# Patient Record
Sex: MALE | Race: BLACK OR AFRICAN AMERICAN | NOT HISPANIC OR LATINO | Employment: UNEMPLOYED | URBAN - METROPOLITAN AREA
[De-identification: names, ages, dates, MRNs, and addresses within clinical notes are randomized per-mention and may not be internally consistent; named-entity substitution may affect disease eponyms.]

---

## 2017-01-07 DIAGNOSIS — Z79.899 ENCOUNTER FOR LONG-TERM (CURRENT) USE OF MEDICATIONS: ICD-10-CM

## 2017-01-07 DIAGNOSIS — Z94.1 HEART TRANSPLANTED: ICD-10-CM

## 2017-01-07 DIAGNOSIS — K21.9 GASTROESOPHAGEAL REFLUX DISEASE, ESOPHAGITIS PRESENCE NOT SPECIFIED: ICD-10-CM

## 2017-01-07 DIAGNOSIS — Z94.1 STATUS POST HEART TRANSPLANT: ICD-10-CM

## 2017-01-07 DIAGNOSIS — T86.20 COMPLICATION OF HEART TRANSPLANT, UNSPECIFIED COMPLICATION: ICD-10-CM

## 2017-01-07 DIAGNOSIS — L28.0 LSC (LICHEN SIMPLEX CHRONICUS): ICD-10-CM

## 2017-01-07 DIAGNOSIS — D84.9 IMMUNOSUPPRESSION: ICD-10-CM

## 2017-01-08 RX ORDER — TRIAMCINOLONE ACETONIDE 1 MG/G
CREAM TOPICAL
Qty: 45 G | Refills: 1 | Status: SHIPPED | OUTPATIENT
Start: 2017-01-08 | End: 2017-03-07 | Stop reason: SDUPTHER

## 2017-01-09 RX ORDER — PRAVASTATIN SODIUM 20 MG/1
TABLET ORAL
Qty: 30 TABLET | Refills: 11 | Status: SHIPPED | OUTPATIENT
Start: 2017-01-09 | End: 2017-12-13 | Stop reason: SDUPTHER

## 2017-01-30 DIAGNOSIS — Z79.52 LONG TERM CURRENT USE OF SYSTEMIC STEROIDS: ICD-10-CM

## 2017-01-30 DIAGNOSIS — T86.20 COMPLICATION OF HEART TRANSPLANT, UNSPECIFIED COMPLICATION: ICD-10-CM

## 2017-01-30 DIAGNOSIS — R06.02 SHORTNESS OF BREATH: ICD-10-CM

## 2017-01-30 DIAGNOSIS — Z94.1 STATUS POST HEART TRANSPLANT: ICD-10-CM

## 2017-01-30 DIAGNOSIS — Z94.1 HEART REPLACED BY TRANSPLANT: Primary | ICD-10-CM

## 2017-01-30 DIAGNOSIS — Z79.899 ENCOUNTER FOR LONG-TERM (CURRENT) USE OF MEDICATIONS: ICD-10-CM

## 2017-01-30 DIAGNOSIS — D84.9 IMMUNOSUPPRESSION: ICD-10-CM

## 2017-01-30 DIAGNOSIS — N28.9 RENAL INSUFFICIENCY: ICD-10-CM

## 2017-01-30 DIAGNOSIS — I15.9 SECONDARY HYPERTENSION: ICD-10-CM

## 2017-01-30 DIAGNOSIS — E78.2 MIXED HYPERLIPIDEMIA: ICD-10-CM

## 2017-02-21 DIAGNOSIS — D84.9 IMMUNOSUPPRESSION: ICD-10-CM

## 2017-02-21 DIAGNOSIS — Z94.1 STATUS POST HEART TRANSPLANT: ICD-10-CM

## 2017-02-21 DIAGNOSIS — Z94.1 HEART TRANSPLANTED: ICD-10-CM

## 2017-02-21 DIAGNOSIS — T86.20 COMPLICATION OF HEART TRANSPLANT, UNSPECIFIED COMPLICATION: ICD-10-CM

## 2017-02-21 DIAGNOSIS — K21.9 GASTROESOPHAGEAL REFLUX DISEASE, ESOPHAGITIS PRESENCE NOT SPECIFIED: ICD-10-CM

## 2017-02-21 DIAGNOSIS — Z79.899 ENCOUNTER FOR LONG-TERM (CURRENT) USE OF MEDICATIONS: ICD-10-CM

## 2017-02-24 RX ORDER — TOPIRAMATE 25 MG/1
TABLET ORAL
Qty: 30 TABLET | Refills: 0 | Status: SHIPPED | OUTPATIENT
Start: 2017-02-24 | End: 2018-01-22

## 2017-03-07 DIAGNOSIS — Z94.1 HEART TRANSPLANTED: ICD-10-CM

## 2017-03-07 DIAGNOSIS — T86.20 COMPLICATION OF HEART TRANSPLANT, UNSPECIFIED COMPLICATION: ICD-10-CM

## 2017-03-07 DIAGNOSIS — D84.9 IMMUNOSUPPRESSION: ICD-10-CM

## 2017-03-07 DIAGNOSIS — K21.9 GASTROESOPHAGEAL REFLUX DISEASE, ESOPHAGITIS PRESENCE NOT SPECIFIED: ICD-10-CM

## 2017-03-07 DIAGNOSIS — Z79.899 ENCOUNTER FOR LONG-TERM (CURRENT) USE OF MEDICATIONS: ICD-10-CM

## 2017-03-07 DIAGNOSIS — L28.0 LSC (LICHEN SIMPLEX CHRONICUS): ICD-10-CM

## 2017-03-07 DIAGNOSIS — Z94.1 STATUS POST HEART TRANSPLANT: ICD-10-CM

## 2017-03-08 ENCOUNTER — PATIENT MESSAGE (OUTPATIENT)
Dept: TRANSPLANT | Facility: CLINIC | Age: 22
End: 2017-03-08

## 2017-03-08 RX ORDER — TRIAMCINOLONE ACETONIDE 1 MG/G
CREAM TOPICAL
Qty: 45 G | Refills: 1 | Status: SHIPPED | OUTPATIENT
Start: 2017-03-08 | End: 2018-09-07

## 2017-03-09 RX ORDER — DICYCLOMINE HYDROCHLORIDE 10 MG/1
CAPSULE ORAL
Qty: 60 CAPSULE | Refills: 1 | Status: SHIPPED | OUTPATIENT
Start: 2017-03-09 | End: 2017-05-08 | Stop reason: SDUPTHER

## 2017-03-09 RX ORDER — MYCOPHENOLIC ACID 360 MG/1
TABLET, DELAYED RELEASE ORAL
Qty: 240 TABLET | Refills: 3 | Status: SHIPPED | OUTPATIENT
Start: 2017-03-09 | End: 2017-12-13 | Stop reason: SDUPTHER

## 2017-04-03 ENCOUNTER — TELEPHONE (OUTPATIENT)
Dept: TRANSPLANT | Facility: CLINIC | Age: 22
End: 2017-04-03

## 2017-04-06 ENCOUNTER — TELEPHONE (OUTPATIENT)
Dept: TRANSPLANT | Facility: CLINIC | Age: 22
End: 2017-04-06

## 2017-04-10 ENCOUNTER — TELEPHONE (OUTPATIENT)
Dept: TRANSPLANT | Facility: CLINIC | Age: 22
End: 2017-04-10

## 2017-04-10 ENCOUNTER — PATIENT MESSAGE (OUTPATIENT)
Dept: TRANSPLANT | Facility: CLINIC | Age: 22
End: 2017-04-10

## 2017-04-10 NOTE — LETTER
[facility] [address] [place] [address]  Phone: [phone]   April 10, 2017    Derrick Montanez  [address] [address]          Dear Derrick Montanez:    We hope this letter finds you well.  This is a reminder to follow-up with your medical care.  I have been trying to get in touch with you to schedule your 6 month labs with appointment. We need to get you in asap, your 6 month follow up was due in March.    Please call to schedule your labs and doctor visit.    If you have any questions, please do not hesitate to call me at [phone].     Sincerely,         Breanna Lucio, RN  Post Heart Transplant Coordinator.    [facility]  [address] [place] [id number] [phone]

## 2017-04-10 NOTE — TELEPHONE ENCOUNTER
Letter sent to patient regarding unable to reach by phone to schedule his 6 month follow up with labs.

## 2017-04-15 DIAGNOSIS — T86.20 COMPLICATION OF HEART TRANSPLANT, UNSPECIFIED COMPLICATION: ICD-10-CM

## 2017-04-15 DIAGNOSIS — Z94.1 HEART TRANSPLANTED: ICD-10-CM

## 2017-04-15 DIAGNOSIS — Z94.1 STATUS POST HEART TRANSPLANT: ICD-10-CM

## 2017-04-15 DIAGNOSIS — K21.9 GASTROESOPHAGEAL REFLUX DISEASE, ESOPHAGITIS PRESENCE NOT SPECIFIED: ICD-10-CM

## 2017-04-15 DIAGNOSIS — D84.9 IMMUNOSUPPRESSION: ICD-10-CM

## 2017-04-15 DIAGNOSIS — Z79.899 ENCOUNTER FOR LONG-TERM (CURRENT) USE OF MEDICATIONS: ICD-10-CM

## 2017-04-15 RX ORDER — LISINOPRIL 5 MG/1
TABLET ORAL
Qty: 30 TABLET | Refills: 5 | Status: SHIPPED | OUTPATIENT
Start: 2017-04-15 | End: 2017-10-14 | Stop reason: SDUPTHER

## 2017-05-08 DIAGNOSIS — T86.20 COMPLICATION OF HEART TRANSPLANT, UNSPECIFIED COMPLICATION: ICD-10-CM

## 2017-05-08 DIAGNOSIS — D84.9 IMMUNOSUPPRESSION: ICD-10-CM

## 2017-05-08 DIAGNOSIS — Z94.1 STATUS POST HEART TRANSPLANT: ICD-10-CM

## 2017-05-08 DIAGNOSIS — Z94.1 HEART REPLACED BY TRANSPLANT: ICD-10-CM

## 2017-05-08 DIAGNOSIS — K21.9 GASTROESOPHAGEAL REFLUX DISEASE, ESOPHAGITIS PRESENCE NOT SPECIFIED: ICD-10-CM

## 2017-05-08 DIAGNOSIS — Z94.1 HEART TRANSPLANTED: ICD-10-CM

## 2017-05-08 DIAGNOSIS — Z79.899 ENCOUNTER FOR LONG-TERM (CURRENT) USE OF MEDICATIONS: ICD-10-CM

## 2017-05-08 RX ORDER — DICYCLOMINE HYDROCHLORIDE 10 MG/1
CAPSULE ORAL
Qty: 60 CAPSULE | Refills: 6 | Status: SHIPPED | OUTPATIENT
Start: 2017-05-08 | End: 2017-11-09 | Stop reason: SDUPTHER

## 2017-05-09 RX ORDER — TACROLIMUS 5 MG/1
5 CAPSULE, GELATIN COATED ORAL EVERY 12 HOURS
Qty: 60 CAPSULE | Refills: 6 | Status: SHIPPED | OUTPATIENT
Start: 2017-05-09 | End: 2018-01-22 | Stop reason: SDUPTHER

## 2017-05-09 RX ORDER — TACROLIMUS 1 MG/1
CAPSULE, GELATIN COATED ORAL
Qty: 120 CAPSULE | Refills: 6 | Status: SHIPPED | OUTPATIENT
Start: 2017-05-09 | End: 2017-05-09 | Stop reason: SDUPTHER

## 2017-05-09 RX ORDER — TACROLIMUS 1 MG/1
1 CAPSULE, GELATIN COATED ORAL EVERY 12 HOURS
Qty: 120 CAPSULE | Refills: 6 | Status: SHIPPED | OUTPATIENT
Start: 2017-05-09 | End: 2018-01-22 | Stop reason: SDUPTHER

## 2017-05-09 RX ORDER — TACROLIMUS 5 MG/1
CAPSULE, GELATIN COATED ORAL
Qty: 60 CAPSULE | Refills: 6 | Status: SHIPPED | OUTPATIENT
Start: 2017-05-09 | End: 2017-05-09 | Stop reason: SDUPTHER

## 2017-05-18 ENCOUNTER — HOSPITAL ENCOUNTER (OUTPATIENT)
Dept: CARDIOLOGY | Facility: CLINIC | Age: 22
Discharge: HOME | End: 2017-05-18
Payer: MEDICAID

## 2017-05-18 ENCOUNTER — OFFICE VISIT (OUTPATIENT)
Dept: TRANSPLANT | Facility: CLINIC | Age: 22
End: 2017-05-18
Payer: MEDICAID

## 2017-05-18 VITALS
HEIGHT: 64 IN | SYSTOLIC BLOOD PRESSURE: 154 MMHG | BODY MASS INDEX: 40.39 KG/M2 | WEIGHT: 236.56 LBS | HEART RATE: 90 BPM | DIASTOLIC BLOOD PRESSURE: 86 MMHG

## 2017-05-18 DIAGNOSIS — Z94.1 HEART REPLACED BY TRANSPLANT: ICD-10-CM

## 2017-05-18 DIAGNOSIS — D84.9 IMMUNOSUPPRESSION: ICD-10-CM

## 2017-05-18 DIAGNOSIS — F41.9 ANXIETY: ICD-10-CM

## 2017-05-18 DIAGNOSIS — Z79.52 LONG TERM (CURRENT) USE OF SYSTEMIC STEROIDS: ICD-10-CM

## 2017-05-18 DIAGNOSIS — K21.9 GASTROESOPHAGEAL REFLUX DISEASE, ESOPHAGITIS PRESENCE NOT SPECIFIED: ICD-10-CM

## 2017-05-18 DIAGNOSIS — Z94.1 HEART REPLACED BY TRANSPLANT: Primary | ICD-10-CM

## 2017-05-18 DIAGNOSIS — E66.01 OBESITY, CLASS III, BMI 40-49.9 (MORBID OBESITY): ICD-10-CM

## 2017-05-18 LAB
DIASTOLIC DYSFUNCTION: NO
ESTIMATED PA SYSTOLIC PRESSURE: 22.09
RETIRED EF AND QEF - SEE NOTES: 55 (ref 55–65)
TRICUSPID VALVE REGURGITATION: NORMAL

## 2017-05-18 PROCEDURE — 93320 DOPPLER ECHO COMPLETE: CPT | Mod: 26,S$PBB,, | Performed by: INTERNAL MEDICINE

## 2017-05-18 PROCEDURE — 99999 PR PBB SHADOW E&M-EST. PATIENT-LVL III: CPT | Mod: PBBFAC,,, | Performed by: PHYSICIAN ASSISTANT

## 2017-05-18 PROCEDURE — 93325 DOPPLER ECHO COLOR FLOW MAPG: CPT | Mod: 26,S$PBB,, | Performed by: INTERNAL MEDICINE

## 2017-05-18 PROCEDURE — 93351 STRESS TTE COMPLETE: CPT | Mod: 26,S$PBB,, | Performed by: INTERNAL MEDICINE

## 2017-05-18 PROCEDURE — 99213 OFFICE O/P EST LOW 20 MIN: CPT | Mod: PBBFAC | Performed by: PHYSICIAN ASSISTANT

## 2017-05-18 PROCEDURE — 99214 OFFICE O/P EST MOD 30 MIN: CPT | Mod: S$PBB,,, | Performed by: PHYSICIAN ASSISTANT

## 2017-05-18 RX ORDER — ESCITALOPRAM OXALATE 10 MG/1
10 TABLET ORAL DAILY
Qty: 30 TABLET | Refills: 2 | Status: SHIPPED | OUTPATIENT
Start: 2017-05-18 | End: 2017-08-29 | Stop reason: SDUPTHER

## 2017-05-18 NOTE — PROGRESS NOTES
"Subjective:   Mr. Montanez is a 21 y.o. year old Black or  male who received a  heart transplant on 3/17/09.      CMV status:   Donor: +  Recipient: +    HPI   22 y/o AAM who developed myocarditis at age 6, ultimately underwent transplant at [facility] on 3/17/2009, who shared care between [facility] and [facility] but now is following with us, here for routine follow-up visit 8-years post-transplant.    Patient remains on prednisone 2.5 mg despite being this far out, which per the [facility] snapshot is "due to clinic noncompliance and infrequent FU in [place]." Mother was on facetime throughout our visit as she couldn't be here today. Patient biggest concern today is getting off of prednisone. He feels that this has stunted his growth and is hopeful to grow 2 more inches. 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 He has never taken anything for this but would like to start something. Also has had intermittent cough since April which is associated with seasonal allergies. Cough improves on the day he takes zyrtec. He has not been exercising, but is planning to join a gym next Thursday. Has had a sleep study 4-5 years ago which he reports was negative for sleep apnea.  He does not have daytime sleepiness, difficulty concentrating, or sleeping. Mom denies that he snores.     He denies chest pain, shortness of breath, nausea, vomiting, diarrhea, fever, chills, abdominal pain or any other symptoms at this time.     Review of Systems   Constitution: Negative for chills, decreased appetite, diaphoresis, fever, weakness, malaise/fatigue, night sweats, weight gain and weight loss.   HENT: Negative for headaches.    Cardiovascular: Negative for chest pain, claudication, leg swelling, near-syncope, orthopnea and palpitations.   Respiratory: Negative for cough, hemoptysis, shortness of breath, sleep disturbances due to breathing, snoring, sputum production and " "wheezing.    Endocrine: Negative for cold intolerance and heat intolerance.   Musculoskeletal: Negative for gout, joint pain, joint swelling, muscle cramps, muscle weakness and myalgias.   Gastrointestinal: Negative for bloating, abdominal pain, melena, nausea and vomiting.   Genitourinary: Positive for flank pain. Negative for dysuria.   Neurological: Positive for tremors (reports secondary to anxiety). Negative for difficulty with concentration, excessive daytime sleepiness and dizziness.   Psychiatric/Behavioral: Negative for depression, memory loss, substance abuse and suicidal ideas. The patient is nervous/anxious. The patient does not have insomnia.        Objective:   Blood pressure (!) 154/86, pulse 90, height 5' 3.5" (1.613 m), weight 107.3 kg (236 lb 8.9 oz).body mass index is 41.25 kg/(m^2).    Physical Exam   Constitutional: He is oriented to person, place, and time. He appears well-developed and well-nourished.   obese   Neck: Normal range of motion. Neck supple. No JVD present.   Cardiovascular: Normal rate, regular rhythm, S1 normal and S2 normal.    No murmur heard.  Pulmonary/Chest: Effort normal and breath sounds normal. No respiratory distress. He has no wheezes. He has no rales.   Abdominal: Soft. Bowel sounds are normal. He exhibits no distension. There is no tenderness. There is no rebound.   Musculoskeletal: Normal range of motion. He exhibits no edema.   Neurological: He is alert and oriented to person, place, and time.   Skin: Skin is warm and dry. No rash noted. No erythema. No pallor.   Psychiatric: His mood appears anxious.       Lab Results   Component Value Date    WBC 11.14 05/18/2017    HGB 14.6 05/18/2017    HCT 44.3 05/18/2017    MCV 85 05/18/2017     05/18/2017    CO2 24 05/18/2017    CREATININE 1.0 05/18/2017    CALCIUM 9.3 05/18/2017    ALBUMIN 3.9 05/18/2017    AST 17 05/18/2017    BNP 51 05/18/2017    ALT 18 05/18/2017    ALLOMAP 27 10/07/2016       Lab Results " "  Component Value Date    INR 1.0 10/07/2016       BNP   Date Value Ref Range Status   05/18/2017 51 0 - 99 pg/mL Final     Comment:     Values of less than 100 pg/ml are consistent with non-CHF populations.   10/07/2016 23 0 - 99 pg/mL Final     Comment:     Values of less than 100 pg/ml are consistent with non-CHF populations.   07/14/2016 24 0 - 99 pg/mL Final     Comment:     Values of less than 100 pg/ml are consistent with non-CHF populations.       No results found for: LDH    Tacrolimus Lvl   Date Value Ref Range Status   05/18/2017 6.1 5.0 - 15.0 ng/mL Final     Comment:     Testing performed by Liquid Chromatography-Tandem  Mass Spectrometry (LC-MS/MS).  This test was developed and its performance characteristics  determined by [facility], Department of Pathology  and Laboratory Medicine in a manner consistent with CLIA  requirements. It has not been cleared or approved by the [place]  Food and Drug Administration.  This test is used for clinical   purposes.  It should not be regarded as investigational or for  research.       No results found for: SIROLIMUS  No results found for: CYCLOSPORINE    No results found for this or any previous visit.  No results found for this or any previous visit.    Labs were reviewed with the patient.    Assessment:     1. Heart replaced by transplant    2. Anxiety    3. Gastroesophageal reflux disease, esophagitis presence not specified    4. Immunosuppression    5. Obesity, Class III, BMI 40-49.9 (morbid obesity)    6. Long term (current) use of systemic steroids        Plan:     -Patient had exercise stress echo today and both patient and wife oncern immediately regarding the findings of "enlarged RV" and LVEF 55-60%. I reassured them that the report findings suggested only mild RV enlargement with normal function and that LVEF was still WNL. I reviewed the echo with Dr. Baron who also helped to reassure the patient and mother of the same.   -Discussed weight loss " and exercise at length are crucial to ensure continued overall good health and graft function. He plans to start exercising next week after joining the gym  -Would plan weaning prednisone from 2.5 to 1 mg as he has been on prednisone for many years to reduce withdrawal symptoms. Will await DSA and allomap  -BP elevated in clinic today but patient reports this is unusual for him, and earlier today it was 120/80  -Start lexapro 10 mg. Has a 12-lead EKG from today which can serve as a baseline (if concern for QT prolongation in the future). Will have transplant coordinator f/u via telephone in 1 week to ensure no side effects  - Per patient's mother's request will send clinic note to his PCP Dr. Chelsea Willard    Return instructions as set forth by post transplant schedule or as needed:    Clinic: Return for labs and/or biopsy weekly the first month, every two weeks during month 2 and then monthly for the first year at the provider or coordinator's discretion. During the second year, return to clinic every 3 months. Post transplant year 3-5 return every 6 months. There will be a comprehensive post transplant evaluation every year that may include LHC/RHC/biopsy, stress test, echo, CXR, and other health screening exams.    In addition to the clinical assessment, I have ordered Allomap testing for this patient to assist in identification of moderate/severe acute cellular rejection (ACR) in a pt with stable Allograft function instead of endomyocardial biopsy.     Patient is reminded to call with any health changes as these can be early signs of transplant complications. Patient is advised to make sure any new medications or changes of old medications are discussed with a pharmacist or physician knowledgeable with transplant to avoid rejection/drug toxicity related to significant drug interactions.    UNOS Patient Status  Functional Status: 100% - Normal, no complaints, no evidence of disease  Physical Capacity: No  Limitations  Working for Income: No  If no, reason not working: Patient Choice - Student Full Time/Part Time    WILMER McdowellC

## 2017-05-18 NOTE — PROGRESS NOTES
Pt walked into clinic feeling well. Pt wanted to know if Allomap is stable, he wanted to discontinue prednisone and recheck allomaps monthly until he goes back to school.

## 2017-05-19 ENCOUNTER — TELEPHONE (OUTPATIENT)
Dept: TRANSPLANT | Facility: CLINIC | Age: 22
End: 2017-05-19

## 2017-05-19 DIAGNOSIS — Z94.1 STATUS POST HEART TRANSPLANT: Primary | ICD-10-CM

## 2017-05-23 ENCOUNTER — TELEPHONE (OUTPATIENT)
Dept: TRANSPLANT | Facility: CLINIC | Age: 22
End: 2017-05-23

## 2017-05-23 ENCOUNTER — CONFERENCE (OUTPATIENT)
Dept: TRANSPLANT | Facility: CLINIC | Age: 22
End: 2017-05-23
Payer: MEDICAID

## 2017-05-23 ENCOUNTER — PATIENT MESSAGE (OUTPATIENT)
Dept: TRANSPLANT | Facility: CLINIC | Age: 22
End: 2017-05-23

## 2017-05-23 RX ORDER — PREDNISONE 2.5 MG/1
2.5 TABLET ORAL
Qty: 30 TABLET | Refills: 11 | Status: SHIPPED | OUTPATIENT
Start: 2017-05-23 | End: 2018-01-22 | Stop reason: SDUPTHER

## 2017-05-23 NOTE — TELEPHONE ENCOUNTER
Discussed at chart review. Pt wishes to decrease prednisone dose. Will decrease Pred to 2.5mg Tu and Thurs and watch for adrenal insufficiency.

## 2017-06-23 ENCOUNTER — TELEPHONE (OUTPATIENT)
Dept: TRANSPLANT | Facility: CLINIC | Age: 22
End: 2017-06-23

## 2017-06-23 NOTE — TELEPHONE ENCOUNTER
Pt called to inform me that he smashed his hand on a kaushik nail. No bleeding and not sure if it punctured his skin enough. I advised him to go to doctor for tetnus shot. If he should develop and s/s of infection at the entry poitn to to get antibiotics asap and let me know.

## 2017-08-01 ENCOUNTER — PATIENT MESSAGE (OUTPATIENT)
Dept: TRANSPLANT | Facility: CLINIC | Age: 22
End: 2017-08-01

## 2017-08-02 ENCOUNTER — TELEPHONE (OUTPATIENT)
Dept: TRANSPLANT | Facility: CLINIC | Age: 22
End: 2017-08-02

## 2017-08-02 DIAGNOSIS — Z94.1 STATUS POST HEART TRANSPLANT: Primary | ICD-10-CM

## 2017-08-02 NOTE — TELEPHONE ENCOUNTER
Pt called regarding GI upset. Vomited once last night, diarrhea today. He has not been taking nexium for a couple of days. Will draw labs to rule out CMV.

## 2017-08-07 DIAGNOSIS — E78.2 MIXED HYPERLIPIDEMIA: ICD-10-CM

## 2017-08-07 DIAGNOSIS — Z12.5 SCREENING FOR PROSTATE CANCER: ICD-10-CM

## 2017-08-07 DIAGNOSIS — Z94.1 STATUS POST HEART TRANSPLANT: ICD-10-CM

## 2017-08-29 DIAGNOSIS — F41.9 ANXIETY: ICD-10-CM

## 2017-08-29 RX ORDER — ESCITALOPRAM OXALATE 10 MG/1
10 TABLET ORAL DAILY
Qty: 30 TABLET | Refills: 2 | Status: SHIPPED | OUTPATIENT
Start: 2017-08-29 | End: 2017-11-09 | Stop reason: SDUPTHER

## 2017-10-14 DIAGNOSIS — D84.9 IMMUNOSUPPRESSION: ICD-10-CM

## 2017-10-14 DIAGNOSIS — Z79.899 ENCOUNTER FOR LONG-TERM (CURRENT) USE OF MEDICATIONS: ICD-10-CM

## 2017-10-14 DIAGNOSIS — K21.9 GASTROESOPHAGEAL REFLUX DISEASE, ESOPHAGITIS PRESENCE NOT SPECIFIED: ICD-10-CM

## 2017-10-14 DIAGNOSIS — Z94.1 HEART TRANSPLANTED: ICD-10-CM

## 2017-10-14 DIAGNOSIS — Z94.1 STATUS POST HEART TRANSPLANT: ICD-10-CM

## 2017-10-14 DIAGNOSIS — T86.20 COMPLICATION OF HEART TRANSPLANT, UNSPECIFIED COMPLICATION: ICD-10-CM

## 2017-10-14 RX ORDER — LISINOPRIL 5 MG/1
TABLET ORAL
Qty: 30 TABLET | Refills: 5 | Status: SHIPPED | OUTPATIENT
Start: 2017-10-14 | End: 2018-01-22

## 2017-10-14 RX ORDER — DILTIAZEM HYDROCHLORIDE 180 MG/1
CAPSULE, COATED, EXTENDED RELEASE ORAL
Qty: 30 CAPSULE | Status: CANCELLED | OUTPATIENT
Start: 2017-10-14

## 2017-10-16 DIAGNOSIS — I10 ESSENTIAL HYPERTENSION: Primary | ICD-10-CM

## 2017-10-16 DIAGNOSIS — Z94.1 STATUS POST HEART TRANSPLANT: ICD-10-CM

## 2017-10-16 RX ORDER — DILTIAZEM HYDROCHLORIDE 180 MG/1
180 CAPSULE, COATED, EXTENDED RELEASE ORAL DAILY
Qty: 30 CAPSULE | Refills: 11 | Status: SHIPPED | OUTPATIENT
Start: 2017-10-16 | End: 2018-10-11 | Stop reason: SDUPTHER

## 2017-11-09 DIAGNOSIS — Z94.1 STATUS POST HEART TRANSPLANT: ICD-10-CM

## 2017-11-09 DIAGNOSIS — Z79.899 ENCOUNTER FOR LONG-TERM (CURRENT) USE OF MEDICATIONS: ICD-10-CM

## 2017-11-09 DIAGNOSIS — F41.9 ANXIETY: ICD-10-CM

## 2017-11-09 DIAGNOSIS — Z94.1 HEART TRANSPLANTED: ICD-10-CM

## 2017-11-09 DIAGNOSIS — T86.20 COMPLICATION OF HEART TRANSPLANT, UNSPECIFIED COMPLICATION: ICD-10-CM

## 2017-11-09 DIAGNOSIS — K21.9 GASTROESOPHAGEAL REFLUX DISEASE, ESOPHAGITIS PRESENCE NOT SPECIFIED: ICD-10-CM

## 2017-11-09 DIAGNOSIS — D84.9 IMMUNOSUPPRESSION: ICD-10-CM

## 2017-11-09 RX ORDER — DICYCLOMINE HYDROCHLORIDE 10 MG/1
10 CAPSULE ORAL 2 TIMES DAILY
Qty: 60 CAPSULE | Refills: 6 | Status: SHIPPED | OUTPATIENT
Start: 2017-11-09 | End: 2018-07-07 | Stop reason: SDUPTHER

## 2017-11-09 RX ORDER — ESCITALOPRAM OXALATE 10 MG/1
10 TABLET ORAL DAILY
Qty: 30 TABLET | Refills: 2 | Status: SHIPPED | OUTPATIENT
Start: 2017-11-09 | End: 2018-01-29 | Stop reason: SDUPTHER

## 2017-12-13 DIAGNOSIS — Z94.1 STATUS POST HEART TRANSPLANT: ICD-10-CM

## 2017-12-13 DIAGNOSIS — T86.20 COMPLICATION OF HEART TRANSPLANT, UNSPECIFIED COMPLICATION: ICD-10-CM

## 2017-12-13 DIAGNOSIS — D84.9 IMMUNOSUPPRESSION: ICD-10-CM

## 2017-12-13 DIAGNOSIS — Z79.899 ENCOUNTER FOR LONG-TERM (CURRENT) USE OF MEDICATIONS: ICD-10-CM

## 2017-12-13 DIAGNOSIS — Z94.1 HEART TRANSPLANTED: ICD-10-CM

## 2017-12-13 DIAGNOSIS — K21.9 GASTROESOPHAGEAL REFLUX DISEASE, ESOPHAGITIS PRESENCE NOT SPECIFIED: ICD-10-CM

## 2017-12-13 RX ORDER — MYCOPHENOLIC ACID 360 MG/1
TABLET, DELAYED RELEASE ORAL
Qty: 240 TABLET | Refills: 3 | Status: SHIPPED | OUTPATIENT
Start: 2017-12-13 | End: 2018-01-22

## 2017-12-13 RX ORDER — PRAVASTATIN SODIUM 20 MG/1
TABLET ORAL
Qty: 30 TABLET | Refills: 5 | Status: SHIPPED | OUTPATIENT
Start: 2017-12-13 | End: 2018-01-22

## 2018-01-03 ENCOUNTER — PATIENT MESSAGE (OUTPATIENT)
Dept: TRANSPLANT | Facility: CLINIC | Age: 23
End: 2018-01-03

## 2018-01-03 ENCOUNTER — TELEPHONE (OUTPATIENT)
Dept: TRANSPLANT | Facility: CLINIC | Age: 23
End: 2018-01-03

## 2018-01-03 NOTE — TELEPHONE ENCOUNTER
----- Message from Laisha Porter sent at 12/28/2017  8:32 AM CST -----  Good Morning!                    Pt canceled his appointment via [facility] portal due to time being inconvenient. Should I reschedule appointment or will you like to handle matter?                                                                          Thank you

## 2018-01-03 NOTE — TELEPHONE ENCOUNTER
Called pt regarding appts no VM set up.     Called pt's mother no answer. Will send [facility] message and letter to pt regarding follow up.

## 2018-01-03 NOTE — LETTER
January 3, 2018    Derrick Montanez  [address] [address]          Dear Derrick Montanez:    We hope this letter finds you well.  This is a reminder to follow-up with your medical care.  You missed your appointment with us on 1/3/2018 and have not called to reschedule.     Please call to reschedule your appointment as soon as you are able. We hope to see you soon.    If you have any questions, please do not hesitate to call me at [phone].     Sincerely,         Breanna Lucio, RN  Post Heart Transplant Coordinator    [facility]  [address] [place] [id number] [phone]

## 2018-01-03 NOTE — TELEPHONE ENCOUNTER
Called pt to see if he was coming to clinic today. Once phone number was disconnected, which was removed from demographics. The other phone number was not set up for voicemail. Will send a message through [employer] to pt.

## 2018-01-22 ENCOUNTER — TELEPHONE (OUTPATIENT)
Dept: TRANSPLANT | Facility: CLINIC | Age: 23
End: 2018-01-22

## 2018-01-22 ENCOUNTER — HOSPITAL ENCOUNTER (OUTPATIENT)
Dept: CARDIOLOGY | Facility: CLINIC | Age: 23
Discharge: HOME | End: 2018-01-22
Attending: INTERNAL MEDICINE
Payer: MEDICAID

## 2018-01-22 ENCOUNTER — OFFICE VISIT (OUTPATIENT)
Dept: TRANSPLANT | Facility: CLINIC | Age: 23
End: 2018-01-22
Payer: MEDICAID

## 2018-01-22 ENCOUNTER — HOSPITAL ENCOUNTER (OUTPATIENT)
Dept: RADIOLOGY | Facility: CLINIC | Age: 23
Discharge: HOME | End: 2018-01-22
Attending: INTERNAL MEDICINE
Payer: MEDICAID

## 2018-01-22 ENCOUNTER — HOSPITAL ENCOUNTER (OUTPATIENT)
Dept: RADIOLOGY | Facility: HOSPITAL | Age: 23
Discharge: HOME | End: 2018-01-22
Attending: INTERNAL MEDICINE
Payer: MEDICAID

## 2018-01-22 VITALS
DIASTOLIC BLOOD PRESSURE: 82 MMHG | HEART RATE: 80 BPM | HEIGHT: 65 IN | BODY MASS INDEX: 44.56 KG/M2 | WEIGHT: 267.44 LBS | SYSTOLIC BLOOD PRESSURE: 135 MMHG

## 2018-01-22 DIAGNOSIS — Z79.899 LONG TERM CURRENT USE OF IMMUNOSUPPRESSIVE DRUG: ICD-10-CM

## 2018-01-22 DIAGNOSIS — Z94.1 HEART REPLACED BY TRANSPLANT: ICD-10-CM

## 2018-01-22 DIAGNOSIS — Z94.1 STATUS POST HEART TRANSPLANT: ICD-10-CM

## 2018-01-22 DIAGNOSIS — Z94.1 STATUS POST HEART TRANSPLANT: Primary | ICD-10-CM

## 2018-01-22 DIAGNOSIS — E66.01 OBESITY, CLASS III, BMI 40-49.9 (MORBID OBESITY): ICD-10-CM

## 2018-01-22 DIAGNOSIS — T86.20 COMPLICATION OF HEART TRANSPLANT, UNSPECIFIED COMPLICATION: ICD-10-CM

## 2018-01-22 DIAGNOSIS — E55.9 VITAMIN D DEFICIENCY: ICD-10-CM

## 2018-01-22 PROBLEM — Z79.60 LONG TERM CURRENT USE OF IMMUNOSUPPRESSIVE DRUG: Status: ACTIVE | Noted: 2018-01-22

## 2018-01-22 LAB
DIASTOLIC DYSFUNCTION: NO
ESTIMATED PA SYSTOLIC PRESSURE: 24.16
RETIRED EF AND QEF - SEE NOTES: 60 (ref 55–65)
TRICUSPID VALVE REGURGITATION: ABNORMAL

## 2018-01-22 PROCEDURE — 99213 OFFICE O/P EST LOW 20 MIN: CPT | Mod: PBBFAC,25,NTX | Performed by: INTERNAL MEDICINE

## 2018-01-22 PROCEDURE — 99999 PR PBB SHADOW E&M-EST. PATIENT-LVL III: CPT | Mod: PBBFAC,,, | Performed by: INTERNAL MEDICINE

## 2018-01-22 PROCEDURE — 93306 TTE W/DOPPLER COMPLETE: CPT | Mod: PBBFAC | Performed by: INTERNAL MEDICINE

## 2018-01-22 PROCEDURE — 86832 HLA CLASS I HIGH DEFIN QUAL: CPT | Mod: PO,TXP

## 2018-01-22 PROCEDURE — 77080 DXA BONE DENSITY AXIAL: CPT | Mod: TC

## 2018-01-22 PROCEDURE — 71046 X-RAY EXAM CHEST 2 VIEWS: CPT | Mod: TC,FY,NTX

## 2018-01-22 PROCEDURE — 77080 DXA BONE DENSITY AXIAL: CPT | Mod: 26,,, | Performed by: INTERNAL MEDICINE

## 2018-01-22 PROCEDURE — 99214 OFFICE O/P EST MOD 30 MIN: CPT | Mod: S$PBB,,, | Performed by: INTERNAL MEDICINE

## 2018-01-22 PROCEDURE — 71046 X-RAY EXAM CHEST 2 VIEWS: CPT | Mod: 26,,, | Performed by: RADIOLOGY

## 2018-01-22 RX ORDER — ERGOCALCIFEROL 1.25 MG/1
50000 CAPSULE ORAL
Qty: 30 CAPSULE | Refills: 1 | Status: SHIPPED | OUTPATIENT
Start: 2018-01-22 | End: 2019-05-23 | Stop reason: SDUPTHER

## 2018-01-22 NOTE — TELEPHONE ENCOUNTER
Emailed pt regarding low vitamin D level. Dr. Lawrence sent a prescription to [facility] for ergocalicferal 50,000 units weekly.

## 2018-01-22 NOTE — PROGRESS NOTES
"Subjective:   Mr. Montanez is a 22 y.o. year old Black or  male who received a  heart transplant on 3/17/09.      CMV status:   Donor: +  Recipient: +    HPI   22 y.o. AAM who developed myocarditis at age 6, ultimately underwent transplant at [facility] on 3/17/2009, who shared care between [facility] and [facility] but now is following with us, here for routine follow-up visit 9-years post-transplant. Patient remains on prednisone 2.5 mg despite being this far out, which per the [facility] snapshot is "due to clinic noncompliance and infrequent FU in [place]." He as last seen 5/2017 when he was wanting to get off pred and c/o anxiety about nursing school. He was started on Lexapro and his prednisone was weaned from 2.5mg daily to 2.5 Tuesday and Thursday. He presents today for routine f/u.     Since last visit, pt reports doing very well symptomatically. He has no cardiopulmonary complaints. He had an urgent care visit ~5 weeks ago for ear infection that has since resolved (had tubes placed). He has continued to gain weight (~20-30lbs in last year). Had long discussion about diet (portion control, avoid sweets/snacks). He has not been exercising, but is planning going to his apartment gym.     He denies chest pain, shortness of breath, nausea, vomiting, diarrhea, fever, chills, abdominal pain or any other symptoms at this time.     Current IS:  Tacrolimus 7/7  Myfortic 720 BID  Pred 2.5mg Tues/Thurs    DSE 5/18/17: Today's is pending  CONCLUSIONS     1 - Post-cardiac transplantation study.     2 - Concentric hypertrophy.     3 - Normal left ventricular systolic function (EF 55-60%).     4 - Right ventricular enlargement with normal systolic function.     5 - Normal left ventricular diastolic function.     6 - Mild tricuspid regurgitation.     No evidence of stress induced myocardial ischemia. Sensitivity is impaired due to failure to reach target heart rate.     Review of Systems   Constitution: Negative for chills, " "decreased appetite, diaphoresis, fever, weakness, malaise/fatigue, night sweats, weight gain and weight loss.   Cardiovascular: Negative for chest pain, claudication, leg swelling, near-syncope, orthopnea and palpitations.   Respiratory: Negative for cough, hemoptysis, shortness of breath, sleep disturbances due to breathing, snoring, sputum production and wheezing.    Endocrine: Negative for cold intolerance and heat intolerance.   Musculoskeletal: Negative for gout, joint pain, joint swelling, muscle cramps, muscle weakness and myalgias.   Gastrointestinal: Negative for bloating, abdominal pain, melena, nausea and vomiting.   Genitourinary: Negative for dysuria and flank pain.   Neurological: Negative for difficulty with concentration, excessive daytime sleepiness, dizziness, headaches and tremors.   Psychiatric/Behavioral: Negative for depression, memory loss, substance abuse and suicidal ideas. The patient does not have insomnia and is not nervous/anxious.        Objective:   Blood pressure 135/82, pulse 80, height 5' 5" (1.651 m), weight 121.3 kg (267 lb 6.7 oz).body mass index is 44.5 kg/m².    Physical Exam   Constitutional: He is oriented to person, place, and time. He appears well-developed and well-nourished.   obese   Neck: Normal range of motion. Neck supple. No JVD present.   Cardiovascular: Normal rate, regular rhythm, S1 normal and S2 normal.    No murmur heard.  Pulmonary/Chest: Effort normal and breath sounds normal. No respiratory distress. He has no wheezes. He has no rales.   Abdominal: Soft. Bowel sounds are normal. He exhibits no distension. There is no tenderness. There is no rebound.   Musculoskeletal: Normal range of motion. He exhibits no edema.   Neurological: He is alert and oriented to person, place, and time.   Skin: Skin is warm and dry. No rash noted. No erythema. No pallor.   Psychiatric: His mood appears anxious.       Lab Results   Component Value Date    WBC 12.79 (H) 01/22/2018 "    HGB 14.7 01/22/2018    HCT 44.8 01/22/2018    MCV 84 01/22/2018     01/22/2018    CO2 24 05/18/2017    CREATININE 1.0 05/18/2017    CALCIUM 9.3 05/18/2017    ALBUMIN 3.9 05/18/2017    AST 17 05/18/2017    BNP 51 05/18/2017    ALT 18 05/18/2017    ALLOMAP 26 05/18/2017       Lab Results   Component Value Date    INR 1.0 10/07/2016       BNP   Date Value Ref Range Status   05/18/2017 51 0 - 99 pg/mL Final     Comment:     Values of less than 100 pg/ml are consistent with non-CHF populations.   10/07/2016 23 0 - 99 pg/mL Final     Comment:     Values of less than 100 pg/ml are consistent with non-CHF populations.   07/14/2016 24 0 - 99 pg/mL Final     Comment:     Values of less than 100 pg/ml are consistent with non-CHF populations.       No results found for: LDH    Tacrolimus Lvl   Date Value Ref Range Status   05/18/2017 6.1 5.0 - 15.0 ng/mL Final     Comment:     Testing performed by Liquid Chromatography-Tandem  Mass Spectrometry (LC-MS/MS).  This test was developed and its performance characteristics  determined by [facility], Department of Pathology  and Laboratory Medicine in a manner consistent with CLIA  requirements. It has not been cleared or approved by the [place]  Food and Drug Administration.  This test is used for clinical   purposes.  It should not be regarded as investigational or for  research.       No results found for: SIROLIMUS  No results found for: CYCLOSPORINE    No results found for this or any previous visit.  No results found for this or any previous visit.    Labs were reviewed with the patient.    Assessment:     1. Status post heart transplant    2. Heart replaced by transplant    3. Complication of heart transplant, unspecified complication    4. Obesity, Class III, BMI 40-49.9 (morbid obesity)    5. Long term current use of immunosuppressive drug        Plan:   S/p OHT  - doing well; labs reviewed and BNP, Scr are normal; LDL at goal  - Discussed weight loss and  exercise at length are crucial to ensure continued overall good health and graft function.   - Will plan on weaning prednisone once HLA and Echo resulted; will discuss at chart review   - BP today at goal  - Cont lexapro 10 mg.   - DEXA today c/w osteopenia of L1 (Z score -2.6); will check Vit D and place on Ca++/Vit D supplementation    Return instructions as set forth by post transplant schedule or as needed:    Clinic: Return for labs and/or biopsy weekly the first month, every two weeks during month 2 and then monthly for the first year at the provider or coordinator's discretion. During the second year, return to clinic every 3 months. Post transplant year 3-5 return every 6 months. There will be a comprehensive post transplant evaluation every year that may include LHC/RHC/biopsy, stress test, echo, CXR, and other health screening exams.    In addition to the clinical assessment, I have ordered HLA/DSA testing for this patient to assist in identification of moderate/severe acute cellular rejection (ACR) in a pt with stable Allograft function instead of endomyocardial biopsy.     Patient is reminded to call with any health changes as these can be early signs of transplant complications. Patient is advised to make sure any new medications or changes of old medications are discussed with a pharmacist or physician knowledgeable with transplant to avoid rejection/drug toxicity related to significant drug interactions.    UNOS Patient Status  Functional Status: 100% - Normal, no complaints, no evidence of disease  Physical Capacity: No Limitations  Working for Income: No  If no, reason not working: Patient Choice - Student Full Time/Part Time    Eduardo Lawrence MD    Addendum:  -Vitamin D level 11: will give Rx for Ergocalciferol 50,000 weekly for 3-6 months and recheck levels    - Also, patient is clear to undergo TM tube removal by ENT; please call with questions

## 2018-01-23 RX ORDER — PREDNISONE 2.5 MG/1
2.5 TABLET ORAL
Qty: 30 TABLET | Refills: 11 | Status: SHIPPED | OUTPATIENT
Start: 2018-01-23 | End: 2019-02-07 | Stop reason: SDUPTHER

## 2018-01-23 RX ORDER — TOPIRAMATE 25 MG/1
25 TABLET ORAL NIGHTLY
Qty: 30 TABLET | Refills: 11 | Status: SHIPPED | OUTPATIENT
Start: 2018-01-23 | End: 2019-02-07 | Stop reason: SDUPTHER

## 2018-01-23 RX ORDER — LISINOPRIL 5 MG/1
5 TABLET ORAL DAILY
Qty: 30 TABLET | Refills: 11 | Status: SHIPPED | OUTPATIENT
Start: 2018-01-23 | End: 2019-03-25 | Stop reason: SDUPTHER

## 2018-01-23 RX ORDER — TACROLIMUS 1 MG/1
1 CAPSULE, GELATIN COATED ORAL EVERY 12 HOURS
Qty: 120 CAPSULE | Refills: 11 | Status: SHIPPED | OUTPATIENT
Start: 2018-01-23 | End: 2019-03-25

## 2018-01-23 RX ORDER — TACROLIMUS 5 MG/1
5 CAPSULE, GELATIN COATED ORAL EVERY 12 HOURS
Qty: 60 CAPSULE | Refills: 11 | Status: SHIPPED | OUTPATIENT
Start: 2018-01-23 | End: 2019-02-06 | Stop reason: SDUPTHER

## 2018-01-23 RX ORDER — MYCOPHENOLIC ACID 360 MG/1
720 TABLET, DELAYED RELEASE ORAL 2 TIMES DAILY
Qty: 120 TABLET | Refills: 6 | Status: SHIPPED | OUTPATIENT
Start: 2018-01-23 | End: 2019-06-29 | Stop reason: SDUPTHER

## 2018-01-23 RX ORDER — PRAVASTATIN SODIUM 20 MG/1
20 TABLET ORAL NIGHTLY
Qty: 30 TABLET | Refills: 11 | Status: SHIPPED | OUTPATIENT
Start: 2018-01-23 | End: 2019-11-22

## 2018-01-23 RX ORDER — HYDROGEN PEROXIDE 3 %
20 SOLUTION, NON-ORAL MISCELLANEOUS
Qty: 30 CAPSULE | Refills: 11 | Status: SHIPPED | OUTPATIENT
Start: 2018-01-23

## 2018-01-24 ENCOUNTER — TELEPHONE (OUTPATIENT)
Dept: TRANSPLANT | Facility: CLINIC | Age: 23
End: 2018-01-24

## 2018-01-24 NOTE — TELEPHONE ENCOUNTER
----- Message from Willa Jeong sent at 1/24/2018  1:37 PM CST -----  Contact: Robyn carcamo/ [facility]  Robyn called to get a clearance and a heart note for this pt because he is having surgery tomorrow.  She can be reached @ [phone], the fax# is [phone].  Thanks!

## 2018-01-24 NOTE — TELEPHONE ENCOUNTER
Pt is having tubes removed from ears at [facility] tomorrow. Need clearance and heart note.

## 2018-01-29 DIAGNOSIS — F41.9 ANXIETY: ICD-10-CM

## 2018-01-29 RX ORDER — ESCITALOPRAM OXALATE 10 MG/1
TABLET ORAL
Qty: 30 TABLET | Refills: 11 | Status: SHIPPED | OUTPATIENT
Start: 2018-01-29 | End: 2019-03-23 | Stop reason: SDUPTHER

## 2018-04-02 ENCOUNTER — TELEPHONE (OUTPATIENT)
Dept: TRANSPLANT | Facility: CLINIC | Age: 23
End: 2018-04-02

## 2018-04-02 NOTE — TELEPHONE ENCOUNTER
Pt called bc he thinks he has pink eye. I advised him to see PCP or urgent care for treatment. He stated his understanding.

## 2018-04-25 DIAGNOSIS — Z94.1 HEART REPLACED BY TRANSPLANT: ICD-10-CM

## 2018-04-25 DIAGNOSIS — Z94.1 HEART TRANSPLANTED: ICD-10-CM

## 2018-04-25 DIAGNOSIS — Z94.1 STATUS POST HEART TRANSPLANT: ICD-10-CM

## 2018-04-25 DIAGNOSIS — D84.9 IMMUNOSUPPRESSION: ICD-10-CM

## 2018-04-25 DIAGNOSIS — T86.20 COMPLICATION OF HEART TRANSPLANT, UNSPECIFIED COMPLICATION: ICD-10-CM

## 2018-04-25 DIAGNOSIS — Z79.899 ENCOUNTER FOR LONG-TERM (CURRENT) USE OF MEDICATIONS: ICD-10-CM

## 2018-04-25 DIAGNOSIS — K21.9 GASTROESOPHAGEAL REFLUX DISEASE, ESOPHAGITIS PRESENCE NOT SPECIFIED: ICD-10-CM

## 2018-04-26 RX ORDER — LISINOPRIL 5 MG/1
TABLET ORAL
Qty: 30 TABLET | Refills: 3 | Status: SHIPPED | OUTPATIENT
Start: 2018-04-26 | End: 2018-08-13 | Stop reason: SDUPTHER

## 2018-04-26 RX ORDER — TACROLIMUS 1 MG/1
CAPSULE, GELATIN COATED ORAL
Qty: 120 CAPSULE | Refills: 11 | Status: SHIPPED | OUTPATIENT
Start: 2018-04-26 | End: 2019-02-07 | Stop reason: SDUPTHER

## 2018-07-07 DIAGNOSIS — D84.9 IMMUNOSUPPRESSION: ICD-10-CM

## 2018-07-07 DIAGNOSIS — K21.9 GASTROESOPHAGEAL REFLUX DISEASE, ESOPHAGITIS PRESENCE NOT SPECIFIED: ICD-10-CM

## 2018-07-07 DIAGNOSIS — Z94.1 HEART TRANSPLANTED: ICD-10-CM

## 2018-07-07 DIAGNOSIS — T86.20 COMPLICATION OF HEART TRANSPLANT, UNSPECIFIED COMPLICATION: ICD-10-CM

## 2018-07-07 DIAGNOSIS — Z94.1 STATUS POST HEART TRANSPLANT: ICD-10-CM

## 2018-07-07 DIAGNOSIS — Z79.899 ENCOUNTER FOR LONG-TERM (CURRENT) USE OF MEDICATIONS: ICD-10-CM

## 2018-07-07 RX ORDER — DICYCLOMINE HYDROCHLORIDE 10 MG/1
CAPSULE ORAL
Qty: 60 CAPSULE | Refills: 3 | Status: SHIPPED | OUTPATIENT
Start: 2018-07-07 | End: 2019-02-05 | Stop reason: SDUPTHER

## 2018-07-10 RX ORDER — PRAVASTATIN SODIUM 20 MG/1
TABLET ORAL
Qty: 30 TABLET | Refills: 5 | Status: SHIPPED | OUTPATIENT
Start: 2018-07-10 | End: 2018-09-07 | Stop reason: SDUPTHER

## 2018-07-17 ENCOUNTER — TELEPHONE (OUTPATIENT)
Dept: TRANSPLANT | Facility: CLINIC | Age: 23
End: 2018-07-17

## 2018-07-31 DIAGNOSIS — K21.9 GASTROESOPHAGEAL REFLUX DISEASE, ESOPHAGITIS PRESENCE NOT SPECIFIED: ICD-10-CM

## 2018-07-31 DIAGNOSIS — Z79.899 ENCOUNTER FOR LONG-TERM (CURRENT) USE OF MEDICATIONS: ICD-10-CM

## 2018-07-31 DIAGNOSIS — Z94.1 STATUS POST HEART TRANSPLANT: ICD-10-CM

## 2018-07-31 DIAGNOSIS — D84.9 IMMUNOSUPPRESSION: ICD-10-CM

## 2018-07-31 DIAGNOSIS — T86.20 COMPLICATION OF HEART TRANSPLANT, UNSPECIFIED COMPLICATION: ICD-10-CM

## 2018-07-31 DIAGNOSIS — Z94.1 HEART TRANSPLANTED: ICD-10-CM

## 2018-07-31 RX ORDER — MYCOPHENOLIC ACID 360 MG/1
TABLET, DELAYED RELEASE ORAL
Qty: 240 TABLET | Refills: 3 | Status: SHIPPED | OUTPATIENT
Start: 2018-07-31 | End: 2018-09-07 | Stop reason: SDUPTHER

## 2018-08-13 DIAGNOSIS — T86.20 COMPLICATION OF HEART TRANSPLANT, UNSPECIFIED COMPLICATION: ICD-10-CM

## 2018-08-13 DIAGNOSIS — K21.9 GASTROESOPHAGEAL REFLUX DISEASE, ESOPHAGITIS PRESENCE NOT SPECIFIED: ICD-10-CM

## 2018-08-13 DIAGNOSIS — Z94.1 HEART TRANSPLANTED: ICD-10-CM

## 2018-08-13 DIAGNOSIS — Z79.899 ENCOUNTER FOR LONG-TERM (CURRENT) USE OF MEDICATIONS: ICD-10-CM

## 2018-08-13 DIAGNOSIS — Z94.1 STATUS POST HEART TRANSPLANT: ICD-10-CM

## 2018-08-13 DIAGNOSIS — D84.9 IMMUNOSUPPRESSION: ICD-10-CM

## 2018-08-13 RX ORDER — LISINOPRIL 5 MG/1
TABLET ORAL
Qty: 30 TABLET | Refills: 3 | Status: SHIPPED | OUTPATIENT
Start: 2018-08-13 | End: 2018-09-07 | Stop reason: SDUPTHER

## 2018-08-29 ENCOUNTER — TELEPHONE (OUTPATIENT)
Dept: TRANSPLANT | Facility: CLINIC | Age: 23
End: 2018-08-29

## 2018-08-29 NOTE — TELEPHONE ENCOUNTER
Pt called asking if he can take adderal for attention span in school. I advised no, he cannot take it. He asked what he can take to help him focus. I messaged pharm D for help and will give him an answer next week.

## 2018-09-07 ENCOUNTER — LAB VISIT (OUTPATIENT)
Dept: LAB | Facility: HOSPITAL | Age: 23
End: 2018-09-07
Attending: INTERNAL MEDICINE
Payer: MEDICAID

## 2018-09-07 ENCOUNTER — HOSPITAL ENCOUNTER (OUTPATIENT)
Dept: CARDIOLOGY | Facility: CLINIC | Age: 23
Discharge: HOME | End: 2018-09-07
Attending: INTERNAL MEDICINE
Payer: MEDICAID

## 2018-09-07 ENCOUNTER — NUTRITION (OUTPATIENT)
Dept: NUTRITION | Facility: CLINIC | Age: 23
End: 2018-09-07
Payer: MEDICAID

## 2018-09-07 ENCOUNTER — OFFICE VISIT (OUTPATIENT)
Dept: TRANSPLANT | Facility: CLINIC | Age: 23
End: 2018-09-07
Payer: MEDICAID

## 2018-09-07 ENCOUNTER — TELEPHONE (OUTPATIENT)
Dept: TRANSPLANT | Facility: CLINIC | Age: 23
End: 2018-09-07

## 2018-09-07 VITALS — HEIGHT: 65 IN | WEIGHT: 278.88 LBS | BODY MASS INDEX: 46.46 KG/M2

## 2018-09-07 VITALS
HEIGHT: 65 IN | WEIGHT: 278.88 LBS | DIASTOLIC BLOOD PRESSURE: 73 MMHG | BODY MASS INDEX: 46.46 KG/M2 | HEART RATE: 75 BPM | SYSTOLIC BLOOD PRESSURE: 132 MMHG

## 2018-09-07 DIAGNOSIS — E78.2 MIXED HYPERLIPIDEMIA: ICD-10-CM

## 2018-09-07 DIAGNOSIS — Z94.1 STATUS POST HEART TRANSPLANT: ICD-10-CM

## 2018-09-07 DIAGNOSIS — E66.01 SEVERE OBESITY (BMI >= 40): Primary | ICD-10-CM

## 2018-09-07 DIAGNOSIS — E66.01 MORBID OBESITY: Primary | ICD-10-CM

## 2018-09-07 DIAGNOSIS — Z94.1 HEART REPLACED BY TRANSPLANT: Primary | ICD-10-CM

## 2018-09-07 DIAGNOSIS — Z94.1 HEART REPLACED BY TRANSPLANT: ICD-10-CM

## 2018-09-07 DIAGNOSIS — F41.9 ANXIETY: ICD-10-CM

## 2018-09-07 DIAGNOSIS — E66.01 OBESITY, CLASS III, BMI 40-49.9 (MORBID OBESITY): ICD-10-CM

## 2018-09-07 DIAGNOSIS — Z79.899 LONG TERM CURRENT USE OF IMMUNOSUPPRESSIVE DRUG: ICD-10-CM

## 2018-09-07 DIAGNOSIS — Z71.3 DIETARY COUNSELING: ICD-10-CM

## 2018-09-07 LAB
25(OH)D3+25(OH)D2 SERPL-MCNC: 16 NG/ML
ALBUMIN SERPL BCP-MCNC: 4.1 G/DL
ALP SERPL-CCNC: 80 U/L
ALT SERPL W/O P-5'-P-CCNC: 30 U/L
ANION GAP SERPL CALC-SCNC: 12 MMOL/L
AST SERPL-CCNC: 22 U/L
BASOPHILS # BLD AUTO: 0.06 K/UL
BASOPHILS NFR BLD: 0.5 %
BILIRUB SERPL-MCNC: 0.3 MG/DL
BNP SERPL-MCNC: 14 PG/ML
BUN SERPL-MCNC: 22 MG/DL
CALCIUM SERPL-MCNC: 9.7 MG/DL
CHLORIDE SERPL-SCNC: 108 MMOL/L
CHOLEST SERPL-MCNC: 84 MG/DL
CHOLEST/HDLC SERPL: 3.5 {RATIO}
CO2 SERPL-SCNC: 15 MMOL/L
CREAT SERPL-MCNC: 1.2 MG/DL
DIASTOLIC DYSFUNCTION: NO
DIFFERENTIAL METHOD: ABNORMAL
EOSINOPHIL # BLD AUTO: 0.2 K/UL
EOSINOPHIL NFR BLD: 1.7 %
ERYTHROCYTE [DISTWIDTH] IN BLOOD BY AUTOMATED COUNT: 12.9 %
EST. GFR  (AFRICAN AMERICAN): >60 ML/MIN/1.73 M^2
EST. GFR  (NON AFRICAN AMERICAN): >60 ML/MIN/1.73 M^2
ESTIMATED AVG GLUCOSE: 140 MG/DL
ESTIMATED PA SYSTOLIC PRESSURE: 27.01
GLUCOSE SERPL-MCNC: 178 MG/DL
HBA1C MFR BLD HPLC: 6.5 %
HCT VFR BLD AUTO: 46.4 %
HDLC SERPL-MCNC: 24 MG/DL
HDLC SERPL: 28.6 %
HGB BLD-MCNC: 14.9 G/DL
IMM GRANULOCYTES # BLD AUTO: 0.03 K/UL
IMM GRANULOCYTES NFR BLD AUTO: 0.2 %
LDLC SERPL CALC-MCNC: 30.8 MG/DL
LYMPHOCYTES # BLD AUTO: 3.1 K/UL
LYMPHOCYTES NFR BLD: 26.1 %
MAGNESIUM SERPL-MCNC: 1.7 MG/DL
MCH RBC QN AUTO: 28.2 PG
MCHC RBC AUTO-ENTMCNC: 32.1 G/DL
MCV RBC AUTO: 88 FL
MITRAL VALVE REGURGITATION: NORMAL
MONOCYTES # BLD AUTO: 1.1 K/UL
MONOCYTES NFR BLD: 8.9 %
NEUTROPHILS # BLD AUTO: 7.5 K/UL
NEUTROPHILS NFR BLD: 62.6 %
NONHDLC SERPL-MCNC: 60 MG/DL
NRBC BLD-RTO: 0 /100 WBC
PLATELET # BLD AUTO: 312 K/UL
PMV BLD AUTO: 9.8 FL
POTASSIUM SERPL-SCNC: 4 MMOL/L
PROT SERPL-MCNC: 7.7 G/DL
RBC # BLD AUTO: 5.28 M/UL
RETIRED EF AND QEF - SEE NOTES: 63 (ref 55–65)
SODIUM SERPL-SCNC: 135 MMOL/L
TACROLIMUS BLD-MCNC: 9.5 NG/ML
TRICUSPID VALVE REGURGITATION: NORMAL
TRIGL SERPL-MCNC: 146 MG/DL
WBC # BLD AUTO: 12.03 K/UL

## 2018-09-07 PROCEDURE — 86833 HLA CLASS II HIGH DEFIN QUAL: CPT | Mod: PO,TXP

## 2018-09-07 PROCEDURE — 99999 PR PBB SHADOW E&M-EST. PATIENT-LVL III: CPT | Mod: PBBFAC,,, | Performed by: INTERNAL MEDICINE

## 2018-09-07 PROCEDURE — 86833 HLA CLASS II HIGH DEFIN QUAL: CPT | Mod: 91,PO,NTX

## 2018-09-07 PROCEDURE — 36415 COLL VENOUS BLD VENIPUNCTURE: CPT

## 2018-09-07 PROCEDURE — 99214 OFFICE O/P EST MOD 30 MIN: CPT | Mod: S$PBB,,, | Performed by: INTERNAL MEDICINE

## 2018-09-07 PROCEDURE — 83880 ASSAY OF NATRIURETIC PEPTIDE: CPT

## 2018-09-07 PROCEDURE — 85025 COMPLETE CBC W/AUTO DIFF WBC: CPT | Mod: NTX

## 2018-09-07 PROCEDURE — 97802 MEDICAL NUTRITION INDIV IN: CPT | Mod: PBBFAC | Performed by: DIETITIAN, REGISTERED

## 2018-09-07 PROCEDURE — 99213 OFFICE O/P EST LOW 20 MIN: CPT | Mod: PBBFAC,27,25 | Performed by: INTERNAL MEDICINE

## 2018-09-07 PROCEDURE — 82306 VITAMIN D 25 HYDROXY: CPT | Mod: NTX

## 2018-09-07 PROCEDURE — 86977 RBC SERUM PRETX INCUBJ/INHIB: CPT | Mod: 91,PO,TXP

## 2018-09-07 PROCEDURE — 93306 TTE W/DOPPLER COMPLETE: CPT | Mod: PBBFAC | Performed by: INTERNAL MEDICINE

## 2018-09-07 PROCEDURE — 83036 HEMOGLOBIN GLYCOSYLATED A1C: CPT | Mod: NTX

## 2018-09-07 PROCEDURE — 99213 OFFICE O/P EST LOW 20 MIN: CPT | Mod: PBBFAC,25

## 2018-09-07 PROCEDURE — 86832 HLA CLASS I HIGH DEFIN QUAL: CPT | Mod: 91,PO,NTX

## 2018-09-07 PROCEDURE — 80061 LIPID PANEL: CPT

## 2018-09-07 PROCEDURE — 86832 HLA CLASS I HIGH DEFIN QUAL: CPT | Mod: PO,TXP

## 2018-09-07 PROCEDURE — 80053 COMPREHEN METABOLIC PANEL: CPT | Mod: NTX

## 2018-09-07 PROCEDURE — 80197 ASSAY OF TACROLIMUS: CPT

## 2018-09-07 PROCEDURE — 83735 ASSAY OF MAGNESIUM: CPT | Mod: NTX

## 2018-09-07 PROCEDURE — 99999 PR PBB SHADOW E&M-EST. PATIENT-LVL III: CPT | Mod: PBBFAC,,,

## 2018-09-07 NOTE — PROGRESS NOTES
"Referring Physician:Loyda Donahue MD     Reason for visit:  Chief Complaint   Patient presents with    Obesity    Heart replaced by transplant          Nutrition Counseling      Initial Visit    :1995     Allergies Reviewed  Meds Reviewed    Anthropometrics  Weight:126.5 kg (278 lb 14.1 oz)  Height:5' 5" (1.651 m)  BMI:Body mass index is 46.41 kg/m².   IBW:   61.8 kg  +/-10%    Meds:  Outpatient Medications Prior to Visit   Medication Sig Dispense Refill    aspirin (ECOTRIN) 81 MG EC tablet Take 81 mg by mouth once daily.      cetirizine 10 mg chewable tablet Take 10 mg by mouth as needed.       dicyclomine (BENTYL) 10 MG capsule TAKE 1 CAPSULE BY MOUTH TWICE DAILY 60 capsule 3    diltiaZEM (CARDIZEM CD) 180 MG 24 hr capsule Take 1 capsule (180 mg total) by mouth once daily. 30 capsule 11    ergocalciferol (ERGOCALCIFEROL) 50,000 unit Cap Take 1 capsule (50,000 Units total) by mouth every 7 days. 30 capsule 1    escitalopram oxalate (LEXAPRO) 10 MG tablet TAKE 1 TABLET BY MOUTH EVERY DAY 30 tablet 11    esomeprazole (NEXIUM) 20 MG capsule Take 1 capsule (20 mg total) by mouth before breakfast. 30 capsule 11    guaifenesin (MUCINEX) 600 mg 12 hr tablet Take 1,200 mg by mouth as needed.       lisinopril (PRINIVIL,ZESTRIL) 5 MG tablet Take 1 tablet (5 mg total) by mouth once daily. 30 tablet 11    MAGNESIUM CHLORIDE (SLOW-MAG ORAL) Take 64 mg by mouth 2 (two) times daily. 1-2 tabs      MYFORTIC 360 mg TbEC Take 2 tablets (720 mg total) by mouth 2 (two) times daily. 120 tablet 6    pravastatin (PRAVACHOL) 20 MG tablet Take 1 tablet (20 mg total) by mouth nightly. 30 tablet 11    predniSONE (DELTASONE) 2.5 MG tablet Take 1 tablet (2.5 mg total) by mouth every Tues, Thurs. 30 tablet 11    PROGRAF 1 mg Cap Take 1 capsule (1 mg total) by mouth every 12 (twelve) hours. Take 2 (1 mg capsules) by mouth every 12 hours, along with 1 (5 mg capsule) by mouth for a total of 7 mg  capsule 11 "    PROGRAF 1 mg Cap TAKE TWO CAPSULES BY MOUTH EVERY TWELVE HOURS 120 capsule 11    PROGRAF 5 mg Cap Take 1 capsule (5 mg total) by mouth every 12 (twelve) hours. Pt's final dose is 7 mg BID 60 capsule 11    topiramate (TOPAMAX) 25 MG tablet Take 1 tablet (25 mg total) by mouth every evening. 30 tablet 11     No facility-administered medications prior to visit.          Vitamins/Supplements/Herbs:    n/a    Labs:   HgbA1c  6.5   Chol 84   TG  146   HDL  24   LDL Chol  30.8   Alb  4.1   Gluc  178 (pt states this was nonfasting)     Nutrition Prescription:   1854 Kcals/day( 30 kcal/kg IBW),    49 g protein( 0.8 g/kg IBW)     Support System:    Pt lives in [facility] with 3 roommates; does his own grocery shopping and meal prep.  Is a pharmacy tech at [employer]    Diet Hx:   Pt uses My Fitness Pal kylie to track daily nutrient intake; states he often consumes <1000 calories/day.  Beverages:  Sugar-free.  Snacks:  Candy/sweets depending upon his mood at work.     Breakfast:   Rafael Silver breakfast bowl or eggs or Protein Shake ([facility] Lean Burn:  Tbsp peanut butter, protein powder, almond milk)  Lunch:   Chicken, brown rice.  Hawaiian Punch sugar-free packet in water.  Dinner:   Last night:  Pizza rolls, water.    Current activity level and/or physical limitations:    Pt states he plans to resume cardio exercise at the gym starting next week - elliptical machine work-out    Motivation to make changes/anticipated barriers and/or expected adherence:    Pt seems interested in losing weight via diet and exercise.    Nutrition-Focus Physical Findings:    Appears well nourished.      Assessment:   Pt very attentive and asked relevant questions about foods recommended & to avoid; reading food labels; sample meal plan and menus; healthy snacks; grocery shopping and cooking tips.  All questions answered, and pt verbalized understanding of information.    Nutrition Diagnosis:   Obesity RT excess energy intake and physical  inactivity AEB Obesity - grade III, BMI > 40    Recommendations: 1800 calorie, low fat, high fiber diet.  Exercise goal:  30 minutes per day, 3-5 days per week.  Handouts provided and reviewed:  Cardiac Nutrition Therapy; 1800 Calorie Sample 5-Day Menus; Weight Loss Tips; Cooking Tips for Weight Management; Get Fit Shopping List; Eat Fit Plan...Anytime/Anywhere;  Servings of Carbohydrates for Meal Planning; Walking Works; My Plate Planner;  Heart Healthy Eating:  Shopping Tips and Label Reading Tips        Strategies Implemented:    Increase physical activity as tolerated    Consultation Time:30 minutes.    Follow Up:  Pt provided with dietitian contact number and advised to call with questions or make future appointment if further intervention needed.

## 2018-09-07 NOTE — LETTER
September 7, 2018      [facility]  [address]  [facility] [address]  Phone: [phone]       Patient: Derrick Montanez   YOB: 1995  Date of Visit: 09/07/2018    To Whom It May Concern:    Davy Montanez  was at [facility] on 09/07/2018. Please excuse him from class on 9/7/2018, he was seen in clinic as part of his bi-yearly visits. He may return to school  9/10/2018 with no restrictions. If you have any questions or concerns, or if I can be of further assistance, please do not hesitate to contact me.    Sincerely,        Breanna Lucio, RN  Post Heart Transplant Coordinator  [facility]  [address] [place] [address]  [phone]

## 2018-09-07 NOTE — PROGRESS NOTES
"Subjective:   Mr. Montanez is a 22 y.o. year old Black or  male who received a  heart transplant on 3/17/09.      CMV status:   Donor: +  Recipient: +    HPI   22 y.o. AAM who developed myocarditis at age 6, ultimately underwent transplant at [facility] on 3/17/2009, who shared care between [facility] and [facility] but now is following with us, here for routine follow-up visit 9-years post-transplant. Patient remains on prednisone 2.5 mg despite being this far out, which per the [facility] snapshot is "due to clinic noncompliance and infrequent FU in [place]." He as last seen 5/2017 when he was wanting to get off pred and c/o anxiety about nursing school. He was started on Lexapro and his prednisone was weaned from 2.5mg daily to 2.5 Tuesday and Thursday. He presents today for routine f/u.     Patient continues to do well, however has been gaining weight unfortunately. Patient denies N/V/F/C, lightheadedness, dizziness, PND, orthopnea, LE edema, abdominal pain, abdominal pressure, chest pain, chest pressure, syncope, pre-syncope. 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 Looking to meet with a nutritionist across the street after this visit to work on weight loss. Did bring up a meeting with bariatric medicine, however patient is not interested.     Current IS:  Tacrolimus 7/7  Myfortic 720 BID  Pred 2.5mg Tues/Thurs    TTE 09/07/18:    1 - Post-cardiac transplantation study.     2 - Normal left ventricular systolic function (EF 60-65%).     3 - Concentric remodeling.     4 - No wall motion abnormalities.     5 - Normal left ventricular diastolic function.     6 - Right ventricle is upper limit of normal in size with normal systolic function.     7 - Trivial mitral regurgitation.     8 - Mild tricuspid regurgitation.     9 - The estimated PA systolic pressure is 27 mmHg.           Review of Systems   Constitution: Negative for chills, decreased appetite, diaphoresis, fever, weakness, malaise/fatigue, night sweats, weight " "gain and weight loss.   Cardiovascular: Negative for chest pain, claudication, leg swelling, near-syncope, orthopnea and palpitations.   Respiratory: Negative for cough, hemoptysis, shortness of breath, sleep disturbances due to breathing, snoring, sputum production and wheezing.    Endocrine: Negative for cold intolerance and heat intolerance.   Musculoskeletal: Negative for gout, joint pain, joint swelling, muscle cramps, muscle weakness and myalgias.   Gastrointestinal: Negative for bloating, abdominal pain, melena, nausea and vomiting.   Genitourinary: Negative for dysuria and flank pain.   Neurological: Negative for difficulty with concentration, excessive daytime sleepiness, dizziness, headaches and tremors.   Psychiatric/Behavioral: Negative for depression, memory loss, substance abuse and suicidal ideas. The patient does not have insomnia and is not nervous/anxious.        Objective:   Blood pressure 132/73, pulse 75, height 5' 5" (1.651 m), weight 126.5 kg (278 lb 14.1 oz).body mass index is 46.41 kg/m².    Physical Exam   Constitutional: He is oriented to person, place, and time. He appears well-developed and well-nourished.   obese   Neck: Normal range of motion. Neck supple. No JVD present.   Cardiovascular: Normal rate, regular rhythm, S1 normal and S2 normal.   No murmur heard.  Pulmonary/Chest: Effort normal and breath sounds normal. No respiratory distress. He has no wheezes. He has no rales.   Abdominal: Soft. Bowel sounds are normal. He exhibits no distension. There is no tenderness. There is no rebound.   Musculoskeletal: Normal range of motion. He exhibits no edema.   Neurological: He is alert and oriented to person, place, and time.   Skin: Skin is warm and dry. No rash noted. No erythema. No pallor.   Psychiatric: His mood appears anxious.   Nursing note and vitals reviewed.      Lab Results   Component Value Date    WBC 12.03 09/07/2018    HGB 14.9 09/07/2018    HCT 46.4 09/07/2018    MCV 88 " 09/07/2018     09/07/2018    CO2 15 (L) 09/07/2018    CREATININE 1.2 09/07/2018    CALCIUM 9.7 09/07/2018    ALBUMIN 4.1 09/07/2018    AST 22 09/07/2018    BNP 14 09/07/2018    ALT 30 09/07/2018    ALLOMAP 26 05/18/2017       Lab Results   Component Value Date    INR 1.0 10/07/2016       BNP   Date Value Ref Range Status   09/07/2018 14 0 - 99 pg/mL Final     Comment:     Values of less than 100 pg/ml are consistent with non-CHF populations.   01/22/2018 11 0 - 99 pg/mL Final     Comment:     Values of less than 100 pg/ml are consistent with non-CHF populations.   05/18/2017 51 0 - 99 pg/mL Final     Comment:     Values of less than 100 pg/ml are consistent with non-CHF populations.       No results found for: LDH    Tacrolimus Lvl   Date Value Ref Range Status   01/22/2018 6.4 5.0 - 15.0 ng/mL Final     Comment:     Testing performed by Liquid Chromatography-Tandem  Mass Spectrometry (LC-MS/MS).  This test was developed and its performance characteristics  determined by [facility], Department of Pathology  and Laboratory Medicine in a manner consistent with CLIA  requirements. It has not been cleared or approved by the [place]  Food and Drug Administration.  This test is used for clinical   purposes.  It should not be regarded as investigational or for  research.       Labs were reviewed with the patient.    Assessment:     1. Heart replaced by transplant    2. Anxiety    3. Obesity, Class III, BMI 40-49.9 (morbid obesity)    4. Long term current use of immunosuppressive drug        Plan:   S/p OHT  Continued to discuss weight loss, will see how nutritionist visit goes.  Follow up on immunosuppression and adjust as indicated. Likely will leave on current prednisone dose for now.     Return instructions as set forth by post transplant schedule or as needed:    Clinic: Return for labs and/or biopsy weekly the first month, every two weeks during month 2 and then monthly for the first year at the provider  or coordinator's discretion. During the second year, return to clinic every 3 months. Post transplant year 3-5 return every 6 months. There will be a comprehensive post transplant evaluation every year that may include LHC/RHC/biopsy, stress test, echo, CXR, and other health screening exams.    In addition to the clinical assessment, I have ordered HLA/DSA testing for this patient to assist in identification of moderate/severe acute cellular rejection (ACR) in a pt with stable Allograft function instead of endomyocardial biopsy.     Patient is reminded to call with any health changes as these can be early signs of transplant complications. Patient is advised to make sure any new medications or changes of old medications are discussed with a pharmacist or physician knowledgeable with transplant to avoid rejection/drug toxicity related to significant drug interactions.    UNOS Patient Status  Functional Status: 100% - Normal, no complaints, no evidence of disease  Physical Capacity: No Limitations  Working for Income: No  If no, reason not working: Patient Choice - Student Full Time/Part Time    Loyda Donahue MD    Addendum:  -Vitamin D level 11: will give Rx for Ergocalciferol 50,000 weekly for 3-6 months and recheck levels    - Also, patient is clear to undergo TM tube removal by ENT; please call with questions

## 2018-09-11 LAB
CLASS I ANTIBODY COMMENTS - LUMINEX: NORMAL
CLASS II ANTIBODY COMMENTS - LUMINEX: NORMAL
DSA1 TESTING DATE: NORMAL
DSA12 TESTING DATE: NORMAL
DSA2 TESTING DATE: NORMAL
SERUM COLLECTION DT - LUMINEX CLASS I: NORMAL
SERUM COLLECTION DT - LUMINEX CLASS II: NORMAL

## 2018-09-13 LAB
C1Q1 TESTING DATE: NORMAL
C1Q1 TESTING DATE: NORMAL
C1Q2 TESTING DATE: NORMAL
CLASS I ANTIBODY COMMENTS - LUMINEX: NORMAL
CLASS II ANTIBODY COMMENTS - LUMINEX: NORMAL
SERUM COLLECTION DT - LUMINEX CLASS I: NORMAL
SERUM COLLECTION DT - LUMINEX CLASS II: NORMAL

## 2018-10-11 DIAGNOSIS — Z94.1 STATUS POST HEART TRANSPLANT: ICD-10-CM

## 2018-10-11 DIAGNOSIS — I10 ESSENTIAL HYPERTENSION: ICD-10-CM

## 2018-10-12 RX ORDER — DILTIAZEM HYDROCHLORIDE 180 MG/1
CAPSULE, COATED, EXTENDED RELEASE ORAL
Qty: 30 CAPSULE | Refills: 11 | Status: SHIPPED | OUTPATIENT
Start: 2018-10-12 | End: 2019-11-16 | Stop reason: SDUPTHER

## 2018-11-28 ENCOUNTER — TELEPHONE (OUTPATIENT)
Dept: TRANSPLANT | Facility: CLINIC | Age: 23
End: 2018-11-28

## 2018-11-28 NOTE — TELEPHONE ENCOUNTER
Pt called wanting an antibiotic for sore throat, yellow mucus. Advised pt to go to PCP. Pt stated he is going to urgent care right now.

## 2018-12-04 DIAGNOSIS — D84.9 IMMUNOSUPPRESSION: ICD-10-CM

## 2018-12-04 DIAGNOSIS — Z79.899 ENCOUNTER FOR LONG-TERM (CURRENT) USE OF MEDICATIONS: ICD-10-CM

## 2018-12-04 DIAGNOSIS — K21.9 GASTROESOPHAGEAL REFLUX DISEASE, ESOPHAGITIS PRESENCE NOT SPECIFIED: ICD-10-CM

## 2018-12-04 DIAGNOSIS — T86.20 COMPLICATION OF HEART TRANSPLANT, UNSPECIFIED COMPLICATION: ICD-10-CM

## 2018-12-04 DIAGNOSIS — Z94.1 STATUS POST HEART TRANSPLANT: ICD-10-CM

## 2018-12-04 DIAGNOSIS — Z94.1 HEART TRANSPLANTED: ICD-10-CM

## 2018-12-05 RX ORDER — LISINOPRIL 5 MG/1
TABLET ORAL
Qty: 30 TABLET | Refills: 3 | Status: SHIPPED | OUTPATIENT
Start: 2018-12-05 | End: 2019-07-01 | Stop reason: SDUPTHER

## 2019-02-05 DIAGNOSIS — Z94.1 HEART TRANSPLANTED: ICD-10-CM

## 2019-02-05 DIAGNOSIS — T86.20 COMPLICATION OF HEART TRANSPLANT, UNSPECIFIED COMPLICATION: ICD-10-CM

## 2019-02-05 DIAGNOSIS — K21.9 GASTROESOPHAGEAL REFLUX DISEASE, ESOPHAGITIS PRESENCE NOT SPECIFIED: ICD-10-CM

## 2019-02-05 DIAGNOSIS — Z79.899 ENCOUNTER FOR LONG-TERM (CURRENT) USE OF MEDICATIONS: ICD-10-CM

## 2019-02-05 DIAGNOSIS — Z94.1 HEART REPLACED BY TRANSPLANT: ICD-10-CM

## 2019-02-05 DIAGNOSIS — D84.9 IMMUNOSUPPRESSION: ICD-10-CM

## 2019-02-05 DIAGNOSIS — Z94.1 STATUS POST HEART TRANSPLANT: ICD-10-CM

## 2019-02-05 RX ORDER — DICYCLOMINE HYDROCHLORIDE 10 MG/1
CAPSULE ORAL
Qty: 60 CAPSULE | Refills: 3 | Status: SHIPPED | OUTPATIENT
Start: 2019-02-05 | End: 2019-06-20 | Stop reason: SDUPTHER

## 2019-02-06 RX ORDER — PRAVASTATIN SODIUM 20 MG/1
TABLET ORAL
Qty: 30 TABLET | Refills: 5 | Status: SHIPPED | OUTPATIENT
Start: 2019-02-06 | End: 2019-11-22

## 2019-02-06 RX ORDER — TACROLIMUS 5 MG/1
5 CAPSULE, GELATIN COATED ORAL EVERY 12 HOURS
Qty: 60 CAPSULE | Refills: 6 | Status: CANCELLED | OUTPATIENT
Start: 2019-02-06

## 2019-02-06 RX ORDER — TACROLIMUS 1 MG/1
7 CAPSULE ORAL EVERY 12 HOURS
Qty: 420 CAPSULE | Refills: 5 | Status: SHIPPED | OUTPATIENT
Start: 2019-02-06 | End: 2019-02-07 | Stop reason: DRUGHIGH

## 2019-02-07 DIAGNOSIS — Z94.1 STATUS POST HEART TRANSPLANT: Primary | ICD-10-CM

## 2019-02-07 DIAGNOSIS — Z94.1 STATUS POST HEART TRANSPLANT: ICD-10-CM

## 2019-02-07 DIAGNOSIS — Z94.1 HEART REPLACED BY TRANSPLANT: ICD-10-CM

## 2019-02-07 RX ORDER — TACROLIMUS 5 MG/1
5 CAPSULE ORAL 2 TIMES DAILY
COMMUNITY
End: 2019-03-25 | Stop reason: SDUPTHER

## 2019-02-07 RX ORDER — TACROLIMUS 5 MG/1
CAPSULE ORAL
Qty: 180 CAPSULE | Refills: 3 | Status: SHIPPED | OUTPATIENT
Start: 2019-02-07 | End: 2019-02-07 | Stop reason: CLARIF

## 2019-02-07 NOTE — TELEPHONE ENCOUNTER
----- Message from Morelia Wilks sent at 2/7/2019  9:30 AM CST -----  Contact: [employer] has questions regarding medication  tacrolimus (PROGRAF) 1 MG Cap. Please call [employer] [facility]. [phone] (Phone)  [phone] (Fax). Thank you.

## 2019-02-08 RX ORDER — PREDNISONE 2.5 MG/1
2.5 TABLET ORAL
Qty: 30 TABLET | Refills: 11 | Status: SHIPPED | OUTPATIENT
Start: 2019-02-12 | End: 2019-03-25

## 2019-02-08 RX ORDER — TOPIRAMATE 25 MG/1
25 TABLET ORAL NIGHTLY
Qty: 30 TABLET | Refills: 11 | Status: SHIPPED | OUTPATIENT
Start: 2019-02-08 | End: 2020-03-16

## 2019-02-08 RX ORDER — TACROLIMUS 5 MG/1
5 CAPSULE ORAL EVERY 12 HOURS
Qty: 60 CAPSULE | Refills: 11 | Status: SHIPPED | OUTPATIENT
Start: 2019-02-08 | End: 2020-02-08

## 2019-03-14 ENCOUNTER — TELEPHONE (OUTPATIENT)
Dept: TRANSPLANT | Facility: CLINIC | Age: 24
End: 2019-03-14

## 2019-03-14 NOTE — TELEPHONE ENCOUNTER
Pt called to update cell number and to report he was diagnosed with strep throat and started on amoxicillin.

## 2019-03-18 ENCOUNTER — TELEPHONE (OUTPATIENT)
Dept: TRANSPLANT | Facility: CLINIC | Age: 24
End: 2019-03-18

## 2019-03-18 NOTE — TELEPHONE ENCOUNTER
Pt called to report still not feeling well, feels bloated and urinating a lot since he started not feeling well. Was seen in [place] and found to have strep throat. Started on amoxil. Will get labs in the morning and go from there. Pt googled his symptoms and is worried about cancer or diabetes. Encouraged pt not to stress and get labs tomorrow.

## 2019-03-19 ENCOUNTER — TELEPHONE (OUTPATIENT)
Dept: TRANSPLANT | Facility: CLINIC | Age: 24
End: 2019-03-19

## 2019-03-19 ENCOUNTER — HISTORICAL (OUTPATIENT)
Dept: ADMINISTRATIVE | Facility: HOSPITAL | Age: 24
End: 2019-03-19

## 2019-03-19 LAB
ALBUMIN SERPL-MCNC: 4 GM/DL (ref 3.4–5)
ALBUMIN/GLOB SERPL: 1.1 RATIO (ref 1.1–2)
ALP SERPL-CCNC: 112 UNIT/L (ref 50–136)
ALT SERPL-CCNC: 51 UNIT/L (ref 12–78)
AST SERPL-CCNC: 34 UNIT/L (ref 15–37)
BILIRUB SERPL-MCNC: 0.6 MG/DL (ref 0.2–1)
BILIRUBIN DIRECT+TOT PNL SERPL-MCNC: 0.2 MG/DL (ref 0–0.5)
BILIRUBIN DIRECT+TOT PNL SERPL-MCNC: 0.4 MG/DL (ref 0–0.8)
BNP BLD-MCNC: 27 PG/ML (ref 0–125)
BUN SERPL-MCNC: 27 MG/DL (ref 7–18)
CALCIUM SERPL-MCNC: 9.9 MG/DL (ref 8.5–10.1)
CHLORIDE SERPL-SCNC: 93 MMOL/L (ref 98–107)
CHOLEST SERPL-MCNC: 111 MG/DL (ref 0–200)
CHOLEST/HDLC SERPL: 4.1 {RATIO} (ref 0–5)
CO2 SERPL-SCNC: 19 MMOL/L (ref 21–32)
CREAT SERPL-MCNC: 1.86 MG/DL (ref 0.7–1.3)
ERYTHROCYTE [DISTWIDTH] IN BLOOD BY AUTOMATED COUNT: 12.5 % (ref 11.5–17)
EST. AVERAGE GLUCOSE BLD GHB EST-MCNC: 295 MG/DL
GLOBULIN SER-MCNC: 3.7 GM/DL (ref 2.4–3.5)
GLUCOSE SERPL-MCNC: 719 MG/DL (ref 74–106)
HBA1C MFR BLD: 11.9 % (ref 4.2–6.3)
HCT VFR BLD AUTO: 43.4 % (ref 42–52)
HDLC SERPL-MCNC: 27 MG/DL (ref 35–60)
HGB BLD-MCNC: 14.4 GM/DL (ref 14–18)
LDLC SERPL CALC-MCNC: -1 MG/DL (ref 0–129)
MAGNESIUM SERPL-MCNC: 2.1 MG/DL (ref 1.8–2.4)
MCH RBC QN AUTO: 27.7 PG (ref 27–31)
MCHC RBC AUTO-ENTMCNC: 33.2 GM/DL (ref 33–36)
MCV RBC AUTO: 83.5 FL (ref 80–94)
PLATELET # BLD AUTO: 318 X10(3)/MCL (ref 130–400)
PMV BLD AUTO: 11.1 FL (ref 9.4–12.4)
POTASSIUM SERPL-SCNC: 4.8 MMOL/L (ref 3.5–5.1)
PROT SERPL-MCNC: 7.7 GM/DL (ref 6.4–8.2)
RBC # BLD AUTO: 5.2 X10(6)/MCL (ref 4.7–6.1)
SODIUM SERPL-SCNC: 131 MMOL/L (ref 136–145)
TRIGL SERPL-MCNC: 427 MG/DL (ref 30–150)
VLDLC SERPL CALC-MCNC: 85 MG/DL
WBC # SPEC AUTO: 15.3 X10(3)/MCL (ref 4.5–11.5)

## 2019-03-19 NOTE — TELEPHONE ENCOUNTER
Received a call from [facility] with his blood glucose this morning 719, they stated that they rechecked twice.  Spoke with pt and explained that his blood sugar is 719 and went over normal glucose levels.  Asked if he has a PCP, pt states that he does not know the name or who they are sending him to.   I instructed him that he would need to go to the Emergency room in [place] and be treated for such an elevated blood sugar and explained that he would need to be seen by a local doctor to help in controlling the blood sugar.     Explained danger of such high blood sugar which is untreated.  Pt stated that he would go and be seen at [facility].

## 2019-03-19 NOTE — TELEPHONE ENCOUNTER
DIOR Valdes from [facility] called to inform me Pt is being admitted to ICU in DKA. Pt is on insulin drip and IV fluids. Asked that tacrolimus level drawn in the morning and informed her pt's level should be between 6-8. If it is outside that range to call me for tacro adjustments. She stated her understanding.

## 2019-03-19 NOTE — TELEPHONE ENCOUNTER
Pt called in a panic attack regarding his glucose reading. He didn't know how his blood sugar had gotten so high. Asked pt what he has been eating. He has been drinking gatorade and eating meat this past week. He stated he has really been eating twice a day. But started to feel bad. He asked to see someone here on Friday. Will schedule labs and provider visit, along with endocrine.

## 2019-03-20 LAB
BNP (B-TYPE NATRIURETIC PEP): 27
EXT ALBUMIN: 4
EXT ALT: 51
EXT AST: 34
EXT BUN: 27
EXT CALCIUM: 9.9
EXT CHLORIDE: 93
EXT CHOLESTEROL (LIPID PANEL): 111
EXT CREATININE: 1.86 MG/DL
EXT GLUCOSE: 719
EXT HDL: 27
EXT HEMATOCRIT: 43.4
EXT HEMOGLOBIN: 14.4
EXT LDL CHOLESTEROL: -1
EXT MAGNESIUM: 2.1
EXT PLATELETS: 318
EXT POTASSIUM: 4.8
EXT PROTEIN TOTAL: 7.7
EXT SODIUM: 131 MMOL/L
EXT TRIGLYCERIDES: 427
EXT WBC: 15.3

## 2019-03-21 ENCOUNTER — TELEPHONE (OUTPATIENT)
Dept: ENDOCRINOLOGY | Facility: CLINIC | Age: 24
End: 2019-03-21

## 2019-03-21 NOTE — TELEPHONE ENCOUNTER
----- Message from Moraima Jackson sent at 3/21/2019  4:51 PM CDT -----  Contact: Any / Mother [phone]  PT had to cancel appt for tomorrow. He is currently admitted in hospital in [place].

## 2019-03-23 DIAGNOSIS — F41.9 ANXIETY: ICD-10-CM

## 2019-03-23 RX ORDER — ESCITALOPRAM OXALATE 10 MG/1
TABLET ORAL
Qty: 30 TABLET | Refills: 11 | Status: SHIPPED | OUTPATIENT
Start: 2019-03-23 | End: 2020-04-20

## 2019-03-25 ENCOUNTER — OFFICE VISIT (OUTPATIENT)
Dept: ENDOCRINOLOGY | Facility: CLINIC | Age: 24
End: 2019-03-25
Payer: MEDICAID

## 2019-03-25 ENCOUNTER — LAB VISIT (OUTPATIENT)
Dept: LAB | Facility: HOSPITAL | Age: 24
End: 2019-03-25
Payer: MEDICAID

## 2019-03-25 ENCOUNTER — TELEPHONE (OUTPATIENT)
Dept: TRANSPLANT | Facility: CLINIC | Age: 24
End: 2019-03-25

## 2019-03-25 VITALS
SYSTOLIC BLOOD PRESSURE: 126 MMHG | BODY MASS INDEX: 45.62 KG/M2 | HEIGHT: 65 IN | DIASTOLIC BLOOD PRESSURE: 77 MMHG | WEIGHT: 273.81 LBS | HEART RATE: 71 BPM

## 2019-03-25 DIAGNOSIS — E66.01 OBESITY, CLASS III, BMI 40-49.9 (MORBID OBESITY): ICD-10-CM

## 2019-03-25 DIAGNOSIS — Z79.4 TYPE 2 DIABETES MELLITUS WITH HYPERGLYCEMIA, WITH LONG-TERM CURRENT USE OF INSULIN: Primary | ICD-10-CM

## 2019-03-25 DIAGNOSIS — E11.65 TYPE 2 DIABETES MELLITUS WITH HYPERGLYCEMIA, WITH LONG-TERM CURRENT USE OF INSULIN: Primary | ICD-10-CM

## 2019-03-25 DIAGNOSIS — Z94.1 HEART REPLACED BY TRANSPLANT: ICD-10-CM

## 2019-03-25 DIAGNOSIS — Z71.89 COUNSELING AND COORDINATION OF CARE: ICD-10-CM

## 2019-03-25 DIAGNOSIS — Z94.1 STATUS POST HEART TRANSPLANT: ICD-10-CM

## 2019-03-25 DIAGNOSIS — Z79.4 TYPE 2 DIABETES MELLITUS WITH HYPERGLYCEMIA, WITH LONG-TERM CURRENT USE OF INSULIN: ICD-10-CM

## 2019-03-25 DIAGNOSIS — E11.65 TYPE 2 DIABETES MELLITUS WITH HYPERGLYCEMIA, WITH LONG-TERM CURRENT USE OF INSULIN: ICD-10-CM

## 2019-03-25 DIAGNOSIS — Z79.899 ENCOUNTER FOR LONG-TERM (CURRENT) USE OF MEDICATIONS: ICD-10-CM

## 2019-03-25 DIAGNOSIS — Z79.899 LONG TERM CURRENT USE OF IMMUNOSUPPRESSIVE DRUG: ICD-10-CM

## 2019-03-25 LAB
ESTIMATED AVG GLUCOSE: 332 MG/DL (ref 68–131)
EXT CREATININE: 1.2 MG/DL
EXT CREATININE: 1.31 MG/DL
EXT CREATININE: 1.41 MG/DL
EXT TACROLIMUS LVL: 11.9
EXT TACROLIMUS LVL: 12.5
EXT TACROLIMUS LVL: 13.7
EXT TACROLIMUS LVL: 13.8
HBA1C MFR BLD HPLC: 13.2 % (ref 4–5.6)

## 2019-03-25 PROCEDURE — 99215 OFFICE O/P EST HI 40 MIN: CPT | Mod: PBBFAC | Performed by: NURSE PRACTITIONER

## 2019-03-25 PROCEDURE — 83036 HEMOGLOBIN GLYCOSYLATED A1C: CPT

## 2019-03-25 PROCEDURE — 36415 COLL VENOUS BLD VENIPUNCTURE: CPT

## 2019-03-25 PROCEDURE — 99999 PR PBB SHADOW E&M-EST. PATIENT-LVL V: CPT | Mod: PBBFAC,,, | Performed by: NURSE PRACTITIONER

## 2019-03-25 PROCEDURE — 99204 OFFICE O/P NEW MOD 45 MIN: CPT | Mod: S$PBB,,, | Performed by: NURSE PRACTITIONER

## 2019-03-25 PROCEDURE — 99204 PR OFFICE/OUTPT VISIT, NEW, LEVL IV, 45-59 MIN: ICD-10-PCS | Mod: S$PBB,,, | Performed by: NURSE PRACTITIONER

## 2019-03-25 PROCEDURE — 99999 PR PBB SHADOW E&M-EST. PATIENT-LVL V: ICD-10-PCS | Mod: PBBFAC,,, | Performed by: NURSE PRACTITIONER

## 2019-03-25 RX ORDER — INSULIN LISPRO 100 [IU]/ML
INJECTION, SOLUTION INTRAVENOUS; SUBCUTANEOUS
Qty: 1 VIAL | Refills: 6 | Status: SHIPPED | OUTPATIENT
Start: 2019-03-25 | End: 2020-01-07 | Stop reason: SDUPTHER

## 2019-03-25 RX ORDER — INSULIN GLARGINE 100 [IU]/ML
INJECTION, SOLUTION SUBCUTANEOUS DAILY
COMMUNITY
End: 2019-04-08 | Stop reason: SDUPTHER

## 2019-03-25 RX ORDER — INSULIN ASPART 100 [IU]/ML
8 INJECTION, SOLUTION INTRAVENOUS; SUBCUTANEOUS
Status: COMPLETED | OUTPATIENT
Start: 2019-03-25 | End: 2019-03-25

## 2019-03-25 RX ORDER — TACROLIMUS 1 MG/1
CAPSULE, GELATIN COATED ORAL
Qty: 90 CAPSULE | Refills: 11 | Status: SHIPPED | OUTPATIENT
Start: 2019-03-25 | End: 2019-04-18

## 2019-03-25 RX ADMIN — INSULIN ASPART 8 UNITS: 100 INJECTION, SOLUTION INTRAVENOUS; SUBCUTANEOUS at 03:03

## 2019-03-25 NOTE — PROGRESS NOTES
CHIEF COMPLAINT: Type 2 Diabetes     HPI: Mr. Derrick Montanez is a 23 y.o. male who was diagnosed with Type 2 DM in 3/2019   Pt has h/o heart transplantation 2009; followed by heart transplant biannually  Recently stopped steriods in last year. Recent adjustments to Prograft.  Lives in [place], [place].   Recent hospitalization, reports severe hyperglycemia 400-600s. Transitioned from insulin drip to Lantus.  Pt is being seen by me for the first time.  Along with MARKUS Tinoco NP.   Lab Results   Component Value Date    HGBA1C 6.5 (H) 09/07/2018       PREVIOUS DIABETES MEDICATIONS TRIED  None    CURRENT DIABETIC MEDS:  Lantus 20 HS    Pt is monitoring blood glucose readings 0-2 times a day.  Needs >100 strips per month related to fluctuations with blood glucose reading, a1c trends, and activity level.  Diabetes Management Status    Statin: Taking  ACE/ARB: Taking    Screening or Prevention Patient's value Goal Complete/Controlled?   HgA1C Testing and Control   Lab Results   Component Value Date    HGBA1C 6.5 (H) 09/07/2018      Annually/Less than 8% Yes   Lipid profile : 09/07/2018 Annually Yes   LDL control Lab Results   Component Value Date    LDLCALC 30.8 (L) 09/07/2018    Annually/Less than 100 mg/dl  Yes   Nephropathy screening No results found for: LABMICR  No results found for: PROTEINUA Annually No   Blood pressure BP Readings from Last 1 Encounters:   09/07/18 132/73    Less than 140/90 Yes   Dilated retinal exam Most Recent Eye Exam Date: Not Found Annually Yes   Foot exam   Most Recent Foot Exam Date: Not Found Annually Yes       REVIEW OF SYSTEMS  General: no weakness, fatigue, or weight changes.   Eyes: no double or blurred vision, eye pain, or redness.   Cardiovascular: no chest pain, palpitations, edema, or murmurs.   Respiratory: no cough or dyspnea.   GI: no heartburn, nausea, or changes in bowel patterns; good appetite.   Skin: no rashes, dryness, itching, or reactions at insulin injection  "sites.  Neuro: no numbness, tingling, tremors, or vertigo.   Endocrine: no polyuria, polydipsia, polyphagia, heat or cold intolerance.     Vital Signs  /77 (BP Location: Right arm, Patient Position: Sitting, BP Method: Medium (Manual))   Pulse 71   Ht 5' 5" (1.651 m)   Wt 124.2 kg (273 lb 13 oz)   BMI 45.56 kg/m²     Hemoglobin A1C   Date Value Ref Range Status   09/07/2018 6.5 (H) 4.0 - 5.6 % Final     Comment:     ADA Screening Guidelines:  5.7-6.4%  Consistent with prediabetes  >or=6.5%  Consistent with diabetes  High levels of fetal hemoglobin interfere with the HbA1C  assay. Heterozygous hemoglobin variants (HbS, HgC, etc)do  not significantly interfere with this assay.   However, presence of multiple variants may affect accuracy.     01/22/2018 6.1 (H) 4.0 - 5.6 % Final     Comment:     According to ADA guidelines, hemoglobin A1c <7.0% represents  optimal control in non-pregnant diabetic patients. Different  metrics may apply to specific patient populations.   Standards of Medical Care in Diabetes-2016.  For the purpose of screening for the presence of diabetes:  <5.7%     Consistent with the absence of diabetes  5.7-6.4%  Consistent with increasing risk for diabetes   (prediabetes)  >or=6.5%  Consistent with diabetes  Currently, no consensus exists for use of hemoglobin A1c  for diagnosis of diabetes for children.  This Hemoglobin A1c assay has significant interference with fetal   hemoglobin   (HbF). The results are invalid for patients with abnormal amounts of   HbF,   including those with known Hereditary Persistence   of Fetal Hemoglobin. Heterozygous hemoglobin variants (HbAS, HbAC,   HbAD, HbAE, HbA2) do not significantly interfere with this assay;   however, presence of multiple variants in a sample may impact the %   interference.     07/14/2016 5.9 4.5 - 6.2 % Final     Comment:     According to ADA guidelines, hemoglobin A1C <7.0% represents  optimal control in non-pregnant diabetic " patients.  Different  metrics may apply to specific populations.   Standards of Medical Care in Diabetes - 2016.  For the purpose of screening for the presence of diabetes:  <5.7%     Consistent with the absence of diabetes  5.7-6.4%  Consistent with increasing risk for diabetes   (prediabetes)  >or=6.5%  Consistent with diabetes  Currently no consensus exists for use of hemoglobin A1C  for diagnosis of diabetes for children.          Chemistry        Component Value Date/Time     (L) 09/07/2018 0923    K 4.0 09/07/2018 0923     09/07/2018 0923    CO2 15 (L) 09/07/2018 0923    BUN 22 (H) 09/07/2018 0923    CREATININE 1.2 09/07/2018 0923     (H) 09/07/2018 0923        Component Value Date/Time    CALCIUM 9.7 09/07/2018 0923    ALKPHOS 80 09/07/2018 0923    AST 22 09/07/2018 0923    ALT 30 09/07/2018 0923    BILITOT 0.3 09/07/2018 0923    ESTGFRAFRICA >60.0 09/07/2018 0923    EGFRNONAA >60.0 09/07/2018 0923           Lab Results   Component Value Date    TSH 1.126 05/18/2017      Lab Results   Component Value Date    CHOL 84 (L) 09/07/2018    CHOL 107 (L) 01/22/2018    CHOL 116 (L) 05/18/2017     Lab Results   Component Value Date    HDL 24 (L) 09/07/2018    HDL 32 (L) 01/22/2018    HDL 45 05/18/2017     Lab Results   Component Value Date    LDLCALC 30.8 (L) 09/07/2018    LDLCALC 35.2 (L) 01/22/2018    LDLCALC 60.6 (L) 05/18/2017     Lab Results   Component Value Date    TRIG 146 09/07/2018    TRIG 199 (H) 01/22/2018    TRIG 52 05/18/2017     Lab Results   Component Value Date    CHOLHDL 28.6 09/07/2018    CHOLHDL 29.9 01/22/2018    CHOLHDL 38.8 05/18/2017         PHYSICAL EXAMINATION  Constitutional: Appears well, no distress  Neck: Supple, trachea midline.   Respiratory: CTA without wheezes, even and unlabored.  Cardiovascular: RRR; no carotid bruits or murmurs; (+) DP pulses; no edema.   Lymph: no lymphadenopathy palpated  Skin: warm and dry; no injection site reactions, no acanthosis nigracans  observed.  Neuro:patient alert and cooperative, normal affect; steady gait.    Diabetes Foot Exam:   Protective Sensation (w/ 10 gram monofilament):  Right: Intact  Left: Intact  Vibratory sensation intact -BLE     Visual Inspection:  Normal -  Bilateral    Pedal Pulses:   Right: Present  Left: Present    Posterior tibialis:   Right:Present  Left: Present        Assessment/Plan  1. Type 2 diabetes mellitus with hyperglycemia, with long-term current use of insulin  Hemoglobin A1c    Hemoglobin A1c    Ambulatory Referral to Diabetes Education    POCT Glucose, Hand-Held Device >350 mg/dl    F/u in 4 mos  Paraeducation being done per Stefanie Buck LPN  Vial/syringe training done  12 units w/ largest meal-dinner  Carb counting book given  Portion plate given     8 units x 1 (novolog)  Scale 180-230+2, etc  Add victoza 0.6 to 1.8 mg daily -titrate in 3 weeks  Increase basaglar to 35 units at night  Reviewed wt based 124 kg     Counseling >40 mins     2. Status post heart transplant  F/u with h[facility] q4-[facility] mos [facility]   3. Obesity, Class III, BMI 40-49.9 (morbid obesity)  Body mass index is 45.56 kg/m². may increase insulin resistance  Add glp1a   No h/o pancreatitis or med thyroid ca    4. Long term current use of immunosuppressive drug  Recent dose adjustments   May increase insulin resistance    5. Encounter for long-term (current) use of medications  See above   6. Counseling and coordination of care  See above         FOLLOW UP  Follow up in about 4 months (around 7/25/2019).

## 2019-03-25 NOTE — TELEPHONE ENCOUNTER
Pt's tacro was 12.5 on discharge of [facility]. His goal is 6-8. Dr. Donahue reviewed results and ordered to decrease tac to 6/7 and repeat in 1 week.

## 2019-03-25 NOTE — PROGRESS NOTES
.Patient education given on vial and syring, self injection insulin. The patient expresses understanding and acceptance of instructions. Patient re-demonstrated instruction    Stefanie Buck 3/25/2019 3:28 PM

## 2019-03-27 RX ORDER — PEN NEEDLE, DIABETIC 30 GX3/16"
NEEDLE, DISPOSABLE MISCELLANEOUS
Qty: 200 EACH | Refills: 3 | Status: SHIPPED | OUTPATIENT
Start: 2019-03-27 | End: 2020-01-07 | Stop reason: SDUPTHER

## 2019-03-27 RX ORDER — CALCIUM CARB/VITAMIN D3/VIT K1 500-100-40
TABLET,CHEWABLE ORAL
Qty: 300 EACH | Refills: 3 | Status: SHIPPED | OUTPATIENT
Start: 2019-03-27 | End: 2019-04-01 | Stop reason: SDUPTHER

## 2019-04-01 NOTE — TELEPHONE ENCOUNTER
"----- Message from Veronika Silva sent at 4/1/2019  1:30 PM CDT -----  Contact: self  Pt is requesting a refill on insulin syringe-needle U-100 (ADVOCATE SYRINGES) 0.3 mL 31 gauge x 5/16" Syrg. Pt is using [facility] [address] [place] [address] AT [facility].    Pt can be reached at [phone].    Thank you   "

## 2019-04-02 RX ORDER — CALCIUM CARB/VITAMIN D3/VIT K1 500-100-40
TABLET,CHEWABLE ORAL
Qty: 300 EACH | Refills: 3 | Status: SHIPPED | OUTPATIENT
Start: 2019-04-02 | End: 2020-01-07 | Stop reason: SDUPTHER

## 2019-04-04 ENCOUNTER — PATIENT MESSAGE (OUTPATIENT)
Dept: ENDOCRINOLOGY | Facility: CLINIC | Age: 24
End: 2019-04-04

## 2019-04-04 ENCOUNTER — TELEPHONE (OUTPATIENT)
Dept: TRANSPLANT | Facility: CLINIC | Age: 24
End: 2019-04-04

## 2019-04-04 DIAGNOSIS — Z94.1 STATUS POST HEART TRANSPLANT: Primary | ICD-10-CM

## 2019-04-04 NOTE — TELEPHONE ENCOUNTER
Called to check on pt to see how his glucemic control is doing. His BG is under 180, which is goal. He has lost `1 lb with dietary changes. I will schedule appts in May for his annual and he can schedule is [facility] in July. He will repeat labs next week for tacro change.

## 2019-04-08 ENCOUNTER — PATIENT MESSAGE (OUTPATIENT)
Dept: ENDOCRINOLOGY | Facility: CLINIC | Age: 24
End: 2019-04-08

## 2019-04-08 RX ORDER — INSULIN GLARGINE 100 [IU]/ML
INJECTION, SOLUTION SUBCUTANEOUS
Qty: 1 BOX | Refills: 6 | Status: SHIPPED | OUTPATIENT
Start: 2019-04-08 | End: 2019-04-09 | Stop reason: SDUPTHER

## 2019-04-09 DIAGNOSIS — E11.65 TYPE 2 DIABETES MELLITUS WITH HYPERGLYCEMIA, WITHOUT LONG-TERM CURRENT USE OF INSULIN: Primary | ICD-10-CM

## 2019-04-09 RX ORDER — INSULIN GLARGINE 100 [IU]/ML
INJECTION, SOLUTION SUBCUTANEOUS
Qty: 1 BOX | Refills: 6 | Status: SHIPPED | OUTPATIENT
Start: 2019-04-09 | End: 2019-05-06 | Stop reason: SDUPTHER

## 2019-04-09 NOTE — TELEPHONE ENCOUNTER
----- Message from Moraima Jackson sent at 4/9/2019 11:04 AM CDT -----  Contact: Self [phone]  .Medication question / problems.  Pls transfer insulin (BASAGLAR KWIKPEN U-100 INSULIN) glargine 100 units/mL (3mL) SubQ pen      ..  [facility] [address] [place] [address] & [address] [address]  Phone: [phone] Fax: [phone]

## 2019-04-12 ENCOUNTER — TELEPHONE (OUTPATIENT)
Dept: TRANSPLANT | Facility: CLINIC | Age: 24
End: 2019-04-12

## 2019-04-12 ENCOUNTER — HISTORICAL (OUTPATIENT)
Dept: ADMINISTRATIVE | Facility: HOSPITAL | Age: 24
End: 2019-04-12

## 2019-04-12 DIAGNOSIS — E11.65 TYPE 2 DIABETES MELLITUS WITH HYPERGLYCEMIA, WITHOUT LONG-TERM CURRENT USE OF INSULIN: Primary | ICD-10-CM

## 2019-04-12 LAB
ALBUMIN SERPL-MCNC: 4.1 GM/DL (ref 3.4–5)
ALBUMIN/GLOB SERPL: 1.2 {RATIO}
ALP SERPL-CCNC: 64 UNIT/L (ref 50–136)
ALT SERPL-CCNC: 76 UNIT/L (ref 12–78)
AST SERPL-CCNC: 38 UNIT/L (ref 15–37)
BILIRUB SERPL-MCNC: 0.3 MG/DL (ref 0.2–1)
BILIRUBIN DIRECT+TOT PNL SERPL-MCNC: 0.1 MG/DL (ref 0–0.8)
BILIRUBIN DIRECT+TOT PNL SERPL-MCNC: 0.2 MG/DL (ref 0–0.2)
BNP BLD-MCNC: 8 PG/ML (ref 0–125)
BUN SERPL-MCNC: 25 MG/DL (ref 7–18)
CALCIUM SERPL-MCNC: 9.5 MG/DL (ref 8.5–10.1)
CHLORIDE SERPL-SCNC: 107 MMOL/L (ref 98–107)
CHOLEST SERPL-MCNC: 90 MG/DL (ref 0–200)
CHOLEST/HDLC SERPL: 2.7 {RATIO} (ref 0–5)
CO2 SERPL-SCNC: 21 MMOL/L (ref 21–32)
CREAT SERPL-MCNC: 1.25 MG/DL (ref 0.7–1.3)
ERYTHROCYTE [DISTWIDTH] IN BLOOD BY AUTOMATED COUNT: 13.3 % (ref 11.5–17)
EST. AVERAGE GLUCOSE BLD GHB EST-MCNC: 255 MG/DL
GLOBULIN SER-MCNC: 3.4 GM/DL (ref 2.4–3.5)
GLUCOSE SERPL-MCNC: 105 MG/DL (ref 74–106)
HBA1C MFR BLD: 10.5 % (ref 4.2–6.3)
HCT VFR BLD AUTO: 44 % (ref 42–52)
HDLC SERPL-MCNC: 33 MG/DL (ref 35–60)
HGB BLD-MCNC: 14.1 GM/DL (ref 14–18)
LDLC SERPL CALC-MCNC: 39 MG/DL (ref 0–129)
MAGNESIUM SERPL-MCNC: 1.6 MG/DL (ref 1.8–2.4)
MCH RBC QN AUTO: 28.5 PG (ref 27–31)
MCHC RBC AUTO-ENTMCNC: 32 GM/DL (ref 33–36)
MCV RBC AUTO: 89.1 FL (ref 80–94)
PLATELET # BLD AUTO: 242 X10(3)/MCL (ref 130–400)
PMV BLD AUTO: 9.9 FL (ref 9.4–12.4)
POTASSIUM SERPL-SCNC: 4.2 MMOL/L (ref 3.5–5.1)
PROT SERPL-MCNC: 7.5 GM/DL (ref 6.4–8.2)
RBC # BLD AUTO: 4.94 X10(6)/MCL (ref 4.7–6.1)
SODIUM SERPL-SCNC: 139 MMOL/L (ref 136–145)
TRIGL SERPL-MCNC: 90 MG/DL (ref 30–150)
VLDLC SERPL CALC-MCNC: 18 MG/DL
WBC # SPEC AUTO: 10.2 X10(3)/MCL (ref 4.5–11.5)

## 2019-04-12 NOTE — TELEPHONE ENCOUNTER
Called pt's mother to inform her of pt's labs. Unable to reach pt. Informed her his labs look good, glucose is 105, A1C is down to 10.5. Lipid panel is very low, however he is in school studying and not active. When school is out will bring him in to meet with dietician again, now that pt has new onset DM2. Pt's mother stated her understanding.

## 2019-04-12 NOTE — TELEPHONE ENCOUNTER
----- Message from Moraima Jackson sent at 4/12/2019  9:30 AM CDT -----  Contact: Self [phone]   .Refill request.  Test strips     ..  [facility] [id number] [place] [address] & [address] [address]  Phone: [phone] Fax: [phone]

## 2019-04-15 ENCOUNTER — TELEPHONE (OUTPATIENT)
Dept: TRANSPLANT | Facility: CLINIC | Age: 24
End: 2019-04-15

## 2019-04-15 LAB
BNP (B-TYPE NATRIURETIC PEP): 8
EXT ALBUMIN: 4.1
EXT ALT: 64
EXT AST: 76
EXT BUN: 25
EXT CALCIUM: 9.5
EXT CHLORIDE: 107
EXT CHOLESTEROL (LIPID PANEL): 90
EXT CREATININE: 1.25 MG/DL
EXT GLUCOSE: 105
EXT HDL: 33
EXT HEMATOCRIT: 44
EXT HEMOGLOBIN A1C: 10.5 %
EXT HEMOGLOBIN: 14.1
EXT LDL CHOLESTEROL: 39
EXT MAGNESIUM: 1.6
EXT PLATELETS: 242
EXT POTASSIUM: 4.2
EXT PROTEIN TOTAL: 7.5
EXT SODIUM: 139 MMOL/L
EXT TRIGLYCERIDES: 90
EXT WBC: 10.2

## 2019-04-18 DIAGNOSIS — Z94.1 HEART REPLACED BY TRANSPLANT: ICD-10-CM

## 2019-04-18 LAB — EXT TACROLIMUS LVL: 10.1

## 2019-04-18 RX ORDER — TACROLIMUS 1 MG/1
CAPSULE, GELATIN COATED ORAL
Qty: 60 CAPSULE | Refills: 11 | Status: SHIPPED | OUTPATIENT
Start: 2019-04-18 | End: 2020-04-20

## 2019-04-18 NOTE — TELEPHONE ENCOUNTER
Consulted with Dr. Banuelos for pt's tacro level os 10.1 after decreasing tac to 6/7. Goal is 6-8. Will decrease tac to 6/6 and repeat next week. Pt stated his understanding.

## 2019-04-25 NOTE — TELEPHONE ENCOUNTER
----- Message from Marielle Butler sent at 4/25/2019 12:32 PM CDT -----  Contact: Self  Pt is needing a refill of his liraglutide 0.6 mg/0.1 mL, 18 mg/3 mL, subq PNIJ (VICTOZA 3-CARMEN) 0.6 mg/0.1 mL (18 mg/3 mL) PnIj called in to the pharmacy on file. Please advise once this has been done.

## 2019-05-06 DIAGNOSIS — E11.65 TYPE 2 DIABETES MELLITUS WITH HYPERGLYCEMIA, WITHOUT LONG-TERM CURRENT USE OF INSULIN: ICD-10-CM

## 2019-05-06 RX ORDER — INSULIN GLARGINE 100 [IU]/ML
INJECTION, SOLUTION SUBCUTANEOUS
Qty: 1 BOX | Refills: 6 | Status: SHIPPED | OUTPATIENT
Start: 2019-05-06 | End: 2019-05-08

## 2019-05-06 NOTE — TELEPHONE ENCOUNTER
----- Message from Moraima Jackson sent at 5/6/2019  1:07 PM CDT -----  Contact: Self [phone]  .Medication question / problems.  PT called to check status of prior authorization for insulin (BASAGLAR KWIKPEN U-100 INSULIN) glargine 100 units/mL (3mL) SubQ pen. He is out of medication.     ..  [facility] [address] [place] [address] & [address] [address]  Phone: [phone] Fax: [phone]

## 2019-05-07 ENCOUNTER — TELEPHONE (OUTPATIENT)
Dept: ENDOCRINOLOGY | Facility: CLINIC | Age: 24
End: 2019-05-07

## 2019-05-07 ENCOUNTER — PATIENT MESSAGE (OUTPATIENT)
Dept: ENDOCRINOLOGY | Facility: CLINIC | Age: 24
End: 2019-05-07

## 2019-05-07 NOTE — TELEPHONE ENCOUNTER
----- Message from Moraima Jackson sent at 5/7/2019 11:00 AM CDT -----  Contact: Self [phone]  .Medication question / problems.  PT called to check status of a prior authorization for insulin (BASAGLAR KWIKPEN U-100 INSULIN) glargine 100 units/mL (3mL) SubQ pen. He is out of insulin. Pt can be reached at [phone].

## 2019-05-07 NOTE — TELEPHONE ENCOUNTER
Called patient insurance to start a PA for his Basaglar.    It will take 24 hours for his insurance to make a decision .    PA number- [phone]

## 2019-05-08 ENCOUNTER — TELEPHONE (OUTPATIENT)
Dept: ENDOCRINOLOGY | Facility: CLINIC | Age: 24
End: 2019-05-08

## 2019-05-08 DIAGNOSIS — E11.65 TYPE 2 DIABETES MELLITUS WITH HYPERGLYCEMIA, WITHOUT LONG-TERM CURRENT USE OF INSULIN: ICD-10-CM

## 2019-05-08 RX ORDER — INSULIN GLARGINE 100 [IU]/ML
INJECTION, SOLUTION SUBCUTANEOUS
Qty: 1 BOX | Refills: 6
Start: 2019-05-08 | End: 2020-01-07 | Stop reason: SDUPTHER

## 2019-05-08 RX ORDER — METFORMIN HYDROCHLORIDE 500 MG/1
500 TABLET ORAL 2 TIMES DAILY WITH MEALS
Qty: 180 TABLET | Refills: 3 | Status: SHIPPED | OUTPATIENT
Start: 2019-05-08 | End: 2020-05-21

## 2019-05-08 NOTE — TELEPHONE ENCOUNTER
Spoke with pt   Sent metformin 500 mg twice a day  Instructed pt to continue admelog 12 units at dinner  victoza 1.8 mg daily  bg 100-175 mg/dl so far  Off basaglar x 2-3 days, pending PA  PA done per staff, on yesterday 24 hour wait.  Instructed pt if started back, decrease to 25 units.  Will message staff now for updates.

## 2019-05-09 ENCOUNTER — TELEPHONE (OUTPATIENT)
Dept: ENDOCRINOLOGY | Facility: CLINIC | Age: 24
End: 2019-05-09

## 2019-05-09 NOTE — TELEPHONE ENCOUNTER
Waiting on the approval letter from the insurance. SPoke with Rep today from patient insurance , they will fax paperwork today.

## 2019-05-09 NOTE — TELEPHONE ENCOUNTER
----- Message from TARYN Hermosillo, STEFANIP sent at 5/8/2019  5:20 PM CDT -----  Called pharmacy, Pa pending  Please call in am for status with insurance-basaglar  Thanks  Also spoke with pt  Mickie

## 2019-05-18 ENCOUNTER — NURSE TRIAGE (OUTPATIENT)
Dept: ADMINISTRATIVE | Facility: CLINIC | Age: 24
End: 2019-05-18

## 2019-05-18 RX ORDER — BLOOD-GLUCOSE CONTROL, NORMAL
1 EACH MISCELLANEOUS 4 TIMES DAILY
Qty: 100 EACH | Refills: 3 | Status: SHIPPED | OUTPATIENT
Start: 2019-05-18 | End: 2020-01-09

## 2019-05-18 NOTE — TELEPHONE ENCOUNTER
Reason for Disposition   Caller requesting a refill, no triage required, and triager able to refill per unit policy    Protocols used: MEDICATION QUESTION CALL-A-    Patient's mom called for refill of the lancets. One touch ultra 30 gauge, #100, 3 refills. Called the rx to [facility]. I TARYN Lino refilled diabetes meds on 5/8/19, and the lancets were not included. Mom states they use the [facility] on [address] and she will pick them up tomorrow.

## 2019-05-23 DIAGNOSIS — Z94.1 STATUS POST HEART TRANSPLANT: ICD-10-CM

## 2019-05-23 DIAGNOSIS — E55.9 VITAMIN D DEFICIENCY: ICD-10-CM

## 2019-05-23 RX ORDER — ERGOCALCIFEROL 1.25 MG/1
50000 CAPSULE ORAL
Qty: 30 CAPSULE | Refills: 11 | Status: SHIPPED | OUTPATIENT
Start: 2019-05-23 | End: 2020-07-31

## 2019-05-27 ENCOUNTER — TELEPHONE (OUTPATIENT)
Dept: TRANSPLANT | Facility: CLINIC | Age: 24
End: 2019-05-27

## 2019-05-27 ENCOUNTER — TELEPHONE (OUTPATIENT)
Dept: ORTHOPEDICS | Facility: CLINIC | Age: 24
End: 2019-05-27

## 2019-05-27 NOTE — TELEPHONE ENCOUNTER
Pt's mother called concerned bc she was just told that ortho is not seeing new medicaid pt's. Pt broke his foot over the weekend and was asked to follow up outpt. Will send a message to ortho clinic.

## 2019-05-27 NOTE — TELEPHONE ENCOUNTER
Ortho Telephone Triage Message  [phone]  Mother states pt injured L ankle on 5/24/19 and was seen at [facility] ED. Mother confirms not Workers Comp.States told hairline fracture and boot placed. Mother requests Ortho appt. Advised that no Ortho appts are, presently, available at Ortho Clinic for new pts under Medicaid Insurance. Advised that PCP may fax referral to [facility]. Mom states that first available appt at [facility] Orth[facility] is on 6/4/19 and that pt is in pain and cannot wait that long. Advised that pt go to [facility] ED, if in pain, and [facility] Orth[facility] On Call will be available, if needed. Mother states that she does not want to go to [facility] ED, but  to [facility] ED, as pt is established at [facility] and asks if Ortho would be called. Advised that Ortho will be called, if indicated.

## 2019-05-27 NOTE — TELEPHONE ENCOUNTER
Ortho Telephone Triage Follow Up Call  [phone]  Spoke with Mrs. Montanez and advised that Dr. Rm/Eddi Podiatry notified and can see pt on 5/29/19 for L ankle fracture, noting HO DM. Mrs. Montanez declines appt and states she wants pt to be seen by Orthopedics. Advised Mrs. Montanez that Dr. Newton/ [facility] has been notified regarding her concerns and that pt may go to  ED. Mrs. Montanez states understanding and will bring pt to ED today or in AM.

## 2019-05-27 NOTE — TELEPHONE ENCOUNTER
----- Message from Erinn Cohen LPN sent at 5/27/2019  4:10 PM CDT -----  Regarding: FW: ortho      ----- Message -----  From: Wanda Msua MA  Sent: 5/27/2019   4:02 PM  To: [facility] Ortho Triage  Subject: FW: ortho                                            ----- Message -----  From: Laisha Porter  Sent: 5/27/2019   3:56 PM  To: [facility] Ortho Clinical Support  Subject: FW: ortho                                        Good morning,                 Please see message below. Pt is a post heart transplant pt and is needing to be seen as soon possible. Thank you  ----- Message -----  From: Breanna Lucio RN  Sent: 5/27/2019   3:49 PM  To: [facility] Heart Transplant Schedulers  Subject: ortho                                            Hey,  Can you pleas get pt in to see ortho this week? He broke his foot and referrred him to ortho.    Thanks,  T

## 2019-05-27 NOTE — TELEPHONE ENCOUNTER
----- Message from Jamey Billings LPN sent at 5/27/2019  2:15 PM CDT -----  Contact: Mother/ [phone]  I just spoke with Mr. Montanez's mother.  They are not going to pursue this as a work related injury.    Johnny     ----- Message -----  From: Erinn Cohen LPN  Sent: 5/27/2019  10:06 AM  To: Jamey Billings LPN    Johnny -    As per our telephone conversation, please contact pt/mother to determine if pt is to be seen in OOH under Workers Comp. If otherwise, please advise.  Thank you,  Erinn  [phone]    ----- Message -----  From: Rekha Frias  Sent: 5/27/2019   9:38 AM  To: [facility] Ortho Triage    Patient mother would like a call back to make a new patient appt for the patient left ankle fracture. Patient mother said the patient did not have a MVA but did hurt is foot outside of work in the parking lot.

## 2019-05-27 NOTE — TELEPHONE ENCOUNTER
Returned pt's call from Saturday. PT informed me that he broke his foot. PA approval faxed to [employer] this morning.

## 2019-06-20 DIAGNOSIS — K21.9 GASTROESOPHAGEAL REFLUX DISEASE, ESOPHAGITIS PRESENCE NOT SPECIFIED: ICD-10-CM

## 2019-06-20 DIAGNOSIS — Z94.1 STATUS POST HEART TRANSPLANT: ICD-10-CM

## 2019-06-20 DIAGNOSIS — Z94.1 HEART TRANSPLANTED: ICD-10-CM

## 2019-06-20 DIAGNOSIS — D84.9 IMMUNOSUPPRESSION: ICD-10-CM

## 2019-06-20 DIAGNOSIS — Z79.899 ENCOUNTER FOR LONG-TERM (CURRENT) USE OF MEDICATIONS: ICD-10-CM

## 2019-06-20 DIAGNOSIS — T86.20 COMPLICATION OF HEART TRANSPLANT, UNSPECIFIED COMPLICATION: ICD-10-CM

## 2019-06-20 RX ORDER — DICYCLOMINE HYDROCHLORIDE 10 MG/1
CAPSULE ORAL
Qty: 60 CAPSULE | Refills: 3 | Status: SHIPPED | OUTPATIENT
Start: 2019-06-20 | End: 2019-11-16 | Stop reason: SDUPTHER

## 2019-06-28 DIAGNOSIS — Z94.1 HEART REPLACED BY TRANSPLANT: ICD-10-CM

## 2019-06-29 RX ORDER — MYCOPHENOLIC ACID 360 MG/1
720 TABLET, DELAYED RELEASE ORAL 2 TIMES DAILY
Qty: 120 TABLET | Refills: 6 | Status: SHIPPED | OUTPATIENT
Start: 2019-06-29 | End: 2019-07-01

## 2019-07-01 DIAGNOSIS — D84.9 IMMUNOSUPPRESSION: ICD-10-CM

## 2019-07-01 DIAGNOSIS — Z94.1 HEART TRANSPLANTED: ICD-10-CM

## 2019-07-01 DIAGNOSIS — K21.9 GASTROESOPHAGEAL REFLUX DISEASE, ESOPHAGITIS PRESENCE NOT SPECIFIED: ICD-10-CM

## 2019-07-01 DIAGNOSIS — Z79.899 ENCOUNTER FOR LONG-TERM (CURRENT) USE OF MEDICATIONS: ICD-10-CM

## 2019-07-01 DIAGNOSIS — Z94.1 HEART REPLACED BY TRANSPLANT: ICD-10-CM

## 2019-07-01 DIAGNOSIS — T86.20 COMPLICATION OF HEART TRANSPLANT, UNSPECIFIED COMPLICATION: ICD-10-CM

## 2019-07-01 DIAGNOSIS — Z94.1 STATUS POST HEART TRANSPLANT: ICD-10-CM

## 2019-07-01 RX ORDER — LISINOPRIL 5 MG/1
5 TABLET ORAL DAILY
Qty: 30 TABLET | Refills: 3 | Status: SHIPPED | OUTPATIENT
Start: 2019-07-01 | End: 2019-10-05 | Stop reason: SDUPTHER

## 2019-07-01 RX ORDER — MYCOPHENOLIC ACID 360 MG/1
720 TABLET, DELAYED RELEASE ORAL 2 TIMES DAILY
Qty: 120 TABLET | Refills: 6 | Status: SHIPPED | OUTPATIENT
Start: 2019-07-01 | End: 2020-04-20 | Stop reason: SDUPTHER

## 2019-07-09 ENCOUNTER — TELEPHONE (OUTPATIENT)
Dept: TRANSPLANT | Facility: CLINIC | Age: 24
End: 2019-07-09

## 2019-08-02 ENCOUNTER — PATIENT MESSAGE (OUTPATIENT)
Dept: ENDOCRINOLOGY | Facility: CLINIC | Age: 24
End: 2019-08-02

## 2019-08-08 ENCOUNTER — HOSPITAL ENCOUNTER (OUTPATIENT)
Dept: RADIOLOGY | Facility: HOSPITAL | Age: 24
Discharge: HOME | End: 2019-08-08
Attending: INTERNAL MEDICINE
Payer: MEDICAID

## 2019-08-08 ENCOUNTER — OFFICE VISIT (OUTPATIENT)
Dept: TRANSPLANT | Facility: CLINIC | Age: 24
End: 2019-08-08
Payer: MEDICAID

## 2019-08-08 ENCOUNTER — DOCUMENTATION ONLY (OUTPATIENT)
Dept: CARDIOLOGY | Facility: CLINIC | Age: 24
End: 2019-08-08

## 2019-08-08 ENCOUNTER — INITIAL CONSULT (OUTPATIENT)
Dept: CARDIOLOGY | Facility: CLINIC | Age: 24
End: 2019-08-08
Payer: MEDICAID

## 2019-08-08 ENCOUNTER — TELEPHONE (OUTPATIENT)
Dept: TRANSPLANT | Facility: CLINIC | Age: 24
End: 2019-08-08

## 2019-08-08 ENCOUNTER — HOSPITAL ENCOUNTER (OUTPATIENT)
Dept: CARDIOLOGY | Facility: CLINIC | Age: 24
Discharge: HOME | End: 2019-08-08
Attending: INTERNAL MEDICINE
Payer: MEDICAID

## 2019-08-08 VITALS
HEIGHT: 65 IN | DIASTOLIC BLOOD PRESSURE: 78 MMHG | DIASTOLIC BLOOD PRESSURE: 88 MMHG | HEART RATE: 79 BPM | BODY MASS INDEX: 45.48 KG/M2 | HEIGHT: 65 IN | SYSTOLIC BLOOD PRESSURE: 138 MMHG | OXYGEN SATURATION: 99 % | BODY MASS INDEX: 44.73 KG/M2 | SYSTOLIC BLOOD PRESSURE: 130 MMHG | WEIGHT: 268.5 LBS | WEIGHT: 273 LBS | HEART RATE: 80 BPM

## 2019-08-08 VITALS
SYSTOLIC BLOOD PRESSURE: 127 MMHG | BODY MASS INDEX: 43.15 KG/M2 | DIASTOLIC BLOOD PRESSURE: 85 MMHG | HEART RATE: 79 BPM | HEIGHT: 66 IN | WEIGHT: 268.5 LBS

## 2019-08-08 DIAGNOSIS — T86.22 HEART TRANSPLANT FAILURE: Primary | ICD-10-CM

## 2019-08-08 DIAGNOSIS — E11.65 TYPE 2 DIABETES MELLITUS WITH HYPERGLYCEMIA, UNSPECIFIED WHETHER LONG TERM INSULIN USE: ICD-10-CM

## 2019-08-08 DIAGNOSIS — Z94.1 HEART REPLACED BY TRANSPLANT: ICD-10-CM

## 2019-08-08 DIAGNOSIS — T86.22 HEART TRANSPLANT FAILURE: ICD-10-CM

## 2019-08-08 DIAGNOSIS — Z79.899 ENCOUNTER FOR LONG-TERM (CURRENT) USE OF MEDICATIONS: ICD-10-CM

## 2019-08-08 DIAGNOSIS — T86.20 COMPLICATION OF HEART TRANSPLANT, UNSPECIFIED COMPLICATION: ICD-10-CM

## 2019-08-08 DIAGNOSIS — Z79.899 LONG TERM CURRENT USE OF IMMUNOSUPPRESSIVE DRUG: ICD-10-CM

## 2019-08-08 DIAGNOSIS — E66.01 OBESITY, CLASS III, BMI 40-49.9 (MORBID OBESITY): Primary | ICD-10-CM

## 2019-08-08 DIAGNOSIS — Z94.1 STATUS POST HEART TRANSPLANT: ICD-10-CM

## 2019-08-08 DIAGNOSIS — D84.9 IMMUNOSUPPRESSION: ICD-10-CM

## 2019-08-08 LAB
ASCENDING AORTA: 2.72 CM
AV INDEX (PROSTH): 0.82
AV MEAN GRADIENT: 3 MMHG
AV PEAK GRADIENT: 4 MMHG
AV VALVE AREA: 3.16 CM2
AV VELOCITY RATIO: 0.8
BSA FOR ECHO PROCEDURE: 2.38 M2
CV ECHO LV RWT: 0.36 CM
DOP CALC AO PEAK VEL: 1.06 M/S
DOP CALC AO VTI: 21.4 CM
DOP CALC LVOT AREA: 3.8 CM2
DOP CALC LVOT DIAMETER: 2.21 CM
DOP CALC LVOT PEAK VEL: 0.85 M/S
DOP CALC LVOT STROKE VOLUME: 67.56 CM3
DOP CALCLVOT PEAK VEL VTI: 17.62 CM
E WAVE DECELERATION TIME: 158.19 MSEC
E/A RATIO: 2.92
E/E' RATIO: 11.37 M/S
ECHO LV POSTERIOR WALL: 0.9 CM (ref 0.6–1.1)
FRACTIONAL SHORTENING: 23 % (ref 28–44)
INTERVENTRICULAR SEPTUM: 0.8 CM (ref 0.6–1.1)
LEFT INTERNAL DIMENSION IN SYSTOLE: 3.91 CM (ref 2.1–4)
LEFT VENTRICLE DIASTOLIC VOLUME INDEX: 54.18 ML/M2
LEFT VENTRICLE DIASTOLIC VOLUME: 122.33 ML
LEFT VENTRICLE MASS INDEX: 67 G/M2
LEFT VENTRICLE SYSTOLIC VOLUME INDEX: 29.4 ML/M2
LEFT VENTRICLE SYSTOLIC VOLUME: 66.4 ML
LEFT VENTRICULAR INTERNAL DIMENSION IN DIASTOLE: 5.07 CM (ref 3.5–6)
LEFT VENTRICULAR MASS: 150.33 G
LV LATERAL E/E' RATIO: 10.8 M/S
LV SEPTAL E/E' RATIO: 12 M/S
MV PEAK A VEL: 0.37 M/S
MV PEAK E VEL: 1.08 M/S
PISA TR MAX VEL: 2.34 M/S
PULM VEIN S/D RATIO: 0.51
PV PEAK D VEL: 0.65 M/S
PV PEAK S VEL: 0.33 M/S
RA PRESSURE: 3 MMHG
RIGHT VENTRICULAR END-DIASTOLIC DIMENSION: 3.97 CM
RV TISSUE DOPPLER FREE WALL SYSTOLIC VELOCITY 1 (APICAL 4 CHAMBER VIEW): 9.41 CM/S
SINUS: 3.21 CM
STJ: 2.88 CM
TDI LATERAL: 0.1 M/S
TDI SEPTAL: 0.09 M/S
TDI: 0.1 M/S
TR MAX PG: 22 MMHG
TRICUSPID ANNULAR PLANE SYSTOLIC EXCURSION: 1.87 CM
TV REST PULMONARY ARTERY PRESSURE: 25 MMHG

## 2019-08-08 PROCEDURE — 99215 OFFICE O/P EST HI 40 MIN: CPT | Mod: S$PBB,,, | Performed by: INTERNAL MEDICINE

## 2019-08-08 PROCEDURE — 93306 TTE W/DOPPLER COMPLETE: CPT | Mod: PBBFAC | Performed by: INTERNAL MEDICINE

## 2019-08-08 PROCEDURE — 99999 PR PBB SHADOW E&M-EST. PATIENT-LVL III: ICD-10-PCS | Mod: PBBFAC,,, | Performed by: INTERNAL MEDICINE

## 2019-08-08 PROCEDURE — 99213 OFFICE O/P EST LOW 20 MIN: CPT | Mod: PBBFAC,25,27 | Performed by: INTERNAL MEDICINE

## 2019-08-08 PROCEDURE — 71046 X-RAY EXAM CHEST 2 VIEWS: CPT | Mod: TC,FY

## 2019-08-08 PROCEDURE — 99999 PR PBB SHADOW E&M-EST. PATIENT-LVL III: CPT | Mod: PBBFAC,,, | Performed by: INTERNAL MEDICINE

## 2019-08-08 PROCEDURE — 93306 TRANSTHORACIC ECHO (TTE) COMPLETE (CUPID ONLY): ICD-10-PCS | Mod: 26,S$PBB,, | Performed by: INTERNAL MEDICINE

## 2019-08-08 PROCEDURE — 99215 PR OFFICE/OUTPT VISIT, EST, LEVL V, 40-54 MIN: ICD-10-PCS | Mod: S$PBB,,, | Performed by: INTERNAL MEDICINE

## 2019-08-08 PROCEDURE — 99214 OFFICE O/P EST MOD 30 MIN: CPT | Mod: S$PBB,,, | Performed by: INTERNAL MEDICINE

## 2019-08-08 PROCEDURE — 99214 PR OFFICE/OUTPT VISIT, EST, LEVL IV, 30-39 MIN: ICD-10-PCS | Mod: S$PBB,,, | Performed by: INTERNAL MEDICINE

## 2019-08-08 PROCEDURE — 71046 XR CHEST PA AND LATERAL: ICD-10-PCS | Mod: 26,,, | Performed by: RADIOLOGY

## 2019-08-08 PROCEDURE — 71046 X-RAY EXAM CHEST 2 VIEWS: CPT | Mod: 26,,, | Performed by: RADIOLOGY

## 2019-08-08 PROCEDURE — 99213 OFFICE O/P EST LOW 20 MIN: CPT | Mod: PBBFAC,25 | Performed by: INTERNAL MEDICINE

## 2019-08-08 RX ORDER — DIPHENHYDRAMINE HCL 25 MG
50 CAPSULE ORAL ONCE
Status: CANCELLED | OUTPATIENT
Start: 2019-08-08 | End: 2019-08-08

## 2019-08-08 RX ORDER — DEXTROSE MONOHYDRATE AND SODIUM CHLORIDE 5; .45 G/100ML; G/100ML
INJECTION, SOLUTION INTRAVENOUS CONTINUOUS
Status: CANCELLED | OUTPATIENT
Start: 2019-08-08

## 2019-08-08 NOTE — TELEPHONE ENCOUNTER
Met with pt in clinic with Dr. Hernandez, pt denies SOB, Pain, fever. Reviewed medications, tacrolimus level was pending.

## 2019-08-08 NOTE — PROGRESS NOTES
"Subjective:   Mr. Montanez is a 23 y.o. year old Black or  male who received a  heart transplant on 3/17/09.      CMV status:   Donor: +  Recipient: +    HPI   23 y.o. AAM who developed myocarditis at age 6, ultimately underwent transplant at [facility] on 3/17/2009, who shared care between [facility] and [facility] but now is following with us, here for routine follow-up visit 9-years post-transplant. Patient remains on prednisone 2.5 mg despite being this far out, which per the [facility] snapshot is "due to clinic noncompliance and infrequent FU in [place]." He as last seen 5/2017 when he was wanting to get off pred and c/o anxiety about nursing school. He was started on Lexapro and his prednisone was weaned from 2.5mg daily to 2.5 Tuesday and Thursday. He presents today for routine f/u.     Patient continues to do well, however has been gaining weight unfortunately. Patient denies N/V/F/C, lightheadedness, dizziness, PND, orthopnea, LE edema, abdominal pain, abdominal pressure, chest pain, chest pressure, syncope, pre-syncope. 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 Looking to meet with a nutritionist across the street after this visit to work on weight loss. Did bring up a meeting with bariatric medicine, however patient is not interested.     Current IS:  Tacrolimus 6/6  Myfortic 720 BID      TTE 09/07/18:    1 - Post-cardiac transplantation study.     2 - Normal left ventricular systolic function (EF 60-65%).     3 - Concentric remodeling.     4 - No wall motion abnormalities.     5 - Normal left ventricular diastolic function.     6 - Right ventricle is upper limit of normal in size with normal systolic function.     7 - Trivial mitral regurgitation.     8 - Mild tricuspid regurgitation.     9 - The estimated PA systolic pressure is 27 mmHg.           Review of Systems   Constitution: Negative for chills, decreased appetite, diaphoresis, fever, malaise/fatigue, night sweats, weight gain and weight loss. " "  Cardiovascular: Negative for chest pain, claudication, leg swelling, near-syncope, orthopnea and palpitations.   Respiratory: Negative for cough, hemoptysis, shortness of breath, sleep disturbances due to breathing, snoring, sputum production and wheezing.    Endocrine: Negative for cold intolerance and heat intolerance.   Musculoskeletal: Negative for gout, joint pain, joint swelling, muscle cramps, muscle weakness and myalgias.   Gastrointestinal: Negative for bloating, abdominal pain, melena, nausea and vomiting.   Genitourinary: Negative for dysuria and flank pain.   Neurological: Negative for difficulty with concentration, excessive daytime sleepiness, dizziness, headaches, tremors and weakness.   Psychiatric/Behavioral: Negative for depression, memory loss, substance abuse and suicidal ideas. The patient does not have insomnia and is not nervous/anxious.        Objective:   Blood pressure 127/85, pulse 79, height 5' 6" (1.676 m), weight 121.8 kg (268 lb 8.3 oz).body mass index is 43.34 kg/m².    Physical Exam   Constitutional: He is oriented to person, place, and time. He appears well-developed and well-nourished.   obese   Neck: Normal range of motion. Neck supple. No JVD present.   Cardiovascular: Normal rate, regular rhythm, S1 normal and S2 normal.   No murmur heard.  Pulmonary/Chest: Effort normal and breath sounds normal. No respiratory distress. He has no wheezes. He has no rales.   Abdominal: Soft. Bowel sounds are normal. He exhibits no distension. There is no tenderness. There is no rebound.   Musculoskeletal: Normal range of motion. He exhibits no edema.   Neurological: He is alert and oriented to person, place, and time.   Skin: Skin is warm and dry. No rash noted. No erythema. No pallor.   Psychiatric: His mood appears anxious.   Nursing note and vitals reviewed.      Lab Results   Component Value Date    WBC 13.08 (H) 08/08/2019    HGB 13.7 (L) 08/08/2019    HCT 44.1 08/08/2019    MCV 90 " 08/08/2019     08/08/2019    CO2 22 (L) 08/08/2019    CREATININE 1.1 08/08/2019    CALCIUM 9.0 08/08/2019    ALBUMIN 3.6 08/08/2019    AST 17 08/08/2019    BNP 34 08/08/2019    ALT 20 08/08/2019    ALLOMAP 26 05/18/2017       Lab Results   Component Value Date    INR 1.0 10/07/2016       BNP   Date Value Ref Range Status   08/08/2019 34 0 - 99 pg/mL Final     Comment:     Values of less than 100 pg/ml are consistent with non-CHF populations.   09/07/2018 14 0 - 99 pg/mL Final     Comment:     Values of less than 100 pg/ml are consistent with non-CHF populations.   01/22/2018 11 0 - 99 pg/mL Final     Comment:     Values of less than 100 pg/ml are consistent with non-CHF populations.       No results found for: LDH    Tacrolimus Lvl   Date Value Ref Range Status   09/07/2018 9.5 5.0 - 15.0 ng/mL Final     Comment:     Testing performed by Liquid Chromatography-Tandem  Mass Spectrometry (LC-MS/MS).  This test was developed and its performance characteristics  determined by [facility], Department of Pathology  and Laboratory Medicine in a manner consistent with CLIA  requirements. It has not been cleared or approved by the [place]  Food and Drug Administration.  This test is used for clinical   purposes.  It should not be regarded as investigational or for  research.       Labs were reviewed with the patient.    Assessment:     1. Obesity, Class III, BMI 40-49.9 (morbid obesity)    2. Heart transplant failure    3. Status post heart transplant    4. Complication of heart transplant, unspecified complication    5. Heart replaced by transplant    6. Encounter for long-term (current) use of medications    7. Immunosuppression    8. Type 2 diabetes mellitus with hyperglycemia, unspecified whether long term insulin use    9. Long term current use of immunosuppressive drug        Plan:   S/p OHT satble  Continued to discuss weight loss    Return instructions as set forth by post transplant schedule or as  needed:    Clinic: Return for labs and/or biopsy weekly the first month, every two weeks during month 2 and then monthly for the first year at the provider or coordinator's discretion. During the second year, return to clinic every 3 months. Post transplant year 3-5 return every 6 months. There will be a comprehensive post transplant evaluation every year that may include LHC/RHC/biopsy, stress test, echo, CXR, and other health screening exams.    In addition to the clinical assessment, I have ordered HLA/DSA testing for this patient to assist in identification of moderate/severe acute cellular rejection (ACR) in a pt with stable Allograft function instead of endomyocardial biopsy.     Patient is reminded to call with any health changes as these can be early signs of transplant complications. Patient is advised to make sure any new medications or changes of old medications are discussed with a pharmacist or physician knowledgeable with transplant to avoid rejection/drug toxicity related to significant drug interactions.    UNOS Patient Status  Functional Status: 100% - Normal, no complaints, no evidence of disease  Physical Capacity: No Limitations  Working for Income: No  If no, reason not working: Patient Choice - Student Full Time/Part Time    Abad Hernandez MD    Addendum:  -Vitamin D level 11: will give Rx for Ergocalciferol 50,000 weekly for 3-6 months and recheck levels    - Also, patient is clear to undergo TM tube removal by ENT; please call with questions

## 2019-08-08 NOTE — PROGRESS NOTES
OUTPATIENT CATHETERIZATION INSTRUCTIONS    You have been scheduled for a procedure in the catheterization lab on Friday, August 16, 2019.     Please report to the Cardiology Waiting Area on the Third floor of the hospital and check in at 11 AM.   You will then be taken to the SSCU (Short Stay Cardiac Unit) and prepared for your procedure. Please be aware that this is not the time of your procedure but the time you are to arrive. The procedures are scheduled on an hourly basis; however, emergency cases take precedence over all other cases.       You may not have anything to eat or drink after midnight the night before your test. You may take your regular morning medications with water. If there are any medications that you should not take you will be instructed to hold them that morning. If you are diabetic and on Metformin (Glucophage) do not take it the day before, the day of, and for 2 days after your procedure.      The procedure will take 1-2 hours to perform. After the procedure, you will return to SSCU on the third floor of the hospital. You will need to lie still (or keep your arm still) for the next 4 to 6 hours to minimize bleeding from the puncture site. Your family may remain in the room with you during this time.       You may be able to be discharged home that same afternoon if there is someone to drive you home and there were no complications. If you have one of the balloon, stent, or device procedures you may spend the night in the hospital. Your doctor will determine, based on your progress, the date and time of your discharge. The results of your procedure will be discussed with you before you are discharged. Any further testing or procedures will be scheduled for you either before you leave or you will be called with these appointments.       If you should have any questions, concerns, or need to change the date of your procedure, please call  ANGELA Blakely @ [phone]    Special  Instructions:  Hold Metformin Thursday, Friday, Saturday and Sunday  Hold Victoza the morning of procedure  Drink plenty of water the day before and after your procedure.     THE ABOVE INSTRUCTIONS WERE GIVEN TO THE PATIENT VERBALLY AND THEY VERBALIZED UNDERSTANDING.  THEY DO NOT REQUIRE ANY SPECIAL NEEDS AND DO NOT HAVE ANY LEARNING BARRIERS.          Directions for Reporting to Cardiology Waiting Area in the Hospital  If you park in the Parking Garage:  Take elevators to the1st floor of the parking garage.  Continue past the gift shop, coffee shop, and piano.  Take a right and go to the gold elevators. (Elevator B)  Take the elevator to the 3rd floor.  Follow the arrow on the sign on the wall that says Cath Lab Registration/EP Lab Registration.  Follow the long hallway all the way around until you come to a big open area.  This is the registration area.  Check in at Reception Desk.    OR    If family is dropping you off:  Have them drop you off at the front of the Hospital under the green overhang.  Enter through the doors and take a right.  Take the E elevators to the 3rd floor Cardiology Waiting Area.  Check in at the Reception Desk in the waiting room.

## 2019-08-08 NOTE — PROGRESS NOTES
PCP - Jude Willard MD  Subjective:   Patient ID:  Derrick Montanez is a 23 y.o. y.o. male who presents for  Post OHTx [facility] 10 year anniversary. 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 Donahue (taken off prednisone, currently on Tacrolimus 6mg BID, Myfortic 720mg BID), additionally Obesity, IDDM type II. Patient with no new complaints today, continues to do well. No HF symptoms, no cardiovascular symptoms, Patient denies N/V/F/C, lightheadedness, dizziness, PND, orthopnea, LE edema, abdominal pain, abdominal pressure, chest pain, chest pressure, syncope, pre-syncope. Currently in finishing up undergrad, plans for PA school. Completed TTE today, EF 65%, CVP 3mmHg. Cr 1.1, WBC 13K.     Last EMbx 6 years ago at [facility], did have R groin hematoma size of [place], patient prefers Right radial access if possible, however with body habitus may be challenging. Additionally, very difficult PIV access, may need ultrasound guided, if unable to achieve then will give PO Valium and start with R CFV access to IV sedation prior to procedure.       TTE 09/07/18:    1 - Post-cardiac transplantation study.     2 - Normal left ventricular systolic function (EF 60-65%).     3 - Concentric remodeling.     4 - No wall motion abnormalities.     5 - Normal left ventricular diastolic function.     6 - Right ventricle is upper limit of normal in size with normal systolic function.     7 - Trivial mitral regurgitation.     8 - Mild tricuspid regurgitation.     9 - The estimated PA systolic pressure is 27 mmHg.       History:     Social History     Tobacco Use    Smoking status: Never Smoker    Smokeless tobacco: Never Used   Substance Use Topics    Alcohol use: Not on file     Comment: occ     Family History   Problem Relation Age of Onset    No Known Problems Mother     No Known Problems Father     Colon cancer Neg Hx     Cirrhosis Neg Hx     Celiac disease Neg  Hx     Crohn's disease Neg Hx     Ulcerative colitis Neg Hx     Rectal cancer Neg Hx     Liver cancer Neg Hx     Irritable bowel syndrome Neg Hx     Esophageal cancer Neg Hx     Stomach cancer Neg Hx     Melanoma Neg Hx        Meds:     Review of patient's allergies indicates:   Allergen Reactions    Pneumovax 23 [pneumococcal 23-yo ps vaccine] Swelling and Other (See Comments)     Swelling at site and Pneumonia like Sx. Pt spent x1 week in hospital       Current Outpatient Medications:     aspirin (ECOTRIN) 81 MG EC tablet, Take 81 mg by mouth once daily., Disp: , Rfl:     dicyclomine (BENTYL) 10 MG capsule, TAKE 1 CAPSULE BY MOUTH TWICE DAILY, Disp: 60 capsule, Rfl: 3    diltiaZEM (CARDIZEM CD) 180 MG 24 hr capsule, TAKE 1 CAPSULE BY MOUTH EVERY DAY, Disp: 30 capsule, Rfl: 11    ergocalciferol (ERGOCALCIFEROL) 50,000 unit Cap, Take 1 capsule (50,000 Units total) by mouth every 7 days., Disp: 30 capsule, Rfl: 11    escitalopram oxalate (LEXAPRO) 10 MG tablet, TAKE 1 TABLET BY MOUTH EVERY DAY, Disp: 30 tablet, Rfl: 11    esomeprazole (NEXIUM) 20 MG capsule, Take 1 capsule (20 mg total) by mouth before breakfast., Disp: 30 capsule, Rfl: 11    insulin (BASAGLAR KWIKPEN U-100 INSULIN) glargine 100 units/mL (3mL) SubQ pen, Inject 25-35 units daily, Disp: 1 Box, Rfl: 6    insulin lispro (ADMELOG U-100 INSULIN LISPRO) 100 unit/mL injection, Inject 12 units at dinner time, 180-230+2, 231-280+4, 281-330+6, 331-380+8, >380 +10., Disp: 1 vial, Rfl: 6    liraglutide 0.6 mg/0.1 mL, 18 mg/3 mL, subq PNIJ (VICTOZA 3-CARMEN) 0.6 mg/0.1 mL (18 mg/3 mL) PnIj, Inject 0.6 mg daily, week 1, 1.2 mg daily week 2, 1.8 mg daily week 3 and on if tolerable., Disp: 9 mL, Rfl: 6    lisinopril (PRINIVIL,ZESTRIL) 5 MG tablet, Take 1 tablet (5 mg total) by mouth once daily., Disp: 30 tablet, Rfl: 3    metFORMIN (GLUCOPHAGE) 500 MG tablet, Take 1 tablet (500 mg total) by mouth 2 (two) times daily with meals., Disp: 180 tablet,  "Rfl: 3    MYFORTIC 360 mg TbEC, Take 2 tablets (720 mg total) by mouth 2 (two) times daily., Disp: 120 tablet, Rfl: 6    pravastatin (PRAVACHOL) 20 MG tablet, Take 1 tablet (20 mg total) by mouth nightly., Disp: 30 tablet, Rfl: 11    PROGRAF 1 mg Cap, Take 1 (1 mg capsules) by mouth in the am and pm along with 1 (5 mg capsule) by mouth for a total of 6 mg twice a day, Disp: 60 capsule, Rfl: 11    tacrolimus (PROGRAF) 5 MG Cap, Take 1 capsule (5 mg total) by mouth every 12 (twelve) hours., Disp: 60 capsule, Rfl: 11    topiramate (TOPAMAX) 25 MG tablet, Take 1 tablet (25 mg total) by mouth every evening., Disp: 30 tablet, Rfl: 11    blood sugar diagnostic Strp, 1 each by Misc.(Non-Drug; Combo Route) route 2 (two) times daily., Disp: 100 strip, Rfl: 11    cetirizine 10 mg chewable tablet, Take 10 mg by mouth as needed. , Disp: , Rfl:     insulin syringe-needle U-100 (ADVOCATE SYRINGES) 0.3 mL 31 gauge x 5/16" Syrg, Uses 3 times a day, 90 day, Disp: 300 each, Rfl: 3    lancets 30 gauge Misc, 1 lancet by Misc.(Non-Drug; Combo Route) route 4 (four) times daily., Disp: 100 each, Rfl: 3    pen needle, diabetic (BD ULTRA-FINE KARIME PEN NEEDLE) 32 gauge x 5/32" Ndle, Uses 2 times a day. 90 day, Disp: 200 each, Rfl: 3    pravastatin (PRAVACHOL) 20 MG tablet, TAKE 1 TABLET BY MOUTH EVERY NIGHTLY, Disp: 30 tablet, Rfl: 5      Review of Systems   Constitutional: Negative for chills, fever, malaise/fatigue and weight loss.   Respiratory: Negative for cough, sputum production and shortness of breath.    Cardiovascular: Negative for chest pain, palpitations, orthopnea, claudication, leg swelling and PND.   Gastrointestinal: Negative for abdominal pain, diarrhea, nausea and vomiting.   Genitourinary: Negative for dysuria and urgency.       Objective:  Vitals:    08/08/19 0919 08/08/19 0922   BP: (!) 132/93 138/88   BP Location: Left arm Right arm   Patient Position: Sitting Sitting   BP Method: Large (Automatic) Large " "(Automatic)   Pulse: 82 79   SpO2: 99%    Weight: 121.8 kg (268 lb 8.3 oz)    Height: 5' 5" (1.651 m)         Physical Exam   Constitutional: He is oriented to person, place, and time. No distress.   HENT:   Head: Normocephalic and atraumatic.   Neck: Normal range of motion. Neck supple. No JVD present.   Cardiovascular: Normal rate, regular rhythm, normal heart sounds and intact distal pulses. Exam reveals no gallop and no friction rub.   No murmur heard.  Pulmonary/Chest: Effort normal and breath sounds normal. No respiratory distress. He has no wheezes. He has no rales.   Abdominal: Soft. Bowel sounds are normal. He exhibits no distension. There is no tenderness.   Musculoskeletal: Normal range of motion.   Neurological: He is alert and oriented to person, place, and time.   Skin: Skin is warm and dry. He is not diaphoretic.       Labs:     Lab Results   Component Value Date     08/08/2019    K 4.2 08/08/2019     08/08/2019    CO2 22 (L) 08/08/2019    BUN 20 08/08/2019    CREATININE 1.1 08/08/2019    ANIONGAP 11 08/08/2019     Lab Results   Component Value Date    HGBA1C 10.5 04/12/2019     Lab Results   Component Value Date    BNP 34 08/08/2019    BNP 14 09/07/2018    BNP 11 01/22/2018       Lab Results   Component Value Date    WBC 13.08 (H) 08/08/2019    HGB 13.7 (L) 08/08/2019    HCT 44.1 08/08/2019     08/08/2019    GRAN 7.8 (H) 08/08/2019    GRAN 59.7 08/08/2019     Lab Results   Component Value Date    CHOL 92 (L) 08/08/2019    HDL 26 (L) 08/08/2019    LDLCALC 27.2 (L) 08/08/2019    TRIG 194 (H) 08/08/2019       Lab Results   Component Value Date     08/08/2019    K 4.2 08/08/2019     08/08/2019    CO2 22 (L) 08/08/2019    BUN 20 08/08/2019    CREATININE 1.1 08/08/2019    ANIONGAP 11 08/08/2019     Lab Results   Component Value Date    HGBA1C 10.5 04/12/2019     Lab Results   Component Value Date    BNP 34 08/08/2019    BNP 14 09/07/2018    BNP 11 01/22/2018    Lab Results "   Component Value Date    WBC 13.08 (H) 08/08/2019    HGB 13.7 (L) 08/08/2019    HCT 44.1 08/08/2019     08/08/2019    GRAN 7.8 (H) 08/08/2019    GRAN 59.7 08/08/2019     Lab Results   Component Value Date    CHOL 92 (L) 08/08/2019    HDL 26 (L) 08/08/2019    LDLCALC 27.2 (L) 08/08/2019    TRIG 194 (H) 08/08/2019                Cardiovascular Imaging:     TTE 09/07/18:    1 - Post-cardiac transplantation study.     2 - Normal left ventricular systolic function (EF 60-65%).     3 - Concentric remodeling.     4 - No wall motion abnormalities.     5 - Normal left ventricular diastolic function.     6 - Right ventricle is upper limit of normal in size with normal systolic function.     7 - Trivial mitral regurgitation.     8 - Mild tricuspid regurgitation.     9 - The estimated PA systolic pressure is 27 mmHg.     2019  · Patient is s/p cardiac transplant.  · Normal left ventricular systolic function. The estimated ejection fraction is 65%  · Indeterminate left ventricular diastolic function.  · No wall motion abnormalities.  · Normal right ventricular systolic function.  · Mild tricuspid regurgitation.  · The estimated PA systolic pressure is 25 mm Hg  · Normal central venous pressure (3 mm Hg).    Assessment & Plan:     Derrick Montanez is a 23 y.o. y.o. male who presents for  Post OHTx [facility] 10 year anniversary. Case discussed with [facility] coordinator Breanna, due to recent discontinuation of prednisone, will also pursue RV EMBx.    - TTE EF 65%, CVP 3mmHg, PASP 25mmHg, no WMA    - Cr 1.1, WBC 13K,    --C (no IVUS needed), RV EMBx  - Anti-platelet Therapy: ASA   - Access: Right radial access, Right CFV   - Hx of R groin hematoma at [facility]   - Difficult PIV access, may need ultrasound guided, if unable to achieve then will give PO Valium and start with R CFV access to IV sedation prior to procedure.   - Catheters: 6 Fr sheath, 9Fr sheath with 7F long curved sheath, Pigtail catheter, Biopsy forceps  - Creatinine/CrCl:  1.1  - Allergies:  Pneuomovax  - Pre-Hydration: D5 1/2NS 125cc/hr   - Pre-Op Med: Benadryl  - All patient's questions were answered.  -The risks, benefits and alternatives of the procedure were explained to the patient.   -The risks of coronary angiography include but are not limited to: bleeding, infection, heart rhythm abnormalities, allergic reactions, kidney injury and potential need for dialysis, stroke and death.   - Should stenting be indicated, the patient has agreed to dual anti-platelet therapy for 1-consecutive year with a drug-eluting stent and a minimum of 1-month with the use of a bare metal stent  - Additionally, pt is aware that non-compliance is likely to result in stent clotting with heart attack, heart failure, and/or death  -The risks of moderate sedation include hypotension, respiratory depression, arrhythmias, bronchospasm, and death.   - Informed consent was obtained and the  patient is agreeable to proceed with the procedure.      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  The patient's diastolic blood pressures elevated in clinic today.  We will reassess post catheterization.    Signed:  Paulino Roland M.D.  Page # [phone]  Cardiovascular Fellow PGY-IV  [facility]     I have personally taken the history and examined this patient and agree with the resident's note as stated above.  All of the patient's questions were answered.

## 2019-08-08 NOTE — LETTER
August 8, 2019      Loyda Donahue MD  [address]  [facility] [address]           [facility]  [address]  [facility] [address]  Phone: [phone]          Patient: Derrick Montanez   MR Number: [id number]   YOB: 1995   Date of Visit: 8/8/2019       Dear Dr. Loyda Donahue:    Thank you for referring Derrick Montanez to me for evaluation. Attached you will find relevant portions of my assessment and plan of care.    If you have questions, please do not hesitate to call me. I look forward to following Derrick Montanez along with you.    Sincerely,    Thien Hayes MD    Enclosure  CC:  No Recipients    If you would like to receive this communication electronically, please contact [email or web address] or [phone] to request more information on [facility] access.    For providers and/or their staff who would like to refer a patient to [facility], please contact us through our one-stop-shop provider referral line, [facility], at [phone].    If you feel you have received this communication in error or would no longer like to receive these types of communications, please e-mail [email or web address]

## 2019-08-16 ENCOUNTER — HOSPITAL ENCOUNTER (OUTPATIENT)
Facility: HOSPITAL | Age: 24
Discharge: HOME | End: 2019-08-16
Attending: INTERNAL MEDICINE | Admitting: INTERNAL MEDICINE
Payer: MEDICAID

## 2019-08-16 VITALS
HEIGHT: 66 IN | WEIGHT: 268 LBS | TEMPERATURE: 98 F | DIASTOLIC BLOOD PRESSURE: 80 MMHG | BODY MASS INDEX: 43.07 KG/M2 | HEART RATE: 83 BPM | RESPIRATION RATE: 18 BRPM | OXYGEN SATURATION: 93 % | SYSTOLIC BLOOD PRESSURE: 136 MMHG

## 2019-08-16 DIAGNOSIS — Z98.890 STATUS POST CORONARY ANGIOGRAM: ICD-10-CM

## 2019-08-16 DIAGNOSIS — T86.22 HEART TRANSPLANT FAILURE: ICD-10-CM

## 2019-08-16 LAB
ABO + RH BLD: NORMAL
ANION GAP SERPL CALC-SCNC: 11 MMOL/L (ref 8–16)
BASOPHILS # BLD AUTO: 0.05 K/UL (ref 0–0.2)
BASOPHILS NFR BLD: 0.4 % (ref 0–1.9)
BLD GP AB SCN CELLS X3 SERPL QL: NORMAL
BUN SERPL-MCNC: 16 MG/DL (ref 6–20)
CALCIUM SERPL-MCNC: 9.6 MG/DL (ref 8.7–10.5)
CHLORIDE SERPL-SCNC: 106 MMOL/L (ref 95–110)
CO2 SERPL-SCNC: 21 MMOL/L (ref 23–29)
CREAT SERPL-MCNC: 1 MG/DL (ref 0.5–1.4)
DIFFERENTIAL METHOD: ABNORMAL
EOSINOPHIL # BLD AUTO: 0.1 K/UL (ref 0–0.5)
EOSINOPHIL NFR BLD: 1.2 % (ref 0–8)
ERYTHROCYTE [DISTWIDTH] IN BLOOD BY AUTOMATED COUNT: 12.8 % (ref 11.5–14.5)
EST. GFR  (AFRICAN AMERICAN): >60 ML/MIN/1.73 M^2
EST. GFR  (NON AFRICAN AMERICAN): >60 ML/MIN/1.73 M^2
GLUCOSE SERPL-MCNC: 127 MG/DL (ref 70–110)
HCT VFR BLD AUTO: 46.3 % (ref 40–54)
HGB BLD-MCNC: 14.3 G/DL (ref 14–18)
IMM GRANULOCYTES # BLD AUTO: 0.03 K/UL (ref 0–0.04)
IMM GRANULOCYTES NFR BLD AUTO: 0.3 % (ref 0–0.5)
LYMPHOCYTES # BLD AUTO: 2.6 K/UL (ref 1–4.8)
LYMPHOCYTES NFR BLD: 22.7 % (ref 18–48)
MCH RBC QN AUTO: 27.4 PG (ref 27–31)
MCHC RBC AUTO-ENTMCNC: 30.9 G/DL (ref 32–36)
MCV RBC AUTO: 89 FL (ref 82–98)
MONOCYTES # BLD AUTO: 0.8 K/UL (ref 0.3–1)
MONOCYTES NFR BLD: 7 % (ref 4–15)
NEUTROPHILS # BLD AUTO: 7.8 K/UL (ref 1.8–7.7)
NEUTROPHILS NFR BLD: 68.4 % (ref 38–73)
NRBC BLD-RTO: 0 /100 WBC
PLATELET # BLD AUTO: 303 K/UL (ref 150–350)
PMV BLD AUTO: 10.2 FL (ref 9.2–12.9)
POCT GLUCOSE: 113 MG/DL (ref 70–110)
POCT GLUCOSE: 133 MG/DL (ref 70–110)
POTASSIUM SERPL-SCNC: 4.5 MMOL/L (ref 3.5–5.1)
RBC # BLD AUTO: 5.22 M/UL (ref 4.6–6.2)
SODIUM SERPL-SCNC: 138 MMOL/L (ref 136–145)
WBC # BLD AUTO: 11.42 K/UL (ref 3.9–12.7)

## 2019-08-16 PROCEDURE — 99153 MOD SED SAME PHYS/QHP EA: CPT | Performed by: INTERNAL MEDICINE

## 2019-08-16 PROCEDURE — 86850 RBC ANTIBODY SCREEN: CPT

## 2019-08-16 PROCEDURE — 85025 COMPLETE CBC W/AUTO DIFF WBC: CPT

## 2019-08-16 PROCEDURE — C1760 CLOSURE DEV, VASC: HCPCS | Performed by: INTERNAL MEDICINE

## 2019-08-16 PROCEDURE — 88307 TISSUE SPECIMEN TO PATHOLOGY: ICD-10-PCS | Mod: 26,,, | Performed by: PATHOLOGY

## 2019-08-16 PROCEDURE — C1887 CATHETER, GUIDING: HCPCS | Performed by: INTERNAL MEDICINE

## 2019-08-16 PROCEDURE — 88342 IMHCHEM/IMCYTCHM 1ST ANTB: CPT | Performed by: PATHOLOGY

## 2019-08-16 PROCEDURE — 93567 NJX CAR CTH SPRVLV AORTGRPHY: CPT

## 2019-08-16 PROCEDURE — 80048 BASIC METABOLIC PNL TOTAL CA: CPT

## 2019-08-16 PROCEDURE — 93010 EKG 12-LEAD: ICD-10-PCS | Mod: ,,, | Performed by: INTERNAL MEDICINE

## 2019-08-16 PROCEDURE — 93505 ENDOMYOCARDIAL BIOPSY: CPT | Mod: 26,,, | Performed by: INTERNAL MEDICINE

## 2019-08-16 PROCEDURE — 25000003 PHARM REV CODE 250: Performed by: STUDENT IN AN ORGANIZED HEALTH CARE EDUCATION/TRAINING PROGRAM

## 2019-08-16 PROCEDURE — 93567 NJX CAR CTH SPRVLV AORTGRPHY: CPT | Mod: ,,, | Performed by: INTERNAL MEDICINE

## 2019-08-16 PROCEDURE — 93005 ELECTROCARDIOGRAM TRACING: CPT | Mod: 59

## 2019-08-16 PROCEDURE — 93460 PR CATH PLACE/CORON ANGIO, IMG SUPER/INTERP,R&L HRT CATH, L HRT VENTRIC: ICD-10-PCS | Mod: 26,,, | Performed by: INTERNAL MEDICINE

## 2019-08-16 PROCEDURE — 99152 MOD SED SAME PHYS/QHP 5/>YRS: CPT | Performed by: INTERNAL MEDICINE

## 2019-08-16 PROCEDURE — 93505 PR BIOPSY OF HEART LINING: ICD-10-PCS | Mod: 26,,, | Performed by: INTERNAL MEDICINE

## 2019-08-16 PROCEDURE — 25000003 PHARM REV CODE 250: Performed by: INTERNAL MEDICINE

## 2019-08-16 PROCEDURE — 93010 ELECTROCARDIOGRAM REPORT: CPT | Mod: ,,, | Performed by: INTERNAL MEDICINE

## 2019-08-16 PROCEDURE — C1769 GUIDE WIRE: HCPCS | Performed by: INTERNAL MEDICINE

## 2019-08-16 PROCEDURE — 82962 GLUCOSE BLOOD TEST: CPT

## 2019-08-16 PROCEDURE — 93460 R&L HRT ART/VENTRICLE ANGIO: CPT

## 2019-08-16 PROCEDURE — C1751 CATH, INF, PER/CENT/MIDLINE: HCPCS | Performed by: INTERNAL MEDICINE

## 2019-08-16 PROCEDURE — 99152 MOD SED SAME PHYS/QHP 5/>YRS: CPT | Mod: ,,, | Performed by: INTERNAL MEDICINE

## 2019-08-16 PROCEDURE — 93505 ENDOMYOCARDIAL BIOPSY: CPT | Performed by: INTERNAL MEDICINE

## 2019-08-16 PROCEDURE — 25500020 PHARM REV CODE 255: Performed by: INTERNAL MEDICINE

## 2019-08-16 PROCEDURE — 99152 PR MOD CONSCIOUS SEDATION, SAME PHYS, 5+ YRS, FIRST 15 MIN: ICD-10-PCS | Mod: ,,, | Performed by: INTERNAL MEDICINE

## 2019-08-16 PROCEDURE — 63600175 PHARM REV CODE 636 W HCPCS: Performed by: INTERNAL MEDICINE

## 2019-08-16 PROCEDURE — 88342 TISSUE SPECIMEN TO PATHOLOGY: ICD-10-PCS | Mod: 26,,, | Performed by: PATHOLOGY

## 2019-08-16 PROCEDURE — C1894 INTRO/SHEATH, NON-LASER: HCPCS | Performed by: INTERNAL MEDICINE

## 2019-08-16 PROCEDURE — 88307 TISSUE EXAM BY PATHOLOGIST: CPT | Mod: 26,,, | Performed by: PATHOLOGY

## 2019-08-16 PROCEDURE — 93460 R&L HRT ART/VENTRICLE ANGIO: CPT | Mod: 26,,, | Performed by: INTERNAL MEDICINE

## 2019-08-16 PROCEDURE — 27201423 OPTIME MED/SURG SUP & DEVICES STERILE SUPPLY: Performed by: INTERNAL MEDICINE

## 2019-08-16 PROCEDURE — 93567 PR INJECT SUPRAVALVULAR AORTOGRAPHY DURING HEART CATH: ICD-10-PCS | Mod: ,,, | Performed by: INTERNAL MEDICINE

## 2019-08-16 RX ORDER — INSULIN ASPART 100 [IU]/ML
0-5 INJECTION, SOLUTION INTRAVENOUS; SUBCUTANEOUS
Status: DISCONTINUED | OUTPATIENT
Start: 2019-08-16 | End: 2019-08-16 | Stop reason: HOSPADM

## 2019-08-16 RX ORDER — VERAPAMIL HYDROCHLORIDE 2.5 MG/ML
INJECTION, SOLUTION INTRAVENOUS
Status: DISCONTINUED | OUTPATIENT
Start: 2019-08-16 | End: 2019-08-16 | Stop reason: HOSPADM

## 2019-08-16 RX ORDER — CEFAZOLIN SODIUM 1 G/3ML
INJECTION, POWDER, FOR SOLUTION INTRAMUSCULAR; INTRAVENOUS
Status: DISCONTINUED | OUTPATIENT
Start: 2019-08-16 | End: 2019-08-16 | Stop reason: HOSPADM

## 2019-08-16 RX ORDER — SODIUM CHLORIDE 9 MG/ML
INJECTION, SOLUTION INTRAVENOUS CONTINUOUS
Status: DISCONTINUED | OUTPATIENT
Start: 2019-08-16 | End: 2019-08-16

## 2019-08-16 RX ORDER — HEPARIN SODIUM 200 [USP'U]/100ML
INJECTION, SOLUTION INTRAVENOUS
Status: DISCONTINUED | OUTPATIENT
Start: 2019-08-16 | End: 2019-08-16 | Stop reason: HOSPADM

## 2019-08-16 RX ORDER — HEPARIN SODIUM 1000 [USP'U]/ML
INJECTION, SOLUTION INTRAVENOUS; SUBCUTANEOUS
Status: DISCONTINUED | OUTPATIENT
Start: 2019-08-16 | End: 2019-08-16 | Stop reason: HOSPADM

## 2019-08-16 RX ORDER — HYDRALAZINE HYDROCHLORIDE 20 MG/ML
INJECTION INTRAMUSCULAR; INTRAVENOUS
Status: DISCONTINUED | OUTPATIENT
Start: 2019-08-16 | End: 2019-08-16 | Stop reason: HOSPADM

## 2019-08-16 RX ORDER — NITROGLYCERIN 5 MG/ML
INJECTION, SOLUTION INTRAVENOUS
Status: DISCONTINUED | OUTPATIENT
Start: 2019-08-16 | End: 2019-08-16 | Stop reason: HOSPADM

## 2019-08-16 RX ORDER — ALPRAZOLAM 0.25 MG/1
0.25 TABLET ORAL ONCE
Status: COMPLETED | OUTPATIENT
Start: 2019-08-16 | End: 2019-08-16

## 2019-08-16 RX ORDER — DIPHENHYDRAMINE HCL 50 MG
50 CAPSULE ORAL ONCE
Status: COMPLETED | OUTPATIENT
Start: 2019-08-16 | End: 2019-08-16

## 2019-08-16 RX ORDER — IBUPROFEN 200 MG
16 TABLET ORAL
Status: DISCONTINUED | OUTPATIENT
Start: 2019-08-16 | End: 2019-08-16 | Stop reason: HOSPADM

## 2019-08-16 RX ORDER — SODIUM CHLORIDE 9 MG/ML
INJECTION, SOLUTION INTRAVENOUS
Status: DISCONTINUED | OUTPATIENT
Start: 2019-08-16 | End: 2019-08-16 | Stop reason: HOSPADM

## 2019-08-16 RX ORDER — FENTANYL CITRATE 50 UG/ML
INJECTION, SOLUTION INTRAMUSCULAR; INTRAVENOUS
Status: DISCONTINUED | OUTPATIENT
Start: 2019-08-16 | End: 2019-08-16 | Stop reason: HOSPADM

## 2019-08-16 RX ORDER — GLUCAGON 1 MG
1 KIT INJECTION
Status: DISCONTINUED | OUTPATIENT
Start: 2019-08-16 | End: 2019-08-16 | Stop reason: HOSPADM

## 2019-08-16 RX ORDER — SODIUM CHLORIDE 9 MG/ML
INJECTION, SOLUTION INTRAVENOUS CONTINUOUS
Status: ACTIVE | OUTPATIENT
Start: 2019-08-16 | End: 2019-08-16

## 2019-08-16 RX ORDER — DEXTROSE MONOHYDRATE AND SODIUM CHLORIDE 5; .45 G/100ML; G/100ML
INJECTION, SOLUTION INTRAVENOUS CONTINUOUS
Status: DISCONTINUED | OUTPATIENT
Start: 2019-08-16 | End: 2019-08-16

## 2019-08-16 RX ORDER — MIDAZOLAM HYDROCHLORIDE 1 MG/ML
INJECTION, SOLUTION INTRAMUSCULAR; INTRAVENOUS
Status: DISCONTINUED | OUTPATIENT
Start: 2019-08-16 | End: 2019-08-16 | Stop reason: HOSPADM

## 2019-08-16 RX ORDER — IBUPROFEN 200 MG
24 TABLET ORAL
Status: DISCONTINUED | OUTPATIENT
Start: 2019-08-16 | End: 2019-08-16 | Stop reason: HOSPADM

## 2019-08-16 RX ADMIN — ALPRAZOLAM 0.25 MG: 0.25 TABLET ORAL at 12:08

## 2019-08-16 RX ADMIN — DIPHENHYDRAMINE HYDROCHLORIDE 50 MG: 50 CAPSULE ORAL at 12:08

## 2019-08-16 NOTE — Clinical Note
130 ml injected throughout the case. 30 mL total wasted during the case. 160 mL total used in the case.

## 2019-08-16 NOTE — PLAN OF CARE
Problem: Adult Inpatient Plan of Care  Goal: Plan of Care Review  Outcome: Ongoing (interventions implemented as appropriate)  Received report from Alyson. Patient s/p R/LHC, AAOx3. VSS, no c/o pain or discomfort at this time, resp even and unlabored. Gauze/tegaderm dressing to R groin, vascband to R wrist is CDI. No active bleeding. No hematoma noted. Post procedure protocol reviewed with patient and patient's family. Understanding verbalized. Family members at bedside. Nurse call bell within reach. Will continue to monitor per post procedure protocol.

## 2019-08-16 NOTE — Clinical Note
Angiography performed of the aortic arch. Angiography performed via power injection 40 mL contrast at 20 mL/s.

## 2019-08-16 NOTE — PLAN OF CARE
Ambulated on unit with grandmother at pt side.  Denies pain or SOB.  Verbalized understanding of procedure. Oriented to room and call bell provided.  Will continue to monitor.

## 2019-08-16 NOTE — INTERVAL H&P NOTE
The patient has been examined and the H&P has been reviewed:    I concur with the findings and no changes have occurred since H&P was written.   Derrick Montanez is a 23 y.o. male who presents for Post OHTx [facility] 10 year anniversary. PMHx of myocarditis at age 6, ultimately underwent transplant at [facility] on 3/17/2009.   Pt's last meal was at midnight, although he has drunk a little bit of water this morning around 11am. He is non-compliant with his aspirin. Consents in media tab.     Anesthesia/Surgery risks, benefits and alternative options discussed and understood by patient/family.    - TTE: EF 65%, CVP 3mmHg, PASP 25mmHg, no WMA   - LHC (no IVUS needed), RV EMBx  - Anti-platelet Therapy: plavix 75mg / ASA 81mg   - Access: : Right radial access, Right CFV                 - Hx of R groin hematoma at [facility]                 - Difficult PIV access, may need ultrasound guided, if unable to achieve then will give PO Valium and start with R CFV access to IV sedation prior to procedure.   - Catheters: 6 Fr sheath, 9Fr sheath with 7F long curved sheath, Pigtail catheter, Biopsy forceps  - Creatinine/CrCl: 1.1 on 8/8/19. Repeat from this AM pending.  - Allergies: Pneumovax  - Pre-Hydration:D5 1/2NS 125cc/hr  - Pre-Op Med: 50mg Benadryl, Alprazolam 0.25mg x1 dose only   - All patient's questions were answered.  -The risks, benefits and alternatives of the procedure were explained to the patient's family and surrogate decision maker.   -The risks of coronary angiography include but are not limited to: bleeding, infection, heart rhythm abnormalities, allergic reactions, kidney injury and potential need for dialysis, stroke and death.   -The risks of moderate sedation include hypotension, respiratory depression, arrhythmias, bronchospasm, and death.   - Informed consent was obtained and the  patient is agreeable to proceed with the procedure.      Active Hospital Problems    Diagnosis  POA    Heart transplant failure [T86.22]  Yes       Resolved Hospital Problems   No resolved problems to display.

## 2019-08-16 NOTE — Clinical Note
The PA catheter is inserted into the main pulmonary artery. Hemodynamics performed. Oxygen saturation obtained at 79%.

## 2019-08-17 NOTE — PROGRESS NOTES
Patient ambulated in hallway with standby assist. R groin remained CDI. No bleeding or hematoma noted. Will monitor.

## 2019-08-17 NOTE — PROGRESS NOTES
Patient discharged per MD orders. Instructions given on medications, wound care, activity, signs of infection, when to call MD, and follow up appointments. Pt verbalized understanding.  Patient AAOx3, VSS, no c/o pain or discomfort at this time. Telemetry and PIV removed. Patient refused wheelchair and ambulated off unit with family. Pt had an even/steady gait.

## 2019-08-17 NOTE — DISCHARGE SUMMARY
DISCHARGE SUMMARY  Interventional Cardiology    Team: Networked reference to record PCT     Patient Name: Derrick Montanez  YOB: 1995    Admit Date: 8/16/2019    Discharge Date: 08/17/2019    Discharge Attending Physician: Thien Hayes MD     Resident on Service: Yanet Frey MD    Chief Complaint: Post OHTx 10 year LHc and EMBx    Princilpal Diagnoses:  Active Hospital Problems    Diagnosis  POA    Heart transplant failure [T86.22]  Yes      Resolved Hospital Problems   No resolved problems to display.     Discharged Condition: Admit problems have stabilized       HOSPITAL COURSE:      Initial Presentation:    Derrick Montanez is a 24 y/o M.who presents for  Post OHTx [facility] 10 year anniversary. 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 Last EMBx was 6 years ago at [facility]. Pt reports it was complicated by a right groin hematoma.   He was seen in Interventional Clinic by Dr. Hayes scheduled for this procedure on 8/16/19.     Pt presented with no new complaints. Last meal at midnight.     Course of Principle Problem for Admission:    He underwent a LHC via R CFA. Right radial access was attempted initially, he had a bovine aortic arch with the origin of the head and neck vessel being in thte distal part of the aortic arch creating a very acute angle between the innominate artery origin and the ascending aorta. Results of the angiogram itself was normal. LVEDP was 25 and 5 biopsy specimens were taken and sent to the lab. Patient was discharged after routine post cath protocol was complete and pt was able to ambulate w/o complaints. No complications.    CONSULTS: n/a    Last CBC/BMP/HgbA1c (if applicable):  Recent Labs   Lab 08/16/19  1151   WBC 11.42   HGB 14.3   HCT 46.3          Recent Labs   Lab 08/16/19  1151      K 4.5      CO2 21*   BUN 16   CREATININE 1.0  "  CALCIUM 9.6     Disposition:  Home       Discharge Medication List:   Derrick Montanez   Home Medication Instructions ANGELICA:[id number]    Printed on:08/17/19 0021   Medication Information                      aspirin (ECOTRIN) 81 MG EC tablet  Take 81 mg by mouth once daily.             blood sugar diagnostic Strp  1 each by Misc.(Non-Drug; Combo Route) route 2 (two) times daily.             cetirizine 10 mg chewable tablet  Take 10 mg by mouth as needed.              dicyclomine (BENTYL) 10 MG capsule  TAKE 1 CAPSULE BY MOUTH TWICE DAILY             diltiaZEM (CARDIZEM CD) 180 MG 24 hr capsule  TAKE 1 CAPSULE BY MOUTH EVERY DAY             ergocalciferol (ERGOCALCIFEROL) 50,000 unit Cap  Take 1 capsule (50,000 Units total) by mouth every 7 days.             escitalopram oxalate (LEXAPRO) 10 MG tablet  TAKE 1 TABLET BY MOUTH EVERY DAY             esomeprazole (NEXIUM) 20 MG capsule  Take 1 capsule (20 mg total) by mouth before breakfast.             insulin (BASAGLAR KWIKPEN U-100 INSULIN) glargine 100 units/mL (3mL) SubQ pen  Inject 25-35 units daily             insulin lispro (ADMELOG U-100 INSULIN LISPRO) 100 unit/mL injection  Inject 12 units at dinner time, 180-230+2, 231-280+4, 281-330+6, 331-380+8, >380 +10.             insulin syringe-needle U-100 (ADVOCATE SYRINGES) 0.3 mL 31 gauge x 5/16" Syrg  Uses 3 times a day, 90 day             lancets 30 gauge Misc  1 lancet by Misc.(Non-Drug; Combo Route) route 4 (four) times daily.             liraglutide 0.6 mg/0.1 mL, 18 mg/3 mL, subq PNIJ (VICTOZA 3-CARMEN) 0.6 mg/0.1 mL (18 mg/3 mL) PnIj  Inject 0.6 mg daily, week 1, 1.2 mg daily week 2, 1.8 mg daily week 3 and on if tolerable.             lisinopril (PRINIVIL,ZESTRIL) 5 MG tablet  Take 1 tablet (5 mg total) by mouth once daily.             metFORMIN (GLUCOPHAGE) 500 MG tablet  Take 1 tablet (500 mg total) by mouth 2 (two) times daily with meals.             MYFORTIC 360 mg TbEC  Take 2 tablets (720 mg total) by " "mouth 2 (two) times daily.             pen needle, diabetic (BD ULTRA-FINE KARIME PEN NEEDLE) 32 gauge x 5/32" Ndle  Uses 2 times a day. 90 day             pravastatin (PRAVACHOL) 20 MG tablet  Take 1 tablet (20 mg total) by mouth nightly.             pravastatin (PRAVACHOL) 20 MG tablet  TAKE 1 TABLET BY MOUTH EVERY NIGHTLY             PROGRAF 1 mg Cap  Take 1 (1 mg capsules) by mouth in the am and pm along with 1 (5 mg capsule) by mouth for a total of 6 mg twice a day             tacrolimus (PROGRAF) 5 MG Cap  Take 1 capsule (5 mg total) by mouth every 12 (twelve) hours.             topiramate (TOPAMAX) 25 MG tablet  Take 1 tablet (25 mg total) by mouth every evening.                 Patient Instructions:  No discharge procedures on file.    At the time of discharge patient was told to take all medications as prescribed, to keep all followup appointments, and to call their primary care physician or return to the emergency room if they have any worsening or concerning symptoms.    Patient seen and plan discussed with Staff.    Signing Physician:    Yanet Frey MD  Cardiology, PGY IV  Pager: [phone]      "

## 2019-08-23 ENCOUNTER — PATIENT MESSAGE (OUTPATIENT)
Dept: TRANSPLANT | Facility: CLINIC | Age: 24
End: 2019-08-23

## 2019-08-26 DIAGNOSIS — Z94.1 STATUS POST HEART TRANSPLANT: ICD-10-CM

## 2019-08-26 DIAGNOSIS — Z94.9 HYPERTENSION ASSOCIATED WITH TRANSPLANTATION: Primary | ICD-10-CM

## 2019-08-26 DIAGNOSIS — I15.8 HYPERTENSION ASSOCIATED WITH TRANSPLANTATION: Primary | ICD-10-CM

## 2019-08-26 RX ORDER — NEBULIZER AND COMPRESSOR
EACH MISCELLANEOUS
Qty: 1 EACH | Refills: 0 | Status: SHIPPED | OUTPATIENT
Start: 2019-08-26

## 2019-08-26 NOTE — TELEPHONE ENCOUNTER
Pt's mother called to inform me that the IC doctor stated pt had hypertension and needed to have his medication adjusted. Asked pt's mother if he had BP cuff at home to check his BP, she stated no. She asked to have a script sent to [facility] to cover the cost of the cuff. Script sent to [facility].

## 2019-10-05 DIAGNOSIS — D84.9 IMMUNOSUPPRESSION: ICD-10-CM

## 2019-10-05 DIAGNOSIS — T86.20 COMPLICATION OF HEART TRANSPLANT, UNSPECIFIED COMPLICATION: ICD-10-CM

## 2019-10-05 DIAGNOSIS — Z94.1 STATUS POST HEART TRANSPLANT: ICD-10-CM

## 2019-10-05 DIAGNOSIS — Z79.899 ENCOUNTER FOR LONG-TERM (CURRENT) USE OF MEDICATIONS: ICD-10-CM

## 2019-10-05 DIAGNOSIS — K21.9 GASTROESOPHAGEAL REFLUX DISEASE, ESOPHAGITIS PRESENCE NOT SPECIFIED: ICD-10-CM

## 2019-10-05 DIAGNOSIS — Z94.1 HEART TRANSPLANTED: ICD-10-CM

## 2019-10-06 RX ORDER — LISINOPRIL 5 MG/1
TABLET ORAL
Qty: 30 TABLET | Refills: 3 | Status: SHIPPED | OUTPATIENT
Start: 2019-10-06 | End: 2020-03-16

## 2019-11-16 DIAGNOSIS — Z79.899 ENCOUNTER FOR LONG-TERM (CURRENT) USE OF MEDICATIONS: ICD-10-CM

## 2019-11-16 DIAGNOSIS — Z94.1 HEART TRANSPLANTED: ICD-10-CM

## 2019-11-16 DIAGNOSIS — D84.9 IMMUNOSUPPRESSION: ICD-10-CM

## 2019-11-16 DIAGNOSIS — I10 ESSENTIAL HYPERTENSION: ICD-10-CM

## 2019-11-16 DIAGNOSIS — Z94.1 STATUS POST HEART TRANSPLANT: ICD-10-CM

## 2019-11-16 DIAGNOSIS — T86.20 COMPLICATION OF HEART TRANSPLANT, UNSPECIFIED COMPLICATION: ICD-10-CM

## 2019-11-16 DIAGNOSIS — K21.9 GASTROESOPHAGEAL REFLUX DISEASE, ESOPHAGITIS PRESENCE NOT SPECIFIED: ICD-10-CM

## 2019-11-16 RX ORDER — DICYCLOMINE HYDROCHLORIDE 10 MG/1
CAPSULE ORAL
Qty: 60 CAPSULE | Refills: 3 | Status: SHIPPED | OUTPATIENT
Start: 2019-11-16 | End: 2020-04-20

## 2019-11-16 RX ORDER — DILTIAZEM HYDROCHLORIDE 180 MG/1
CAPSULE, COATED, EXTENDED RELEASE ORAL
Qty: 30 CAPSULE | Refills: 11 | Status: SHIPPED | OUTPATIENT
Start: 2019-11-16 | End: 2020-01-08

## 2019-11-22 DIAGNOSIS — D84.9 IMMUNOSUPPRESSION: ICD-10-CM

## 2019-11-22 DIAGNOSIS — Z79.899 ENCOUNTER FOR LONG-TERM (CURRENT) USE OF MEDICATIONS: ICD-10-CM

## 2019-11-22 DIAGNOSIS — K21.9 GASTROESOPHAGEAL REFLUX DISEASE, ESOPHAGITIS PRESENCE NOT SPECIFIED: ICD-10-CM

## 2019-11-22 DIAGNOSIS — Z94.1 HEART TRANSPLANTED: ICD-10-CM

## 2019-11-22 DIAGNOSIS — T86.20 COMPLICATION OF HEART TRANSPLANT, UNSPECIFIED COMPLICATION: ICD-10-CM

## 2019-11-22 DIAGNOSIS — Z94.1 STATUS POST HEART TRANSPLANT: ICD-10-CM

## 2019-11-22 RX ORDER — PRAVASTATIN SODIUM 20 MG/1
TABLET ORAL
Qty: 30 TABLET | Refills: 11 | Status: SHIPPED | OUTPATIENT
Start: 2019-11-22 | End: 2020-11-17

## 2020-01-06 ENCOUNTER — TELEPHONE (OUTPATIENT)
Dept: TRANSPLANT | Facility: CLINIC | Age: 25
End: 2020-01-06

## 2020-01-06 ENCOUNTER — HOSPITAL ENCOUNTER (EMERGENCY)
Facility: HOSPITAL | Age: 25
Discharge: HOME | End: 2020-01-06
Attending: EMERGENCY MEDICINE
Payer: MEDICAID

## 2020-01-06 ENCOUNTER — LAB VISIT (OUTPATIENT)
Dept: LAB | Facility: HOSPITAL | Age: 25
End: 2020-01-06
Attending: INTERNAL MEDICINE
Payer: MEDICAID

## 2020-01-06 VITALS
OXYGEN SATURATION: 100 % | DIASTOLIC BLOOD PRESSURE: 80 MMHG | SYSTOLIC BLOOD PRESSURE: 130 MMHG | HEIGHT: 65 IN | WEIGHT: 260 LBS | HEART RATE: 100 BPM | TEMPERATURE: 98 F | BODY MASS INDEX: 43.32 KG/M2 | RESPIRATION RATE: 20 BRPM

## 2020-01-06 DIAGNOSIS — R53.1 WEAKNESS: ICD-10-CM

## 2020-01-06 DIAGNOSIS — E78.2 MIXED HYPERLIPIDEMIA: ICD-10-CM

## 2020-01-06 DIAGNOSIS — Z94.1 STATUS POST HEART TRANSPLANT: ICD-10-CM

## 2020-01-06 DIAGNOSIS — R73.9 HYPERGLYCEMIA: Primary | ICD-10-CM

## 2020-01-06 LAB
ALBUMIN SERPL BCP-MCNC: 4 G/DL (ref 3.5–5.2)
ALLENS TEST: ABNORMAL
ALP SERPL-CCNC: 112 U/L (ref 55–135)
ALT SERPL W/O P-5'-P-CCNC: 45 U/L (ref 10–44)
ANION GAP SERPL CALC-SCNC: 12 MMOL/L (ref 8–16)
AST SERPL-CCNC: 17 U/L (ref 10–40)
B-OH-BUTYR BLD STRIP-SCNC: 0.1 MMOL/L (ref 0–0.5)
BACTERIA #/AREA URNS AUTO: ABNORMAL /HPF
BASOPHILS # BLD AUTO: 0.05 K/UL (ref 0–0.2)
BASOPHILS NFR BLD: 0.4 % (ref 0–1.9)
BILIRUB SERPL-MCNC: 0.6 MG/DL (ref 0.1–1)
BILIRUB UR QL STRIP: NEGATIVE
BNP SERPL-MCNC: 15 PG/ML (ref 0–99)
BUN SERPL-MCNC: 21 MG/DL (ref 6–20)
CALCIUM SERPL-MCNC: 9.8 MG/DL (ref 8.7–10.5)
CHLORIDE SERPL-SCNC: 97 MMOL/L (ref 95–110)
CHOLEST SERPL-MCNC: 104 MG/DL (ref 120–199)
CHOLEST/HDLC SERPL: 4.3 {RATIO} (ref 2–5)
CLARITY UR REFRACT.AUTO: CLEAR
CLASS I ANTIBODY COMMENTS - LUMINEX: NORMAL
CLASS II ANTIBODY COMMENTS - LUMINEX: NORMAL
CO2 SERPL-SCNC: 21 MMOL/L (ref 23–29)
COLOR UR AUTO: YELLOW
CREAT SERPL-MCNC: 1.5 MG/DL (ref 0.5–1.4)
DIFFERENTIAL METHOD: NORMAL
DSA1 TESTING DATE: NORMAL
DSA12 TESTING DATE: NORMAL
DSA2 TESTING DATE: NORMAL
EOSINOPHIL # BLD AUTO: 0.1 K/UL (ref 0–0.5)
EOSINOPHIL NFR BLD: 0.8 % (ref 0–8)
ERYTHROCYTE [DISTWIDTH] IN BLOOD BY AUTOMATED COUNT: 12.3 % (ref 11.5–14.5)
EST. GFR  (AFRICAN AMERICAN): >60 ML/MIN/1.73 M^2
EST. GFR  (NON AFRICAN AMERICAN): >60 ML/MIN/1.73 M^2
ESTIMATED AVG GLUCOSE: 263 MG/DL (ref 68–131)
GLUCOSE SERPL-MCNC: 644 MG/DL (ref 70–110)
GLUCOSE UR QL STRIP: ABNORMAL
HBA1C MFR BLD HPLC: 10.8 % (ref 4–5.6)
HCO3 UR-SCNC: 20.2 MMOL/L (ref 24–28)
HCT VFR BLD AUTO: 45.4 % (ref 40–54)
HDLC SERPL-MCNC: 24 MG/DL (ref 40–75)
HDLC SERPL: 23.1 % (ref 20–50)
HGB BLD-MCNC: 14.9 G/DL (ref 14–18)
HGB UR QL STRIP: ABNORMAL
HYALINE CASTS UR QL AUTO: 3 /LPF
IMM GRANULOCYTES # BLD AUTO: 0.03 K/UL (ref 0–0.04)
IMM GRANULOCYTES NFR BLD AUTO: 0.3 % (ref 0–0.5)
KETONES UR QL STRIP: NEGATIVE
LDLC SERPL CALC-MCNC: 25.4 MG/DL (ref 63–159)
LEUKOCYTE ESTERASE UR QL STRIP: NEGATIVE
LYMPHOCYTES # BLD AUTO: 3.3 K/UL (ref 1–4.8)
LYMPHOCYTES NFR BLD: 28 % (ref 18–48)
MAGNESIUM SERPL-MCNC: 1.4 MG/DL (ref 1.6–2.6)
MCH RBC QN AUTO: 27.5 PG (ref 27–31)
MCHC RBC AUTO-ENTMCNC: 32.8 G/DL (ref 32–36)
MCV RBC AUTO: 84 FL (ref 82–98)
MICROSCOPIC COMMENT: ABNORMAL
MONOCYTES # BLD AUTO: 0.8 K/UL (ref 0.3–1)
MONOCYTES NFR BLD: 7.1 % (ref 4–15)
NEUTROPHILS # BLD AUTO: 7.4 K/UL (ref 1.8–7.7)
NEUTROPHILS NFR BLD: 63.4 % (ref 38–73)
NITRITE UR QL STRIP: NEGATIVE
NONHDLC SERPL-MCNC: 80 MG/DL
NRBC BLD-RTO: 0 /100 WBC
PCO2 BLDA: 37.8 MMHG (ref 35–45)
PH SMN: 7.34 [PH] (ref 7.35–7.45)
PH UR STRIP: 5 [PH] (ref 5–8)
PLATELET # BLD AUTO: 265 K/UL (ref 150–350)
PMV BLD AUTO: 11.6 FL (ref 9.2–12.9)
PO2 BLDA: 67 MMHG (ref 40–60)
POC BE: -6 MMOL/L
POC SATURATED O2: 92 % (ref 95–100)
POC TCO2: 21 MMOL/L (ref 24–29)
POCT GLUCOSE: 207 MG/DL (ref 70–110)
POCT GLUCOSE: 273 MG/DL (ref 70–110)
POCT GLUCOSE: 296 MG/DL (ref 70–110)
POCT GLUCOSE: 378 MG/DL (ref 70–110)
POCT GLUCOSE: >500 MG/DL (ref 70–110)
POTASSIUM SERPL-SCNC: 4 MMOL/L (ref 3.5–5.1)
PROT SERPL-MCNC: 7.9 G/DL (ref 6–8.4)
PROT UR QL STRIP: ABNORMAL
RBC # BLD AUTO: 5.42 M/UL (ref 4.6–6.2)
RBC #/AREA URNS AUTO: 3 /HPF (ref 0–4)
SAMPLE: ABNORMAL
SERUM COLLECTION DT - LUMINEX CLASS I: NORMAL
SERUM COLLECTION DT - LUMINEX CLASS II: NORMAL
SITE: ABNORMAL
SODIUM SERPL-SCNC: 130 MMOL/L (ref 136–145)
SP GR UR STRIP: 1.03 (ref 1–1.03)
SQUAMOUS #/AREA URNS AUTO: 0 /HPF
TACROLIMUS BLD-MCNC: 6.8 NG/ML (ref 5–15)
TRIGL SERPL-MCNC: 273 MG/DL (ref 30–150)
URN SPEC COLLECT METH UR: ABNORMAL
WBC # BLD AUTO: 11.67 K/UL (ref 3.9–12.7)
WBC #/AREA URNS AUTO: 0 /HPF (ref 0–5)
YEAST UR QL AUTO: ABNORMAL

## 2020-01-06 PROCEDURE — 82010 KETONE BODYS QUAN: CPT

## 2020-01-06 PROCEDURE — 93010 EKG 12-LEAD: ICD-10-PCS | Mod: ,,, | Performed by: INTERNAL MEDICINE

## 2020-01-06 PROCEDURE — 86833 HLA CLASS II HIGH DEFIN QUAL: CPT | Mod: 59,PO

## 2020-01-06 PROCEDURE — 80197 ASSAY OF TACROLIMUS: CPT

## 2020-01-06 PROCEDURE — 93010 ELECTROCARDIOGRAM REPORT: CPT | Mod: ,,, | Performed by: INTERNAL MEDICINE

## 2020-01-06 PROCEDURE — 82803 BLOOD GASES ANY COMBINATION: CPT

## 2020-01-06 PROCEDURE — 99900035 HC TECH TIME PER 15 MIN (STAT)

## 2020-01-06 PROCEDURE — 96365 THER/PROPH/DIAG IV INF INIT: CPT

## 2020-01-06 PROCEDURE — 36415 COLL VENOUS BLD VENIPUNCTURE: CPT

## 2020-01-06 PROCEDURE — 96366 THER/PROPH/DIAG IV INF ADDON: CPT

## 2020-01-06 PROCEDURE — 83735 ASSAY OF MAGNESIUM: CPT

## 2020-01-06 PROCEDURE — 85025 COMPLETE CBC W/AUTO DIFF WBC: CPT

## 2020-01-06 PROCEDURE — 80061 LIPID PANEL: CPT

## 2020-01-06 PROCEDURE — 81001 URINALYSIS AUTO W/SCOPE: CPT

## 2020-01-06 PROCEDURE — 83880 ASSAY OF NATRIURETIC PEPTIDE: CPT

## 2020-01-06 PROCEDURE — 82962 GLUCOSE BLOOD TEST: CPT

## 2020-01-06 PROCEDURE — 86832 HLA CLASS I HIGH DEFIN QUAL: CPT | Mod: PO

## 2020-01-06 PROCEDURE — 86832 HLA CLASS I HIGH DEFIN QUAL: CPT | Mod: 59,PO

## 2020-01-06 PROCEDURE — 99291 CRITICAL CARE FIRST HOUR: CPT | Mod: ,,, | Performed by: EMERGENCY MEDICINE

## 2020-01-06 PROCEDURE — 99291 CRITICAL CARE FIRST HOUR: CPT | Mod: 25

## 2020-01-06 PROCEDURE — 86977 RBC SERUM PRETX INCUBJ/INHIB: CPT | Mod: 59,PO

## 2020-01-06 PROCEDURE — 63600175 PHARM REV CODE 636 W HCPCS: Performed by: EMERGENCY MEDICINE

## 2020-01-06 PROCEDURE — 83036 HEMOGLOBIN GLYCOSYLATED A1C: CPT

## 2020-01-06 PROCEDURE — 86833 HLA CLASS II HIGH DEFIN QUAL: CPT | Mod: PO

## 2020-01-06 PROCEDURE — 99291 PR CRITICAL CARE, E/M 30-74 MINUTES: ICD-10-PCS | Mod: ,,, | Performed by: EMERGENCY MEDICINE

## 2020-01-06 PROCEDURE — 80053 COMPREHEN METABOLIC PANEL: CPT

## 2020-01-06 RX ADMIN — SODIUM CHLORIDE 1000 ML: 0.9 INJECTION, SOLUTION INTRAVENOUS at 03:01

## 2020-01-06 RX ADMIN — SODIUM CHLORIDE 1000 ML: 0.9 INJECTION, SOLUTION INTRAVENOUS at 04:01

## 2020-01-06 RX ADMIN — SODIUM CHLORIDE 8 UNITS/HR: 9 INJECTION, SOLUTION INTRAVENOUS at 03:01

## 2020-01-06 NOTE — ED TRIAGE NOTES
"Medical screening exam completed.  I have conducted a focused provider triage encounter, findings are as follows:    Brief history of present illness:  Patient is a 24-year-old male with a history of heart transplant in 2009, dm 2 on Victoza and metformin who presents the ED with abnormal lab value.  Patient had routine labs this morning but was referred to the ED due to hyperglycemia.   States that he feels fine, but has noted polyuria and polydipsia.  Denies any fever, chills, abdominal pain, nausea, vomiting, or dysuria.  Reports compliance to Victoza and metformin.  States that he was taken off insulin approx 4 months ago by his Endocrinologist.    Vitals:    01/06/20 1228   BP: (!) 146/88   Pulse: (!) 111   Resp: 18   Temp: 97.7 °F (36.5 °C)   TempSrc: Oral   SpO2: 96%   Weight: 117.9 kg (260 lb)   Height: 5' 5" (1.651 m)       Pertinent physical exam:  Initial vitals with tachycardia.   Young pleasant male in NAD and nontoxic appearing.    Brief workup plan:    Routine labs at 08:30 AM showed hyperglycemia at 644 with anion gap of 12. Hyponatremia at 130. Slight renal insufficiency at 1.5. Increase A1c to 10%; was 6 five months ago.  Will obtain beta hydroxybutyrate, UA, and check VBG and defer further evaluation and treatment to primary ED team.    Preliminary workup initiated; this workup will be continued and followed by the physician or advanced practice provider that is assigned to the patient when roomed.        Annamarie Cade PA-C  Emergent Department  [facility]  [facility] #[id number] or #[phone]      "

## 2020-01-06 NOTE — TELEPHONE ENCOUNTER
Received a call from chem lab with critical glucose of 644. Lab ran test twice. Discussed with Dr. Banuelos who advised pt come to ED for evaluation of possible DKA. Pt was admitted les [facility] a year ago with DKA. Pt notified of glucose level as well as other labs. Advised he come to ED for evaluation.

## 2020-01-07 ENCOUNTER — PATIENT MESSAGE (OUTPATIENT)
Dept: INTERNAL MEDICINE | Facility: CLINIC | Age: 25
End: 2020-01-07

## 2020-01-07 ENCOUNTER — TELEPHONE (OUTPATIENT)
Dept: TRANSPLANT | Facility: CLINIC | Age: 25
End: 2020-01-07

## 2020-01-07 DIAGNOSIS — E11.65 TYPE 2 DIABETES MELLITUS WITH HYPERGLYCEMIA, WITHOUT LONG-TERM CURRENT USE OF INSULIN: ICD-10-CM

## 2020-01-07 RX ORDER — INSULIN GLARGINE 100 [IU]/ML
INJECTION, SOLUTION SUBCUTANEOUS
Qty: 1 BOX | Refills: 6
Start: 2020-01-07 | End: 2020-01-07 | Stop reason: SDUPTHER

## 2020-01-07 RX ORDER — INSULIN LISPRO 100 [IU]/ML
INJECTION, SOLUTION INTRAVENOUS; SUBCUTANEOUS
Qty: 2 VIAL | Refills: 6 | Status: SHIPPED | OUTPATIENT
Start: 2020-01-07 | End: 2020-07-21 | Stop reason: SDUPTHER

## 2020-01-07 RX ORDER — INSULIN GLARGINE 100 [IU]/ML
INJECTION, SOLUTION SUBCUTANEOUS
Qty: 1 BOX | Refills: 6 | Status: SHIPPED | OUTPATIENT
Start: 2020-01-07 | End: 2020-07-21 | Stop reason: SDUPTHER

## 2020-01-07 RX ORDER — CALCIUM CARB/VITAMIN D3/VIT K1 500-100-40
TABLET,CHEWABLE ORAL
Qty: 300 EACH | Refills: 3 | Status: SHIPPED | OUTPATIENT
Start: 2020-01-07 | End: 2021-05-05 | Stop reason: SDUPTHER

## 2020-01-07 RX ORDER — INSULIN GLARGINE 100 [IU]/ML
INJECTION, SOLUTION SUBCUTANEOUS
Qty: 1 BOX | Refills: 6 | Status: SHIPPED | OUTPATIENT
Start: 2020-01-07 | End: 2020-01-07

## 2020-01-07 RX ORDER — PEN NEEDLE, DIABETIC 30 GX3/16"
NEEDLE, DISPOSABLE MISCELLANEOUS
Qty: 200 EACH | Refills: 3 | Status: SHIPPED | OUTPATIENT
Start: 2020-01-07 | End: 2020-07-21 | Stop reason: SDUPTHER

## 2020-01-08 ENCOUNTER — OFFICE VISIT (OUTPATIENT)
Dept: INTERNAL MEDICINE | Facility: CLINIC | Age: 25
End: 2020-01-08
Payer: MEDICAID

## 2020-01-08 VITALS
HEIGHT: 65 IN | BODY MASS INDEX: 45.48 KG/M2 | SYSTOLIC BLOOD PRESSURE: 118 MMHG | WEIGHT: 273 LBS | DIASTOLIC BLOOD PRESSURE: 58 MMHG

## 2020-01-08 DIAGNOSIS — F41.9 ANXIETY: ICD-10-CM

## 2020-01-08 DIAGNOSIS — E66.01 OBESITY, CLASS III, BMI 40-49.9 (MORBID OBESITY): ICD-10-CM

## 2020-01-08 DIAGNOSIS — E11.65 TYPE 2 DIABETES MELLITUS WITH HYPERGLYCEMIA, UNSPECIFIED WHETHER LONG TERM INSULIN USE: Primary | ICD-10-CM

## 2020-01-08 DIAGNOSIS — Z94.1 STATUS POST HEART TRANSPLANT: ICD-10-CM

## 2020-01-08 DIAGNOSIS — Z79.899 LONG TERM CURRENT USE OF IMMUNOSUPPRESSIVE DRUG: ICD-10-CM

## 2020-01-08 PROCEDURE — 99999 PR PBB SHADOW E&M-EST. PATIENT-LVL IV: ICD-10-PCS | Mod: PBBFAC,,, | Performed by: NURSE PRACTITIONER

## 2020-01-08 PROCEDURE — 99214 OFFICE O/P EST MOD 30 MIN: CPT | Mod: S$PBB,,, | Performed by: NURSE PRACTITIONER

## 2020-01-08 PROCEDURE — 99999 PR PBB SHADOW E&M-EST. PATIENT-LVL IV: CPT | Mod: PBBFAC,,, | Performed by: NURSE PRACTITIONER

## 2020-01-08 PROCEDURE — 99214 PR OFFICE/OUTPT VISIT, EST, LEVL IV, 30-39 MIN: ICD-10-PCS | Mod: S$PBB,,, | Performed by: NURSE PRACTITIONER

## 2020-01-08 PROCEDURE — 99214 OFFICE O/P EST MOD 30 MIN: CPT | Mod: PBBFAC | Performed by: NURSE PRACTITIONER

## 2020-01-08 RX ORDER — DILTIAZEM HYDROCHLORIDE 180 MG/1
180 CAPSULE, EXTENDED RELEASE ORAL EVERY MORNING
COMMUNITY
Start: 2019-12-18 | End: 2020-12-23

## 2020-01-08 NOTE — PATIENT INSTRUCTIONS
Snacks can be an important part of a balanced, healthy meal plan. They allow you to eat more frequently, feeling full and satisfied throughout the day. Also, they allow you to spread carbohydrates evenly, which may stabilize blood sugars.  Plus, snacks are enjoyable!     The amount of carbohydrate needed at snacks varies. Generally, about 15-30 grams of carbohydrate per snack is recommended.  Below you will find some tasty treats.       0-5 gm carb   Crystal Light   Vitamin Water Zero   Herbal tea, unsweetened   2 tsp peanut butter on celery   1./2 cup sugar-free jell-o   1 sugar-free popsicle   ¼ cup blueberries   8oz Blue Lisa unsweetened almond milk   5 baby carrots & celery sticks, cucumbers, bell peppers dipped in ¼ cup salsa, 2Tbsp light ranch dressing or 2Tbsp plain Greek yogurt   10 Goldfish crackers   ½ oz low-fat cheese or string cheese   1 closed handful of nuts, unsalted   1 Tbsp of sunflower seeds, unsalted   1 cup Smart Pop popcorn   1 whole grain brown rice cake        15 gm carb   1 small piece of fruit or ½ banana or 1/2 cup lite canned fruit   3 andrew cracker squares   3 cups Smart Pop popcorn, top spray butter, Cabrera lite salt or cinnamon and Truvia   5 Vanilla Wafers   ½ cup low fat, no added sugar ice cream or frozen yogurt (Blue bell, Blue Bunny, Weight Watchers, Skinny Cow)   ½ turkey, ham, or chicken sandwich   ½ c fruit with ½ c Cottage cheese   4-6 unsalted wheat crackers with 1 oz low fat cheese or 1 tbsp peanut butter    30-45 goldfish crackers (depending on flavor)    7-8 Zoroastrian mini brown rice cakes (caramel, apple cinnamon, chocolate)    12 Zoroastrian mini brown rice cakes (cheddar, bbq, ranch)    1/3 cup hummus dip with raw veg   1/2 whole wheat bud, 1Tbsp hummus   Mini Pizza (1/2 whole wheat English muffin, low-fat  cheese, tomato sauce)   100 calorie snack pack (Oreo, Chips Ahoy, Ritz Mix, Baked Cheetos)   4-6 oz. light or Greek Style yogurt  (Nathaly, Milton, Jodie, [facility])   ½ cup sugar-free pudding     6 in. wheat tortilla or bud oven toasted chips (topped with spray butter flavoring, cinnamon, Truvia OR spray butter, garlic powder, chili powder)    18 BBQ Popchips (available at [employer], Whole Foods, Fresh Market)                   Diabetes Support Group Meetings         Date: Topic:   February 13 Eat Fit YASMANI/Health Promotion   March 12 Taking Care of Your Smile   April 9 Spring into Healthy Eating/Cooking Demo   May 14 Ease Your Mind with Diabetes   Amanda 11 Summer Treats/Cooking Demo   July 9 Eat Fit YASMANI/Grocery Tour   August 13 Taking Care of Your Eyes and Feet   Sept 10 Chair Exercises/ADA updates   October 8 Recipes & Treats/Cooking Demo   November 12 Heart Health/Pump it up!   December 10 Year-End Close Out Party!        Meetings are held in the [facility] (A) of the [facility] located at [address] [place] [address]. Please call [phone] for additional information.    Free service, offered every 2nd Thursday of every month! Family members and/or friends are welcome as well!  Support group is for patients with type 1 or type 2 diabetes.    From 3:30p to 4:30p

## 2020-01-08 NOTE — PROGRESS NOTES
CHIEF COMPLAINT: Type 2 Diabetes     HPI: Mr. Derrick Montanez is a 24 y.o. male who was diagnosed with Type 2 DM in 3/2019   Pt has h/o heart transplantation 2009.  H/o DKA, hyperglycemia, hypoglycemia.     Stopped steroids x > 1 year ago.  Managed per HTS.   Lives in [place], [place].   Recent ER visit for BG >600, has been off of insulin x > 4 mos.   Last seen by me in 3/2019 and is now being seen by me again today.   Recent a1c is elevated, aug 2019 6.4%.   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  Needs >100 strips per month related to fluctuations with blood glucose reading, a1c trends, and activity level.  Diabetes Management Status    Statin: Taking  ACE/ARB: Taking    Screening or Prevention Patient's value Goal Complete/Controlled?   HgA1C Testing and Control   Lab Results   Component Value Date    HGBA1C 10.8 (H) 01/06/2020      Annually/Less than 8% Yes   Lipid profile : 01/06/2020 Annually Yes   LDL control Lab Results   Component Value Date    LDLCALC 25.4 (L) 01/06/2020    Annually/Less than 100 mg/dl  Yes   Nephropathy screening No results found for: LABMICR  Lab Results   Component Value Date    PROTEINUA 1+ (A) 01/06/2020    Annually No   Blood pressure BP Readings from Last 1 Encounters:   01/06/20 (P) 133/78    Less than 140/90 Yes   Dilated retinal exam Most Recent Eye Exam Date: Not Found Annually Yes   Foot  "exam   : 03/25/2019 Annually Yes       REVIEW OF SYSTEMS  General: no weakness, fatigue, +weight changes 5 # (gain)  Eyes: no double or blurred vision, eye pain, or redness.   Cardiovascular: no chest pain, palpitations, edema, or murmurs.   Respiratory: no cough or dyspnea.   GI: no heartburn, nausea, or changes in bowel patterns; good appetite.   Skin: no rashes, dryness, itching, or reactions at insulin injection sites.  Neuro: no numbness, tingling, tremors, or vertigo.   Endocrine: + polyuria, polydipsia, polyphagia, heat or cold intolerance.     Vital Signs  BP (!) 118/58   Ht 5' 5" (1.651 m)   Wt 123.8 kg (273 lb)   BMI 45.43 kg/m²     EXT Hemoglobin A1C   Date Value Ref Range Status   04/12/2019 10.5 % Final     Hemoglobin A1C   Date Value Ref Range Status   01/06/2020 10.8 (H) 4.0 - 5.6 % Final     Comment:     ADA Screening Guidelines:  5.7-6.4%  Consistent with prediabetes  >or=6.5%  Consistent with diabetes  High levels of fetal hemoglobin interfere with the HbA1C  assay. Heterozygous hemoglobin variants (HbS, HgC, etc)do  not significantly interfere with this assay.   However, presence of multiple variants may affect accuracy.     08/08/2019 6.4 (H) 4.0 - 5.6 % Final     Comment:     ADA Screening Guidelines:  5.7-6.4%  Consistent with prediabetes  >or=6.5%  Consistent with diabetes  High levels of fetal hemoglobin interfere with the HbA1C  assay. Heterozygous hemoglobin variants (HbS, HgC, etc)do  not significantly interfere with this assay.   However, presence of multiple variants may affect accuracy.     03/25/2019 13.2 (H) 4.0 - 5.6 % Final     Comment:     ADA Screening Guidelines:  5.7-6.4%  Consistent with prediabetes  >or=6.5%  Consistent with diabetes  High levels of fetal hemoglobin interfere with the HbA1C  assay. Heterozygous hemoglobin variants (HbS, HgC, etc)do  not significantly interfere with this assay.   However, presence of multiple variants may affect accuracy.          Chemistry  "       Component Value Date/Time     (L) 01/06/2020 0850    K 4.0 01/06/2020 0850    CL 97 01/06/2020 0850    CO2 21 (L) 01/06/2020 0850    BUN 21 (H) 01/06/2020 0850    CREATININE 1.5 (H) 01/06/2020 0850     (HH) 01/06/2020 0850        Component Value Date/Time    CALCIUM 9.8 01/06/2020 0850    ALKPHOS 112 01/06/2020 0850    AST 17 01/06/2020 0850    ALT 45 (H) 01/06/2020 0850    BILITOT 0.6 01/06/2020 0850    ESTGFRAFRICA >60.0 01/06/2020 0850    EGFRNONAA >60.0 01/06/2020 0850           Lab Results   Component Value Date    TSH 1.126 05/18/2017      Lab Results   Component Value Date    CHOL 104 (L) 01/06/2020    CHOL 92 (L) 08/08/2019    CHOL 84 (L) 09/07/2018     Lab Results   Component Value Date    HDL 24 (L) 01/06/2020    HDL 26 (L) 08/08/2019    HDL 24 (L) 09/07/2018     Lab Results   Component Value Date    LDLCALC 25.4 (L) 01/06/2020    LDLCALC 27.2 (L) 08/08/2019    LDLCALC 30.8 (L) 09/07/2018     Lab Results   Component Value Date    TRIG 273 (H) 01/06/2020    TRIG 194 (H) 08/08/2019    TRIG 146 09/07/2018     Lab Results   Component Value Date    CHOLHDL 23.1 01/06/2020    CHOLHDL 28.3 08/08/2019    CHOLHDL 28.6 09/07/2018       PHYSICAL EXAMINATION  Constitutional: Appears well, no distress  Neck: Supple, trachea midline.   Respiratory: No wheezes, even and unlabored.  Cardiovascular: RRR;  no edema.   Lymph: deferred   Skin: warm and dry; no injection site reactions, no acanthosis nigracans observed.  Neuro:patient alert and cooperative, normal affect; steady gait.    Diabetes Foot Exam:   Deferred   Has appropriate footwear.    Assessment/Plan    1. Type 2 diabetes mellitus with hyperglycemia, unspecified whether long term insulin use  Hemoglobin A1c    Hemoglobin A1c    Ambulatory Referral to Diabetes Education   2. Obesity, Class III, BMI 40-49.9 (morbid obesity)     3. Status post heart transplant     4. Long term current use of immunosuppressive drug     5. Anxiety       1. F/u in 5  mos w/ me-send logs via [facility]  a1c in 6 weeks-standing order [facility]   a1c next time  a1c goal less than  7%  Continue regimen  admelog 12 units ac w/ scale 150-200+2, etc  6 units admelog w/ snack  victoza 1.8 mg daily   Continue lantus 36 units at night  Continue metformin 500 mg bid ( discuss increase to 1000 mg bid or 1000/500)  Discussed invokana 100 mg daily -defer now  Reviewed dexcom and freestyle ash--in future-brochures given   132 mg during office visit   BG goal  mg/dl    2. Body mass index is 45.43 kg/m². may increase insulin resistance  On glp1a  3. F/u with HTS  4. May increase insulin resistance  On stable prograf 6 mg     FOLLOW UP  Follow up in about 3 months (around 4/8/2020).

## 2020-01-09 ENCOUNTER — PATIENT MESSAGE (OUTPATIENT)
Dept: INTERNAL MEDICINE | Facility: CLINIC | Age: 25
End: 2020-01-09

## 2020-01-09 LAB
C1Q1 TESTING DATE: NORMAL
C1Q2 TESTING DATE: NORMAL
CLASS I ANTIBODY COMMENTS - LUMINEX: NORMAL
CLASS II ANTIBODY COMMENTS - LUMINEX: NORMAL
HC1Q TESTING DATE: NORMAL
SERUM COLLECTION DT - LUMINEX CLASS I: NORMAL
SERUM COLLECTION DT - LUMINEX CLASS II: NORMAL

## 2020-01-09 RX ORDER — BLOOD-GLUCOSE CONTROL, NORMAL
EACH MISCELLANEOUS
Qty: 400 EACH | Refills: 3 | Status: SHIPPED | OUTPATIENT
Start: 2020-01-09 | End: 2020-05-21 | Stop reason: SDUPTHER

## 2020-03-16 DIAGNOSIS — T86.20 COMPLICATION OF HEART TRANSPLANT, UNSPECIFIED COMPLICATION: ICD-10-CM

## 2020-03-16 DIAGNOSIS — Z94.1 STATUS POST HEART TRANSPLANT: ICD-10-CM

## 2020-03-16 DIAGNOSIS — Z94.1 HEART TRANSPLANTED: ICD-10-CM

## 2020-03-16 DIAGNOSIS — D84.9 IMMUNOSUPPRESSION: ICD-10-CM

## 2020-03-16 DIAGNOSIS — K21.9 GASTROESOPHAGEAL REFLUX DISEASE, ESOPHAGITIS PRESENCE NOT SPECIFIED: ICD-10-CM

## 2020-03-16 DIAGNOSIS — Z94.1 HEART REPLACED BY TRANSPLANT: ICD-10-CM

## 2020-03-16 DIAGNOSIS — Z79.899 ENCOUNTER FOR LONG-TERM (CURRENT) USE OF MEDICATIONS: ICD-10-CM

## 2020-03-16 RX ORDER — LISINOPRIL 5 MG/1
TABLET ORAL
Qty: 30 TABLET | Refills: 3 | Status: SHIPPED | OUTPATIENT
Start: 2020-03-16 | End: 2020-04-20 | Stop reason: SDUPTHER

## 2020-03-16 RX ORDER — TACROLIMUS 5 MG/1
CAPSULE ORAL
Qty: 180 CAPSULE | Refills: 3 | Status: SHIPPED | OUTPATIENT
Start: 2020-03-16 | End: 2020-04-21 | Stop reason: SDUPTHER

## 2020-03-16 RX ORDER — TOPIRAMATE 25 MG/1
TABLET ORAL
Qty: 30 TABLET | Refills: 11 | Status: SHIPPED | OUTPATIENT
Start: 2020-03-16 | End: 2020-03-16 | Stop reason: SDUPTHER

## 2020-03-16 RX ORDER — TOPIRAMATE 25 MG/1
25 TABLET ORAL NIGHTLY
Qty: 30 TABLET | Refills: 11 | Status: SHIPPED | OUTPATIENT
Start: 2020-03-16 | End: 2021-04-15 | Stop reason: SDUPTHER

## 2020-03-16 RX ORDER — TACROLIMUS 5 MG/1
CAPSULE ORAL
Qty: 180 CAPSULE | Refills: 3 | Status: SHIPPED | OUTPATIENT
Start: 2020-03-16 | End: 2020-03-16 | Stop reason: SDUPTHER

## 2020-04-02 ENCOUNTER — TELEPHONE (OUTPATIENT)
Dept: TRANSPLANT | Facility: CLINIC | Age: 25
End: 2020-04-02

## 2020-04-02 NOTE — LETTER
April 2, 2020      [facility]  [address]  [facility] [address]  Phone: [phone]       Patient: Derrick Montanez   YOB: 1995  Date of Visit: 04/02/2020  RE: COVID-19      To Whom It May Concern:    Derrick Montanez has been a patient under my care at [facility] as a heart transplant since 2009. As a heart transplant patient, Derrick is immuncompromised so he will not reject his new heart and die.  He takes medication daily to specifically lower his immune system to prevent rejection.  Along with his heart transplant, Derrick is newly diagnosed with diabetes and is working very heard to control his diabetes. Having both conditions, makes him especially at high risk of developing COVID infection.      Derrick has done well managing his heart transplant, however, patients with heart transplants are at the highest risk of developing infection. Based on information from the Centers for Disease Control and Prevention [CDC] concerning COVID-19 (coronavirus); people with lung disease, heart disease and diabetes are at an increased risk of getting sick from this virus (Older Adults and Medical Conditions: https://www.cdc.gov/coronavirus/2019-ncov/index.html).   We ask our patients, to include Derrick, to stay home during this outbreak, avoid crowds and follow all recommended precautions. Please refer to the CDC guidelines for schools, workplaces and community locations for more information. Also, do not hesitate to contact our office if you have concerns or questions. Derrick's transplant coordinator is Breanna Lucio. She can be reached at [phone].  Sincerely,  Dr. Loyda Donahue

## 2020-04-02 NOTE — TELEPHONE ENCOUNTER
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 He is worried about the exposure to COVID and asked what he can do to stay safe. Advised pt to ask for a transfer to an area of the hospital where he will not come in contact with pts.He asked if there is a letter we can send to his employer stating that he is a heart transplant and is immunocompromised. I typed up a letter to email to him to give to his employer.

## 2020-04-20 DIAGNOSIS — Z94.1 STATUS POST HEART TRANSPLANT: ICD-10-CM

## 2020-04-20 DIAGNOSIS — Z94.1 HEART TRANSPLANTED: ICD-10-CM

## 2020-04-20 DIAGNOSIS — D84.9 IMMUNOSUPPRESSION: ICD-10-CM

## 2020-04-20 DIAGNOSIS — Z79.899 ENCOUNTER FOR LONG-TERM (CURRENT) USE OF MEDICATIONS: ICD-10-CM

## 2020-04-20 DIAGNOSIS — F41.9 ANXIETY: ICD-10-CM

## 2020-04-20 DIAGNOSIS — Z94.1 HEART REPLACED BY TRANSPLANT: ICD-10-CM

## 2020-04-20 DIAGNOSIS — T86.20 COMPLICATION OF HEART TRANSPLANT, UNSPECIFIED COMPLICATION: ICD-10-CM

## 2020-04-20 DIAGNOSIS — K21.9 GASTROESOPHAGEAL REFLUX DISEASE, ESOPHAGITIS PRESENCE NOT SPECIFIED: ICD-10-CM

## 2020-04-20 RX ORDER — MYCOPHENOLIC ACID 360 MG/1
720 TABLET, DELAYED RELEASE ORAL 2 TIMES DAILY
Qty: 120 TABLET | Refills: 11 | Status: SHIPPED | OUTPATIENT
Start: 2020-04-20 | End: 2020-12-29

## 2020-04-20 RX ORDER — LISINOPRIL 5 MG/1
5 TABLET ORAL DAILY
Qty: 30 TABLET | Refills: 11 | Status: SHIPPED | OUTPATIENT
Start: 2020-04-20 | End: 2020-12-29

## 2020-04-20 RX ORDER — DICYCLOMINE HYDROCHLORIDE 10 MG/1
CAPSULE ORAL
Qty: 60 CAPSULE | Refills: 3 | Status: SHIPPED | OUTPATIENT
Start: 2020-04-20 | End: 2020-09-21

## 2020-04-20 RX ORDER — TACROLIMUS 1 MG/1
CAPSULE, GELATIN COATED ORAL
Qty: 60 CAPSULE | Refills: 11 | Status: SHIPPED | OUTPATIENT
Start: 2020-04-20 | End: 2020-04-21 | Stop reason: SDUPTHER

## 2020-04-20 RX ORDER — ESCITALOPRAM OXALATE 10 MG/1
TABLET ORAL
Qty: 30 TABLET | Refills: 11 | Status: SHIPPED | OUTPATIENT
Start: 2020-04-20 | End: 2021-03-18

## 2020-04-20 NOTE — TELEPHONE ENCOUNTER
----- Message from Tere Moore MA sent at 4/20/2020  2:47 PM CDT -----  Contact: Pt Called  Please call [employer] at [phone].  He needs refills for:  Myfortic 360 mg,   Lisinopril 5 mg, Escitalopram Oxalate (Lexapro) 10 mg, Prograf 1 mg, Dicyclomine 10 mg and Prograf 5 mg.  Mr. Montanez is a patient of Dr. Hernandez.    Thank You,  Jenae

## 2020-04-21 DIAGNOSIS — Z94.1 HEART REPLACED BY TRANSPLANT: ICD-10-CM

## 2020-04-21 DIAGNOSIS — Z94.1 STATUS POST HEART TRANSPLANT: ICD-10-CM

## 2020-04-22 RX ORDER — TACROLIMUS 1 MG/1
2 CAPSULE, GELATIN COATED ORAL EVERY 12 HOURS
Qty: 60 CAPSULE | Refills: 11 | Status: SHIPPED | OUTPATIENT
Start: 2020-04-22 | End: 2020-07-31

## 2020-04-22 RX ORDER — TACROLIMUS 5 MG/1
CAPSULE ORAL
Qty: 180 CAPSULE | Refills: 3 | Status: SHIPPED | OUTPATIENT
Start: 2020-04-22 | End: 2020-07-31

## 2020-05-15 ENCOUNTER — TELEPHONE (OUTPATIENT)
Dept: TRANSPLANT | Facility: CLINIC | Age: 25
End: 2020-05-15

## 2020-05-15 NOTE — TELEPHONE ENCOUNTER
----- Message from Suzi Matthews MA sent at 5/15/2020  2:45 PM CDT -----  Contact: Yola [phone] with [facility] on behalf of Medicaid      ----- Message -----  From: Elinor Lynn  Sent: 5/15/2020   2:00 PM CDT  To: [facility]    Yola need a diagnosis code for the pt Myfortic 360 mg TBE. If she does not answer please leave a detailed message and please make sure you leave the pt first and last name.    Thanks

## 2020-05-21 RX ORDER — METFORMIN HYDROCHLORIDE 500 MG/1
TABLET ORAL
Qty: 180 TABLET | Refills: 3 | Status: SHIPPED | OUTPATIENT
Start: 2020-05-21 | End: 2020-07-21 | Stop reason: SDUPTHER

## 2020-05-21 RX ORDER — BLOOD-GLUCOSE CONTROL, NORMAL
EACH MISCELLANEOUS
Qty: 400 EACH | Refills: 3 | Status: SHIPPED | OUTPATIENT
Start: 2020-05-21 | End: 2021-05-24 | Stop reason: SDUPTHER

## 2020-06-01 DIAGNOSIS — Z94.1 STATUS POST HEART TRANSPLANT: Primary | ICD-10-CM

## 2020-06-26 NOTE — LETTER
March 25, 2019      Loyda Donahue MD  [address]  [facility] [address]           [facility] - Endocrinology  [address]  [facility] [address]  Phone: [phone]          Patient: Derrick Montanez   MR Number: [id number]   YOB: 1995   Date of Visit: 3/25/2019       Dear Dr. Loyda Donahue:    Thank you for referring Derrick Montanez to me for evaluation. Attached you will find relevant portions of my assessment and plan of care.    If you have questions, please do not hesitate to call me. I look forward to following Derrick Montanez along with you.    Sincerely,    TARYN Hermosillo, FNP    Enclosure  CC:  No Recipients    If you would like to receive this communication electronically, please contact [email or web address] or [phone] to request more information on [employer] access.    For providers and/or their staff who would like to refer a patient to [facility], please contact us through our one-stop-shop provider referral line, [facility], at [phone].    If you feel you have received this communication in error or would no longer like to receive these types of communications, please e-mail [email or web address]         
mother

## 2020-07-21 ENCOUNTER — TELEPHONE (OUTPATIENT)
Dept: INTERNAL MEDICINE | Facility: CLINIC | Age: 25
End: 2020-07-21

## 2020-07-21 RX ORDER — METFORMIN HYDROCHLORIDE 500 MG/1
500 TABLET ORAL 2 TIMES DAILY WITH MEALS
Qty: 180 TABLET | Refills: 3 | Status: SHIPPED | OUTPATIENT
Start: 2020-07-21 | End: 2020-07-21 | Stop reason: SDUPTHER

## 2020-07-21 RX ORDER — INSULIN GLARGINE 100 [IU]/ML
INJECTION, SOLUTION SUBCUTANEOUS
Qty: 1 BOX | Refills: 6 | Status: SHIPPED | OUTPATIENT
Start: 2020-07-21 | End: 2021-08-11

## 2020-07-21 RX ORDER — PEN NEEDLE, DIABETIC 30 GX3/16"
NEEDLE, DISPOSABLE MISCELLANEOUS
Qty: 400 EACH | Refills: 3 | Status: SHIPPED | OUTPATIENT
Start: 2020-07-21 | End: 2021-05-05 | Stop reason: SDUPTHER

## 2020-07-21 RX ORDER — METFORMIN HYDROCHLORIDE 500 MG/1
500 TABLET ORAL 2 TIMES DAILY WITH MEALS
Qty: 180 TABLET | Refills: 3 | Status: SHIPPED | OUTPATIENT
Start: 2020-07-21 | End: 2021-05-11

## 2020-07-21 RX ORDER — LIRAGLUTIDE 6 MG/ML
INJECTION SUBCUTANEOUS
Qty: 9 ML | Refills: 6 | Status: SHIPPED | OUTPATIENT
Start: 2020-07-21 | End: 2021-11-08

## 2020-07-21 RX ORDER — PEN NEEDLE, DIABETIC 30 GX3/16"
NEEDLE, DISPOSABLE MISCELLANEOUS
Qty: 400 EACH | Refills: 3 | Status: SHIPPED | OUTPATIENT
Start: 2020-07-21 | End: 2020-07-21 | Stop reason: SDUPTHER

## 2020-07-21 RX ORDER — INSULIN GLARGINE 100 [IU]/ML
INJECTION, SOLUTION SUBCUTANEOUS
Qty: 1 BOX | Refills: 6 | Status: SHIPPED | OUTPATIENT
Start: 2020-07-21 | End: 2020-07-21 | Stop reason: SDUPTHER

## 2020-07-21 RX ORDER — INSULIN LISPRO 100 [IU]/ML
INJECTION, SOLUTION INTRAVENOUS; SUBCUTANEOUS
Qty: 2 VIAL | Refills: 6 | Status: SHIPPED | OUTPATIENT
Start: 2020-07-21 | End: 2022-07-24 | Stop reason: SDUPTHER

## 2020-07-21 RX ORDER — LIRAGLUTIDE 6 MG/ML
INJECTION SUBCUTANEOUS
Qty: 9 ML | Refills: 6
Start: 2020-07-21 | End: 2020-07-21 | Stop reason: SDUPTHER

## 2020-07-21 NOTE — TELEPHONE ENCOUNTER
LVM- BG hyperglycemia noted over the past few days 300s reported  Will message pt via [facility]  Advised pt to be on victoza  admelog and basaglar  Mdi will help bring sugar levels down.

## 2020-07-23 ENCOUNTER — TELEPHONE (OUTPATIENT)
Dept: TRANSPLANT | Facility: CLINIC | Age: 25
End: 2020-07-23

## 2020-07-23 ENCOUNTER — HOSPITAL ENCOUNTER (EMERGENCY)
Facility: HOSPITAL | Age: 25
Discharge: HOME | End: 2020-07-23
Attending: EMERGENCY MEDICINE
Payer: MEDICAID

## 2020-07-23 VITALS
TEMPERATURE: 99 F | HEIGHT: 65 IN | OXYGEN SATURATION: 99 % | HEART RATE: 95 BPM | SYSTOLIC BLOOD PRESSURE: 142 MMHG | BODY MASS INDEX: 43.32 KG/M2 | RESPIRATION RATE: 20 BRPM | WEIGHT: 260 LBS | DIASTOLIC BLOOD PRESSURE: 91 MMHG

## 2020-07-23 DIAGNOSIS — N20.1 URETERIC CALCULUS: Primary | ICD-10-CM

## 2020-07-23 LAB
ALBUMIN SERPL BCP-MCNC: 4.1 G/DL (ref 3.5–5.2)
ALP SERPL-CCNC: 67 U/L (ref 55–135)
ALT SERPL W/O P-5'-P-CCNC: 55 U/L (ref 10–44)
ANION GAP SERPL CALC-SCNC: 13 MMOL/L (ref 8–16)
AST SERPL-CCNC: 39 U/L (ref 10–40)
BACTERIA #/AREA URNS AUTO: ABNORMAL /HPF
BASOPHILS # BLD AUTO: 0.04 K/UL (ref 0–0.2)
BASOPHILS NFR BLD: 0.3 % (ref 0–1.9)
BILIRUB SERPL-MCNC: 0.4 MG/DL (ref 0.1–1)
BILIRUB UR QL STRIP: NEGATIVE
BUN SERPL-MCNC: 24 MG/DL (ref 6–20)
CALCIUM SERPL-MCNC: 9.4 MG/DL (ref 8.7–10.5)
CHLORIDE SERPL-SCNC: 107 MMOL/L (ref 95–110)
CLARITY UR REFRACT.AUTO: CLEAR
CO2 SERPL-SCNC: 19 MMOL/L (ref 23–29)
COLOR UR AUTO: YELLOW
CREAT SERPL-MCNC: 1.4 MG/DL (ref 0.5–1.4)
DIFFERENTIAL METHOD: ABNORMAL
EOSINOPHIL # BLD AUTO: 0.1 K/UL (ref 0–0.5)
EOSINOPHIL NFR BLD: 0.6 % (ref 0–8)
ERYTHROCYTE [DISTWIDTH] IN BLOOD BY AUTOMATED COUNT: 12.6 % (ref 11.5–14.5)
EST. GFR  (AFRICAN AMERICAN): >60 ML/MIN/1.73 M^2
EST. GFR  (NON AFRICAN AMERICAN): >60 ML/MIN/1.73 M^2
GLUCOSE SERPL-MCNC: 154 MG/DL (ref 70–110)
GLUCOSE UR QL STRIP: NEGATIVE
HCT VFR BLD AUTO: 43.6 % (ref 40–54)
HGB BLD-MCNC: 14.2 G/DL (ref 14–18)
HGB UR QL STRIP: ABNORMAL
HYALINE CASTS UR QL AUTO: 1 /LPF
IMM GRANULOCYTES # BLD AUTO: 0.03 K/UL (ref 0–0.04)
IMM GRANULOCYTES NFR BLD AUTO: 0.2 % (ref 0–0.5)
KETONES UR QL STRIP: NEGATIVE
LACTATE SERPL-SCNC: 1.3 MMOL/L (ref 0.5–2.2)
LEUKOCYTE ESTERASE UR QL STRIP: ABNORMAL
LYMPHOCYTES # BLD AUTO: 4 K/UL (ref 1–4.8)
LYMPHOCYTES NFR BLD: 28.6 % (ref 18–48)
MCH RBC QN AUTO: 28.1 PG (ref 27–31)
MCHC RBC AUTO-ENTMCNC: 32.6 G/DL (ref 32–36)
MCV RBC AUTO: 86 FL (ref 82–98)
MICROSCOPIC COMMENT: ABNORMAL
MONOCYTES # BLD AUTO: 1.1 K/UL (ref 0.3–1)
MONOCYTES NFR BLD: 7.6 % (ref 4–15)
NEUTROPHILS # BLD AUTO: 8.7 K/UL (ref 1.8–7.7)
NEUTROPHILS NFR BLD: 62.7 % (ref 38–73)
NITRITE UR QL STRIP: NEGATIVE
NRBC BLD-RTO: 0 /100 WBC
PH UR STRIP: 5 [PH] (ref 5–8)
PLATELET # BLD AUTO: 316 K/UL (ref 150–350)
PMV BLD AUTO: 9.6 FL (ref 9.2–12.9)
POCT GLUCOSE: 153 MG/DL (ref 70–110)
POTASSIUM SERPL-SCNC: 4 MMOL/L (ref 3.5–5.1)
PROT SERPL-MCNC: 7.8 G/DL (ref 6–8.4)
PROT UR QL STRIP: ABNORMAL
RBC # BLD AUTO: 5.05 M/UL (ref 4.6–6.2)
RBC #/AREA URNS AUTO: 11 /HPF (ref 0–4)
SARS-COV-2 RDRP RESP QL NAA+PROBE: NEGATIVE
SODIUM SERPL-SCNC: 139 MMOL/L (ref 136–145)
SP GR UR STRIP: 1.02 (ref 1–1.03)
SQUAMOUS #/AREA URNS AUTO: 1 /HPF
URN SPEC COLLECT METH UR: ABNORMAL
WBC # BLD AUTO: 13.86 K/UL (ref 3.9–12.7)
WBC #/AREA URNS AUTO: 9 /HPF (ref 0–5)

## 2020-07-23 PROCEDURE — 80053 COMPREHEN METABOLIC PANEL: CPT

## 2020-07-23 PROCEDURE — U0002 COVID-19 LAB TEST NON-CDC: HCPCS

## 2020-07-23 PROCEDURE — 99284 EMERGENCY DEPT VISIT MOD MDM: CPT | Mod: ,,, | Performed by: EMERGENCY MEDICINE

## 2020-07-23 PROCEDURE — 99284 PR EMERGENCY DEPT VISIT,LEVEL IV: ICD-10-PCS | Mod: ,,, | Performed by: EMERGENCY MEDICINE

## 2020-07-23 PROCEDURE — 82962 GLUCOSE BLOOD TEST: CPT

## 2020-07-23 PROCEDURE — 83605 ASSAY OF LACTIC ACID: CPT

## 2020-07-23 PROCEDURE — 36000 PLACE NEEDLE IN VEIN: CPT

## 2020-07-23 PROCEDURE — 81001 URINALYSIS AUTO W/SCOPE: CPT

## 2020-07-23 PROCEDURE — 85025 COMPLETE CBC W/AUTO DIFF WBC: CPT

## 2020-07-23 PROCEDURE — 25000003 PHARM REV CODE 250: Performed by: EMERGENCY MEDICINE

## 2020-07-23 PROCEDURE — 99284 EMERGENCY DEPT VISIT MOD MDM: CPT | Mod: 25

## 2020-07-23 RX ORDER — LIRAGLUTIDE 6 MG/ML
0.6 INJECTION SUBCUTANEOUS DAILY
COMMUNITY
End: 2020-08-20 | Stop reason: CLARIF

## 2020-07-23 RX ORDER — ACETAMINOPHEN 500 MG
1000 TABLET ORAL
Status: COMPLETED | OUTPATIENT
Start: 2020-07-23 | End: 2020-07-23

## 2020-07-23 RX ORDER — MORPHINE SULFATE 15 MG/1
7.5 TABLET ORAL EVERY 4 HOURS PRN
Qty: 6 TABLET | Refills: 0 | Status: SHIPPED | OUTPATIENT
Start: 2020-07-23 | End: 2020-08-20 | Stop reason: CLARIF

## 2020-07-23 RX ORDER — TAMSULOSIN HYDROCHLORIDE 0.4 MG/1
0.4 CAPSULE ORAL DAILY
Qty: 10 CAPSULE | Refills: 0 | Status: SHIPPED | OUTPATIENT
Start: 2020-07-23 | End: 2020-08-12

## 2020-07-23 RX ADMIN — ACETAMINOPHEN 1000 MG: 500 TABLET ORAL at 12:07

## 2020-07-23 NOTE — TELEPHONE ENCOUNTER
Returned pt's call. He is having painful urination, back pain, urine frequency. He is enroute to ED.

## 2020-07-23 NOTE — TELEPHONE ENCOUNTER
----- Message from Elinor Lynn sent at 7/23/2020  8:14 AM CDT -----  Regarding: Med concerns  Pt would like a call from Willa. He states he's having painful urination and lowe back pain. Please call him at [phone]    Thanks

## 2020-07-23 NOTE — DISCHARGE INSTRUCTIONS
Your urine does not appear significantly infected.  If you develop fever or chills, please contact your coordinator immediately and/or come back to the ER    Acetaminophen for mild to moderate pain; morphine for severe pain

## 2020-07-23 NOTE — ED NOTES
Patient discharged home  Discharge instructions given and pt provided with urine strainer  Patient verbalizes understanding   Patient denies pain, chest pain and shortness of breath   All belongings sent home with patient   \

## 2020-07-23 NOTE — ED PROVIDER NOTES
SCRIBE NOTE: I, Jonathon Benton am scribing for, and in the presence of, Wilfredo Sarah MD. I have scribed the HPI, ROS, PE. I performed the above scribed service and the documentation accurately describes the services I performed. I attest to the accuracy of the note.     Source of History:  Patient    Chief complaint:  Dysuria (+ bilateral lower back pains w/ painful urination x 3 days. Denies fever or chills)      HPI:  Derrick Montanez is a 24 y.o. male, with a past surgical history of a heart transplant 11 years ago, who presents with right flank pain that radiates to his bladder and began 3 days ago. He states that the pain starts closer to his hip and is most severe towards his back. He endorses the associated symptoms of increased urinary frequency (of less volume), nausea, and dysuria. He denies fever.    ROS: As per HPI and below:  General: No fever.  No chills.  Eyes: No visual changes.  Head: No headache.    Integument: No rashes or lesions.  Chest: No shortness of breath.  Cardiovascular: No chest pain.  Abdomen: No abdominal pain.  No vomiting. +Nausea  Urinary: +dysuria, +right flank pain, +increased urinary frequency  Neurologic: No focal weakness.  No numbness.  Hematologic: No easy bruising.  Endocrine: No excessive thirst or urination.      Review of patient's allergies indicates:   Allergen Reactions    Pneumovax 23 [pneumococcal 23-yo ps vaccine] Swelling and Other (See Comments)     Swelling at site and Pneumonia like Sx. Pt spent x1 week in hospital       No current facility-administered medications on file prior to encounter.      Current Outpatient Medications on File Prior to Encounter   Medication Sig Dispense Refill    diltiaZEM (TIAZAC) 180 MG Cs24       escitalopram oxalate (LEXAPRO) 10 MG tablet TAKE 1 TABLET BY MOUTH EVERY DAY 30 tablet 11    esomeprazole (NEXIUM) 20 MG capsule Take 1 capsule (20 mg total) by mouth before breakfast. 30 capsule 11    liraglutide 0.6 mg/0.1 mL, 18  "mg/3 mL, subq PNIJ (VICTOZA 2-CARMEN) 0.6 mg/0.1 mL (18 mg/3 mL) PnIj pen Inject 0.6 mg into the skin once daily.      metFORMIN (GLUCOPHAGE) 500 MG tablet Take 1 tablet (500 mg total) by mouth 2 (two) times daily with meals. 180 tablet 3    pravastatin (PRAVACHOL) 20 MG tablet TAKE 1 TABLET BY MOUTH EVERY NIGHTLY 30 tablet 11    PROGRAF 1 mg Cap Take 2 capsules (2 mg total) by mouth every 12 (twelve) hours. Take two 1 mg capsules with one 5 mg capsule, for a total dose of 7 mg my mouth every 12 hours 60 capsule 11    tacrolimus (PROGRAF) 5 MG Cap TAKE 1 CAPSULE BY MOUTH TWICE DAILY ALONG WITH TWO OF ONE MG CAPSULE FOR A TOTAL OF 7 MG TWICE DAILY 180 capsule 3    topiramate (TOPAMAX) 25 MG tablet Take 1 tablet (25 mg total) by mouth every evening. 30 tablet 11    aspirin (ECOTRIN) 81 MG EC tablet Take 81 mg by mouth once daily.      BLOOD PRESSURE CUFF Misc Please provide pt with one blood pressure cuff to check his BP twice a day. 1 each 0    blood sugar diagnostic Strp Uses 4 x a day, one touch ultra blue, on insulin-long acting and meal insulin. 400 each 3    cetirizine 10 mg chewable tablet Take 10 mg by mouth as needed.       dicyclomine (BENTYL) 10 MG capsule TAKE 1 CAPSULE BY MOUTH TWICE DAILY 60 capsule 3    ergocalciferol (ERGOCALCIFEROL) 50,000 unit Cap Take 1 capsule (50,000 Units total) by mouth every 7 days. 30 capsule 11    FLUCELVAX QUAD 3303-6709, PF, 60 mcg (15 mcg x 4)/0.5 mL Syrg       insulin (LANTUS SOLOSTAR U-100 INSULIN) glargine 100 units/mL (3mL) SubQ pen Inject 36-40 units at night. Can substitute basaglar or levemir 1 Box 6    insulin lispro (ADMELOG U-100 INSULIN LISPRO) 100 unit/mL injection Inject 12 units at dinner time, 150-200+2, 201-250+4, 251-300+6, 301-350+8, >350 +10. Can substitute humalog.max daily 66 units. 2 vial 6    insulin syringe-needle U-100 (ADVOCATE SYRINGES) 0.3 mL 31 gauge x 5/16" Syrg Uses 3 times a day, 90 day 300 each 3    lancets 30 gauge Misc Has " "one touch ultra 2, needs lancets , uses 4 times a day. E 11.65, on insulin. 400 each 3    liraglutide 0.6 mg/0.1 mL, 18 mg/3 mL, subq PNIJ (VICTOZA 3-CARMEN) 0.6 mg/0.1 mL (18 mg/3 mL) PnIj pen Inject 1.8 mg daily 9 mL 6    lisinopriL (PRINIVIL,ZESTRIL) 5 MG tablet Take 1 tablet (5 mg total) by mouth once daily. 30 tablet 11    MYFORTIC 360 mg TbEC Take 2 tablets (720 mg total) by mouth 2 (two) times daily. 120 tablet 11    pen needle, diabetic (BD ULTRA-FINE KARIME PEN NEEDLE) 32 gauge x 5/32" Ndle Uses 5 times a day. 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 at [facility]    Coronary artery disease     Diabetes mellitus     GERD (gastroesophageal reflux disease)     Hypertension      Past Surgical History:   Procedure Laterality Date    CARDIAC BIOPSY  8/16/2019    Procedure: Biopsy, Cardiac;  Surgeon: Thien Hayes MD;  Location: [facility] CATH LAB;  Service: Cardiology;;    CARDIAC SURGERY  2009    OHT at [facility]    CATHETERIZATION OF BOTH LEFT AND RIGHT HEART Right 8/16/2019    Procedure: CATHETERIZATION, HEART, BOTH LEFT AND RIGHT;  Surgeon: Thien Hayes MD;  Location: [facility] CATH LAB;  Service: Cardiology;  Laterality: Right;    heart tx      TONSILLECTOMY         Social History     Socioeconomic History    Marital status: Single     Spouse name: Not on file    Number of children: Not on file    Years of education: Not on file    Highest education level: Not on file   Occupational History    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   Social Needs    Financial resource strain: Not on file    Food insecurity     Worry: Not on file     Inability: Not on file    Transportation needs     Medical: Not on file     Non-medical: Not on file   Tobacco Use    Smoking status: Never Smoker    Smokeless tobacco: Never Used   Substance and " Sexual Activity    Alcohol use: Not Currently     Alcohol/week: 1.0 standard drinks     Types: 1 Shots of liquor per week     Comment: occ    Drug use: No    Sexual activity: Not on file   Lifestyle    Physical activity     Days per week: Not on file     Minutes per session: Not on file    Stress: Not on file   Relationships    Social connections     Talks on phone: Not on file     Gets together: Not on file     Attends Taoist service: Not on file     Active member of club or organization: Not on file     Attends meetings of clubs or organizations: Not on file     Relationship status: Not on file   Other Topics Concern    Not on file   Social History Narrative    Not on file       Family History   Problem Relation Age of Onset    No Known Problems Mother     No Known Problems Father     Colon cancer Neg Hx     Cirrhosis Neg Hx     Celiac disease Neg Hx     Crohn's disease Neg Hx     Ulcerative colitis Neg Hx     Rectal cancer Neg Hx     Liver cancer Neg Hx     Irritable bowel syndrome Neg Hx     Esophageal cancer Neg Hx     Stomach cancer Neg Hx     Melanoma Neg Hx        Physical Exam:    Vitals:    07/23/20 1034 07/23/20 1102 07/23/20 1230 07/23/20 1245   BP: (!) 181/99 (!) 186/106 (!) 143/78    BP Location:       Patient Position:       Pulse: 95 93  90   Resp:  20  19   Temp:       TempSrc:       SpO2: 98% 98%  99%   Weight:       Height:         Appearance:  No acute distress.  Comfortable.  Skin:  No rashes.  Good turgor.  Head: Normocephalic.  Atraumatic.    Eyes: No conjunctival injection.  No swelling.  Chest: Normal work of breathing.  No retractions.  No stridor.  Cardiovascular: Regular rhythm.  No appearance of shock.  No edema.  Abdomen:  No distention. No abdominal tenderness. No right lower quadrant tenderness. No true CVA tenderness.    Musculoskeletal:  No deformities.  Normal range of motion.    Neurologic:  Normal movement and ambulation.  Mental Status:  Alert and  oriented x 3.  Appropriate, conversant.      Initial Impression:  Right flank pain, dysuria and frequency.  No abdominal tenderness.  I doubt appendicitis.  He does have a history of heart transplant.  Will check labs and reassess.  Given his comorbidities, I am also going to do a renal stone CT.    Laboratory Studies:  Labs Reviewed   CBC W/ AUTO DIFFERENTIAL - Abnormal; Notable for the following components:       Result Value    WBC 13.86 (*)     Gran # (ANC) 8.7 (*)     Mono # 1.1 (*)     All other components within normal limits   COMPREHENSIVE METABOLIC PANEL - Abnormal; Notable for the following components:    CO2 19 (*)     Glucose 154 (*)     BUN, Bld 24 (*)     ALT 55 (*)     All other components within normal limits   URINALYSIS, REFLEX TO URINE CULTURE - Abnormal; Notable for the following components:    Protein, UA 2+ (*)     Occult Blood UA 2+ (*)     Leukocytes, UA 1+ (*)     All other components within normal limits    Narrative:     Specimen Source->Urine   URINALYSIS MICROSCOPIC - Abnormal; Notable for the following components:    RBC, UA 11 (*)     WBC, UA 9 (*)     All other components within normal limits    Narrative:     Specimen Source->Urine   POCT GLUCOSE - Abnormal; Notable for the following components:    POCT Glucose 153 (*)     All other components within normal limits   C. TRACHOMATIS/N. GONORRHOEAE BY AMP DNA   C. TRACHOMATIS/N. GONORRHOEAE BY AMP DNA   SARS-COV-2 RNA AMPLIFICATION, QUAL   LACTIC ACID, PLASMA   POCT GLUCOSE MONITORING CONTINUOUS       Chart reviewed.     Imaging Results           CT Renal Stone Study ABD Pelvis WO (Final result)  Result time 07/23/20 11:08:07    Final result by Dawood Valle Jr., MD (07/23/20 11:08:07)                 Impression:      There is dilatation of the right intrarenal collecting system and right ureter.  4 mm obstructing calculus just proximal to the right UVJ.  Additional nonobstructing right nephrolithiasis.    Enlarged fatty  liver.    There may be a calcification in the tip of the appendix as well as some high-density material more proximally.  Tip appendicitis considered unlikely though correlation with clinical findings suggested and follow-up study if indicated.    This report was flagged in Epic as abnormal.      Electronically signed by: Dawood Valle MD  Date:    07/23/2020  Time:    11:08             Narrative:    EXAMINATION:  CT RENAL STONE STUDY ABD PELVIS WO    CLINICAL HISTORY:  Flank pain, kidney stone suspected;    TECHNIQUE:  Low dose axial images, sagittal and coronal reformations were obtained from the lung bases to the pubic symphysis.  Contrast was not administered.    COMPARISON:  None    FINDINGS:  In the chest no significant volume of pleural or pericardial fluid.  Lung bases are clear.    In the abdomen liver is enlarged 21 cm.  Decreased attenuation suggests fatty infiltration.  Some focal fatty sparing about the gallbladder fossa.  No focal mass lesions.  Gallbladder, pancreas, and spleen are normal.  No adrenal masses.    3 mm calculus right lower pole kidney.  There is dilatation of the right intrarenal collecting system and ureter.  4 mm distal right ureteral calculus just proximal to the UVJ.  Bladder is decompressed.  Left collecting system is not dilated.  No left renal stones.    Aorta tapers normally.  No convincing para-aortic adenopathy.  Nonenlarged nodes are noted.    In the pelvis no pelvic adenopathy.  Prostate unremarkable.  Bladder decompressed as previously noted.    Evaluation of the bowel demonstrates scattered stool and bowel gas.  Fat containing umbilical hernia.  Stomach is not distended.  There is some high-density material within the appendix.  No convincing dilatation.  There may be a punctate calcification tip of the appendix or perhaps this is within small bowel, series 2, image 60.  Nonenlarged mesenteric nodes.    Evaluation of the bones demonstrate no lytic or blastic lesion.   Probable benign bone island right femoral neck and symphysis pubis.                                Medications Given:  Medications   acetaminophen tablet 1,000 mg (1,000 mg Oral Given 7/23/20 1230)       ED Course:  ED Course as of Jul 23 1326   Thu Jul 23, 2020   0955 POCT Glucose(!): 153 [DC]   1012 SARS-CoV-2 RNA, Amplification, Qual: Negative [DC]   1055 WBC(!): 13.86 [DC]   1055 Hemoglobin: 14.2 [DC]   1055 Platelets: 316 [DC]   1206 Lactate, Jose: 1.3 [DC]   1206 BUN, Bld(!): 24 [DC]   1206 Creatinine: 1.4 [DC]   1225 Discussed with cardiology    [DC]   1316 Discussed with Urology -- d/c with flomax and strainer, no antibiotics given <10 WBC and negative nitrite    [DC]      ED Course User Index  [DC] Wilfredo Sarah MD           MDM:    24 y.o. male with kidney stone.  4 mm at the UVJ.  It should pass shortly.  Discussed with heart transplant Neurology Services.  Urology recommends Flomax; 9 white cells and negative nitrite means no indication for antibiotics at this time.  Will give kidney strain or further recs.  Our Transplant Services see the patient states the patient is okay to go home and they will follow him up in clinic.  They will call and make the appointment.  I have phone numbers for both clinics on the discharge instructions.  His pain is currently controlled but I will give him small amount of morphine to take at home for any breakthrough kidney stone pain.    Diagnostic Impression:    1. Ureteric calculus         ED Disposition Condition    Discharge Stable        ED Prescriptions     Medication Sig Dispense Start Date End Date Auth. Provider    tamsulosin (FLOMAX) 0.4 mg Cap Take 1 capsule (0.4 mg total) by mouth once daily. for 10 days 10 capsule 7/23/2020 8/2/2020 Wilfredo Sarah MD    morphine (MSIR) 15 MG tablet Take 0.5 tablets (7.5 mg total) by mouth every 4 (four) hours as needed for Pain. 6 tablet 7/23/2020  Wilfredo Sarah MD        Follow-up Information     Follow up With  Specialties Details Why Contact Info Additional Information    [facility] 4th Floor Urology In 1 week  [address]  [facility] [address] [phone] Atrium - 4th Floor    [facility] Schedule an appointment as soon as possible for a visit  Heart transplant clinic [address]  [facility] [address]  [phone] 3rd floor                             Wilfredo Sarah MD  07/23/20 8345

## 2020-07-23 NOTE — ED NOTES
Patient identifiers verified and correct for Mr Montanez  C/C: Flank and back pain SEE NN  APPEARANCE: awake and alert in NAD.  SKIN: warm, dry and intact. No breakdown or bruising.  MUSCULOSKELETAL: Patient moving all extremities spontaneously, no obvious swelling or deformities noted. Ambulates independently.  RESPIRATORY: Denies shortness of breath.Respirations unlabored. Denies cough Denies fevers  CARDIAC: Denies CP, 2+ distal pulses; no peripheral edema  ABDOMEN: ABdomen large, round, pain to right lower back, denies nausea or vomiting  : voids spontaneously, states frequent urination with decr output  Neurologic: AAO x 4; follows commands equal strength in all extremities; denies numbness/tingling. Denies dizziness Denies weakness

## 2020-07-23 NOTE — ED NOTES
Patient identifiers verified and correct for Derrick Montanez.    LOC: The patient is awake, alert and aware of environment with an appropriate affect, the patient is oriented x 3 and speaking appropriately.  APPEARANCE: Patient resting comfortably and in no acute distress, patient is clean and well groomed, patient's clothing is properly fastened.  SKIN: The skin is warm and dry, color consistent with ethnicity, patient has normal skin turgor and moist mucus membranes, skin intact, no breakdown or bruising noted.  MUSCULOSKELETAL: Patient moving all extremities spontaneously, no obvious swelling or deformities noted.  Pain in the right flank.    RESPIRATORY: Airway is open and patent, respirations are spontaneous, patient has a normal effort and rate, no accessory muscle use noted.  CARDIAC: Patient has a normal rate and regular rhythm, no periphreal edema noted, capillary refill < 3 seconds. HR  92  ABDOMEN: Soft and non tender to palpation, no distention noted, normoactive bowel sounds present in all four quadrants.   NEUROLOGIC: PERRL, eyes open spontaneously, behavior appropriate to situation, follows commands, facial expression symmetrical, bilateral hand grasp equal and even, purposeful motor response noted.

## 2020-07-23 NOTE — ED TRIAGE NOTES
Patient states pain to right lower back pain near hip, pain radiates to front abdomen x 3 days. States frequent urination but less quantity x 3 days, denies fevers.  States heart transplant 11 years ago, has had elevated glucose levels  In 300s recently

## 2020-07-27 ENCOUNTER — HOSPITAL ENCOUNTER (OUTPATIENT)
Dept: RADIOLOGY | Facility: CLINIC | Age: 25
Discharge: HOME | End: 2020-07-27
Attending: INTERNAL MEDICINE
Payer: MEDICAID

## 2020-07-27 ENCOUNTER — HOSPITAL ENCOUNTER (OUTPATIENT)
Dept: RADIOLOGY | Facility: HOSPITAL | Age: 25
Discharge: HOME | End: 2020-07-27
Attending: INTERNAL MEDICINE
Payer: MEDICAID

## 2020-07-27 ENCOUNTER — HOSPITAL ENCOUNTER (OUTPATIENT)
Dept: CARDIOLOGY | Facility: HOSPITAL | Age: 25
Discharge: HOME | End: 2020-07-27
Attending: INTERNAL MEDICINE
Payer: MEDICAID

## 2020-07-27 ENCOUNTER — OFFICE VISIT (OUTPATIENT)
Dept: TRANSPLANT | Facility: CLINIC | Age: 25
End: 2020-07-27
Payer: MEDICAID

## 2020-07-27 VITALS — BODY MASS INDEX: 45.48 KG/M2 | HEIGHT: 65 IN | WEIGHT: 273 LBS

## 2020-07-27 VITALS
WEIGHT: 271.63 LBS | HEART RATE: 96 BPM | DIASTOLIC BLOOD PRESSURE: 95 MMHG | HEIGHT: 65 IN | BODY MASS INDEX: 45.26 KG/M2 | SYSTOLIC BLOOD PRESSURE: 135 MMHG

## 2020-07-27 DIAGNOSIS — E66.01 OBESITY, CLASS III, BMI 40-49.9 (MORBID OBESITY): ICD-10-CM

## 2020-07-27 DIAGNOSIS — Z94.1 STATUS POST HEART TRANSPLANT: ICD-10-CM

## 2020-07-27 DIAGNOSIS — Z79.899 LONG TERM CURRENT USE OF IMMUNOSUPPRESSIVE DRUG: ICD-10-CM

## 2020-07-27 DIAGNOSIS — Z94.1 HEART REPLACED BY TRANSPLANT: ICD-10-CM

## 2020-07-27 DIAGNOSIS — D84.9 IMMUNOSUPPRESSION: ICD-10-CM

## 2020-07-27 DIAGNOSIS — Z94.1 STATUS POST HEART TRANSPLANT: Primary | ICD-10-CM

## 2020-07-27 DIAGNOSIS — E11.65 TYPE 2 DIABETES MELLITUS WITH HYPERGLYCEMIA, UNSPECIFIED WHETHER LONG TERM INSULIN USE: ICD-10-CM

## 2020-07-27 LAB
ASCENDING AORTA: 2.96 CM
BSA FOR ECHO PROCEDURE: 2.38 M2
CV ECHO LV RWT: 0.34 CM
CV STRESS BASE HR: 87 BPM
DIASTOLIC BLOOD PRESSURE: 87 MMHG
DOP CALC LVOT AREA: 3.7 CM2
DOP CALC LVOT DIAMETER: 2.17 CM
DOP CALC LVOT PEAK VEL: 0.83 M/S
DOP CALC LVOT STROKE VOLUME: 49.13 CM3
DOP CALCLVOT PEAK VEL VTI: 13.29 CM
E WAVE DECELERATION TIME: 198.09 MSEC
E/A RATIO: 1.78
E/E' RATIO: 7.91 M/S
ECHO LV POSTERIOR WALL: 0.87 CM (ref 0.6–1.1)
FRACTIONAL SHORTENING: 34 % (ref 28–44)
INTERVENTRICULAR SEPTUM: 0.92 CM (ref 0.6–1.1)
IVRT: 94.2 MSEC
LEFT INTERNAL DIMENSION IN SYSTOLE: 3.4 CM (ref 2.1–4)
LEFT VENTRICLE DIASTOLIC VOLUME INDEX: 55.91 ML/M2
LEFT VENTRICLE DIASTOLIC VOLUME: 126.23 ML
LEFT VENTRICLE MASS INDEX: 73 G/M2
LEFT VENTRICLE SYSTOLIC VOLUME INDEX: 21.1 ML/M2
LEFT VENTRICLE SYSTOLIC VOLUME: 47.55 ML
LEFT VENTRICULAR INTERNAL DIMENSION IN DIASTOLE: 5.14 CM (ref 3.5–6)
LEFT VENTRICULAR MASS: 164.52 G
LV LATERAL E/E' RATIO: 7.91 M/S
LV SEPTAL E/E' RATIO: 7.91 M/S
MV PEAK A VEL: 0.49 M/S
MV PEAK E VEL: 0.87 M/S
MV STENOSIS PRESSURE HALF TIME: 57.45 MS
MV VALVE AREA P 1/2 METHOD: 3.83 CM2
OHS CV CPX 1 MINUTE RECOVERY HEART RATE: 155 BPM
OHS CV CPX 85 PERCENT MAX PREDICTED HEART RATE MALE: 167
OHS CV CPX ESTIMATED METS: 9
OHS CV CPX MAX PREDICTED HEART RATE: 197
OHS CV CPX PATIENT IS FEMALE: 0
OHS CV CPX PATIENT IS MALE: 1
OHS CV CPX PEAK DIASTOLIC BLOOD PRESSURE: 94 MMHG
OHS CV CPX PEAK HEAR RATE: 169 BPM
OHS CV CPX PEAK RATE PRESSURE PRODUCT: NORMAL
OHS CV CPX PEAK SYSTOLIC BLOOD PRESSURE: 155 MMHG
OHS CV CPX PERCENT MAX PREDICTED HEART RATE ACHIEVED: 86
OHS CV CPX RATE PRESSURE PRODUCT PRESENTING: NORMAL
PISA TR MAX VEL: 2.25 M/S
RIGHT VENTRICULAR END-DIASTOLIC DIMENSION: 3.59 CM
RV TISSUE DOPPLER FREE WALL SYSTOLIC VELOCITY 1 (APICAL 4 CHAMBER VIEW): 8.91 CM/S
SINUS: 3.29 CM
STJ: 2.74 CM
SYSTOLIC BLOOD PRESSURE: 134 MMHG
TDI LATERAL: 0.11 M/S
TDI SEPTAL: 0.11 M/S
TDI: 0.11 M/S
TR MAX PG: 20 MMHG
TRICUSPID ANNULAR PLANE SYSTOLIC EXCURSION: 1.53 CM

## 2020-07-27 PROCEDURE — 99999 PR PBB SHADOW E&M-EST. PATIENT-LVL V: CPT | Mod: PBBFAC,,, | Performed by: INTERNAL MEDICINE

## 2020-07-27 PROCEDURE — 99215 OFFICE O/P EST HI 40 MIN: CPT | Mod: PBBFAC,25 | Performed by: INTERNAL MEDICINE

## 2020-07-27 PROCEDURE — 93351 STRESS TTE COMPLETE: CPT | Mod: 26,,, | Performed by: INTERNAL MEDICINE

## 2020-07-27 PROCEDURE — 77080 DXA BONE DENSITY AXIAL: CPT | Mod: 26,,, | Performed by: INTERNAL MEDICINE

## 2020-07-27 PROCEDURE — 93351 STRESS ECHO (CUPID ONLY): ICD-10-PCS | Mod: 26,,, | Performed by: INTERNAL MEDICINE

## 2020-07-27 PROCEDURE — 99999 PR PBB SHADOW E&M-EST. PATIENT-LVL V: ICD-10-PCS | Mod: PBBFAC,,, | Performed by: INTERNAL MEDICINE

## 2020-07-27 PROCEDURE — 71046 XR CHEST PA AND LATERAL: ICD-10-PCS | Mod: 26,,, | Performed by: RADIOLOGY

## 2020-07-27 PROCEDURE — 99214 PR OFFICE/OUTPT VISIT, EST, LEVL IV, 30-39 MIN: ICD-10-PCS | Mod: S$PBB,,, | Performed by: INTERNAL MEDICINE

## 2020-07-27 PROCEDURE — 77080 DEXA BONE DENSITY SPINE HIP: ICD-10-PCS | Mod: 26,,, | Performed by: INTERNAL MEDICINE

## 2020-07-27 PROCEDURE — 99214 OFFICE O/P EST MOD 30 MIN: CPT | Mod: S$PBB,,, | Performed by: INTERNAL MEDICINE

## 2020-07-27 PROCEDURE — 71046 X-RAY EXAM CHEST 2 VIEWS: CPT | Mod: 26,,, | Performed by: RADIOLOGY

## 2020-07-27 PROCEDURE — 93351 STRESS TTE COMPLETE: CPT

## 2020-07-27 PROCEDURE — 71046 X-RAY EXAM CHEST 2 VIEWS: CPT | Mod: TC,FY

## 2020-07-27 PROCEDURE — 77080 DXA BONE DENSITY AXIAL: CPT | Mod: TC

## 2020-07-31 DIAGNOSIS — Z94.1 STATUS POST HEART TRANSPLANT: ICD-10-CM

## 2020-07-31 DIAGNOSIS — E55.9 VITAMIN D DEFICIENCY: ICD-10-CM

## 2020-07-31 DIAGNOSIS — Z94.1 HEART REPLACED BY TRANSPLANT: ICD-10-CM

## 2020-08-03 ENCOUNTER — TELEPHONE (OUTPATIENT)
Dept: TRANSPLANT | Facility: CLINIC | Age: 25
End: 2020-08-03

## 2020-08-03 RX ORDER — TACROLIMUS 5 MG/1
CAPSULE ORAL
Qty: 180 CAPSULE | Refills: 3 | Status: ON HOLD | OUTPATIENT
Start: 2020-08-03 | End: 2021-02-20 | Stop reason: HOSPADM

## 2020-08-03 RX ORDER — ERGOCALCIFEROL 1.25 MG/1
50000 CAPSULE ORAL
Qty: 30 CAPSULE | Refills: 11 | Status: SHIPPED | OUTPATIENT
Start: 2020-08-03 | End: 2023-05-01

## 2020-08-03 RX ORDER — TACROLIMUS 1 MG/1
CAPSULE, GELATIN COATED ORAL
Qty: 270 CAPSULE | Refills: 3 | Status: SHIPPED | OUTPATIENT
Start: 2020-08-03 | End: 2021-04-15 | Stop reason: SDUPTHER

## 2020-08-03 NOTE — TELEPHONE ENCOUNTER
----- Message from Morelia Wilks sent at 8/3/2020 11:36 AM CDT -----  Contact: [facility]  [facility] need clarification for medication tacrolimus (PROGRAF) 5 MG Cap. Please call [facility] @ [phone]. Thank you.    Spoke with Cherise:   Clarified tacrolimus brand name only.

## 2020-08-05 ENCOUNTER — OFFICE VISIT (OUTPATIENT)
Dept: UROLOGY | Facility: CLINIC | Age: 25
End: 2020-08-05
Payer: MEDICAID

## 2020-08-05 DIAGNOSIS — N20.0 KIDNEY STONES: Primary | ICD-10-CM

## 2020-08-05 PROCEDURE — 99203 PR OFFICE/OUTPT VISIT, NEW, LEVL III, 30-44 MIN: ICD-10-PCS | Mod: 95,,, | Performed by: UROLOGY

## 2020-08-05 PROCEDURE — 99203 OFFICE O/P NEW LOW 30 MIN: CPT | Mod: 95,,, | Performed by: UROLOGY

## 2020-08-05 NOTE — PROGRESS NOTES
Subjective:      Patient ID: Derrick Montanez is a 24 y.o. male.    Chief Complaint: kidney stones    Patient is a 24 y.o. male who is new to our clinic and referred by the ER, Dr. Sarah for evaluation of kidney stones.     HPI  The patient location is: home  The chief complaint leading to consultation is: kidney stones    Visit type: audiovisual    Face to Face time with patient: 18  30 minutes of total time spent on the encounter, which includes face to face time and non-face to face time preparing to see the patient (eg, review of tests), Obtaining and/or reviewing separately obtained history, Documenting clinical information in the electronic or other health record, Independently interpreting results (not separately reported) and communicating results to the patient/family/caregiver, or Care coordination (not separately reported).         Each patient to whom he or she provides medical services by telemedicine is:  (1) informed of the relationship between the physician and patient and the respective role of any other health care provider with respect to management of the patient; and (2) notified that he or she may decline to receive medical services by telemedicine and may withdraw from such care at any time.    Notes:       Urolithiasis  Patient complains of right flank pain with radiation to the groin. Onset of symptoms was abrupt starting several weeks ago with completely resolved course since that time---pain completely resolved on 7/26. Patient describes the pain as cramping and dull, continuous and rated as 6-10/10. The patient has had nausea and no vomiting and no diaphoresis. There has been no fever or chills. The patient is not complaining of dysuria or frequency. Risk factors for urolithiasis:   Obesity, diabetes.      Review of Systems  All other systems reviewed and negative except pertinent positives noted in HPI.    Objective:     Physical Exam   Constitutional: He is oriented to person,  place, and time. He appears well-developed. No distress.   HENT:   Head: Normocephalic and atraumatic.   Eyes: No scleral icterus.   Neck: No tracheal deviation present.   Pulmonary/Chest: Effort normal. No respiratory distress.   Neurological: He is alert and oriented to person, place, and time.   Psychiatric: His behavior is normal. Judgment and thought content normal.     Assessment:     1. Kidney stones      Plan:     1. Kidney stones        Orders Placed This Encounter   Procedures    US Retroperitoneal Complete (Kidney and     Standing Status:   Future     Standing Expiration Date:   8/5/2021     Order Specific Question:   Reason for Exam:     Answer:   kidney stones     1. Kidney stones:  -General risk factors for kidney stones and the conservative measures to prevent kidney stones in the future were discussed with the patient in detail.  The patient was encouraged to drink 2-3 liters of water a day, limit iced tea and stevie as well as foods high in oxalate.  They were cautioned to try to limit salt and red meat intake.  We also discussed adding citrate to the diet with the addition of winter or lemon juice to their water or alternatively with crystal light.   -CT scan was independently reviewed today and reveals 3mm right UVJ stone, small right non-obstructing renal stone.    -recommend a renal US in 1 month unless he is able to catch a stone to confirm passage.  Symptoms have resolved but no stone passage that he is aware of at this time.       2. DM:   -recommend tighter blood sugar control.  Discussed correlation of DM and kidney stones.     3. Morbid obesity:  -discussed diet and exercise for weight loss  -discussed the link between obesity and kidney stones.

## 2020-08-05 NOTE — PROGRESS NOTES
"Subjective:   Mr. Montanez is a 24 y.o. year old Black or  male who received a  heart transplant on 3/17/09.      CMV status:   Donor: +  Recipient: +    HPI   24 y.o. AAM who developed myocarditis at age 6, ultimately underwent transplant at [facility] on 3/17/2009, who shared care between [facility] and [facility] but now is following with us, here for routine follow-up visit 9-years post-transplant. 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 Major issue remains his weight gain, he admits he has been stressed being home and pandemic definitely led to increased weight gain. Patient denies N/V/F/C, lightheadedness, dizziness, PND, orthopnea, LE edema, abdominal pain, abdominal pressure, chest pain, chest pressure, syncope, pre-syncope.      Current IS:  Tacrolimus 6/6  Myfortic 720 BID    07/27/20 DSE:  · Normal left ventricular systolic function. The estimated ejection fraction is 55%.  · Normal right ventricular systolic function.  · Normal LV diastolic function.  · Mild right ventricular enlargement.  · The test was stopped because the patient experienced fatigue.  · The stress echo portion of this study is negative for myocardial ischemia.  · There were no arrhythmias during stress.  · The patient's exercise capacity was mildly impaired.  · The ECG portion of this study is negative for myocardial ischemia.    Review of Systems   Constitution: Negative for chills, decreased appetite, diaphoresis, fever, malaise/fatigue, night sweats, weight gain and weight loss.   Cardiovascular: Negative for chest pain, claudication, leg swelling, near-syncope, orthopnea and " "palpitations.   Respiratory: Negative for cough, hemoptysis, shortness of breath, sleep disturbances due to breathing, snoring, sputum production and wheezing.    Endocrine: Negative for cold intolerance and heat intolerance.   Musculoskeletal: Negative for gout, joint pain, joint swelling, muscle cramps, muscle weakness and myalgias.   Gastrointestinal: Negative for bloating, abdominal pain, melena, nausea and vomiting.   Genitourinary: Negative for dysuria and flank pain.   Neurological: Negative for difficulty with concentration, excessive daytime sleepiness, dizziness, headaches, tremors and weakness.   Psychiatric/Behavioral: Negative for depression, memory loss, substance abuse and suicidal ideas. The patient does not have insomnia and is not nervous/anxious.        Objective:   Blood pressure (!) 135/95, pulse 96, height 5' 5" (1.651 m), weight 123.2 kg (271 lb 9.7 oz).body mass index is 45.2 kg/m².    Physical Exam   Constitutional: He is oriented to person, place, and time. He appears well-developed and well-nourished.   obese   Neck: Normal range of motion. Neck supple. No JVD present.   Cardiovascular: Normal rate, regular rhythm, S1 normal and S2 normal.   No murmur heard.  Pulmonary/Chest: Effort normal and breath sounds normal. No respiratory distress. He has no wheezes. He has no rales.   Abdominal: Soft. Bowel sounds are normal. He exhibits no distension. There is no abdominal tenderness. There is no rebound.   Musculoskeletal: Normal range of motion.         General: No edema.   Neurological: He is alert and oriented to person, place, and time.   Skin: Skin is warm and dry. No rash noted. No erythema. No pallor.   Psychiatric: His mood appears anxious.   Nursing note and vitals reviewed.      Lab Results   Component Value Date    WBC 10.83 07/27/2020    HGB 13.5 (L) 07/27/2020    HCT 43.3 07/27/2020    MCV 90 07/27/2020     07/27/2020    CO2 21 (L) 07/27/2020    CREATININE 1.5 (H) 07/27/2020 "    CALCIUM 8.6 (L) 07/27/2020    ALBUMIN 3.8 07/27/2020    AST 35 07/27/2020    BNP 39 07/27/2020    ALT 65 (H) 07/27/2020    ALLOMAP 26 05/18/2017       Lab Results   Component Value Date    INR 1.0 10/07/2016       BNP   Date Value Ref Range Status   07/27/2020 39 0 - 99 pg/mL Final     Comment:     Values of less than 100 pg/ml are consistent with non-CHF populations.   01/06/2020 15 0 - 99 pg/mL Final     Comment:     Values of less than 100 pg/ml are consistent with non-CHF populations.   08/08/2019 34 0 - 99 pg/mL Final     Comment:     Values of less than 100 pg/ml are consistent with non-CHF populations.       No results found for: LDH    Tacrolimus Lvl   Date Value Ref Range Status   07/27/2020 6.4 5.0 - 15.0 ng/mL Final     Comment:     Testing performed by Liquid Chromatography-Tandem  Mass Spectrometry (LC-MS/MS).  This test was developed and its performance characteristics  determined by [facility], Department of Pathology  and Laboratory Medicine in a manner consistent with CLIA  requirements. It has not been cleared or approved by the [place]  Food and Drug Administration.  This test is used for clinical   purposes.  It should not be regarded as investigational or for  research.       Labs were reviewed with the patient.    Assessment:     1. Status post heart transplant    2. Heart replaced by transplant    3. Immunosuppression    4. Type 2 diabetes mellitus with hyperglycemia, unspecified whether long term insulin use    5. Obesity, Class III, BMI 40-49.9 (morbid obesity)    6. Long term current use of immunosuppressive drug        Plan:   S/p OHT - with echo stress normal, concerned mostly about his weight gain. Patient agrees he is having significant difficulty with weight loss, will see if he can be seen here in bariatric medicine, perhaps there is something that can be done from a medical standpoint, short of surgical intervention.     Return instructions as set forth by post transplant  schedule or as needed:    Clinic: Return for labs and/or biopsy weekly the first month, every two weeks during month 2 and then monthly for the first year at the provider or coordinator's discretion. During the second year, return to clinic every 3 months. Post transplant year 3-5 return every 6 months. There will be a comprehensive post transplant evaluation every year that may include LHC/RHC/biopsy, stress test, echo, CXR, and other health screening exams.    In addition to the clinical assessment, I have ordered HLA/DSA testing for this patient to assist in identification of moderate/severe acute cellular rejection (ACR) in a pt with stable Allograft function instead of endomyocardial biopsy.     Patient is reminded to call with any health changes as these can be early signs of transplant complications. Patient is advised to make sure any new medications or changes of old medications are discussed with a pharmacist or physician knowledgeable with transplant to avoid rejection/drug toxicity related to significant drug interactions.    UNOS Patient Status  Functional Status: 100% - Normal, no complaints, no evidence of disease  Physical Capacity: No Limitations  Working for Income: No  If no, reason not working: Patient Choice - Student Full Time/Part Time    Loyda Donahue MD    Addendum:  -Vitamin D level 11: will give Rx for Ergocalciferol 50,000 weekly for 3-6 months and recheck levels    - Also, patient is clear to undergo TM tube removal by ENT; please call with questions

## 2020-08-08 ENCOUNTER — LAB VISIT (OUTPATIENT)
Dept: LAB | Facility: HOSPITAL | Age: 25
End: 2020-08-08
Attending: PEDIATRICS
Payer: MEDICAID

## 2020-08-08 DIAGNOSIS — Z94.1 STATUS POST HEART TRANSPLANT: ICD-10-CM

## 2020-08-08 LAB
ALBUMIN SERPL BCP-MCNC: 3.7 G/DL (ref 3.5–5.2)
ALP SERPL-CCNC: 76 U/L (ref 55–135)
ALT SERPL W/O P-5'-P-CCNC: 52 U/L (ref 10–44)
ANION GAP SERPL CALC-SCNC: 9 MMOL/L (ref 8–16)
AST SERPL-CCNC: 27 U/L (ref 10–40)
BILIRUB SERPL-MCNC: 0.5 MG/DL (ref 0.1–1)
BUN SERPL-MCNC: 19 MG/DL (ref 6–20)
CALCIUM SERPL-MCNC: 9.5 MG/DL (ref 8.7–10.5)
CHLORIDE SERPL-SCNC: 106 MMOL/L (ref 95–110)
CO2 SERPL-SCNC: 22 MMOL/L (ref 23–29)
CREAT SERPL-MCNC: 1.2 MG/DL (ref 0.5–1.4)
EST. GFR  (AFRICAN AMERICAN): >60 ML/MIN/1.73 M^2
EST. GFR  (NON AFRICAN AMERICAN): >60 ML/MIN/1.73 M^2
GLUCOSE SERPL-MCNC: 282 MG/DL (ref 70–110)
POTASSIUM SERPL-SCNC: 4.7 MMOL/L (ref 3.5–5.1)
PROT SERPL-MCNC: 7.6 G/DL (ref 6–8.4)
SODIUM SERPL-SCNC: 137 MMOL/L (ref 136–145)

## 2020-08-08 PROCEDURE — 80053 COMPREHEN METABOLIC PANEL: CPT

## 2020-08-08 PROCEDURE — 36415 COLL VENOUS BLD VENIPUNCTURE: CPT | Mod: PO

## 2020-08-08 PROCEDURE — 80197 ASSAY OF TACROLIMUS: CPT

## 2020-08-09 LAB — TACROLIMUS BLD-MCNC: 7.3 NG/ML (ref 5–15)

## 2020-08-12 ENCOUNTER — TELEPHONE (OUTPATIENT)
Dept: UROLOGY | Facility: CLINIC | Age: 25
End: 2020-08-12

## 2020-08-12 DIAGNOSIS — N20.0 KIDNEY STONES: Primary | ICD-10-CM

## 2020-08-12 RX ORDER — TAMSULOSIN HYDROCHLORIDE 0.4 MG/1
0.4 CAPSULE ORAL
Qty: 30 CAPSULE | Refills: 1 | Status: SHIPPED | OUTPATIENT
Start: 2020-08-12 | End: 2021-11-19 | Stop reason: ALTCHOICE

## 2020-08-12 RX ORDER — HYDROCODONE BITARTRATE AND ACETAMINOPHEN 5; 325 MG/1; MG/1
1 TABLET ORAL EVERY 6 HOURS PRN
Qty: 12 TABLET | Refills: 0 | Status: SHIPPED | OUTPATIENT
Start: 2020-08-12 | End: 2020-08-19

## 2020-08-12 NOTE — TELEPHONE ENCOUNTER
"----- Message from Vira Panchal LPN sent at 8/12/2020  3:13 PM CDT -----  Regarding: FW: Medical Advice  Pt states pain started a few days ago, "can't get comfortable, an 8 on pain scale". Flomax, and something for pain requested. Was seen 8-5, and due to have imaging in a month if he hasn't passed stone. Advise please.  ----- Message -----  From: Ceci Andino  Sent: 8/12/2020   2:48 PM CDT  To: Shola Cam Staff  Subject: Medical Advice                                   Type:  Medical Advice     Name of Caller:patient need to speak with nurse.   Patient states has kidneystone experiencing requesting  something be called into pharmacy for   and requesting refill on Flomax  called into [facility] on [facility]  When is the first available appointment?  Symptoms:  Best Call Back Number [phone]  Additional Information:          "

## 2020-08-12 NOTE — TELEPHONE ENCOUNTER
Spoke with patient---he would like to try to pass his stone.  He will  norco and flomax sent to [facility].

## 2020-08-14 ENCOUNTER — LAB VISIT (OUTPATIENT)
Dept: LAB | Facility: HOSPITAL | Age: 25
End: 2020-08-14
Attending: UROLOGY
Payer: MEDICAID

## 2020-08-14 ENCOUNTER — TELEPHONE (OUTPATIENT)
Dept: UROLOGY | Facility: CLINIC | Age: 25
End: 2020-08-14

## 2020-08-14 DIAGNOSIS — N20.0 KIDNEY STONES: ICD-10-CM

## 2020-08-14 DIAGNOSIS — N20.0 KIDNEY STONES: Primary | ICD-10-CM

## 2020-08-14 DIAGNOSIS — Z01.818 PRE-OP EVALUATION: ICD-10-CM

## 2020-08-14 PROCEDURE — 87086 URINE CULTURE/COLONY COUNT: CPT

## 2020-08-14 NOTE — PROGRESS NOTES
Placed case request for ureteroscopy next Friday.  He nees a ua/urine culture today if possible.  I ordered covid testing for Tuesday.  Please reach out to the patient to arrange the labs as well as to discuss preop instruction.    Thank you.

## 2020-08-15 LAB — BACTERIA UR CULT: NO GROWTH

## 2020-08-18 ENCOUNTER — TELEPHONE (OUTPATIENT)
Dept: UROLOGY | Facility: CLINIC | Age: 25
End: 2020-08-18

## 2020-08-18 NOTE — TELEPHONE ENCOUNTER
----- Message from Ari Lynn sent at 8/18/2020 11:39 AM CDT -----  Type:  Needs Medical Advice    Who Called:  pt   Would the patient rather a call back or a response via [facility]?  Call back   Best Call Back Number:  [phone]   Additional Information:  Patient would like a call back from your office regarding concerns he has for his upcoming procedure. Please Advise.

## 2020-08-18 NOTE — TELEPHONE ENCOUNTER
----- Message from Tali Sanchez sent at 8/18/2020 12:26 PM CDT -----  Contact: pt  Pt returning call to the clinic         Please contact pt [phone]

## 2020-08-20 ENCOUNTER — ANESTHESIA EVENT (OUTPATIENT)
Dept: SURGERY | Facility: HOSPITAL | Age: 25
End: 2020-08-20
Payer: MEDICAID

## 2020-08-20 PROBLEM — N20.1 RIGHT URETERAL STONE: Status: ACTIVE | Noted: 2020-08-20

## 2020-08-20 NOTE — PRE-PROCEDURE INSTRUCTIONS
PREOP INSTRUCTIONS:No solid food ,milk or milk products for 8 hours prior to procedure.Clear liquids are allowed up to 2 hours before procedure.Clear liquids are:water,apple juice,gatorade & powerade.Patient instructed to follow the surgeon's instructions if they differ from these.Shower instructions as well as directions to the [facility] were given.Patient encouraged to wear loose fitting,comfortable clothing.Medication instructions for pm prior to and am of procedure reviewed.Instructed patient to avoid taking vitamins,supplements,aspirin and ibuprofen the morning of surgery. Patient stated an understanding.Patient informed of the current visitor policy and advised patient that one visitor may accompany each patient into the hospital and wait (socially distanced) until a member of the medical team provides an update at the conclusion of the procedure.When they enter the hospital both patient and visitor will have their temperature checked.All visitors are asked to arrive with a mask and to keep their mask on throughout the visit.    Patient denies any side effects or issues with anesthesia or sedation.    Patient does not know arrival time.Explained that this information comes from the surgeon's office and if they haven't heard from them by 2 or 3 pm to call the office.Patient stated an understanding.

## 2020-08-21 ENCOUNTER — ANESTHESIA (OUTPATIENT)
Dept: SURGERY | Facility: HOSPITAL | Age: 25
End: 2020-08-21
Payer: MEDICAID

## 2020-08-21 ENCOUNTER — HOSPITAL ENCOUNTER (OUTPATIENT)
Facility: HOSPITAL | Age: 25
Discharge: HOME | End: 2020-08-21
Attending: UROLOGY | Admitting: UROLOGY
Payer: MEDICAID

## 2020-08-21 VITALS
SYSTOLIC BLOOD PRESSURE: 134 MMHG | RESPIRATION RATE: 20 BRPM | HEART RATE: 83 BPM | HEIGHT: 65 IN | OXYGEN SATURATION: 96 % | WEIGHT: 270 LBS | TEMPERATURE: 98 F | DIASTOLIC BLOOD PRESSURE: 77 MMHG | BODY MASS INDEX: 44.98 KG/M2

## 2020-08-21 DIAGNOSIS — N20.1 URETERAL STONE: ICD-10-CM

## 2020-08-21 DIAGNOSIS — N20.1 RIGHT URETERAL STONE: Primary | ICD-10-CM

## 2020-08-21 LAB
POCT GLUCOSE: 131 MG/DL (ref 70–110)
POCT GLUCOSE: 166 MG/DL (ref 70–110)
SARS-COV-2 RDRP RESP QL NAA+PROBE: NEGATIVE

## 2020-08-21 PROCEDURE — 82962 GLUCOSE BLOOD TEST: CPT | Performed by: UROLOGY

## 2020-08-21 PROCEDURE — D9220A PRA ANESTHESIA: Mod: CRNA,,, | Performed by: NURSE ANESTHETIST, CERTIFIED REGISTERED

## 2020-08-21 PROCEDURE — 37000009 HC ANESTHESIA EA ADD 15 MINS: Performed by: UROLOGY

## 2020-08-21 PROCEDURE — 36000707: Performed by: UROLOGY

## 2020-08-21 PROCEDURE — 71000015 HC POSTOP RECOV 1ST HR: Performed by: UROLOGY

## 2020-08-21 PROCEDURE — 27201423 OPTIME MED/SURG SUP & DEVICES STERILE SUPPLY: Performed by: UROLOGY

## 2020-08-21 PROCEDURE — D9220A PRA ANESTHESIA: Mod: ANES,,, | Performed by: ANESTHESIOLOGY

## 2020-08-21 PROCEDURE — 37000008 HC ANESTHESIA 1ST 15 MINUTES: Performed by: UROLOGY

## 2020-08-21 PROCEDURE — C1769 GUIDE WIRE: HCPCS | Performed by: UROLOGY

## 2020-08-21 PROCEDURE — 63600175 PHARM REV CODE 636 W HCPCS: Performed by: NURSE ANESTHETIST, CERTIFIED REGISTERED

## 2020-08-21 PROCEDURE — C1758 CATHETER, URETERAL: HCPCS | Performed by: UROLOGY

## 2020-08-21 PROCEDURE — C2617 STENT, NON-COR, TEM W/O DEL: HCPCS | Performed by: UROLOGY

## 2020-08-21 PROCEDURE — 52332 CYSTOSCOPY AND TREATMENT: CPT | Mod: RT,,, | Performed by: UROLOGY

## 2020-08-21 PROCEDURE — 00910 ANES TRANSURETHRAL PX NOS: CPT | Performed by: UROLOGY

## 2020-08-21 PROCEDURE — 52332 PR CYSTOSCOPY,INSERT URETERAL STENT: ICD-10-PCS | Mod: RT,,, | Performed by: UROLOGY

## 2020-08-21 PROCEDURE — 63600175 PHARM REV CODE 636 W HCPCS: Performed by: STUDENT IN AN ORGANIZED HEALTH CARE EDUCATION/TRAINING PROGRAM

## 2020-08-21 PROCEDURE — D9220A PRA ANESTHESIA: ICD-10-PCS | Mod: ANES,,, | Performed by: ANESTHESIOLOGY

## 2020-08-21 PROCEDURE — 36000706: Performed by: UROLOGY

## 2020-08-21 PROCEDURE — 25000003 PHARM REV CODE 250: Performed by: NURSE ANESTHETIST, CERTIFIED REGISTERED

## 2020-08-21 PROCEDURE — 71000044 HC DOSC ROUTINE RECOVERY FIRST HOUR: Performed by: UROLOGY

## 2020-08-21 PROCEDURE — D9220A PRA ANESTHESIA: ICD-10-PCS | Mod: CRNA,,, | Performed by: NURSE ANESTHETIST, CERTIFIED REGISTERED

## 2020-08-21 PROCEDURE — 25500020 PHARM REV CODE 255: Performed by: UROLOGY

## 2020-08-21 PROCEDURE — U0002 COVID-19 LAB TEST NON-CDC: HCPCS

## 2020-08-21 DEVICE — STENT URETERAL UNIV 6FR 24CM: Type: IMPLANTABLE DEVICE | Site: URETER | Status: FUNCTIONAL

## 2020-08-21 RX ORDER — SUCCINYLCHOLINE CHLORIDE 20 MG/ML
INJECTION INTRAMUSCULAR; INTRAVENOUS
Status: DISCONTINUED | OUTPATIENT
Start: 2020-08-21 | End: 2020-08-21

## 2020-08-21 RX ORDER — LORAZEPAM 2 MG/ML
0.25 INJECTION INTRAMUSCULAR ONCE AS NEEDED
Status: DISCONTINUED | OUTPATIENT
Start: 2020-08-21 | End: 2020-08-21 | Stop reason: HOSPADM

## 2020-08-21 RX ORDER — PROPOFOL 10 MG/ML
VIAL (ML) INTRAVENOUS
Status: DISCONTINUED | OUTPATIENT
Start: 2020-08-21 | End: 2020-08-21

## 2020-08-21 RX ORDER — ROCURONIUM BROMIDE 10 MG/ML
INJECTION, SOLUTION INTRAVENOUS
Status: DISCONTINUED | OUTPATIENT
Start: 2020-08-21 | End: 2020-08-21

## 2020-08-21 RX ORDER — SODIUM CHLORIDE 9 MG/ML
INJECTION, SOLUTION INTRAVENOUS CONTINUOUS
Status: DISCONTINUED | OUTPATIENT
Start: 2020-08-21 | End: 2020-08-21 | Stop reason: HOSPADM

## 2020-08-21 RX ORDER — OXYBUTYNIN CHLORIDE 5 MG/1
5 TABLET ORAL 3 TIMES DAILY PRN
Qty: 21 TABLET | Refills: 0 | Status: SHIPPED | OUTPATIENT
Start: 2020-08-21 | End: 2021-11-19 | Stop reason: ALTCHOICE

## 2020-08-21 RX ORDER — FENTANYL CITRATE 50 UG/ML
INJECTION, SOLUTION INTRAMUSCULAR; INTRAVENOUS
Status: DISCONTINUED | OUTPATIENT
Start: 2020-08-21 | End: 2020-08-21

## 2020-08-21 RX ORDER — SODIUM CHLORIDE 9 MG/ML
INJECTION, SOLUTION INTRAVENOUS CONTINUOUS PRN
Status: DISCONTINUED | OUTPATIENT
Start: 2020-08-21 | End: 2020-08-21

## 2020-08-21 RX ORDER — DEXAMETHASONE SODIUM PHOSPHATE 4 MG/ML
INJECTION, SOLUTION INTRA-ARTICULAR; INTRALESIONAL; INTRAMUSCULAR; INTRAVENOUS; SOFT TISSUE
Status: DISCONTINUED | OUTPATIENT
Start: 2020-08-21 | End: 2020-08-21

## 2020-08-21 RX ORDER — TRAMADOL HYDROCHLORIDE 50 MG/1
50 TABLET ORAL EVERY 6 HOURS PRN
Qty: 5 TABLET | Refills: 0 | Status: SHIPPED | OUTPATIENT
Start: 2020-08-21 | End: 2021-11-19 | Stop reason: ALTCHOICE

## 2020-08-21 RX ORDER — ONDANSETRON 2 MG/ML
INJECTION INTRAMUSCULAR; INTRAVENOUS
Status: DISCONTINUED | OUTPATIENT
Start: 2020-08-21 | End: 2020-08-21

## 2020-08-21 RX ORDER — MIDAZOLAM HYDROCHLORIDE 1 MG/ML
INJECTION, SOLUTION INTRAMUSCULAR; INTRAVENOUS
Status: DISCONTINUED | OUTPATIENT
Start: 2020-08-21 | End: 2020-08-21

## 2020-08-21 RX ORDER — PHENAZOPYRIDINE HYDROCHLORIDE 200 MG/1
200 TABLET, FILM COATED ORAL 3 TIMES DAILY PRN
Qty: 15 TABLET | Refills: 0 | Status: SHIPPED | OUTPATIENT
Start: 2020-08-21 | End: 2021-11-19 | Stop reason: ALTCHOICE

## 2020-08-21 RX ORDER — LIDOCAINE HYDROCHLORIDE 20 MG/ML
INJECTION INTRAVENOUS
Status: DISCONTINUED | OUTPATIENT
Start: 2020-08-21 | End: 2020-08-21

## 2020-08-21 RX ORDER — HYDROMORPHONE HYDROCHLORIDE 1 MG/ML
0.2 INJECTION, SOLUTION INTRAMUSCULAR; INTRAVENOUS; SUBCUTANEOUS EVERY 5 MIN PRN
Status: DISCONTINUED | OUTPATIENT
Start: 2020-08-21 | End: 2020-08-21 | Stop reason: HOSPADM

## 2020-08-21 RX ORDER — MEPERIDINE HYDROCHLORIDE 50 MG/ML
12.5 INJECTION INTRAMUSCULAR; INTRAVENOUS; SUBCUTANEOUS ONCE AS NEEDED
Status: DISCONTINUED | OUTPATIENT
Start: 2020-08-21 | End: 2020-08-21 | Stop reason: HOSPADM

## 2020-08-21 RX ADMIN — ROCURONIUM BROMIDE 40 MG: 10 INJECTION, SOLUTION INTRAVENOUS at 09:08

## 2020-08-21 RX ADMIN — FENTANYL CITRATE 100 MCG: 50 INJECTION, SOLUTION INTRAMUSCULAR; INTRAVENOUS at 08:08

## 2020-08-21 RX ADMIN — ROCURONIUM BROMIDE 10 MG: 10 INJECTION, SOLUTION INTRAVENOUS at 08:08

## 2020-08-21 RX ADMIN — ONDANSETRON 4 MG: 2 INJECTION, SOLUTION INTRAMUSCULAR; INTRAVENOUS at 09:08

## 2020-08-21 RX ADMIN — SODIUM CHLORIDE: 0.9 INJECTION, SOLUTION INTRAVENOUS at 08:08

## 2020-08-21 RX ADMIN — DEXAMETHASONE SODIUM PHOSPHATE 8 MG: 4 INJECTION, SOLUTION INTRAMUSCULAR; INTRAVENOUS at 09:08

## 2020-08-21 RX ADMIN — LIDOCAINE HYDROCHLORIDE 100 MG: 20 INJECTION, SOLUTION INTRAVENOUS at 08:08

## 2020-08-21 RX ADMIN — SUCCINYLCHOLINE CHLORIDE 200 MG: 20 INJECTION, SOLUTION INTRAMUSCULAR; INTRAVENOUS at 08:08

## 2020-08-21 RX ADMIN — MIDAZOLAM HYDROCHLORIDE 2 MG: 1 INJECTION, SOLUTION INTRAMUSCULAR; INTRAVENOUS at 08:08

## 2020-08-21 RX ADMIN — SUGAMMADEX 400 MG: 100 INJECTION, SOLUTION INTRAVENOUS at 09:08

## 2020-08-21 RX ADMIN — PROPOFOL 200 MG: 10 INJECTION, EMULSION INTRAVENOUS at 08:08

## 2020-08-21 RX ADMIN — CEFAZOLIN 3 ML: 1 INJECTION, POWDER, FOR SOLUTION INTRAVENOUS at 09:08

## 2020-08-21 NOTE — TRANSFER OF CARE
"Anesthesia Transfer of Care Note    Patient: Derrick Montanez    Procedure(s) Performed: Procedure(s) (LRB):  URETEROSCOPY (Right)  CYSTOSCOPY, WITH URETERAL STENT INSERTION (Right)    Patient location: PACU    Anesthesia Type: general    Transport from OR: Transported from OR on 6-10 L/min O2 by face mask with adequate spontaneous ventilation    Post pain: adequate analgesia    Post assessment: no apparent anesthetic complications and tolerated procedure well    Post vital signs: stable    Level of consciousness: awake and alert    Nausea/Vomiting: no nausea/vomiting    Complications: none    Transfer of care protocol was followed      Last vitals:   Visit Vitals  BP (!) 145/72 (BP Location: Right arm, Patient Position: Lying)   Pulse 85   Temp 36.7 °C (98.1 °F) (Skin)   Resp (!) 28   Ht 5' 5" (1.651 m)   Wt 122.5 kg (270 lb)   SpO2 100%   BMI 44.93 kg/m²     "

## 2020-08-21 NOTE — ANESTHESIA POSTPROCEDURE EVALUATION
Anesthesia Post Evaluation    Patient: Derrick Montanez    Procedure(s) Performed: Procedure(s) (LRB):  URETEROSCOPY (Right)  CYSTOSCOPY, WITH URETERAL STENT INSERTION (Right)    Final Anesthesia Type: general    Patient location during evaluation: PACU  Patient participation: Yes- Able to Participate  Level of consciousness: awake and alert  Post-procedure vital signs: reviewed and stable  Pain management: adequate  Airway patency: patent    PONV status at discharge: No PONV  Anesthetic complications: no      Cardiovascular status: blood pressure returned to baseline  Respiratory status: spontaneous ventilation and room air  Hydration status: euvolemic  Follow-up not needed.          Vitals Value Taken Time   /77 08/21/20 1031   Temp 36.9 °C (98.4 °F) 08/21/20 1030   Pulse 86 08/21/20 1030   Resp 44 08/21/20 1030   SpO2 96 % 08/21/20 1030   Vitals shown include unvalidated device data.      No case tracking events are documented in the log.      Pain/Ally Score: Ally Score: 10 (8/21/2020  9:40 AM)

## 2020-08-21 NOTE — OP NOTE
[facility]  Operative Note    Date: 08/21/2020    Pre-Op Diagnosis: Right ureteral stone    Patient Active Problem List    Diagnosis Date Noted    Right ureteral stone 08/20/2020    Heart transplant failure 08/08/2019    Counseling and coordination of care 03/25/2019    Type 2 diabetes mellitus with hyperglycemia 03/25/2019    Long term current use of immunosuppressive drug 01/22/2018    Anxiety 05/18/2017    Obesity, Class III, BMI 40-49.9 (morbid obesity) 10/07/2016    Heart replaced by transplant 03/11/2016    Gastroesophageal reflux disease 01/06/2016    History of abdominal pain 01/06/2016    Immunosuppression 11/04/2015    Gastritis 11/03/2015    Encounter for long-term (current) use of medications 10/13/2015       Post-Op Diagnosis: No evidence of right ureteral stone    Procedure(s) Performed:   1.  Right ureteroscopy  2.  Cystoscopy  3.  Right ureteral stent placement  4.  Fluoro < 1 h    Specimen(s): none    Staff Surgeon: Tutu Jolley MD    Assistant Surgeon: Eduardo Miranda MD    Anesthesia: General endotracheal anesthesia    Indications: Derrick Montanez is a 24 y.o. male with a right distal ureteral stone presenting for definitive stone management.    Findings:  1. No stones within the bladder or along the entire course of the right ureter.  2. Narrowing in right distal ureter requiring two wires to advance ureteroscope past.   3. Stent with strings placed due to difficulty traversing right distal ureteral narrowing.    Estimated Blood Loss: min    Drains: Right 6 Fr x 24 cm JJ ureteral stent with strings    Procedure in detail:  After informed consent was obtained, the patient was brought the the cystoscopy suite and placed in the supine position.  SCDs were applied and working.  Anesthesia was administered.  The patient was then placed in the dorsal lithotomy position and prepped and draped in the usual sterile fashion.      A rigid cystoscope in a 22 Fr sheath  was introduced into the patient's urethra.  This passed easily.  The entire urethra was visualized which showed no strictures or masses.  Formal cystoscopy was performed which revealed the ureteral orifices in the normal anatomic position bilaterally.    A motion wire was passed up the right ureteral orifice and up into the kidney.  This passed easily and placement was confirmed using fluoro.  The cystoscope was removed. An 8 Fr rigid ureteroscope was passed into the patient's bladder alongside the wire under direct vision.  It was then passed through the right ureteral orifice alongside the wire. There was an area of narrowing in the distal ureter requiring train-tracking with a stiff glidewire to advance the scope past. The entire right ureter was surveyed and no stones were seen. Because there was difficulty traversing the distal ureteral narrowing, we opted to place a stent with strings. The ureteroscope was removed keeping the motion wire in place.    A 6 Fr x 24 cm JJ ureteral stent with strings was passed over the wire and up into the renal pelvis using fluoro.  When the coil appeared to be in good position in the kidney and the radio-opaque marker of the pusher was at the inferior pubis, the wire was removed under continuous fluoro.  Good coils were seen in the kidney and the bladder using fluoro.      The patient tolerated the procedure well and was transferred to the recovery room in stable condition.      Disposition:  The patient will follow up with Dr. Jolley in 3 months with a renal ultrasound prior. He was given written instructions to self-remove his ureteral stent with strings on Monday, 8/24/20.    Eduardo Miranda MD

## 2020-08-21 NOTE — DISCHARGE INSTRUCTIONS
Please remove your ureteral stent with strings yourself on Monday, 8/24/20. In order to do so, pull the blue string hanging out of your urethra until the entire stent is removed.    Post Cystoscopy Instructions  Do not strain to have a bowel movement  No strenuous exercise x 7 days  No driving while you are on narcotic pain medications    You can expect:  To pass stone fragments if you had a stone procedure  Have pain when you void from your stent if you have a stent in place  See blood in your urine if you have a stent in place    If you have a catheter, please return to the ER if your catheter stops draining or you are having abdominal pain.    Call the doctor if:   Temperature is greater than 101F   Persistent vomiting and inability to keep food down   Inability to void if you do not have a catheter        Anesthesia: General Anesthesia     You are watched continuously during your procedure by your anesthesia provider.     Youre due to have surgery. During surgery, youll be given medicine called anesthesia or anesthetic. This will keep you comfortable and pain-free. Your anesthesia provider will use general anesthesia.  What is general anesthesia?  General anesthesia puts you into a state like deep sleep. It goes into the bloodstream (IV anesthetics), into the lungs (gas anesthetics), or both. You feel nothing during the procedure. You will not remember it. During the procedure, the anesthesia provider monitors you continuously. He or she checks your heart rate and rhythm, blood pressure, breathing, and blood oxygen.  · IV anesthetics. IV anesthetics are given through an IV line in your arm. Theyre often given first. This is so you are asleep before a gas anesthetic is started. Some kinds of IV anesthetics relieve pain. Others relax you. Your doctor will decide which kind is best in your case.  · Gas anesthetics. Gas anesthetics are breathed into the lungs. They are often used to keep you asleep. They can  be given through a facemask or a tube placed in your larynx or trachea (breathing tube).  ¨ If you have a facemask, your anesthesia provider will most likely place it over your nose and mouth while youre still awake. Youll breathe oxygen through the mask as your IV anesthetic is started. Gas anesthetic may be added through the mask.  ¨ If you have a tube in the larynx or trachea, it will be inserted into your throat after youre asleep.  Anesthesia tools and medicines  You will likely have:  · IV anesthetics. These are put into an IV line into your bloodstream.  · Gas anesthetics. You breathe these anesthetics into your lungs, where they pass into your bloodstream.  · Pulse oximeter. This is a small clip that is attached to the end of your finger. This measures your blood oxygen level.  · Electrocardiography leads (electrodes). These are small sticky pads that are placed on your chest. They record your heart rate and rhythm.  · Blood pressure cuff. This reads your blood pressure.  Risks and possible complications  General anesthesia has some risks. These include:  · Breathing problems  · Nausea and vomiting  · Sore throat or hoarseness (usually temporary)  · Allergic reaction to the anesthetic  · Irregular heartbeat (rare)  · Cardiac arrest (rare)   Anesthesia safety  · Follow all instructions you are given for how long not to eat or drink before your procedure.  · Be sure your doctor knows what medicines and drugs you take. This includes over-the-counter medicines, herbs, supplements, alcohol or other drugs. You will be asked when those were last taken.  · Have an adult family member or friend drive you home after the procedure.  · For the first 24 hours after your surgery:  ¨ Do not drive or use heavy equipment.  ¨ Do not make important decisions or sign legal documents. If important decisions or signing legal documents is necessary during the first 24 hours after surgery, have a trusted family member or spouse  act on your behalf.  ¨ Avoid alcohol.  ¨ Have a responsible adult stay with you. He or she can watch for problems and help keep you safe.  Date Last Reviewed: 12/1/2016  © 3398-5596 [employer]. [address] [place] [address], [place] [address]. All rights reserved. This information is not intended as a substitute for professional medical care. Always follow your healthcare professional's instructions.

## 2020-08-21 NOTE — INTERVAL H&P NOTE
The patient has been examined and the H&P has been reviewed:    I concur with the findings and no changes have occurred since H&P was written.    Surgery risks, benefits and alternative options discussed and understood by patient/family.    I have explained the indication, risks, benefits, and alternatives of the procedure in detail.  Risks including but not limited to bleeding, infection, injury to the urethra, bladder, ureter, need for additional treatments, inability or incomplete removal of kidney stones, pain, and discomfort related to the stent were discussed in depth with the patient.  The patient voices understanding and all questions have been answered.  The patient agrees to proceed as planned with right ureteroscopy, laser lithotripsy, and ureteral stent placement.          Active Hospital Problems    Diagnosis  POA    *Right ureteral stone [N20.1]  Yes    Type 2 diabetes mellitus with hyperglycemia [E11.65]  Yes    Long term current use of immunosuppressive drug [Z79.899]  Not Applicable    Obesity, Class III, BMI 40-49.9 (morbid obesity) [E66.01]  Yes    Heart replaced by transplant [Z94.1]  Not Applicable    Gastroesophageal reflux disease [K21.9]  Yes      Resolved Hospital Problems   No resolved problems to display.

## 2020-08-21 NOTE — ANESTHESIA PROCEDURE NOTES
Intubation  Performed by: Lidia Mary CRNA  Authorized by: Bobby Balbuena Jr., MD     Intubation:     Induction:  Intravenous    Intubated:  Postinduction    Mask Ventilation:  Moderately difficult with oral airway    Attempts:  2    Attempted By:  Student    Method of Intubation:  Video laryngoscopy    Blade:  Richards 3    Laryngeal View Grade: Grade I - full view of chords      Attempted By (2nd Attempt):  CRNA    Method of Intubation (2nd Attempt):  Video laryngoscopy    Blade (2nd Attempt):  Richards 3    Laryngeal View Grade (2nd Attempt): Grade I - full view of cords      Difficult Airway Encountered?: Yes      Complications:  None    Airway Device:  Oral endotracheal tube    Airway Device Size:  7.5    Style/Cuff Inflation:  Cuffed    Inflation Amount (mL):  7    Tube secured:  24    Secured at:  The lips    Placement Verified By:  Capnometry and Revisualization with laryngoscopy    Complicating Factors:  Large/floppy epiglottis, obesity, short neck, oropharyngeal edema or fat and anterior larynx    Findings Post-Intubation:  BS equal bilateral and atraumatic/condition of teeth unchanged

## 2020-08-21 NOTE — DISCHARGE SUMMARY
[facility]  Discharge Note  Short Stay    Admit Date: 8/21/2020    Discharge Date and Time: 08/21/2020 9:51 AM      Attending Physician: Tutu Jolley MD     Discharge Provider: Eduardo Miranda MD    Diagnoses:  Active Hospital Problems    Diagnosis  POA    *Right ureteral stone [N20.1]  Yes    Type 2 diabetes mellitus with hyperglycemia [E11.65]  Yes    Long term current use of immunosuppressive drug [Z79.899]  Not Applicable    Obesity, Class III, BMI 40-49.9 (morbid obesity) [E66.01]  Yes    Heart replaced by transplant [Z94.1]  Not Applicable    Gastroesophageal reflux disease [K21.9]  Yes      Resolved Hospital Problems   No resolved problems to display.       Discharged Condition: good    Hospital Course: Patient was admitted for right ureteroscopy and right ureteral stent placement and tolerated the procedure well with no complications. The patient was discharged home in good condition on the same day.       Final Diagnoses: Same as principal problem.    Disposition: Home or Self Care    Follow up/Patient Instructions:    Medications:  Reconciled Home Medications:   Current Discharge Medication List      START taking these medications    Details   oxybutynin (DITROPAN) 5 MG Tab Take 1 tablet (5 mg total) by mouth 3 (three) times daily as needed (Bladder spasms).  Qty: 21 tablet, Refills: 0      phenazopyridine (PYRIDIUM) 200 MG tablet Take 1 tablet (200 mg total) by mouth 3 (three) times daily as needed (Burning with urination).  Qty: 15 tablet, Refills: 0      traMADoL (ULTRAM) 50 mg tablet Take 1 tablet (50 mg total) by mouth every 6 (six) hours as needed for Pain.  Qty: 5 tablet, Refills: 0    Comments: Quantity prescribed more than 7 day supply? No         CONTINUE these medications which have NOT CHANGED    Details   dicyclomine (BENTYL) 10 MG capsule TAKE 1 CAPSULE BY MOUTH TWICE DAILY  Qty: 60 capsule, Refills: 3    Comments: This prescription was filled on 4/20/2020. Any refills  authorized will be placed on file.  Associated Diagnoses: Heart transplanted; Complication of heart transplant, unspecified complication; Status post heart transplant; Encounter for long-term (current) use of medications; Gastroesophageal reflux disease, esophagitis presence not specified; Immunosuppression      diltiaZEM (TIAZAC) 180 MG Cs24 Take 180 mg by mouth every morning.       ergocalciferol (ERGOCALCIFEROL) 50,000 unit Cap Take 1 capsule (50,000 Units total) by mouth every 7 days.  Qty: 30 capsule, Refills: 11    Associated Diagnoses: Status post heart transplant; Vitamin D deficiency      escitalopram oxalate (LEXAPRO) 10 MG tablet TAKE 1 TABLET BY MOUTH EVERY DAY  Qty: 30 tablet, Refills: 11    Associated Diagnoses: Anxiety      esomeprazole (NEXIUM) 20 MG capsule Take 1 capsule (20 mg total) by mouth before breakfast.  Qty: 30 capsule, Refills: 11    Associated Diagnoses: Heart replaced by transplant      insulin (LANTUS SOLOSTAR U-100 INSULIN) glargine 100 units/mL (3mL) SubQ pen Inject 36-40 units at night. Can substitute basaglar or levemir  Qty: 1 Box, Refills: 6      insulin lispro (ADMELOG U-100 INSULIN LISPRO) 100 unit/mL injection Inject 12 units at dinner time, 150-200+2, 201-250+4, 251-300+6, 301-350+8, >350 +10. Can substitute humalog.max daily 66 units.  Qty: 2 vial, Refills: 6      liraglutide 0.6 mg/0.1 mL, 18 mg/3 mL, subq PNIJ (VICTOZA 3-CARMEN) 0.6 mg/0.1 mL (18 mg/3 mL) PnIj pen Inject 1.8 mg daily  Qty: 9 mL, Refills: 6      lisinopriL (PRINIVIL,ZESTRIL) 5 MG tablet Take 1 tablet (5 mg total) by mouth once daily.  Qty: 30 tablet, Refills: 11    Comments: This prescription was filled on 3/16/2020. Any refills authorized will be placed on file.  Associated Diagnoses: Heart transplanted; Complication of heart transplant, unspecified complication; Status post heart transplant; Encounter for long-term (current) use of medications; Gastroesophageal reflux disease, esophagitis presence not  specified; Immunosuppression      metFORMIN (GLUCOPHAGE) 500 MG tablet Take 1 tablet (500 mg total) by mouth 2 (two) times daily with meals.  Qty: 180 tablet, Refills: 3      MYFORTIC 360 mg TbEC Take 2 tablets (720 mg total) by mouth 2 (two) times daily.  Qty: 120 tablet, Refills: 11    Associated Diagnoses: Heart replaced by transplant      pravastatin (PRAVACHOL) 20 MG tablet TAKE 1 TABLET BY MOUTH EVERY NIGHTLY  Qty: 30 tablet, Refills: 11    Associated Diagnoses: Heart transplanted; Complication of heart transplant, unspecified complication; Status post heart transplant; Encounter for long-term (current) use of medications; Gastroesophageal reflux disease, esophagitis presence not specified; Immunosuppression      !! PROGRAF 1 mg Cap Take one (1 mg) capsule along with one 5 mg capsule in the am and 2 (1 mg) capsules along with one 5 mg capsule in the pm for a total of 6 mg in the am and 7 mg in the pm.  Qty: 270 capsule, Refills: 3    Comments: Txp Date:3/17/2009 (Heart) Disch Date: ICD10:Z94.1 Txp Location:[facility] ([place], [place])  Associated Diagnoses: Heart replaced by transplant      !! tacrolimus (PROGRAF) 5 MG Cap TAKE 1 CAPSULE BY MOUTH  Along with 1 mg capsule in the am and 2 (1 mg) capsules in the pm for total dose of 6 mg in the am and 7 mg in the pm.  Qty: 180 capsule, Refills: 3    Comments: Txp Date:3/17/2009 (Heart) Disch Date: ICD10:Z94.1 Txp Location:[facility] ([place], [place])  Associated Diagnoses: Status post heart transplant      tamsulosin (FLOMAX) 0.4 mg Cap Take 1 capsule (0.4 mg total) by mouth after dinner.  Qty: 30 capsule, Refills: 1    Associated Diagnoses: Kidney stones      topiramate (TOPAMAX) 25 MG tablet Take 1 tablet (25 mg total) by mouth every evening.  Qty: 30 tablet, Refills: 11    Associated Diagnoses: Heart replaced by transplant      aspirin (ECOTRIN) 81 MG EC tablet Take 81 mg by mouth once daily.      BLOOD PRESSURE CUFF Misc Please provide pt  "with one blood pressure cuff to check his BP twice a day.  Qty: 1 each, Refills: 0    Associated Diagnoses: Hypertension associated with transplantation; Status post heart transplant      blood sugar diagnostic Strp Uses 4 x a day, one touch ultra blue, on insulin-long acting and meal insulin.  Qty: 400 each, Refills: 3      cetirizine 10 mg Cap Take 10 mg by mouth as needed.       insulin syringe-needle U-100 (ADVOCATE SYRINGES) 0.3 mL 31 gauge x 5/16" Syrg Uses 3 times a day, 90 day  Qty: 300 each, Refills: 3      lancets 30 gauge Misc Has one touch ultra 2, needs lancets , uses 4 times a day. E 11.65, on insulin.  Qty: 400 each, Refills: 3      pen needle, diabetic (BD ULTRA-FINE KARIME PEN NEEDLE) 32 gauge x 5/32" Ndle Uses 5 times a day. 90 day  Qty: 400 each, Refills: 3       !! - Potential duplicate medications found. Please discuss with provider.        Discharge Procedure Orders   US Retroperitoneal Complete   Standing Status: Future Standing Exp. Date: 08/21/21     Order Specific Question Answer Comments   Reason for Exam: S/p right ureteroscopy    May the Radiologist modify the order per protocol to meet the clinical needs of the patient? Yes      Diet diabetic     No driving until:   Order Comments: Off narcotics     Notify your health care provider if you experience any of the following:  temperature >100.4     Notify your health care provider if you experience any of the following:  persistent nausea and vomiting or diarrhea     Notify your health care provider if you experience any of the following:  severe uncontrolled pain     Notify your health care provider if you experience any of the following:  difficulty breathing or increased cough     Notify your health care provider if you experience any of the following:  severe persistent headache     Notify your health care provider if you experience any of the following:  worsening rash     Notify your health care provider if you experience any of the " following:  persistent dizziness, light-headedness, or visual disturbances     Notify your health care provider if you experience any of the following:  increased confusion or weakness     Other restrictions (specify):   Order Comments: Please remove your ureteral stent with strings yourself on Monday, 8/24/20. In order to do so, pull the blue string hanging out of your urethra until the entire stent is removed.     Activity as tolerated     Follow-up Information     Tutu Jolley MD In 3 months.    Specialty: Urology  Why: Follow-up with renal ultrasound before.  Contact information:  [address]  [facility] [address] [phone]

## 2020-08-21 NOTE — ANESTHESIA PREPROCEDURE EVALUATION
08/21/2020  Derrick Montanez is a 24 y.o., male.    Anesthesia Evaluation    I have reviewed the Patient Summary Reports.    I have reviewed the Nursing Notes.       Review of Systems  Anesthesia Hx:  No problems with previous Anesthesia    Hematology/Oncology:  Hematology Normal   Oncology Normal     EENT/Dental:EENT/Dental Normal   Cardiovascular:   Hypertension CAD      Pulmonary:  Pulmonary Normal    Renal/:  Renal/ Normal     Hepatic/GI:   GERD    Musculoskeletal:  Musculoskeletal Normal    Neurological:  Neurology Normal    Endocrine:   Diabetes    Dermatological:  Skin Normal    Psych:  Psychiatric Normal           Physical Exam  General:  Morbid Obesity    Airway/Jaw/Neck:  Airway Findings: Mouth Opening: Normal Tongue: Normal  General Airway Assessment: Adult  Mallampati: III  Improves to II with phonation.  TM Distance: Normal, at least 6 cm        Eyes/Ears/Nose:  EYES/EARS/NOSE FINDINGS: Normal   Dental:  Dental Findings: In tact   Chest/Lungs:  Chest/Lungs Clear    Heart/Vascular:  Heart Findings: Normal Heart murmur: negative Vascular Findings: Normal    Abdomen:  Abdomen Findings: Normal    Musculoskeletal:  Musculoskeletal Findings: Normal   Skin:  Skin Findings: Normal    Mental Status:  Mental Status Findings: Normal        Anesthesia Plan  Type of Anesthesia, risks & benefits discussed:  Anesthesia Type:  general  Patient's Preference:   Intra-op Monitoring Plan:   Intra-op Monitoring Plan Comments:   Post Op Pain Control Plan:   Post Op Pain Control Plan Comments:   Induction:   IV  Beta Blocker:  Patient is not currently on a Beta-Blocker (No further documentation required).       Informed Consent: Patient understands risks and agrees with Anesthesia plan.  Questions answered. Anesthesia consent signed with patient.  ASA Score: 3     Day of Surgery Review of History & Physical:     H&P update referred to the surgeon.         Ready For Surgery From Anesthesia Perspective.

## 2020-10-15 DIAGNOSIS — Z94.1 HEART REPLACED BY TRANSPLANT: Primary | ICD-10-CM

## 2020-10-15 RX ORDER — AMOXICILLIN 500 MG/1
2000 CAPSULE ORAL
Qty: 4 CAPSULE | Refills: 0 | Status: SHIPPED | OUTPATIENT
Start: 2020-10-15 | End: 2020-10-15 | Stop reason: SDUPTHER

## 2020-10-15 RX ORDER — AMOXICILLIN 500 MG/1
2000 CAPSULE ORAL
Qty: 12 CAPSULE | Refills: 3 | Status: SHIPPED | OUTPATIENT
Start: 2020-10-15

## 2020-10-15 NOTE — TELEPHONE ENCOUNTER
Called into pharmacy this afternoon and stressed pt needs to get the Rx tonight. Also, sent to Dr. Tod coleman.

## 2020-11-16 LAB
GAMMA INTERFERON BACKGROUND BLD IA-ACNC: 0.04 IU/ML
M TB IFN-G CD4+ BCKGRND COR BLD-ACNC: 0.18 IU/ML
MITOGEN IGNF BCKGRD COR BLD-ACNC: 8.69 IU/ML
TB GOLD PLUS: NEGATIVE
TB2 - NIL: 0.26 IU/ML

## 2020-12-04 DIAGNOSIS — Z01.84 ANTIBODY RESPONSE EXAMINATION: ICD-10-CM

## 2020-12-18 DIAGNOSIS — Z94.1 HEART REPLACED BY TRANSPLANT: Primary | ICD-10-CM

## 2020-12-29 DIAGNOSIS — Z94.1 HEART REPLACED BY TRANSPLANT: ICD-10-CM

## 2020-12-29 DIAGNOSIS — D84.9 IMMUNOSUPPRESSION: ICD-10-CM

## 2020-12-29 DIAGNOSIS — I15.8 HYPERTENSION ASSOCIATED WITH TRANSPLANTATION: ICD-10-CM

## 2020-12-29 DIAGNOSIS — Z94.1 STATUS POST HEART TRANSPLANT: ICD-10-CM

## 2020-12-29 DIAGNOSIS — Z94.1 HEART TRANSPLANTED: ICD-10-CM

## 2020-12-29 DIAGNOSIS — T86.20 COMPLICATION OF HEART TRANSPLANT, UNSPECIFIED COMPLICATION: ICD-10-CM

## 2020-12-29 DIAGNOSIS — Z94.9 HYPERTENSION ASSOCIATED WITH TRANSPLANTATION: ICD-10-CM

## 2020-12-29 DIAGNOSIS — Z79.899 ENCOUNTER FOR LONG-TERM (CURRENT) USE OF MEDICATIONS: ICD-10-CM

## 2020-12-29 RX ORDER — LISINOPRIL 5 MG/1
5 TABLET ORAL DAILY
Qty: 30 TABLET | Refills: 11 | Status: SHIPPED | OUTPATIENT
Start: 2020-12-29 | End: 2022-01-13

## 2020-12-29 RX ORDER — MYCOPHENOLIC ACID 360 MG/1
720 TABLET, DELAYED RELEASE ORAL 2 TIMES DAILY
Qty: 120 TABLET | Refills: 11 | Status: SHIPPED | OUTPATIENT
Start: 2020-12-29 | End: 2022-01-13

## 2021-01-08 ENCOUNTER — PATIENT MESSAGE (OUTPATIENT)
Dept: TRANSPLANT | Facility: CLINIC | Age: 26
End: 2021-01-08

## 2021-01-14 ENCOUNTER — TELEPHONE (OUTPATIENT)
Dept: TRANSPLANT | Facility: CLINIC | Age: 26
End: 2021-01-14

## 2021-01-14 ENCOUNTER — IMMUNIZATION (OUTPATIENT)
Dept: INTERNAL MEDICINE | Facility: CLINIC | Age: 26
End: 2021-01-14
Payer: MEDICAID

## 2021-01-14 DIAGNOSIS — Z23 NEED FOR VACCINATION: ICD-10-CM

## 2021-01-14 PROCEDURE — 91300 COVID-19, MRNA, LNP-S, PF, 30 MCG/0.3 ML DOSE VACCINE: CPT | Mod: PBBFAC | Performed by: INTERNAL MEDICINE

## 2021-02-04 ENCOUNTER — IMMUNIZATION (OUTPATIENT)
Dept: INTERNAL MEDICINE | Facility: CLINIC | Age: 26
End: 2021-02-04
Payer: MEDICAID

## 2021-02-04 DIAGNOSIS — Z23 NEED FOR VACCINATION: Primary | ICD-10-CM

## 2021-02-04 PROCEDURE — 91300 COVID-19, MRNA, LNP-S, PF, 30 MCG/0.3 ML DOSE VACCINE: CPT | Mod: PBBFAC | Performed by: INTERNAL MEDICINE

## 2021-02-04 PROCEDURE — 0002A COVID-19, MRNA, LNP-S, PF, 30 MCG/0.3 ML DOSE VACCINE: CPT | Mod: PBBFAC | Performed by: INTERNAL MEDICINE

## 2021-02-08 ENCOUNTER — TELEPHONE (OUTPATIENT)
Dept: TRANSPLANT | Facility: CLINIC | Age: 26
End: 2021-02-08

## 2021-02-11 ENCOUNTER — LAB VISIT (OUTPATIENT)
Dept: INTERNAL MEDICINE | Facility: CLINIC | Age: 26
End: 2021-02-11

## 2021-02-11 DIAGNOSIS — R53.83 FATIGUE, UNSPECIFIED TYPE: ICD-10-CM

## 2021-02-11 DIAGNOSIS — R05.9 COUGHING: Primary | ICD-10-CM

## 2021-02-11 DIAGNOSIS — R05.9 COUGHING: ICD-10-CM

## 2021-02-11 LAB
CTP QC/QA: YES
SARS-COV-2 RDRP RESP QL NAA+PROBE: POSITIVE

## 2021-02-11 PROCEDURE — U0002 COVID-19 LAB TEST NON-CDC: HCPCS | Mod: QW,S$GLB,, | Performed by: PREVENTIVE MEDICINE

## 2021-02-11 PROCEDURE — U0002: ICD-10-PCS | Mod: QW,S$GLB,, | Performed by: PREVENTIVE MEDICINE

## 2021-02-12 ENCOUNTER — TELEPHONE (OUTPATIENT)
Dept: PRIMARY CARE CLINIC | Facility: OTHER | Age: 26
End: 2021-02-12

## 2021-02-19 ENCOUNTER — HOSPITAL ENCOUNTER (OUTPATIENT)
Facility: HOSPITAL | Age: 26
Discharge: HOME | End: 2021-02-20
Attending: EMERGENCY MEDICINE | Admitting: INTERNAL MEDICINE
Payer: MEDICAID

## 2021-02-19 DIAGNOSIS — R11.0 NAUSEA: ICD-10-CM

## 2021-02-19 DIAGNOSIS — U07.1 COVID-19: Primary | ICD-10-CM

## 2021-02-19 DIAGNOSIS — R00.0 TACHYCARDIA: ICD-10-CM

## 2021-02-19 LAB
ALBUMIN SERPL BCP-MCNC: 2.9 G/DL (ref 3.5–5.2)
ALP SERPL-CCNC: 72 U/L (ref 55–135)
ALT SERPL W/O P-5'-P-CCNC: 30 U/L (ref 10–44)
ANION GAP SERPL CALC-SCNC: 14 MMOL/L (ref 8–16)
AST SERPL-CCNC: 53 U/L (ref 10–40)
BASOPHILS # BLD AUTO: 0.03 K/UL (ref 0–0.2)
BASOPHILS NFR BLD: 0.3 % (ref 0–1.9)
BILIRUB SERPL-MCNC: 0.5 MG/DL (ref 0.1–1)
BUN SERPL-MCNC: 21 MG/DL (ref 6–20)
CALCIUM SERPL-MCNC: 8.3 MG/DL (ref 8.7–10.5)
CHLORIDE SERPL-SCNC: 105 MMOL/L (ref 95–110)
CO2 SERPL-SCNC: 16 MMOL/L (ref 23–29)
CREAT SERPL-MCNC: 1.3 MG/DL (ref 0.5–1.4)
DIFFERENTIAL METHOD: ABNORMAL
EOSINOPHIL # BLD AUTO: 0 K/UL (ref 0–0.5)
EOSINOPHIL NFR BLD: 0 % (ref 0–8)
ERYTHROCYTE [DISTWIDTH] IN BLOOD BY AUTOMATED COUNT: 12.4 % (ref 11.5–14.5)
EST. GFR  (AFRICAN AMERICAN): >60 ML/MIN/1.73 M^2
EST. GFR  (NON AFRICAN AMERICAN): >60 ML/MIN/1.73 M^2
GLUCOSE SERPL-MCNC: 142 MG/DL (ref 70–110)
HCT VFR BLD AUTO: 45.3 % (ref 40–54)
HGB BLD-MCNC: 14.8 G/DL (ref 14–18)
IMM GRANULOCYTES # BLD AUTO: 0.05 K/UL (ref 0–0.04)
IMM GRANULOCYTES NFR BLD AUTO: 0.5 % (ref 0–0.5)
LIPASE SERPL-CCNC: 23 U/L (ref 4–60)
LYMPHOCYTES # BLD AUTO: 1.8 K/UL (ref 1–4.8)
LYMPHOCYTES NFR BLD: 17.2 % (ref 18–48)
MCH RBC QN AUTO: 27.4 PG (ref 27–31)
MCHC RBC AUTO-ENTMCNC: 32.7 G/DL (ref 32–36)
MCV RBC AUTO: 84 FL (ref 82–98)
MONOCYTES # BLD AUTO: 1.1 K/UL (ref 0.3–1)
MONOCYTES NFR BLD: 10.5 % (ref 4–15)
NEUTROPHILS # BLD AUTO: 7.6 K/UL (ref 1.8–7.7)
NEUTROPHILS NFR BLD: 71.5 % (ref 38–73)
NRBC BLD-RTO: 0 /100 WBC
PLATELET # BLD AUTO: 328 K/UL (ref 150–350)
PMV BLD AUTO: 9.3 FL (ref 9.2–12.9)
POTASSIUM SERPL-SCNC: 4.2 MMOL/L (ref 3.5–5.1)
PROT SERPL-MCNC: 8 G/DL (ref 6–8.4)
RBC # BLD AUTO: 5.41 M/UL (ref 4.6–6.2)
SODIUM SERPL-SCNC: 135 MMOL/L (ref 136–145)
TROPONIN I SERPL DL<=0.01 NG/ML-MCNC: <0.006 NG/ML (ref 0–0.03)
WBC # BLD AUTO: 10.62 K/UL (ref 3.9–12.7)

## 2021-02-19 PROCEDURE — 63600175 PHARM REV CODE 636 W HCPCS: Performed by: EMERGENCY MEDICINE

## 2021-02-19 PROCEDURE — 86703 HIV-1/HIV-2 1 RESULT ANTBDY: CPT

## 2021-02-19 PROCEDURE — 99285 PR EMERGENCY DEPT VISIT,LEVEL V: ICD-10-PCS | Mod: ,,, | Performed by: EMERGENCY MEDICINE

## 2021-02-19 PROCEDURE — 85025 COMPLETE CBC W/AUTO DIFF WBC: CPT

## 2021-02-19 PROCEDURE — 99285 EMERGENCY DEPT VISIT HI MDM: CPT | Mod: ,,, | Performed by: EMERGENCY MEDICINE

## 2021-02-19 PROCEDURE — 93010 EKG 12-LEAD: ICD-10-PCS | Mod: ,,, | Performed by: INTERNAL MEDICINE

## 2021-02-19 PROCEDURE — 25000003 PHARM REV CODE 250: Performed by: EMERGENCY MEDICINE

## 2021-02-19 PROCEDURE — 83690 ASSAY OF LIPASE: CPT

## 2021-02-19 PROCEDURE — 93010 ELECTROCARDIOGRAM REPORT: CPT | Mod: ,,, | Performed by: INTERNAL MEDICINE

## 2021-02-19 PROCEDURE — 93005 ELECTROCARDIOGRAM TRACING: CPT

## 2021-02-19 PROCEDURE — 96361 HYDRATE IV INFUSION ADD-ON: CPT

## 2021-02-19 PROCEDURE — 84484 ASSAY OF TROPONIN QUANT: CPT

## 2021-02-19 PROCEDURE — 96375 TX/PRO/DX INJ NEW DRUG ADDON: CPT

## 2021-02-19 PROCEDURE — 80053 COMPREHEN METABOLIC PANEL: CPT

## 2021-02-19 PROCEDURE — 99285 EMERGENCY DEPT VISIT HI MDM: CPT | Mod: 25

## 2021-02-19 PROCEDURE — 86803 HEPATITIS C AB TEST: CPT

## 2021-02-19 RX ORDER — ONDANSETRON 2 MG/ML
4 INJECTION INTRAMUSCULAR; INTRAVENOUS
Status: COMPLETED | OUTPATIENT
Start: 2021-02-19 | End: 2021-02-19

## 2021-02-19 RX ORDER — ACETAMINOPHEN 500 MG
1000 TABLET ORAL
Status: COMPLETED | OUTPATIENT
Start: 2021-02-19 | End: 2021-02-20

## 2021-02-19 RX ADMIN — SODIUM CHLORIDE 250 ML: 0.9 INJECTION, SOLUTION INTRAVENOUS at 10:02

## 2021-02-19 RX ADMIN — ONDANSETRON 4 MG: 2 INJECTION INTRAMUSCULAR; INTRAVENOUS at 09:02

## 2021-02-20 ENCOUNTER — NURSE TRIAGE (OUTPATIENT)
Dept: ADMINISTRATIVE | Facility: CLINIC | Age: 26
End: 2021-02-20

## 2021-02-20 VITALS
RESPIRATION RATE: 18 BRPM | HEART RATE: 94 BPM | TEMPERATURE: 99 F | BODY MASS INDEX: 38.93 KG/M2 | OXYGEN SATURATION: 95 % | SYSTOLIC BLOOD PRESSURE: 124 MMHG | DIASTOLIC BLOOD PRESSURE: 79 MMHG | WEIGHT: 233.69 LBS | HEIGHT: 65 IN

## 2021-02-20 PROBLEM — U07.1 COVID-19: Status: ACTIVE | Noted: 2021-02-20

## 2021-02-20 PROBLEM — E83.42 HYPOMAGNESEMIA: Status: ACTIVE | Noted: 2021-02-20

## 2021-02-20 PROBLEM — R00.0 TACHYCARDIA: Status: ACTIVE | Noted: 2021-02-20

## 2021-02-20 PROBLEM — Z94.1 HEART TRANSPLANT RECIPIENT: Status: ACTIVE | Noted: 2021-02-20

## 2021-02-20 LAB
25(OH)D3+25(OH)D2 SERPL-MCNC: 28 NG/ML (ref 30–96)
ALBUMIN SERPL BCP-MCNC: 2.8 G/DL (ref 3.5–5.2)
ALP SERPL-CCNC: 71 U/L (ref 55–135)
ALT SERPL W/O P-5'-P-CCNC: 30 U/L (ref 10–44)
ANION GAP SERPL CALC-SCNC: 14 MMOL/L (ref 8–16)
APTT BLDCRRT: 27.8 SEC (ref 21–32)
AST SERPL-CCNC: 46 U/L (ref 10–40)
BACTERIA #/AREA URNS AUTO: NORMAL /HPF
BASOPHILS # BLD AUTO: 0.04 K/UL (ref 0–0.2)
BASOPHILS NFR BLD: 0.4 % (ref 0–1.9)
BILIRUB SERPL-MCNC: 0.6 MG/DL (ref 0.1–1)
BILIRUB UR QL STRIP: NEGATIVE
BNP SERPL-MCNC: <10 PG/ML (ref 0–99)
BUN SERPL-MCNC: 19 MG/DL (ref 6–20)
CALCIUM SERPL-MCNC: 8.4 MG/DL (ref 8.7–10.5)
CHLORIDE SERPL-SCNC: 107 MMOL/L (ref 95–110)
CK SERPL-CCNC: 45 U/L (ref 20–200)
CLARITY UR REFRACT.AUTO: CLEAR
CO2 SERPL-SCNC: 17 MMOL/L (ref 23–29)
COLOR UR AUTO: YELLOW
CREAT SERPL-MCNC: 1.3 MG/DL (ref 0.5–1.4)
CRP SERPL-MCNC: 107.3 MG/L (ref 0–8.2)
D DIMER PPP IA.FEU-MCNC: 0.8 MG/L FEU
DIFFERENTIAL METHOD: ABNORMAL
EOSINOPHIL # BLD AUTO: 0 K/UL (ref 0–0.5)
EOSINOPHIL NFR BLD: 0.1 % (ref 0–8)
ERYTHROCYTE [DISTWIDTH] IN BLOOD BY AUTOMATED COUNT: 12.5 % (ref 11.5–14.5)
ERYTHROCYTE [SEDIMENTATION RATE] IN BLOOD BY WESTERGREN METHOD: 90 MM/HR (ref 0–23)
EST. GFR  (AFRICAN AMERICAN): >60 ML/MIN/1.73 M^2
EST. GFR  (NON AFRICAN AMERICAN): >60 ML/MIN/1.73 M^2
ESTIMATED AVG GLUCOSE: ABNORMAL MG/DL (ref 68–131)
FERRITIN SERPL-MCNC: 307 NG/ML (ref 20–300)
GLUCOSE SERPL-MCNC: 175 MG/DL (ref 70–110)
GLUCOSE UR QL STRIP: NEGATIVE
HBA1C MFR BLD: >14 % (ref 4–5.6)
HCT VFR BLD AUTO: 46.4 % (ref 40–54)
HGB BLD-MCNC: 15.3 G/DL (ref 14–18)
HGB UR QL STRIP: ABNORMAL
HYALINE CASTS UR QL AUTO: 0 /LPF
IMM GRANULOCYTES # BLD AUTO: 0.08 K/UL (ref 0–0.04)
IMM GRANULOCYTES NFR BLD AUTO: 0.7 % (ref 0–0.5)
INFLUENZA A, MOLECULAR: NEGATIVE
INFLUENZA B, MOLECULAR: NEGATIVE
INR PPP: 1 (ref 0.8–1.2)
KETONES UR QL STRIP: NEGATIVE
LACTATE SERPL-SCNC: 1.1 MMOL/L (ref 0.5–2.2)
LACTATE SERPL-SCNC: 1.4 MMOL/L (ref 0.5–2.2)
LDH SERPL L TO P-CCNC: 370 U/L (ref 110–260)
LEUKOCYTE ESTERASE UR QL STRIP: ABNORMAL
LYMPHOCYTES # BLD AUTO: 1.5 K/UL (ref 1–4.8)
LYMPHOCYTES NFR BLD: 14.1 % (ref 18–48)
MAGNESIUM SERPL-MCNC: 1.3 MG/DL (ref 1.6–2.6)
MCH RBC QN AUTO: 27.5 PG (ref 27–31)
MCHC RBC AUTO-ENTMCNC: 33 G/DL (ref 32–36)
MCV RBC AUTO: 83 FL (ref 82–98)
MICROSCOPIC COMMENT: NORMAL
MONOCYTES # BLD AUTO: 0.7 K/UL (ref 0.3–1)
MONOCYTES NFR BLD: 6.6 % (ref 4–15)
NEUTROPHILS # BLD AUTO: 8.5 K/UL (ref 1.8–7.7)
NEUTROPHILS NFR BLD: 78.1 % (ref 38–73)
NITRITE UR QL STRIP: NEGATIVE
NRBC BLD-RTO: 0 /100 WBC
PH UR STRIP: 6 [PH] (ref 5–8)
PHOSPHATE SERPL-MCNC: 3.8 MG/DL (ref 2.7–4.5)
PLATELET # BLD AUTO: 301 K/UL (ref 150–350)
PLATELET BLD QL SMEAR: ABNORMAL
PMV BLD AUTO: 9.2 FL (ref 9.2–12.9)
POCT GLUCOSE: 141 MG/DL (ref 70–110)
POCT GLUCOSE: 165 MG/DL (ref 70–110)
POCT GLUCOSE: 191 MG/DL (ref 70–110)
POCT GLUCOSE: 324 MG/DL (ref 70–110)
POTASSIUM SERPL-SCNC: 3.3 MMOL/L (ref 3.5–5.1)
PROCALCITONIN SERPL IA-MCNC: 0.07 NG/ML
PROT SERPL-MCNC: 7.2 G/DL (ref 6–8.4)
PROT UR QL STRIP: ABNORMAL
PROTHROMBIN TIME: 11 SEC (ref 9–12.5)
RBC # BLD AUTO: 5.57 M/UL (ref 4.6–6.2)
RBC #/AREA URNS AUTO: 1 /HPF (ref 0–4)
SODIUM SERPL-SCNC: 138 MMOL/L (ref 136–145)
SP GR UR STRIP: 1.01 (ref 1–1.03)
SPECIMEN SOURCE: NORMAL
SQUAMOUS #/AREA URNS AUTO: 0 /HPF
TACROLIMUS BLD-MCNC: 9.4 NG/ML (ref 5–15)
URN SPEC COLLECT METH UR: ABNORMAL
WBC # BLD AUTO: 11.34 K/UL (ref 3.9–12.7)
WBC #/AREA URNS AUTO: 3 /HPF (ref 0–5)

## 2021-02-20 PROCEDURE — 81001 URINALYSIS AUTO W/SCOPE: CPT

## 2021-02-20 PROCEDURE — 63600175 PHARM REV CODE 636 W HCPCS: Performed by: PEDIATRICS

## 2021-02-20 PROCEDURE — 84100 ASSAY OF PHOSPHORUS: CPT

## 2021-02-20 PROCEDURE — 96376 TX/PRO/DX INJ SAME DRUG ADON: CPT

## 2021-02-20 PROCEDURE — 83880 ASSAY OF NATRIURETIC PEPTIDE: CPT

## 2021-02-20 PROCEDURE — 85652 RBC SED RATE AUTOMATED: CPT

## 2021-02-20 PROCEDURE — 85730 THROMBOPLASTIN TIME PARTIAL: CPT

## 2021-02-20 PROCEDURE — 25000003 PHARM REV CODE 250: Performed by: EMERGENCY MEDICINE

## 2021-02-20 PROCEDURE — 80053 COMPREHEN METABOLIC PANEL: CPT

## 2021-02-20 PROCEDURE — 83735 ASSAY OF MAGNESIUM: CPT

## 2021-02-20 PROCEDURE — G0378 HOSPITAL OBSERVATION PER HR: HCPCS

## 2021-02-20 PROCEDURE — 87040 BLOOD CULTURE FOR BACTERIA: CPT | Mod: 59

## 2021-02-20 PROCEDURE — 85379 FIBRIN DEGRADATION QUANT: CPT

## 2021-02-20 PROCEDURE — 83605 ASSAY OF LACTIC ACID: CPT

## 2021-02-20 PROCEDURE — 85610 PROTHROMBIN TIME: CPT

## 2021-02-20 PROCEDURE — 99220 PR INITIAL OBSERVATION CARE,LEVL III: ICD-10-PCS | Mod: ,,, | Performed by: PEDIATRICS

## 2021-02-20 PROCEDURE — 80197 ASSAY OF TACROLIMUS: CPT

## 2021-02-20 PROCEDURE — 82550 ASSAY OF CK (CPK): CPT

## 2021-02-20 PROCEDURE — 96375 TX/PRO/DX INJ NEW DRUG ADDON: CPT

## 2021-02-20 PROCEDURE — 83036 HEMOGLOBIN GLYCOSYLATED A1C: CPT

## 2021-02-20 PROCEDURE — 82728 ASSAY OF FERRITIN: CPT

## 2021-02-20 PROCEDURE — 83605 ASSAY OF LACTIC ACID: CPT | Mod: 91

## 2021-02-20 PROCEDURE — 85025 COMPLETE CBC W/AUTO DIFF WBC: CPT

## 2021-02-20 PROCEDURE — 94761 N-INVAS EAR/PLS OXIMETRY MLT: CPT

## 2021-02-20 PROCEDURE — 96365 THER/PROPH/DIAG IV INF INIT: CPT

## 2021-02-20 PROCEDURE — 82306 VITAMIN D 25 HYDROXY: CPT

## 2021-02-20 PROCEDURE — 87502 INFLUENZA DNA AMP PROBE: CPT

## 2021-02-20 PROCEDURE — 86140 C-REACTIVE PROTEIN: CPT

## 2021-02-20 PROCEDURE — 84145 PROCALCITONIN (PCT): CPT

## 2021-02-20 PROCEDURE — 25000003 PHARM REV CODE 250: Performed by: PEDIATRICS

## 2021-02-20 PROCEDURE — 96372 THER/PROPH/DIAG INJ SC/IM: CPT | Mod: 59

## 2021-02-20 PROCEDURE — 99220 PR INITIAL OBSERVATION CARE,LEVL III: CPT | Mod: ,,, | Performed by: PEDIATRICS

## 2021-02-20 PROCEDURE — 83615 LACTATE (LD) (LDH) ENZYME: CPT

## 2021-02-20 PROCEDURE — 63600175 PHARM REV CODE 636 W HCPCS: Performed by: EMERGENCY MEDICINE

## 2021-02-20 RX ORDER — CEFDINIR 300 MG/1
300 CAPSULE ORAL 2 TIMES DAILY
Qty: 14 CAPSULE | Refills: 0 | Status: SHIPPED | OUTPATIENT
Start: 2021-02-20 | End: 2021-02-20

## 2021-02-20 RX ORDER — PANTOPRAZOLE SODIUM 40 MG/1
40 TABLET, DELAYED RELEASE ORAL DAILY
Status: DISCONTINUED | OUTPATIENT
Start: 2021-02-20 | End: 2021-02-20 | Stop reason: HOSPADM

## 2021-02-20 RX ORDER — CEFDINIR 300 MG/1
300 CAPSULE ORAL 2 TIMES DAILY
Qty: 14 CAPSULE | Refills: 0 | Status: SHIPPED | OUTPATIENT
Start: 2021-02-20 | End: 2021-02-27

## 2021-02-20 RX ORDER — TOPIRAMATE 25 MG/1
25 TABLET ORAL NIGHTLY
Status: DISCONTINUED | OUTPATIENT
Start: 2021-02-20 | End: 2021-02-20 | Stop reason: HOSPADM

## 2021-02-20 RX ORDER — GLUCAGON 1 MG
1 KIT INJECTION
Status: DISCONTINUED | OUTPATIENT
Start: 2021-02-20 | End: 2021-02-20 | Stop reason: HOSPADM

## 2021-02-20 RX ORDER — INSULIN ASPART 100 [IU]/ML
1-10 INJECTION, SOLUTION INTRAVENOUS; SUBCUTANEOUS
Status: DISCONTINUED | OUTPATIENT
Start: 2021-02-20 | End: 2021-02-20 | Stop reason: HOSPADM

## 2021-02-20 RX ORDER — LISINOPRIL 5 MG/1
5 TABLET ORAL DAILY
Status: DISCONTINUED | OUTPATIENT
Start: 2021-02-20 | End: 2021-02-20 | Stop reason: HOSPADM

## 2021-02-20 RX ORDER — DEXAMETHASONE 6 MG/1
6 TABLET ORAL DAILY
Qty: 9 TABLET | Refills: 0 | Status: SHIPPED | OUTPATIENT
Start: 2021-02-21 | End: 2021-03-02

## 2021-02-20 RX ORDER — HEPARIN SODIUM 5000 [USP'U]/ML
7500 INJECTION, SOLUTION INTRAVENOUS; SUBCUTANEOUS EVERY 8 HOURS
Status: DISCONTINUED | OUTPATIENT
Start: 2021-02-20 | End: 2021-02-20 | Stop reason: HOSPADM

## 2021-02-20 RX ORDER — DEXAMETHASONE 6 MG/1
6 TABLET ORAL DAILY
Qty: 9 TABLET | Refills: 0 | Status: SHIPPED | OUTPATIENT
Start: 2021-02-21 | End: 2021-02-20

## 2021-02-20 RX ORDER — MYCOPHENOLIC ACID 180 MG/1
720 TABLET, DELAYED RELEASE ORAL 2 TIMES DAILY
Status: DISCONTINUED | OUTPATIENT
Start: 2021-02-20 | End: 2021-02-20 | Stop reason: HOSPADM

## 2021-02-20 RX ORDER — IBUPROFEN 200 MG
24 TABLET ORAL
Status: DISCONTINUED | OUTPATIENT
Start: 2021-02-20 | End: 2021-02-20 | Stop reason: HOSPADM

## 2021-02-20 RX ORDER — PRAVASTATIN SODIUM 10 MG/1
20 TABLET ORAL NIGHTLY
Status: DISCONTINUED | OUTPATIENT
Start: 2021-02-20 | End: 2021-02-20 | Stop reason: HOSPADM

## 2021-02-20 RX ORDER — DILTIAZEM HYDROCHLORIDE 180 MG/1
180 CAPSULE, COATED, EXTENDED RELEASE ORAL DAILY
Refills: 11 | Status: DISCONTINUED | OUTPATIENT
Start: 2021-02-20 | End: 2021-02-20 | Stop reason: HOSPADM

## 2021-02-20 RX ORDER — LANOLIN ALCOHOL/MO/W.PET/CERES
400 CREAM (GRAM) TOPICAL DAILY
Qty: 10 TABLET | Refills: 0 | COMMUNITY
Start: 2021-02-20 | End: 2021-02-20

## 2021-02-20 RX ORDER — SODIUM CHLORIDE 0.9 % (FLUSH) 0.9 %
10 SYRINGE (ML) INJECTION
Status: DISCONTINUED | OUTPATIENT
Start: 2021-02-20 | End: 2021-02-20 | Stop reason: HOSPADM

## 2021-02-20 RX ORDER — IBUPROFEN 200 MG
16 TABLET ORAL
Status: DISCONTINUED | OUTPATIENT
Start: 2021-02-20 | End: 2021-02-20 | Stop reason: HOSPADM

## 2021-02-20 RX ORDER — ACETAMINOPHEN 325 MG/1
650 TABLET ORAL EVERY 4 HOURS PRN
Status: DISCONTINUED | OUTPATIENT
Start: 2021-02-20 | End: 2021-02-20 | Stop reason: HOSPADM

## 2021-02-20 RX ORDER — ONDANSETRON 2 MG/ML
8 INJECTION INTRAMUSCULAR; INTRAVENOUS
Status: DISCONTINUED | OUTPATIENT
Start: 2021-02-20 | End: 2021-02-20 | Stop reason: HOSPADM

## 2021-02-20 RX ORDER — CHOLECALCIFEROL (VITAMIN D3) 25 MCG
1000 TABLET ORAL DAILY
Status: DISCONTINUED | OUTPATIENT
Start: 2021-02-20 | End: 2021-02-20 | Stop reason: HOSPADM

## 2021-02-20 RX ORDER — LANOLIN ALCOHOL/MO/W.PET/CERES
400 CREAM (GRAM) TOPICAL DAILY
Qty: 10 TABLET | Refills: 0 | Status: SHIPPED | OUTPATIENT
Start: 2021-02-20 | End: 2021-11-19 | Stop reason: ALTCHOICE

## 2021-02-20 RX ORDER — ASCORBIC ACID 500 MG
500 TABLET ORAL 2 TIMES DAILY
Status: DISCONTINUED | OUTPATIENT
Start: 2021-02-20 | End: 2021-02-20 | Stop reason: HOSPADM

## 2021-02-20 RX ORDER — TALC
6 POWDER (GRAM) TOPICAL NIGHTLY PRN
Status: DISCONTINUED | OUTPATIENT
Start: 2021-02-20 | End: 2021-02-20 | Stop reason: HOSPADM

## 2021-02-20 RX ADMIN — THERA TABS 1 TABLET: TAB at 08:02

## 2021-02-20 RX ADMIN — DEXAMETHASONE 6 MG: 4 TABLET ORAL at 08:02

## 2021-02-20 RX ADMIN — PRAVASTATIN SODIUM 20 MG: 10 TABLET ORAL at 03:02

## 2021-02-20 RX ADMIN — INSULIN ASPART 8 UNITS: 100 INJECTION, SOLUTION INTRAVENOUS; SUBCUTANEOUS at 03:02

## 2021-02-20 RX ADMIN — HEPARIN SODIUM 7500 UNITS: 5000 INJECTION INTRAVENOUS; SUBCUTANEOUS at 06:02

## 2021-02-20 RX ADMIN — ONDANSETRON 8 MG: 2 INJECTION INTRAMUSCULAR; INTRAVENOUS at 03:02

## 2021-02-20 RX ADMIN — TACROLIMUS 6 MG: 5 CAPSULE ORAL at 08:02

## 2021-02-20 RX ADMIN — Medication 1000 UNITS: at 08:02

## 2021-02-20 RX ADMIN — PIPERACILLIN AND TAZOBACTAM 4.5 G: 4; .5 INJECTION, POWDER, LYOPHILIZED, FOR SOLUTION INTRAVENOUS; PARENTERAL at 12:02

## 2021-02-20 RX ADMIN — PIPERACILLIN AND TAZOBACTAM 4.5 G: 4; .5 INJECTION, POWDER, LYOPHILIZED, FOR SOLUTION INTRAVENOUS; PARENTERAL at 01:02

## 2021-02-20 RX ADMIN — ACETAMINOPHEN 1000 MG: 500 TABLET ORAL at 12:02

## 2021-02-20 RX ADMIN — PANTOPRAZOLE SODIUM 40 MG: 40 TABLET, DELAYED RELEASE ORAL at 08:02

## 2021-02-20 RX ADMIN — OXYCODONE HYDROCHLORIDE AND ACETAMINOPHEN 500 MG: 500 TABLET ORAL at 08:02

## 2021-02-20 RX ADMIN — ACETAMINOPHEN 650 MG: 325 TABLET ORAL at 08:02

## 2021-02-20 RX ADMIN — MYCOPHENILIC ACID 720 MG: 180 TABLET, DELAYED RELEASE ORAL at 08:02

## 2021-02-20 RX ADMIN — DILTIAZEM HYDROCHLORIDE 180 MG: 180 CAPSULE, COATED, EXTENDED RELEASE ORAL at 08:02

## 2021-02-20 RX ADMIN — ONDANSETRON 8 MG: 2 INJECTION INTRAMUSCULAR; INTRAVENOUS at 12:02

## 2021-02-20 RX ADMIN — HEPARIN SODIUM 7500 UNITS: 5000 INJECTION INTRAVENOUS; SUBCUTANEOUS at 01:02

## 2021-02-20 RX ADMIN — LISINOPRIL 5 MG: 5 TABLET ORAL at 08:02

## 2021-02-20 RX ADMIN — VANCOMYCIN HYDROCHLORIDE 2250 MG: 1 INJECTION, POWDER, LYOPHILIZED, FOR SOLUTION INTRAVENOUS at 02:02

## 2021-02-22 ENCOUNTER — TELEPHONE (OUTPATIENT)
Dept: TRANSPLANT | Facility: CLINIC | Age: 26
End: 2021-02-22

## 2021-02-22 ENCOUNTER — PATIENT MESSAGE (OUTPATIENT)
Dept: INTERNAL MEDICINE | Facility: CLINIC | Age: 26
End: 2021-02-22

## 2021-02-22 LAB
HCV AB SERPL QL IA: NEGATIVE
HIV 1+2 AB+HIV1 P24 AG SERPL QL IA: NEGATIVE

## 2021-02-23 LAB
BACTERIA BLD CULT: ABNORMAL

## 2021-02-25 LAB — BACTERIA BLD CULT: NORMAL

## 2021-03-23 ENCOUNTER — TELEPHONE (OUTPATIENT)
Dept: TRANSPLANT | Facility: CLINIC | Age: 26
End: 2021-03-23

## 2021-04-15 DIAGNOSIS — K21.9 GASTROESOPHAGEAL REFLUX DISEASE: ICD-10-CM

## 2021-04-15 DIAGNOSIS — Z94.1 STATUS POST HEART TRANSPLANT: Primary | ICD-10-CM

## 2021-04-15 DIAGNOSIS — Z79.899 ENCOUNTER FOR LONG-TERM (CURRENT) USE OF MEDICATIONS: ICD-10-CM

## 2021-04-15 DIAGNOSIS — T86.20 COMPLICATION OF HEART TRANSPLANT, UNSPECIFIED COMPLICATION: ICD-10-CM

## 2021-04-15 DIAGNOSIS — Z94.1 HEART TRANSPLANTED: ICD-10-CM

## 2021-04-15 DIAGNOSIS — Z94.1 HEART REPLACED BY TRANSPLANT: ICD-10-CM

## 2021-04-15 DIAGNOSIS — Z94.1 STATUS POST HEART TRANSPLANT: ICD-10-CM

## 2021-04-15 DIAGNOSIS — F41.9 ANXIETY: ICD-10-CM

## 2021-04-15 DIAGNOSIS — D84.9 IMMUNOSUPPRESSION: ICD-10-CM

## 2021-04-15 RX ORDER — TOPIRAMATE 25 MG/1
25 TABLET ORAL NIGHTLY
Qty: 30 TABLET | Refills: 11 | Status: SHIPPED | OUTPATIENT
Start: 2021-04-15 | End: 2022-07-19

## 2021-04-15 RX ORDER — DICYCLOMINE HYDROCHLORIDE 10 MG/1
10 CAPSULE ORAL 2 TIMES DAILY
Qty: 60 CAPSULE | Refills: 3 | Status: SHIPPED | OUTPATIENT
Start: 2021-04-15 | End: 2021-08-23

## 2021-04-15 RX ORDER — TACROLIMUS 1 MG/1
CAPSULE, GELATIN COATED ORAL
Qty: 270 CAPSULE | Refills: 3 | Status: SHIPPED | OUTPATIENT
Start: 2021-04-15 | End: 2021-04-16 | Stop reason: SDUPTHER

## 2021-04-15 RX ORDER — TACROLIMUS 5 MG/1
5 CAPSULE ORAL EVERY 12 HOURS
Qty: 60 CAPSULE | Refills: 11 | Status: SHIPPED | OUTPATIENT
Start: 2021-04-15 | End: 2021-04-16 | Stop reason: SDUPTHER

## 2021-04-15 RX ORDER — ESCITALOPRAM OXALATE 10 MG/1
10 TABLET ORAL DAILY
Qty: 30 TABLET | Refills: 11 | Status: SHIPPED | OUTPATIENT
Start: 2021-04-15 | End: 2022-06-21

## 2021-04-16 DIAGNOSIS — Z94.1 HEART REPLACED BY TRANSPLANT: ICD-10-CM

## 2021-04-16 RX ORDER — TACROLIMUS 5 MG/1
5 CAPSULE ORAL EVERY 12 HOURS
Qty: 60 CAPSULE | Refills: 11 | Status: SHIPPED | OUTPATIENT
Start: 2021-04-16 | End: 2021-09-15

## 2021-04-16 RX ORDER — TACROLIMUS 1 MG/1
CAPSULE, GELATIN COATED ORAL
Qty: 270 CAPSULE | Refills: 3 | Status: SHIPPED | OUTPATIENT
Start: 2021-04-16 | End: 2022-05-18

## 2021-04-29 ENCOUNTER — PATIENT MESSAGE (OUTPATIENT)
Dept: INTERNAL MEDICINE | Facility: CLINIC | Age: 26
End: 2021-04-29

## 2021-04-29 ENCOUNTER — LAB VISIT (OUTPATIENT)
Dept: LAB | Facility: HOSPITAL | Age: 26
End: 2021-04-29
Payer: MEDICAID

## 2021-04-29 DIAGNOSIS — Z94.1 STATUS POST HEART TRANSPLANT: ICD-10-CM

## 2021-04-29 LAB
ALBUMIN SERPL BCP-MCNC: 3.6 G/DL (ref 3.5–5.2)
ALP SERPL-CCNC: 84 U/L (ref 55–135)
ALT SERPL W/O P-5'-P-CCNC: 19 U/L (ref 10–44)
ANION GAP SERPL CALC-SCNC: 10 MMOL/L (ref 8–16)
AST SERPL-CCNC: 15 U/L (ref 10–40)
BASOPHILS # BLD AUTO: 0.06 K/UL (ref 0–0.2)
BASOPHILS NFR BLD: 0.5 % (ref 0–1.9)
BILIRUB SERPL-MCNC: 0.6 MG/DL (ref 0.1–1)
BNP SERPL-MCNC: 27 PG/ML (ref 0–99)
BUN SERPL-MCNC: 17 MG/DL (ref 6–20)
CALCIUM SERPL-MCNC: 8.7 MG/DL (ref 8.7–10.5)
CHLORIDE SERPL-SCNC: 106 MMOL/L (ref 95–110)
CHOLEST SERPL-MCNC: 110 MG/DL (ref 120–199)
CHOLEST/HDLC SERPL: 3.7 {RATIO} (ref 2–5)
CO2 SERPL-SCNC: 21 MMOL/L (ref 23–29)
CREAT SERPL-MCNC: 1 MG/DL (ref 0.5–1.4)
DIFFERENTIAL METHOD: ABNORMAL
EOSINOPHIL # BLD AUTO: 0.2 K/UL (ref 0–0.5)
EOSINOPHIL NFR BLD: 1.3 % (ref 0–8)
ERYTHROCYTE [DISTWIDTH] IN BLOOD BY AUTOMATED COUNT: 13.7 % (ref 11.5–14.5)
EST. GFR  (AFRICAN AMERICAN): >60 ML/MIN/1.73 M^2
EST. GFR  (NON AFRICAN AMERICAN): >60 ML/MIN/1.73 M^2
ESTIMATED AVG GLUCOSE: ABNORMAL MG/DL (ref 68–131)
GLUCOSE SERPL-MCNC: 137 MG/DL (ref 70–110)
HBA1C MFR BLD: >14 % (ref 4–5.6)
HCT VFR BLD AUTO: 40.4 % (ref 40–54)
HDLC SERPL-MCNC: 30 MG/DL (ref 40–75)
HDLC SERPL: 27.3 % (ref 20–50)
HGB BLD-MCNC: 13.3 G/DL (ref 14–18)
IMM GRANULOCYTES # BLD AUTO: 0.03 K/UL (ref 0–0.04)
IMM GRANULOCYTES NFR BLD AUTO: 0.3 % (ref 0–0.5)
LDLC SERPL CALC-MCNC: 44.2 MG/DL (ref 63–159)
LYMPHOCYTES # BLD AUTO: 4 K/UL (ref 1–4.8)
LYMPHOCYTES NFR BLD: 34.6 % (ref 18–48)
MAGNESIUM SERPL-MCNC: 1.5 MG/DL (ref 1.6–2.6)
MCH RBC QN AUTO: 27.9 PG (ref 27–31)
MCHC RBC AUTO-ENTMCNC: 32.9 G/DL (ref 32–36)
MCV RBC AUTO: 85 FL (ref 82–98)
MONOCYTES # BLD AUTO: 0.9 K/UL (ref 0.3–1)
MONOCYTES NFR BLD: 7.5 % (ref 4–15)
NEUTROPHILS # BLD AUTO: 6.4 K/UL (ref 1.8–7.7)
NEUTROPHILS NFR BLD: 55.8 % (ref 38–73)
NONHDLC SERPL-MCNC: 80 MG/DL
NRBC BLD-RTO: 0 /100 WBC
PLATELET # BLD AUTO: 334 K/UL (ref 150–450)
PMV BLD AUTO: 9.7 FL (ref 9.2–12.9)
POTASSIUM SERPL-SCNC: 3.6 MMOL/L (ref 3.5–5.1)
PROT SERPL-MCNC: 7.3 G/DL (ref 6–8.4)
RBC # BLD AUTO: 4.76 M/UL (ref 4.6–6.2)
SODIUM SERPL-SCNC: 137 MMOL/L (ref 136–145)
TACROLIMUS BLD-MCNC: 6.9 NG/ML (ref 5–15)
TRIGL SERPL-MCNC: 179 MG/DL (ref 30–150)
WBC # BLD AUTO: 11.49 K/UL (ref 3.9–12.7)

## 2021-04-29 PROCEDURE — 80197 ASSAY OF TACROLIMUS: CPT | Performed by: INTERNAL MEDICINE

## 2021-04-29 PROCEDURE — 80053 COMPREHEN METABOLIC PANEL: CPT | Performed by: INTERNAL MEDICINE

## 2021-04-29 PROCEDURE — 86832 HLA CLASS I HIGH DEFIN QUAL: CPT | Mod: 59,PO | Performed by: INTERNAL MEDICINE

## 2021-04-29 PROCEDURE — 86833 HLA CLASS II HIGH DEFIN QUAL: CPT | Mod: 59,PO | Performed by: INTERNAL MEDICINE

## 2021-04-29 PROCEDURE — 86977 RBC SERUM PRETX INCUBJ/INHIB: CPT | Mod: 59,PO | Performed by: INTERNAL MEDICINE

## 2021-04-29 PROCEDURE — 86832 HLA CLASS I HIGH DEFIN QUAL: CPT | Mod: PO | Performed by: INTERNAL MEDICINE

## 2021-04-29 PROCEDURE — 83735 ASSAY OF MAGNESIUM: CPT | Performed by: INTERNAL MEDICINE

## 2021-04-29 PROCEDURE — 83036 HEMOGLOBIN GLYCOSYLATED A1C: CPT | Performed by: INTERNAL MEDICINE

## 2021-04-29 PROCEDURE — 85025 COMPLETE CBC W/AUTO DIFF WBC: CPT | Performed by: INTERNAL MEDICINE

## 2021-04-29 PROCEDURE — 36415 COLL VENOUS BLD VENIPUNCTURE: CPT | Performed by: INTERNAL MEDICINE

## 2021-04-29 PROCEDURE — 80061 LIPID PANEL: CPT | Performed by: INTERNAL MEDICINE

## 2021-04-29 PROCEDURE — 83880 ASSAY OF NATRIURETIC PEPTIDE: CPT | Performed by: INTERNAL MEDICINE

## 2021-05-04 ENCOUNTER — PATIENT MESSAGE (OUTPATIENT)
Dept: INTERNAL MEDICINE | Facility: CLINIC | Age: 26
End: 2021-05-04

## 2021-05-05 RX ORDER — CALCIUM CARB/VITAMIN D3/VIT K1 500-100-40
TABLET,CHEWABLE ORAL
Qty: 300 EACH | Refills: 3 | Status: SHIPPED | OUTPATIENT
Start: 2021-05-05 | End: 2022-07-24 | Stop reason: SDUPTHER

## 2021-05-05 RX ORDER — PEN NEEDLE, DIABETIC 30 GX3/16"
NEEDLE, DISPOSABLE MISCELLANEOUS
Qty: 400 EACH | Refills: 3 | Status: SHIPPED | OUTPATIENT
Start: 2021-05-05 | End: 2023-01-13 | Stop reason: SDUPTHER

## 2021-05-07 LAB
C1Q1A TESTING DATE: NORMAL
C1Q2A TESTING DATE: NORMAL
CLASS I ANTIBODY COMMENTS - LUMINEX: NORMAL
CLASS II ANTIBODY COMMENTS - LUMINEX: NORMAL
HC1QA TESTING DATE: NORMAL
SERUM COLLECTION DT - LUMINEX CLASS I: NORMAL
SERUM COLLECTION DT - LUMINEX CLASS II: NORMAL

## 2021-05-11 RX ORDER — METFORMIN HYDROCHLORIDE 500 MG/1
TABLET ORAL
Qty: 180 TABLET | Refills: 3 | Status: SHIPPED | OUTPATIENT
Start: 2021-05-11 | End: 2022-05-16

## 2021-05-24 ENCOUNTER — PATIENT MESSAGE (OUTPATIENT)
Dept: INTERNAL MEDICINE | Facility: CLINIC | Age: 26
End: 2021-05-24

## 2021-05-24 DIAGNOSIS — Z79.4 TYPE 2 DIABETES MELLITUS WITH HYPERGLYCEMIA, WITH LONG-TERM CURRENT USE OF INSULIN: Primary | ICD-10-CM

## 2021-05-24 DIAGNOSIS — E11.65 TYPE 2 DIABETES MELLITUS WITH HYPERGLYCEMIA, WITH LONG-TERM CURRENT USE OF INSULIN: Primary | ICD-10-CM

## 2021-05-24 RX ORDER — BLOOD-GLUCOSE CONTROL, NORMAL
EACH MISCELLANEOUS
Qty: 400 EACH | Refills: 3 | Status: SHIPPED | OUTPATIENT
Start: 2021-05-24 | End: 2023-01-13

## 2021-05-24 RX ORDER — BLOOD-GLUCOSE CONTROL, NORMAL
EACH MISCELLANEOUS
Qty: 400 EACH | Refills: 3 | Status: SHIPPED | OUTPATIENT
Start: 2021-05-24 | End: 2021-05-24 | Stop reason: SDUPTHER

## 2021-05-25 ENCOUNTER — TELEPHONE (OUTPATIENT)
Dept: INTERNAL MEDICINE | Facility: CLINIC | Age: 26
End: 2021-05-25

## 2021-06-11 ENCOUNTER — TELEPHONE (OUTPATIENT)
Dept: INTERNAL MEDICINE | Facility: CLINIC | Age: 26
End: 2021-06-11

## 2021-06-22 ENCOUNTER — TELEPHONE (OUTPATIENT)
Dept: TRANSPLANT | Facility: CLINIC | Age: 26
End: 2021-06-22

## 2021-06-29 ENCOUNTER — TELEPHONE (OUTPATIENT)
Dept: INTERNAL MEDICINE | Facility: CLINIC | Age: 26
End: 2021-06-29

## 2021-07-21 DIAGNOSIS — Z94.1 STATUS POST HEART TRANSPLANT: Primary | ICD-10-CM

## 2021-07-22 ENCOUNTER — TELEPHONE (OUTPATIENT)
Dept: TRANSPLANT | Facility: CLINIC | Age: 26
End: 2021-07-22

## 2021-08-23 ENCOUNTER — IMMUNIZATION (OUTPATIENT)
Dept: INTERNAL MEDICINE | Facility: CLINIC | Age: 26
End: 2021-08-23
Payer: MEDICAID

## 2021-08-23 DIAGNOSIS — Z23 NEED FOR VACCINATION: Primary | ICD-10-CM

## 2021-08-23 PROCEDURE — 91300 COVID-19, MRNA, LNP-S, PF, 30 MCG/0.3 ML DOSE VACCINE: ICD-10-PCS | Mod: ,,, | Performed by: INTERNAL MEDICINE

## 2021-08-23 PROCEDURE — 0003A COVID-19, MRNA, LNP-S, PF, 30 MCG/0.3 ML DOSE VACCINE: ICD-10-PCS | Mod: CV19,,, | Performed by: INTERNAL MEDICINE

## 2021-08-23 PROCEDURE — 0003A COVID-19, MRNA, LNP-S, PF, 30 MCG/0.3 ML DOSE VACCINE: CPT | Mod: CV19,,, | Performed by: INTERNAL MEDICINE

## 2021-08-23 PROCEDURE — 91300 COVID-19, MRNA, LNP-S, PF, 30 MCG/0.3 ML DOSE VACCINE: CPT | Mod: ,,, | Performed by: INTERNAL MEDICINE

## 2021-09-20 ENCOUNTER — TELEPHONE (OUTPATIENT)
Dept: TRANSPLANT | Facility: CLINIC | Age: 26
End: 2021-09-20

## 2021-10-18 ENCOUNTER — PATIENT MESSAGE (OUTPATIENT)
Dept: TRANSPLANT | Facility: CLINIC | Age: 26
End: 2021-10-18
Payer: MEDICAID

## 2021-11-17 ENCOUNTER — TELEPHONE (OUTPATIENT)
Dept: TRANSPLANT | Facility: CLINIC | Age: 26
End: 2021-11-17
Payer: MEDICAID

## 2021-11-19 ENCOUNTER — TELEPHONE (OUTPATIENT)
Dept: TRANSPLANT | Facility: CLINIC | Age: 26
End: 2021-11-19
Payer: MEDICAID

## 2021-12-17 ENCOUNTER — TELEPHONE (OUTPATIENT)
Dept: TRANSPLANT | Facility: CLINIC | Age: 26
End: 2021-12-17
Payer: MEDICAID

## 2022-01-03 DIAGNOSIS — Z94.1 STATUS POST HEART TRANSPLANT: Primary | ICD-10-CM

## 2022-01-09 ENCOUNTER — LAB VISIT (OUTPATIENT)
Dept: PRIMARY CARE CLINIC | Facility: OTHER | Age: 27
End: 2022-01-09
Attending: INTERNAL MEDICINE
Payer: MEDICAID

## 2022-01-09 ENCOUNTER — PATIENT MESSAGE (OUTPATIENT)
Dept: ADMINISTRATIVE | Facility: OTHER | Age: 27
End: 2022-01-09
Payer: MEDICAID

## 2022-01-09 DIAGNOSIS — Z20.822 ENCOUNTER FOR LABORATORY TESTING FOR COVID-19 VIRUS: ICD-10-CM

## 2022-01-09 PROCEDURE — U0003 INFECTIOUS AGENT DETECTION BY NUCLEIC ACID (DNA OR RNA); SEVERE ACUTE RESPIRATORY SYNDROME CORONAVIRUS 2 (SARS-COV-2) (CORONAVIRUS DISEASE [COVID-19]), AMPLIFIED PROBE TECHNIQUE, MAKING USE OF HIGH THROUGHPUT TECHNOLOGIES AS DESCRIBED BY CMS-2020-01-R: HCPCS | Performed by: INTERNAL MEDICINE

## 2022-01-10 ENCOUNTER — TELEPHONE (OUTPATIENT)
Dept: TRANSPLANT | Facility: CLINIC | Age: 27
End: 2022-01-10
Payer: MEDICAID

## 2022-01-10 ENCOUNTER — OFFICE VISIT (OUTPATIENT)
Dept: URGENT CARE | Facility: CLINIC | Age: 27
End: 2022-01-10
Payer: MEDICAID

## 2022-01-10 VITALS
RESPIRATION RATE: 18 BRPM | HEART RATE: 99 BPM | TEMPERATURE: 98 F | HEIGHT: 65 IN | OXYGEN SATURATION: 98 % | BODY MASS INDEX: 38.82 KG/M2 | DIASTOLIC BLOOD PRESSURE: 85 MMHG | SYSTOLIC BLOOD PRESSURE: 145 MMHG | WEIGHT: 233 LBS

## 2022-01-10 DIAGNOSIS — J02.9 SORE THROAT: ICD-10-CM

## 2022-01-10 DIAGNOSIS — Z20.822 ENCOUNTER FOR LABORATORY TESTING FOR COVID-19 VIRUS: Primary | ICD-10-CM

## 2022-01-10 DIAGNOSIS — J06.9 VIRAL URI: ICD-10-CM

## 2022-01-10 DIAGNOSIS — J03.90 TONSILLITIS: ICD-10-CM

## 2022-01-10 LAB
CTP QC/QA: YES
HETEROPH AB SER QL: NEGATIVE
MOLECULAR STREP A: NEGATIVE
POC MOLECULAR INFLUENZA A AGN: NEGATIVE
POC MOLECULAR INFLUENZA B AGN: NEGATIVE
SARS-COV-2 RDRP RESP QL NAA+PROBE: NEGATIVE

## 2022-01-10 PROCEDURE — U0002 COVID-19 LAB TEST NON-CDC: HCPCS | Mod: QW,S$GLB,,

## 2022-01-10 PROCEDURE — 86308 HETEROPHILE ANTIBODY SCREEN: CPT | Mod: QW,S$GLB,,

## 2022-01-10 PROCEDURE — 87651 POCT STREP A MOLECULAR: ICD-10-PCS | Mod: QW,S$GLB,,

## 2022-01-10 PROCEDURE — 99213 PR OFFICE/OUTPT VISIT, EST, LEVL III, 20-29 MIN: ICD-10-PCS | Mod: S$GLB,,,

## 2022-01-10 PROCEDURE — U0002: ICD-10-PCS | Mod: QW,S$GLB,,

## 2022-01-10 PROCEDURE — 99213 OFFICE O/P EST LOW 20 MIN: CPT | Mod: S$GLB,,,

## 2022-01-10 PROCEDURE — 87502 POCT INFLUENZA A/B MOLECULAR: ICD-10-PCS | Mod: QW,S$GLB,,

## 2022-01-10 PROCEDURE — 87651 STREP A DNA AMP PROBE: CPT | Mod: QW,S$GLB,,

## 2022-01-10 PROCEDURE — 87502 INFLUENZA DNA AMP PROBE: CPT | Mod: QW,S$GLB,,

## 2022-01-10 PROCEDURE — 86308 POCT INFECTIOUS MONONUCLEOSIS: ICD-10-PCS | Mod: QW,S$GLB,,

## 2022-01-10 RX ORDER — AMOXICILLIN AND CLAVULANATE POTASSIUM 875; 125 MG/1; MG/1
1 TABLET, FILM COATED ORAL EVERY 12 HOURS
Qty: 14 TABLET | Refills: 0 | Status: SHIPPED | OUTPATIENT
Start: 2022-01-10 | End: 2022-01-17

## 2022-01-10 NOTE — TELEPHONE ENCOUNTER
Pt called stating his throat is on fire, he thinks he has strep throat and was asking for antibiotics. Advised he should get COVID tested, he stated he is waiting for his results. Advised he will need to see PCP or urgent care for flu and strep testing if covid is negative. PCP or urgent care will be able to prescribe the appropriate antibiotics, if needed. If he is COVID positive, he should receive mAb infusion due to his heart transplant status.

## 2022-01-10 NOTE — PROGRESS NOTES
"Subjective:       Patient ID: Derrick Montanez is a 26 y.o. male.    Vitals:  height is 5' 5" (1.651 m) and weight is 105.7 kg (233 lb). His temperature is 97.7 °F (36.5 °C). His blood pressure is 145/85 (abnormal) and his pulse is 99. His respiration is 18 and oxygen saturation is 98%.     Chief Complaint: Sinus Problem    Close exposure to COVID 4 days ago   Fully vaccinated.    Positive COVID 02/202.    C/o chest congestion, cough-yellow sputum, diarrhea, sore throat, "throat bleeds after brushing teeth." He has history of strep throat and this feels similar. Last had it about 5 years ago. Patient states that he has white spots in the back of his throat. He denies fever, body aches and chills. He is also having intermittent pain in both ears- not currently having any pain. He denies SOB, chest pain, nausea, vomiting. He is having diarrhea. Patient would like to be tested for strep throat and mono. He is fully vaccinated against COVID.     Sinus Problem  This is a new problem. The current episode started in the past 7 days. The problem has been gradually worsening since onset. There has been no fever. Associated symptoms include congestion, coughing, ear pain (right ear), sinus pressure, sneezing and a sore throat. Pertinent negatives include no chills, diaphoresis, headaches, hoarse voice, neck pain, shortness of breath or swollen glands. Past treatments include nothing.       Constitution: Positive for fatigue. Negative for chills, sweating and fever.   HENT: Positive for ear pain (right ear), congestion, postnasal drip, sinus pressure, sore throat and trouble swallowing. Negative for foreign body in ear, tinnitus, hearing loss and dental problem.    Neck: Negative for neck pain and neck stiffness.   Cardiovascular: Negative for chest trauma and chest pain.   Eyes: Negative for eye itching, eye pain, eye redness, photophobia and vision loss.   Respiratory: Positive for cough and sputum production. Negative for " chest tightness and shortness of breath.    Gastrointestinal: Positive for diarrhea. Negative for nausea, vomiting and constipation.   Musculoskeletal: Negative for pain, trauma, muscle cramps and muscle ache.   Skin: Negative for color change, pale and rash.   Allergic/Immunologic: Positive for sneezing.   Neurological: Negative for dizziness, light-headedness, headaches, disorientation and altered mental status.   Psychiatric/Behavioral: Negative for altered mental status, disorientation and confusion.       Objective:      Physical Exam   Constitutional: He is oriented to person, place, and time. He appears well-developed and well-nourished. He is cooperative.  Non-toxic appearance. He does not have a sickly appearance. He does not appear ill. No distress.   HENT:   Head: Normocephalic and atraumatic.   Ears:   Right Ear: Hearing, tympanic membrane, external ear and ear canal normal.   Left Ear: Hearing, tympanic membrane, external ear and ear canal normal.   Nose: Rhinorrhea and congestion present. No mucosal edema or nasal deformity. No epistaxis. Right sinus exhibits no maxillary sinus tenderness and no frontal sinus tenderness. Left sinus exhibits no maxillary sinus tenderness and no frontal sinus tenderness.   Mouth/Throat: Uvula is midline and mucous membranes are normal. No trismus in the jaw. Normal dentition. No uvula swelling. Oropharyngeal exudate, posterior oropharyngeal edema, posterior oropharyngeal erythema and cobblestoning present. No tonsillar abscesses. Tonsils are 2+ on the right. Tonsils are 2+ on the left.   Eyes: Conjunctivae and lids are normal. No scleral icterus.   Neck: Trachea normal and phonation normal. Neck supple. No edema present. No erythema present. No neck rigidity present.   Cardiovascular: Normal rate, regular rhythm, normal heart sounds, intact distal pulses and normal pulses.   Pulmonary/Chest: Effort normal and breath sounds normal. No stridor. No respiratory distress. He  has no decreased breath sounds. He has no wheezes. He has no rhonchi. He has no rales.   Abdominal: Normal appearance.   Musculoskeletal: Normal range of motion.         General: No deformity or edema. Normal range of motion.   Lymphadenopathy:     He has cervical adenopathy.        Right cervical: Superficial cervical adenopathy present. No deep cervical and no posterior cervical adenopathy present.       Left cervical: Superficial cervical adenopathy present. No deep cervical and no posterior cervical adenopathy present.   Neurological: He is alert and oriented to person, place, and time. He exhibits normal muscle tone. Coordination normal.   Skin: Skin is warm, dry, intact, not diaphoretic and not pale.   Psychiatric: He has a normal mood and affect. His speech is normal and behavior is normal. Judgment and thought content normal. Cognition and memory  Nursing note and vitals reviewed.        Results for orders placed or performed in visit on 01/10/22   POCT COVID-19 Rapid Screening   Result Value Ref Range    POC Rapid COVID Negative Negative     Acceptable Yes    POCT Influenza A/B MOLECULAR   Result Value Ref Range    POC Molecular Influenza A Ag Negative Negative, Not Reported    POC Molecular Influenza B Ag Negative Negative, Not Reported     Acceptable Yes    POCT Strep A, Molecular   Result Value Ref Range    Molecular Strep A, POC Negative Negative     Acceptable Yes        Assessment:       1. Encounter for laboratory testing for COVID-19 virus    2. Viral URI    3. Sore throat    4. Tonsillitis          Plan:         Encounter for laboratory testing for COVID-19 virus  -     POCT COVID-19 Rapid Screening  -     POCT Influenza A/B MOLECULAR    Viral URI    Sore throat  -     POCT Strep A, Molecular  -     POCT Infectious mononucleosis antibody  -     diphenhydrAMINE-aluminum-magnesium hydroxide-simethicone-LIDOcaine HCl 2%; Swish and spit 15 mLs every 4 (four)  hours as needed (sore throat).  Dispense: 188 mL; Refill: 0    Tonsillitis  -     amoxicillin-clavulanate 875-125mg (AUGMENTIN) 875-125 mg per tablet; Take 1 tablet by mouth every 12 (twelve) hours. for 7 days  Dispense: 14 tablet; Refill: 0  -     diphenhydrAMINE-aluminum-magnesium hydroxide-simethicone-LIDOcaine HCl 2%; Swish and spit 15 mLs every 4 (four) hours as needed (sore throat).  Dispense: 188 mL; Refill: 0                 Patient Instructions   - You have been given an antibiotic to treat your condition today.    - Please complete the antibiotic as directed on the bottle.   - you can take over-the-counter probiotics during and after antibiotic use to help preserve gut gill and reduce gastrointestinal symptoms        - Rest.    - Drink plenty of fluids.    - Acetaminophen (tylenol) or Ibuprofen (advil,motrin) as directed as needed for fever/pain. Avoid tylenol if you have a history of liver disease. Do not take ibuprofen if you have a history of GI bleeding, kidney disease, or if you take blood thinners.     - You must understand that you have received an Urgent Care treatment only and that you may be released before all of your medical problems are known or treated.   - You, the patient, will arrange for follow up care as instructed.   - If your condition worsens or fails to improve we recommend that you receive another evaluation at the ER immediately or contact your PCP to discuss your concerns or return here.   - Follow up with your PCP or specialty clinic as directed in the next 1-2 weeks if not improved or as needed.  You can call [phone] to schedule an appointment with the appropriate provider.      If your symptoms do not improve or worsen, go to the emergency room immediately.    Patient Education       Cough, Runny Nose, and the Common Cold Discharge Instructions   About this topic   The common cold is an infection in the nose and throat. A tiny germ, called a virus, causes this  infection. It often affects your nose, throat, ears, and sinuses. A cold can easily spread from person to person. Coughing, sneezing, or touching something with the germ on it spreads the cold.  Most colds go away on their own without treatment. Antibiotics will not help a cold. Your doctor may order drugs to help with the signs of a cold. Even though you may feel very sick, the common cold is not a health emergency.         What care is needed at home?   · Ask your doctor what you need to do when you go home. Make sure you ask questions if you do not understand what the doctor says.  · To help you feel better:  ? Use a cool mist humidifier to avoid breathing dry air.  ? Use hard candy or cough drops to soothe sore throat and cough.  ? Gargle with salt water. Mix 1/2 teaspoon (2.5 grams) salt with a cup (240 mL) of warm water a few times a day.  ? Spray saltwater mist in each nostril. Any normal saline spray works.  ? Sip warm liquids to keep your throat moist.  ? Take warm, steamy showers to help soothe the cough.  · Do not smoke or be in smoke-filled places. Avoid things that may cause breathing problems like vaping, fumes, pollution, dust, and other common allergens.  · You may want to use over-the-counter medicines for allergies or acid reflux if your cough is due to one of these problems.  · You can also use an over-the-counter cough medicine.  · Drink plenty of fluids whenever you are thirsty.  What follow-up care is needed?   Your doctor may ask you to make visits to the office to check on your progress. Be sure to keep these visits.  What drugs may be needed?   Your doctor may order drugs to:  · Help a stuffy nose  · Lower a fever  · Help with pain  · Treat an allergy  · Help with cough  Always talk to your doctor before you take any drugs. This includes over-the-counter (OTC) drugs and herbal supplements. This is very important if you have high blood pressure.  Will physical activity be limited?   Get lots  of rest. Sleep when you are feeling tired. Avoid doing tiring activities.  What changes to diet are needed?   · Chicken soup may be helpful. Warm fluids help thin mucus and help the body get rid of it easier.  What problems could happen?   · Colds may cause signs of asthma for people who have asthma.  · Sinus or ear infection  · Lung infections  · Bronchitis  What can be done to prevent this health problem?   · Wash your hands often with soap and water for at least 20 seconds, especially after coughing or sneezing. Alcohol-based hand sanitizers also work to kill the virus.  · If you are sick, cover your mouth and nose with tissue when you cough or sneeze. You can also cough into your elbow. Throw away tissues in the trash and wash your hands after touching used tissues.  · Clean items and surfaces you most often touch like door handles, remotes, phones, or toys. Wipe them with a disinfectant. This can help reduce the spread of infection.  · Do not get too close (kissing, hugging) to people who are sick.  · Do not share towels or hankies with anyone who is sick.  · Stay away from crowded places.  · Keep your hands away from your eyes and nose because the virus can enter easily to those body areas.  · Get a flu shot each year.  When do I need to call the doctor?   · You have chest pain when you cough or trouble breathing.  · You start to cough up blood or yellow or green mucus.  · You have a fever of 100.4°F (38°C) or higher or chills.  · You cough so hard you throw up.  · You are still coughing in 10 days.  Helpful tips   · It is normal that mucus from your runny nose may become thicker and change color. Do not take an antibiotic. Instead, drink more water-based fluids, especially hot tea and soups.  · Most colds go away within a week. If you are still sick after 14 days, see your doctor.  Teach Back: Helping You Understand   The Teach Back Method helps you understand the information we are giving you. After you talk  with the staff, tell them in your own words what you learned. This helps to make sure the staff has described each thing clearly. It also helps to explain things that may have been confusing. Before going home, make sure you can do these:  · I can tell you about my condition.  · I can tell you what may help ease my breathing.  · I can tell you what I can do to help avoid passing the infection to others.  · I can tell you what I will do if I have a fever, chills, or trouble breathing.  Where can I learn more?   American Academy of Family Physicians  https://familydoctor.org/condition/colds-and-the-flu/   American Lung Association  http://www.lung.org/lung-health-and-diseases/lung-disease-lookup/influenza/facts-about-the-common-cold.html   Centers for Disease Control and Prevention  https://www.cdc.gov/features/rhinoviruses/   Kids Health  http://kidshealth.org/en/parents/cold.html?ref=search&WT.ac=pjw-l-slwh-gn-dhkhus-cmw   Last Reviewed Date   2021-06-09  Consumer Information Use and Disclaimer   This information is not specific medical advice and does not replace information you receive from your health care provider. This is only a brief summary of general information. It does NOT include all information about conditions, illnesses, injuries, tests, procedures, treatments, therapies, discharge instructions or life-style choices that may apply to you. You must talk with your health care provider for complete information about your health and treatment options. This information should not be used to decide whether or not to accept your health care providers advice, instructions or recommendations. Only your health care provider has the knowledge and training to provide advice that is right for you.  Copyright   Copyright © 2021 [employer] and its affiliates and/or licensors. All rights reserved.

## 2022-01-11 ENCOUNTER — TELEPHONE (OUTPATIENT)
Dept: TRANSPLANT | Facility: CLINIC | Age: 27
End: 2022-01-11
Payer: MEDICAID

## 2022-01-11 DIAGNOSIS — U07.1 COVID: ICD-10-CM

## 2022-01-11 DIAGNOSIS — Z94.1 HEART REPLACED BY TRANSPLANT: Primary | ICD-10-CM

## 2022-01-11 LAB
SARS-COV-2 RNA RESP QL NAA+PROBE: DETECTED
SARS-COV-2- CYCLE NUMBER: 36

## 2022-01-11 NOTE — LETTER
January 11, 2022      [id number]  [address]  [facility] [address]  Phone: [phone]       Patient: Derrick Montanez   YOB: 1995  Date of Visit: 1/10/2022    To Whom It May Concern:    Davy Montanez  was at [facility] on 1/10/2022. He tested positive for COVID 19 on 1/9/2022. Per CDC and [facility] recommendations, all solid organ transplants must isolate for 21 days, as they may shed the COVID virus up until then. Derrick is a heart transplant taking immunossuppresion. He should isolate for 21 days. The patient may return to work/school on 1/30/2022 with no restrictions. If you have any questions or concerns, or if I can be of further assistance, please do not hesitate to contact me.    Sincerely,    Breanna Lucio RN  Post Heart transplant Coordinator

## 2022-01-11 NOTE — PATIENT INSTRUCTIONS
- You have been given an antibiotic to treat your condition today.    - Please complete the antibiotic as directed on the bottle.   - you can take over-the-counter probiotics during and after antibiotic use to help preserve gut gill and reduce gastrointestinal symptoms        - Rest.    - Drink plenty of fluids.    - Acetaminophen (tylenol) or Ibuprofen (advil,motrin) as directed as needed for fever/pain. Avoid tylenol if you have a history of liver disease. Do not take ibuprofen if you have a history of GI bleeding, kidney disease, or if you take blood thinners.     - You must understand that you have received an Urgent Care treatment only and that you may be released before all of your medical problems are known or treated.   - You, the patient, will arrange for follow up care as instructed.   - If your condition worsens or fails to improve we recommend that you receive another evaluation at the ER immediately or contact your PCP to discuss your concerns or return here.   - Follow up with your PCP or specialty clinic as directed in the next 1-2 weeks if not improved or as needed.  You can call [phone] to schedule an appointment with the appropriate provider.      If your symptoms do not improve or worsen, go to the emergency room immediately.    Patient Education       Cough, Runny Nose, and the Common Cold Discharge Instructions   About this topic   The common cold is an infection in the nose and throat. A tiny germ, called a virus, causes this infection. It often affects your nose, throat, ears, and sinuses. A cold can easily spread from person to person. Coughing, sneezing, or touching something with the germ on it spreads the cold.  Most colds go away on their own without treatment. Antibiotics will not help a cold. Your doctor may order drugs to help with the signs of a cold. Even though you may feel very sick, the common cold is not a health emergency.         What care is needed at home?   · Ask  your doctor what you need to do when you go home. Make sure you ask questions if you do not understand what the doctor says.  · To help you feel better:  ? Use a cool mist humidifier to avoid breathing dry air.  ? Use hard candy or cough drops to soothe sore throat and cough.  ? Gargle with salt water. Mix 1/2 teaspoon (2.5 grams) salt with a cup (240 mL) of warm water a few times a day.  ? Spray saltwater mist in each nostril. Any normal saline spray works.  ? Sip warm liquids to keep your throat moist.  ? Take warm, steamy showers to help soothe the cough.  · Do not smoke or be in smoke-filled places. Avoid things that may cause breathing problems like vaping, fumes, pollution, dust, and other common allergens.  · You may want to use over-the-counter medicines for allergies or acid reflux if your cough is due to one of these problems.  · You can also use an over-the-counter cough medicine.  · Drink plenty of fluids whenever you are thirsty.  What follow-up care is needed?   Your doctor may ask you to make visits to the office to check on your progress. Be sure to keep these visits.  What drugs may be needed?   Your doctor may order drugs to:  · Help a stuffy nose  · Lower a fever  · Help with pain  · Treat an allergy  · Help with cough  Always talk to your doctor before you take any drugs. This includes over-the-counter (OTC) drugs and herbal supplements. This is very important if you have high blood pressure.  Will physical activity be limited?   Get lots of rest. Sleep when you are feeling tired. Avoid doing tiring activities.  What changes to diet are needed?   · Chicken soup may be helpful. Warm fluids help thin mucus and help the body get rid of it easier.  What problems could happen?   · Colds may cause signs of asthma for people who have asthma.  · Sinus or ear infection  · Lung infections  · Bronchitis  What can be done to prevent this health problem?   · Wash your hands often with soap and water for at  least 20 seconds, especially after coughing or sneezing. Alcohol-based hand sanitizers also work to kill the virus.  · If you are sick, cover your mouth and nose with tissue when you cough or sneeze. You can also cough into your elbow. Throw away tissues in the trash and wash your hands after touching used tissues.  · Clean items and surfaces you most often touch like door handles, remotes, phones, or toys. Wipe them with a disinfectant. This can help reduce the spread of infection.  · Do not get too close (kissing, hugging) to people who are sick.  · Do not share towels or hankies with anyone who is sick.  · Stay away from crowded places.  · Keep your hands away from your eyes and nose because the virus can enter easily to those body areas.  · Get a flu shot each year.  When do I need to call the doctor?   · You have chest pain when you cough or trouble breathing.  · You start to cough up blood or yellow or green mucus.  · You have a fever of 100.4°F (38°C) or higher or chills.  · You cough so hard you throw up.  · You are still coughing in 10 days.  Helpful tips   · It is normal that mucus from your runny nose may become thicker and change color. Do not take an antibiotic. Instead, drink more water-based fluids, especially hot tea and soups.  · Most colds go away within a week. If you are still sick after 14 days, see your doctor.  Teach Back: Helping You Understand   The Teach Back Method helps you understand the information we are giving you. After you talk with the staff, tell them in your own words what you learned. This helps to make sure the staff has described each thing clearly. It also helps to explain things that may have been confusing. Before going home, make sure you can do these:  · I can tell you about my condition.  · I can tell you what may help ease my breathing.  · I can tell you what I can do to help avoid passing the infection to others.  · I can tell you what I will do if I have a fever,  chills, or trouble breathing.  Where can I learn more?   American Academy of Family Physicians  https://familydoctor.org/condition/colds-and-the-flu/   American Lung Association  http://www.lung.org/lung-health-and-diseases/lung-disease-lookup/influenza/facts-about-the-common-cold.html   Centers for Disease Control and Prevention  https://www.cdc.gov/features/rhinoviruses/   Kids Health  http://kidshealth.org/en/parents/cold.html?ref=search&WT.ac=mcr-c-gqwe-vm-wpoyuo-nxt   Last Reviewed Date   2021-06-09  Consumer Information Use and Disclaimer   This information is not specific medical advice and does not replace information you receive from your health care provider. This is only a brief summary of general information. It does NOT include all information about conditions, illnesses, injuries, tests, procedures, treatments, therapies, discharge instructions or life-style choices that may apply to you. You must talk with your health care provider for complete information about your health and treatment options. This information should not be used to decide whether or not to accept your health care providers advice, instructions or recommendations. Only your health care provider has the knowledge and training to provide advice that is right for you.  Copyright   Copyright © 2021 UpToDate, Inc. and its affiliates and/or licensors. All rights reserved.

## 2022-01-12 ENCOUNTER — INFUSION (OUTPATIENT)
Dept: INFECTIOUS DISEASES | Facility: HOSPITAL | Age: 27
End: 2022-01-12
Attending: INTERNAL MEDICINE
Payer: MEDICAID

## 2022-01-12 VITALS
WEIGHT: 230 LBS | BODY MASS INDEX: 38.32 KG/M2 | SYSTOLIC BLOOD PRESSURE: 127 MMHG | OXYGEN SATURATION: 96 % | RESPIRATION RATE: 20 BRPM | TEMPERATURE: 99 F | HEART RATE: 85 BPM | DIASTOLIC BLOOD PRESSURE: 74 MMHG | HEIGHT: 65 IN

## 2022-01-12 DIAGNOSIS — Z94.1 HEART REPLACED BY TRANSPLANT: ICD-10-CM

## 2022-01-12 DIAGNOSIS — U07.1 COVID: Primary | ICD-10-CM

## 2022-01-12 PROCEDURE — M0243 CASIRIVI AND IMDEVI INFUSION: HCPCS | Performed by: INTERNAL MEDICINE

## 2022-01-12 PROCEDURE — 25000003 PHARM REV CODE 250: Performed by: INTERNAL MEDICINE

## 2022-01-12 PROCEDURE — 63600175 PHARM REV CODE 636 W HCPCS: Performed by: INTERNAL MEDICINE

## 2022-01-12 RX ORDER — ALBUTEROL SULFATE 90 UG/1
2 AEROSOL, METERED RESPIRATORY (INHALATION)
Status: DISCONTINUED | OUTPATIENT
Start: 2022-01-12 | End: 2022-01-31

## 2022-01-12 RX ORDER — EPINEPHRINE 0.3 MG/.3ML
0.3 INJECTION SUBCUTANEOUS
Status: DISCONTINUED | OUTPATIENT
Start: 2022-01-12 | End: 2022-01-31

## 2022-01-12 RX ORDER — SODIUM CHLORIDE 0.9 % (FLUSH) 0.9 %
10 SYRINGE (ML) INJECTION
Status: DISCONTINUED | OUTPATIENT
Start: 2022-01-12 | End: 2022-01-31

## 2022-01-12 RX ORDER — ACETAMINOPHEN 325 MG/1
650 TABLET ORAL ONCE AS NEEDED
Status: DISCONTINUED | OUTPATIENT
Start: 2022-01-12 | End: 2022-01-31

## 2022-01-12 RX ORDER — DIPHENHYDRAMINE HYDROCHLORIDE 50 MG/ML
25 INJECTION INTRAMUSCULAR; INTRAVENOUS ONCE AS NEEDED
Status: ACTIVE | OUTPATIENT
Start: 2022-01-12 | End: 2033-06-10

## 2022-01-12 RX ORDER — ONDANSETRON 4 MG/1
4 TABLET, ORALLY DISINTEGRATING ORAL ONCE AS NEEDED
Status: DISCONTINUED | OUTPATIENT
Start: 2022-01-12 | End: 2022-01-31

## 2022-01-12 RX ADMIN — CASIRIVIMAB AND IMDEVIMAB 600 MG: 600; 600 INJECTION, SOLUTION, CONCENTRATE INTRAVENOUS at 03:01

## 2022-01-12 NOTE — PROGRESS NOTES
Patient arrives for casirivimab/ imdevimab infusion. Ambulatory. Pt AAox3. No distress noted. RR even and unlabored.     Symptoms: cough, sore throat, diarrhea    and onset date:  01/05/22    Tested COVID + on 01/09/2022

## 2022-01-13 DIAGNOSIS — Z94.1 HEART REPLACED BY TRANSPLANT: Primary | ICD-10-CM

## 2022-01-13 DIAGNOSIS — I15.8 HYPERTENSION ASSOCIATED WITH TRANSPLANTATION: ICD-10-CM

## 2022-01-13 DIAGNOSIS — Z94.9 HYPERTENSION ASSOCIATED WITH TRANSPLANTATION: ICD-10-CM

## 2022-01-13 RX ORDER — DILTIAZEM HYDROCHLORIDE 180 MG/1
180 CAPSULE, COATED, EXTENDED RELEASE ORAL DAILY
Qty: 30 CAPSULE | Refills: 11 | Status: SHIPPED | OUTPATIENT
Start: 2022-01-13 | End: 2023-02-01

## 2022-01-13 RX ORDER — MYCOPHENOLIC ACID 360 MG/1
720 TABLET, DELAYED RELEASE ORAL 2 TIMES DAILY
Qty: 120 TABLET | Refills: 11 | Status: SHIPPED | OUTPATIENT
Start: 2022-01-13 | End: 2022-01-19 | Stop reason: CLARIF

## 2022-01-13 RX ORDER — LISINOPRIL 5 MG/1
5 TABLET ORAL DAILY
Qty: 90 TABLET | Refills: 3 | Status: SHIPPED | OUTPATIENT
Start: 2022-01-13 | End: 2022-12-09

## 2022-01-19 DIAGNOSIS — Z94.1 HEART REPLACED BY TRANSPLANT: ICD-10-CM

## 2022-01-20 RX ORDER — MYCOPHENOLIC ACID 360 MG/1
720 TABLET, DELAYED RELEASE ORAL 2 TIMES DAILY
Qty: 120 TABLET | Refills: 11 | Status: SHIPPED | OUTPATIENT
Start: 2022-01-20 | End: 2022-03-28

## 2022-01-21 ENCOUNTER — TELEPHONE (OUTPATIENT)
Dept: TRANSPLANT | Facility: CLINIC | Age: 27
End: 2022-01-21
Payer: MEDICAID

## 2022-01-21 NOTE — TELEPHONE ENCOUNTER
Pt's mother called to inform me pt is out of his myfortic, while waiting for approval from insurance.

## 2022-01-31 ENCOUNTER — OFFICE VISIT (OUTPATIENT)
Dept: TRANSPLANT | Facility: CLINIC | Age: 27
End: 2022-01-31
Payer: MEDICAID

## 2022-01-31 ENCOUNTER — HOSPITAL ENCOUNTER (OUTPATIENT)
Dept: CARDIOLOGY | Facility: HOSPITAL | Age: 27
Discharge: HOME | End: 2022-01-31
Attending: INTERNAL MEDICINE
Payer: MEDICAID

## 2022-01-31 ENCOUNTER — HOSPITAL ENCOUNTER (OUTPATIENT)
Dept: RADIOLOGY | Facility: HOSPITAL | Age: 27
Discharge: HOME | End: 2022-01-31
Attending: INTERNAL MEDICINE
Payer: MEDICAID

## 2022-01-31 VITALS
BODY MASS INDEX: 36.84 KG/M2 | DIASTOLIC BLOOD PRESSURE: 112 MMHG | HEART RATE: 91 BPM | SYSTOLIC BLOOD PRESSURE: 163 MMHG | WEIGHT: 221.13 LBS | HEIGHT: 65 IN

## 2022-01-31 VITALS
BODY MASS INDEX: 38.32 KG/M2 | SYSTOLIC BLOOD PRESSURE: 144 MMHG | RESPIRATION RATE: 16 BRPM | HEIGHT: 65 IN | WEIGHT: 230 LBS | DIASTOLIC BLOOD PRESSURE: 106 MMHG | HEART RATE: 84 BPM

## 2022-01-31 DIAGNOSIS — Z79.899 LONG TERM CURRENT USE OF IMMUNOSUPPRESSIVE DRUG: ICD-10-CM

## 2022-01-31 DIAGNOSIS — D84.9 IMMUNOSUPPRESSION: ICD-10-CM

## 2022-01-31 DIAGNOSIS — T86.22 HEART TRANSPLANT FAILURE: ICD-10-CM

## 2022-01-31 DIAGNOSIS — Z94.1 STATUS POST HEART TRANSPLANT: ICD-10-CM

## 2022-01-31 DIAGNOSIS — Z94.1 HEART REPLACED BY TRANSPLANT: Primary | ICD-10-CM

## 2022-01-31 DIAGNOSIS — Z94.1 HEART TRANSPLANT RECIPIENT: ICD-10-CM

## 2022-01-31 LAB
ASCENDING AORTA: 2.74 CM
BSA FOR ECHO PROCEDURE: 2.19 M2
CV ECHO LV RWT: 0.47 CM
CV STRESS BASE HR: 84 BPM
DIASTOLIC BLOOD PRESSURE: 108 MMHG
DOP CALC LVOT AREA: 3.6 CM2
DOP CALC LVOT DIAMETER: 2.15 CM
DOP CALC LVOT PEAK VEL: 0.81 M/S
DOP CALC LVOT STROKE VOLUME: 51.53 CM3
DOP CALCLVOT PEAK VEL VTI: 14.2 CM
E WAVE DECELERATION TIME: 183.51 MSEC
E/A RATIO: 1.74
E/E' RATIO: 9.65 M/S
ECHO LV POSTERIOR WALL: 1.18 CM (ref 0.6–1.1)
EJECTION FRACTION: 55 %
FRACTIONAL SHORTENING: 30 % (ref 28–44)
INTERVENTRICULAR SEPTUM: 1.1 CM (ref 0.6–1.1)
IVRT: 79.92 MSEC
LEFT INTERNAL DIMENSION IN SYSTOLE: 3.5 CM (ref 2.1–4)
LEFT VENTRICLE DIASTOLIC VOLUME INDEX: 56.59 ML/M2
LEFT VENTRICLE DIASTOLIC VOLUME: 118.83 ML
LEFT VENTRICLE MASS INDEX: 104 G/M2
LEFT VENTRICLE SYSTOLIC VOLUME INDEX: 24.2 ML/M2
LEFT VENTRICLE SYSTOLIC VOLUME: 50.77 ML
LEFT VENTRICULAR INTERNAL DIMENSION IN DIASTOLE: 5.01 CM (ref 3.5–6)
LEFT VENTRICULAR MASS: 218.31 G
LV LATERAL E/E' RATIO: 8.2 M/S
LV SEPTAL E/E' RATIO: 11.71 M/S
MV PEAK A VEL: 0.47 M/S
MV PEAK E VEL: 0.82 M/S
MV STENOSIS PRESSURE HALF TIME: 53.22 MS
MV VALVE AREA P 1/2 METHOD: 4.13 CM2
OHS CV CPX 1 MINUTE RECOVERY HEART RATE: 148 BPM
OHS CV CPX 85 PERCENT MAX PREDICTED HEART RATE MALE: 166
OHS CV CPX MAX PREDICTED HEART RATE: 195
OHS CV CPX PATIENT IS FEMALE: 0
OHS CV CPX PATIENT IS MALE: 1
OHS CV CPX PEAK DIASTOLIC BLOOD PRESSURE: 90 MMHG
OHS CV CPX PEAK HEAR RATE: 235 BPM
OHS CV CPX PEAK RATE PRESSURE PRODUCT: ABNORMAL
OHS CV CPX PEAK SYSTOLIC BLOOD PRESSURE: 195 MMHG
OHS CV CPX PERCENT MAX PREDICTED HEART RATE ACHIEVED: 121
OHS CV CPX RATE PRESSURE PRODUCT PRESENTING: ABNORMAL
PISA TR MAX VEL: 2.14 M/S
RA PRESSURE: 3 MMHG
RIGHT VENTRICULAR END-DIASTOLIC DIMENSION: 4.04 CM
RV TISSUE DOPPLER FREE WALL SYSTOLIC VELOCITY 1 (APICAL 4 CHAMBER VIEW): 7.64 CM/S
SINUS: 3.36 CM
STJ: 2.66 CM
SYSTOLIC BLOOD PRESSURE: 161 MMHG
TDI LATERAL: 0.1 M/S
TDI SEPTAL: 0.07 M/S
TDI: 0.09 M/S
TR MAX PG: 18 MMHG
TRICUSPID ANNULAR PLANE SYSTOLIC EXCURSION: 1.5 CM
TV REST PULMONARY ARTERY PRESSURE: 21 MMHG

## 2022-01-31 PROCEDURE — 4010F ACE/ARB THERAPY RXD/TAKEN: CPT | Mod: CPTII,,, | Performed by: INTERNAL MEDICINE

## 2022-01-31 PROCEDURE — 3008F PR BODY MASS INDEX (BMI) DOCUMENTED: ICD-10-PCS | Mod: CPTII,,, | Performed by: INTERNAL MEDICINE

## 2022-01-31 PROCEDURE — 3077F SYST BP >= 140 MM HG: CPT | Mod: CPTII,,, | Performed by: INTERNAL MEDICINE

## 2022-01-31 PROCEDURE — 99999 PR PBB SHADOW E&M-EST. PATIENT-LVL II: CPT | Mod: PBBFAC,,, | Performed by: INTERNAL MEDICINE

## 2022-01-31 PROCEDURE — 71046 X-RAY EXAM CHEST 2 VIEWS: CPT | Mod: 26,,, | Performed by: RADIOLOGY

## 2022-01-31 PROCEDURE — 99212 OFFICE O/P EST SF 10 MIN: CPT | Mod: PBBFAC,25 | Performed by: INTERNAL MEDICINE

## 2022-01-31 PROCEDURE — 99214 PR OFFICE/OUTPT VISIT, EST, LEVL IV, 30-39 MIN: ICD-10-PCS | Mod: S$PBB,,, | Performed by: INTERNAL MEDICINE

## 2022-01-31 PROCEDURE — 99214 OFFICE O/P EST MOD 30 MIN: CPT | Mod: S$PBB,,, | Performed by: INTERNAL MEDICINE

## 2022-01-31 PROCEDURE — 99999 PR PBB SHADOW E&M-EST. PATIENT-LVL II: ICD-10-PCS | Mod: PBBFAC,,, | Performed by: INTERNAL MEDICINE

## 2022-01-31 PROCEDURE — 71046 XR CHEST PA AND LATERAL: ICD-10-PCS | Mod: 26,,, | Performed by: RADIOLOGY

## 2022-01-31 PROCEDURE — 93351 STRESS TTE COMPLETE: CPT

## 2022-01-31 PROCEDURE — 71046 X-RAY EXAM CHEST 2 VIEWS: CPT | Mod: TC,FY

## 2022-01-31 PROCEDURE — 63600175 PHARM REV CODE 636 W HCPCS: Performed by: INTERNAL MEDICINE

## 2022-01-31 PROCEDURE — 3080F PR MOST RECENT DIASTOLIC BLOOD PRESSURE >= 90 MM HG: ICD-10-PCS | Mod: CPTII,,, | Performed by: INTERNAL MEDICINE

## 2022-01-31 PROCEDURE — 3008F BODY MASS INDEX DOCD: CPT | Mod: CPTII,,, | Performed by: INTERNAL MEDICINE

## 2022-01-31 PROCEDURE — 93351 STRESS ECHO (CUPID ONLY): ICD-10-PCS | Mod: 26,,, | Performed by: INTERNAL MEDICINE

## 2022-01-31 PROCEDURE — 93351 STRESS TTE COMPLETE: CPT | Mod: 26,,, | Performed by: INTERNAL MEDICINE

## 2022-01-31 PROCEDURE — 4010F PR ACE/ARB THEARPY RXD/TAKEN: ICD-10-PCS | Mod: CPTII,,, | Performed by: INTERNAL MEDICINE

## 2022-01-31 PROCEDURE — 3046F HEMOGLOBIN A1C LEVEL >9.0%: CPT | Mod: CPTII,,, | Performed by: INTERNAL MEDICINE

## 2022-01-31 PROCEDURE — 3080F DIAST BP >= 90 MM HG: CPT | Mod: CPTII,,, | Performed by: INTERNAL MEDICINE

## 2022-01-31 PROCEDURE — 3046F PR MOST RECENT HEMOGLOBIN A1C LEVEL > 9.0%: ICD-10-PCS | Mod: CPTII,,, | Performed by: INTERNAL MEDICINE

## 2022-01-31 PROCEDURE — 3077F PR MOST RECENT SYSTOLIC BLOOD PRESSURE >= 140 MM HG: ICD-10-PCS | Mod: CPTII,,, | Performed by: INTERNAL MEDICINE

## 2022-01-31 RX ORDER — DOBUTAMINE HYDROCHLORIDE 400 MG/100ML
10 INJECTION INTRAVENOUS
Status: COMPLETED | OUTPATIENT
Start: 2022-01-31 | End: 2022-01-31

## 2022-01-31 RX ADMIN — DOBUTAMINE HYDROCHLORIDE 10 MCG/KG/MIN: 400 INJECTION INTRAVENOUS at 11:01

## 2022-01-31 NOTE — PROGRESS NOTES
"                                                                                       [facility] Follow up.        Attending Physician: Jonathon Patricia MD.  The patient's last visit with me was on Visit date not found.         HPI.  Mr. Montanez is a 24 y.o. year old Black or  male who received a  heart transplant on 3/17/09.       CMV status:   Donor: +  Recipient: +     HPI   24 y.o. AAM who developed myocarditis at age 6, ultimately underwent transplant at [facility] on 3/17/2009, who shared care between [facility] and [facility] but now is following with us, here for routine follow-up visit 9-years post-transplant. Patient remains on prednisone 2.5 mg despite being this far out, which per the [facility] snapshot is "due to clinic noncompliance and infrequent FU in [place]."   Patient still doing well. Patient denies N/V/F/C, lightheadedness, dizziness, PND, orthopnea, LE edema, abdominal pain, abdominal pressure, chest pain, chest pressure, syncope, pre-syncope.    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 He has chronic anxiety.       Review of Systems   Constitutional: Negative for activity change, appetite change, chills, diaphoresis, fatigue and fever.   HENT: Negative for nasal congestion, rhinorrhea and sore throat.    Eyes: Negative for visual disturbance.   Respiratory: Negative for apnea, cough, choking, chest tightness and shortness of breath.    Cardiovascular: Negative for chest pain and leg swelling.   Gastrointestinal: Negative for abdominal distention, abdominal pain, diarrhea, nausea and vomiting.   Genitourinary: Negative for difficulty urinating, dysuria and hematuria.   Integumentary:  Negative for rash.   Neurological: Negative for seizures, syncope and light-headedness.   Psychiatric/Behavioral: Negative for agitation and hallucinations.        Past Medical History:   Diagnosis Date    Allergy     Cardiomyopathy     Myocarditis and " subsequent IDC leading to hearr transplant 2009 at [facility]    Coronary artery disease     Diabetes mellitus     GERD (gastroesophageal reflux disease)     Hypertension         Past Surgical History:   Procedure Laterality Date    CARDIAC BIOPSY  8/16/2019    Procedure: Biopsy, Cardiac;  Surgeon: Thien Hayes MD;  Location: [facility] CATH LAB;  Service: Cardiology;;    CARDIAC SURGERY  2009    OHT at [facility]    CATHETERIZATION OF BOTH LEFT AND RIGHT HEART Right 8/16/2019    Procedure: CATHETERIZATION, HEART, BOTH LEFT AND RIGHT;  Surgeon: Thien Hayes MD;  Location: [facility] CATH LAB;  Service: Cardiology;  Laterality: Right;    CYSTOSCOPY W/ URETERAL STENT PLACEMENT Right 8/21/2020    Procedure: CYSTOSCOPY, WITH URETERAL STENT INSERTION;  Surgeon: Tutu Jolley MD;  Location: [facility] OR [address] [place] [address];  Service: Urology;  Laterality: Right;    heart tx      TONSILLECTOMY      URETEROSCOPY Right 8/21/2020    Procedure: URETEROSCOPY;  Surgeon: Tutu Jolley MD;  Location: [facility] OR [address] [place] [address];  Service: Urology;  Laterality: Right;        Family History   Problem Relation Age of Onset    No Known Problems Mother     No Known Problems Father     Colon cancer Neg Hx     Cirrhosis Neg Hx     Celiac disease Neg Hx     Crohn's disease Neg Hx     Ulcerative colitis Neg Hx     Rectal cancer Neg Hx     Liver cancer Neg Hx     Irritable bowel syndrome Neg Hx     Esophageal cancer Neg Hx     Stomach cancer Neg Hx     Melanoma Neg Hx         Review of patient's allergies indicates:   Allergen Reactions    Pneumovax 23 [pneumococcal 23-yo ps vaccine] Swelling and Other (See Comments)     Swelling at site and Pneumonia like Sx. Pt spent x1 week in hospital    Nsaids (non-steroidal anti-inflammatory drug) Other (See Comments)        Current Outpatient Medications   Medication Instructions    amoxicillin (AMOXIL) 2,000 mg, Oral, On Call Procedure    aspirin (ECOTRIN) 81 mg, Oral, Daily    BLOOD PRESSURE CUFF  "Misc Please provide pt with one blood pressure cuff to check his BP twice a day.    blood sugar diagnostic Strp Uses 4 x a day, one touch ultra blue, on insulin-long acting and meal insulin.    dicyclomine (BENTYL) 10 mg, Oral, 2 times daily    diltiaZEM (CARDIZEM CD) 180 mg, Oral, Daily    ergocalciferol (ERGOCALCIFEROL) 50,000 Units, Oral, Every 7 days    EScitalopram oxalate (LEXAPRO) 10 mg, Oral, Daily    esomeprazole (NEXIUM) 20 mg, Oral, Before breakfast    insulin (LANTUS SOLOSTAR U-100 INSULIN) glargine 100 units/mL (3mL) SubQ pen INJECT 45-48 UNITS INTO THE SKIN AT NIGHT    insulin lispro (ADMELOG U-100 INSULIN LISPRO) 100 unit/mL injection Inject 12 units at dinner time, 150-200+2, 201-250+4, 251-300+6, 301-350+8, >350 +10. Can substitute humalog.max daily 66 units.    insulin syringe-needle U-100 (ADVOCATE SYRINGES) 0.3 mL 31 gauge x 5/16" Syrg Uses 3 times a day, 90 day    lancets 30 gauge Misc Has one touch ultra 2, needs lancets , uses 4 times a day. E 11.65, on insulin.    liraglutide 0.6 mg/0.1 mL, 18 mg/3 mL, subq PNIJ (VICTOZA 3-CARMEN) 0.6 mg/0.1 mL (18 mg/3 mL) PnIj pen inject 1.8 mg daily    lisinopriL (PRINIVIL,ZESTRIL) 5 mg, Oral, Daily    metFORMIN (GLUCOPHAGE) 500 MG tablet TAKE 1 TABLET BY MOUTH TWICE DAILY WITH MEALS    mycophenolate (MYFORTIC) 720 mg, Oral, 2 times daily    pen needle, diabetic (BD ULTRA-FINE KARIME PEN NEEDLE) 32 gauge x 5/32" Ndle Uses 5 times a day. 90 day    pravastatin (PRAVACHOL) 20 MG tablet TAKE 1 TABLET BY MOUTH EVERY NIGHT    PROGRAF 1 mg Cap Take one (1 mg) capsule along with one 5 mg capsule in the am and 2 (1 mg) capsules along with one 5 mg capsule in the pm for a total of 6 mg in the am and 7 mg in the pm.    PROGRAF 5 mg Cap TAKE 1 CAPSULE BY MOUTH Along with 1 mg capsule in the am and 2 (1 mg) capsules in the pm for total dose of 6 mg in the am and 7 mg in the pm.    topiramate (TOPAMAX) 25 mg, Oral, Nightly        There were no vitals filed " "for this visit.     Wt Readings from Last 3 Encounters:   01/31/22 104.3 kg (230 lb)   01/12/22 104.3 kg (230 lb)   01/10/22 105.7 kg (233 lb)     Temp Readings from Last 3 Encounters:   01/12/22 99 °F (37.2 °C) (Oral)   01/10/22 97.7 °F (36.5 °C)   02/20/21 99.1 °F (37.3 °C) (Oral)     BP Readings from Last 3 Encounters:   01/31/22 (!) 144/106   01/12/22 127/74   01/10/22 (!) 145/85     Pulse Readings from Last 3 Encounters:   01/31/22 84   01/12/22 85   01/10/22 99        There is no height or weight on file to calculate BMI. Estimated body surface area is 2.19 meters squared as calculated from the following:    Height as of an earlier encounter on 1/31/22: 5' 5" (1.651 m).    Weight as of an earlier encounter on 1/31/22: 104.3 kg (230 lb).     Physical Exam  Constitutional:       Appearance: He is well-developed and well-nourished.   HENT:      Head: Normocephalic and atraumatic.      Right Ear: External ear normal.      Left Ear: External ear normal.   Eyes:      Extraocular Movements: EOM normal.      Conjunctiva/sclera: Conjunctivae normal.      Pupils: Pupils are equal, round, and reactive to light.   Neck:      Vascular: No hepatojugular reflux or JVD.   Cardiovascular:      Rate and Rhythm: Normal rate and regular rhythm.      Pulses: Intact distal pulses.      Heart sounds: S1 normal and S2 normal. No murmur heard.  No friction rub. No gallop.    Pulmonary:      Effort: Pulmonary effort is normal.      Breath sounds: Normal breath sounds.   Abdominal:      General: Bowel sounds are normal. There is no distension.      Palpations: Abdomen is soft.      Tenderness: There is no abdominal tenderness. There is no guarding or rebound.   Musculoskeletal:         General: No edema.      Cervical back: Normal range of motion and neck supple.      Right lower leg: No edema.      Left lower leg: No edema.   Neurological:      Mental Status: He is alert and oriented to person, place, and time.      Deep Tendon " Reflexes: Strength normal.          Lab Results   Component Value Date    BNP 71 01/31/2022     01/31/2022    K 3.9 01/31/2022    MG 1.5 (L) 04/29/2021     01/31/2022    CO2 26 01/31/2022    BUN 21 (H) 01/31/2022    CREATININE 0.9 01/31/2022     (H) 01/31/2022    HGBA1C >14.0 (H) 01/31/2022    HGBA1C 10.5 04/12/2019    AST 21 01/31/2022    ALT 13 01/31/2022    ALBUMIN 3.6 01/31/2022    PROT 7.5 01/31/2022    BILITOT 0.3 01/31/2022    WBC 14.20 (H) 01/31/2022    HGB 15.3 01/31/2022    HCT 46.2 01/31/2022     01/31/2022    INR 1.0 02/20/2021     (H) 02/20/2021    TSH 1.322 01/31/2022    CHOL 105 (L) 01/31/2022    HDL 35 (L) 01/31/2022    LDLCALC 31.6 (L) 01/31/2022    TRIG 192 (H) 01/31/2022    G0LFHUV 6.2 07/27/2020    TACROLIMUS 8.5 01/31/2022    ALLOMAP 26 05/18/2017           Results for orders placed during the hospital encounter of 08/08/19    Transthoracic echo (TTE) complete (Cupid Only)    Interpretation Summary  · Patient is s/p cardiac transplant.  · Normal left ventricular systolic function. The estimated ejection fraction is 65%  · Indeterminate left ventricular diastolic function.  · No wall motion abnormalities.  · Normal right ventricular systolic function.  · Mild tricuspid regurgitation.  · The estimated PA systolic pressure is 25 mm Hg  · Normal central venous pressure (3 mm Hg).        Results for orders placed during the hospital encounter of 08/16/19    Cardiac catheterization    Conclusion  · LVEDP (Pre): 25  · Normal coronary arteries by angiography  · RHC data below  · 5 biospy specimens obtained  · Estimated blood loss: <50 mL         Assessment and Plan:  Heart replaced by transplant    Heart transplant failure    Heart transplant recipient    Immunosuppression    Long term current use of immunosuppressive drug          Negative DSE today.  Normal graft function.  No CV symptoms.    Return instructions as set forth by post transplant schedule or as  needed:     Clinic: Return for labs and/or biopsy weekly the first month, every two weeks during month 2 and then monthly for the first year at the provider or coordinator's discretion. During the second year, return to clinic every 3 months. Post transplant year 3-5 return every 6 months. There will be a comprehensive post transplant evaluation every year that may include LHC/RHC/biopsy, stress test, echo, CXR, and other health screening exams.     In addition to the clinical assessment, I have ordered HLA/DSA testing for this patient to assist in identification of moderate/severe acute cellular rejection (ACR) in a pt with stable Allograft function instead of endomyocardial biopsy.      Patient is reminded to call with any health changes as these can be early signs of transplant complications. Patient is advised to make sure any new medications or changes of old medications are discussed with a pharmacist or physician knowledgeable with transplant to avoid rejection/drug toxicity related to significant drug interactions.     UNOS Patient Status  Functional Status: 100% - Normal, no complaints, no evidence of disease  Physical Capacity: No Limitations  Working for Income: No  If no, reason not working: Patient Choice - Student Full Time/Part Time

## 2022-02-02 NOTE — H&P (VIEW-ONLY)
PCP - Jude Willard MD  Subjective:   Patient ID:  Derrick Montanez is a 23 y.o. y.o. male who presents for  Post OHTx [facility] 10 year anniversary. PMHx of myocarditis at age 6, ultimately underwent transplant at [facility] on 3/17/2009 (CMV +/+), last seen by Dr. Donahue (taken off prednisone, currently on Tacrolimus 6mg BID, Myfortic 720mg BID), additionally Obesity, IDDM type II. Patient with no new complaints today, continues to do well. No HF symptoms, no cardiovascular symptoms, Patient denies N/V/F/C, lightheadedness, dizziness, PND, orthopnea, LE edema, abdominal pain, abdominal pressure, chest pain, chest pressure, syncope, pre-syncope. Currently in finishing up undergrad, plans for PA school. Completed TTE today, EF 65%, CVP 3mmHg. Cr 1.1, WBC 13K.     Last EMbx 6 years ago at [facility], did have R groin hematoma size of [place], patient prefers Right radial access if possible, however with body habitus may be challenging. Additionally, very difficult PIV access, may need ultrasound guided, if unable to achieve then will give PO Valium and start with R CFV access to IV sedation prior to procedure.       TTE 09/07/18:    1 - Post-cardiac transplantation study.     2 - Normal left ventricular systolic function (EF 60-65%).     3 - Concentric remodeling.     4 - No wall motion abnormalities.     5 - Normal left ventricular diastolic function.     6 - Right ventricle is upper limit of normal in size with normal systolic function.     7 - Trivial mitral regurgitation.     8 - Mild tricuspid regurgitation.     9 - The estimated PA systolic pressure is 27 mmHg.       History:     Social History     Tobacco Use    Smoking status: Never Smoker    Smokeless tobacco: Never Used   Substance Use Topics    Alcohol use: Not on file     Comment: occ     Family History   Problem Relation Age of Onset    No Known Problems Mother     No Known Problems Father     Colon cancer Neg Hx     Cirrhosis Neg Hx     Celiac disease Neg  Hx     Crohn's disease Neg Hx     Ulcerative colitis Neg Hx     Rectal cancer Neg Hx     Liver cancer Neg Hx     Irritable bowel syndrome Neg Hx     Esophageal cancer Neg Hx     Stomach cancer Neg Hx     Melanoma Neg Hx        Meds:     Review of patient's allergies indicates:   Allergen Reactions    Pneumovax 23 [pneumococcal 23-yo ps vaccine] Swelling and Other (See Comments)     Swelling at site and Pneumonia like Sx. Pt spent x1 week in hospital       Current Outpatient Medications:     aspirin (ECOTRIN) 81 MG EC tablet, Take 81 mg by mouth once daily., Disp: , Rfl:     dicyclomine (BENTYL) 10 MG capsule, TAKE 1 CAPSULE BY MOUTH TWICE DAILY, Disp: 60 capsule, Rfl: 3    diltiaZEM (CARDIZEM CD) 180 MG 24 hr capsule, TAKE 1 CAPSULE BY MOUTH EVERY DAY, Disp: 30 capsule, Rfl: 11    ergocalciferol (ERGOCALCIFEROL) 50,000 unit Cap, Take 1 capsule (50,000 Units total) by mouth every 7 days., Disp: 30 capsule, Rfl: 11    escitalopram oxalate (LEXAPRO) 10 MG tablet, TAKE 1 TABLET BY MOUTH EVERY DAY, Disp: 30 tablet, Rfl: 11    esomeprazole (NEXIUM) 20 MG capsule, Take 1 capsule (20 mg total) by mouth before breakfast., Disp: 30 capsule, Rfl: 11    insulin (BASAGLAR KWIKPEN U-100 INSULIN) glargine 100 units/mL (3mL) SubQ pen, Inject 25-35 units daily, Disp: 1 Box, Rfl: 6    insulin lispro (ADMELOG U-100 INSULIN LISPRO) 100 unit/mL injection, Inject 12 units at dinner time, 180-230+2, 231-280+4, 281-330+6, 331-380+8, >380 +10., Disp: 1 vial, Rfl: 6    liraglutide 0.6 mg/0.1 mL, 18 mg/3 mL, subq PNIJ (VICTOZA 3-CARMEN) 0.6 mg/0.1 mL (18 mg/3 mL) PnIj, Inject 0.6 mg daily, week 1, 1.2 mg daily week 2, 1.8 mg daily week 3 and on if tolerable., Disp: 9 mL, Rfl: 6    lisinopril (PRINIVIL,ZESTRIL) 5 MG tablet, Take 1 tablet (5 mg total) by mouth once daily., Disp: 30 tablet, Rfl: 3    metFORMIN (GLUCOPHAGE) 500 MG tablet, Take 1 tablet (500 mg total) by mouth 2 (two) times daily with meals., Disp: 180 tablet,  "Rfl: 3    MYFORTIC 360 mg TbEC, Take 2 tablets (720 mg total) by mouth 2 (two) times daily., Disp: 120 tablet, Rfl: 6    pravastatin (PRAVACHOL) 20 MG tablet, Take 1 tablet (20 mg total) by mouth nightly., Disp: 30 tablet, Rfl: 11    PROGRAF 1 mg Cap, Take 1 (1 mg capsules) by mouth in the am and pm along with 1 (5 mg capsule) by mouth for a total of 6 mg twice a day, Disp: 60 capsule, Rfl: 11    tacrolimus (PROGRAF) 5 MG Cap, Take 1 capsule (5 mg total) by mouth every 12 (twelve) hours., Disp: 60 capsule, Rfl: 11    topiramate (TOPAMAX) 25 MG tablet, Take 1 tablet (25 mg total) by mouth every evening., Disp: 30 tablet, Rfl: 11    blood sugar diagnostic Strp, 1 each by Misc.(Non-Drug; Combo Route) route 2 (two) times daily., Disp: 100 strip, Rfl: 11    cetirizine 10 mg chewable tablet, Take 10 mg by mouth as needed. , Disp: , Rfl:     insulin syringe-needle U-100 (ADVOCATE SYRINGES) 0.3 mL 31 gauge x 5/16" Syrg, Uses 3 times a day, 90 day, Disp: 300 each, Rfl: 3    lancets 30 gauge Misc, 1 lancet by Misc.(Non-Drug; Combo Route) route 4 (four) times daily., Disp: 100 each, Rfl: 3    pen needle, diabetic (BD ULTRA-FINE KARIME PEN NEEDLE) 32 gauge x 5/32" Ndle, Uses 2 times a day. 90 day, Disp: 200 each, Rfl: 3    pravastatin (PRAVACHOL) 20 MG tablet, TAKE 1 TABLET BY MOUTH EVERY NIGHTLY, Disp: 30 tablet, Rfl: 5      Review of Systems   Constitutional: Negative for chills, fever, malaise/fatigue and weight loss.   Respiratory: Negative for cough, sputum production and shortness of breath.    Cardiovascular: Negative for chest pain, palpitations, orthopnea, claudication, leg swelling and PND.   Gastrointestinal: Negative for abdominal pain, diarrhea, nausea and vomiting.   Genitourinary: Negative for dysuria and urgency.       Objective:  Vitals:    08/08/19 0919 08/08/19 0922   BP: (!) 132/93 138/88   BP Location: Left arm Right arm   Patient Position: Sitting Sitting   BP Method: Large (Automatic) Large " "(Automatic)   Pulse: 82 79   SpO2: 99%    Weight: 121.8 kg (268 lb 8.3 oz)    Height: 5' 5" (1.651 m)         Physical Exam   Constitutional: He is oriented to person, place, and time. No distress.   HENT:   Head: Normocephalic and atraumatic.   Neck: Normal range of motion. Neck supple. No JVD present.   Cardiovascular: Normal rate, regular rhythm, normal heart sounds and intact distal pulses. Exam reveals no gallop and no friction rub.   No murmur heard.  Pulmonary/Chest: Effort normal and breath sounds normal. No respiratory distress. He has no wheezes. He has no rales.   Abdominal: Soft. Bowel sounds are normal. He exhibits no distension. There is no tenderness.   Musculoskeletal: Normal range of motion.   Neurological: He is alert and oriented to person, place, and time.   Skin: Skin is warm and dry. He is not diaphoretic.       Labs:     Lab Results   Component Value Date     08/08/2019    K 4.2 08/08/2019     08/08/2019    CO2 22 (L) 08/08/2019    BUN 20 08/08/2019    CREATININE 1.1 08/08/2019    ANIONGAP 11 08/08/2019     Lab Results   Component Value Date    HGBA1C 10.5 04/12/2019     Lab Results   Component Value Date    BNP 34 08/08/2019    BNP 14 09/07/2018    BNP 11 01/22/2018       Lab Results   Component Value Date    WBC 13.08 (H) 08/08/2019    HGB 13.7 (L) 08/08/2019    HCT 44.1 08/08/2019     08/08/2019    GRAN 7.8 (H) 08/08/2019    GRAN 59.7 08/08/2019     Lab Results   Component Value Date    CHOL 92 (L) 08/08/2019    HDL 26 (L) 08/08/2019    LDLCALC 27.2 (L) 08/08/2019    TRIG 194 (H) 08/08/2019       Lab Results   Component Value Date     08/08/2019    K 4.2 08/08/2019     08/08/2019    CO2 22 (L) 08/08/2019    BUN 20 08/08/2019    CREATININE 1.1 08/08/2019    ANIONGAP 11 08/08/2019     Lab Results   Component Value Date    HGBA1C 10.5 04/12/2019     Lab Results   Component Value Date    BNP 34 08/08/2019    BNP 14 09/07/2018    BNP 11 01/22/2018    Lab Results "   Component Value Date    WBC 13.08 (H) 08/08/2019    HGB 13.7 (L) 08/08/2019    HCT 44.1 08/08/2019     08/08/2019    GRAN 7.8 (H) 08/08/2019    GRAN 59.7 08/08/2019     Lab Results   Component Value Date    CHOL 92 (L) 08/08/2019    HDL 26 (L) 08/08/2019    LDLCALC 27.2 (L) 08/08/2019    TRIG 194 (H) 08/08/2019                Cardiovascular Imaging:     TTE 09/07/18:    1 - Post-cardiac transplantation study.     2 - Normal left ventricular systolic function (EF 60-65%).     3 - Concentric remodeling.     4 - No wall motion abnormalities.     5 - Normal left ventricular diastolic function.     6 - Right ventricle is upper limit of normal in size with normal systolic function.     7 - Trivial mitral regurgitation.     8 - Mild tricuspid regurgitation.     9 - The estimated PA systolic pressure is 27 mmHg.     2019  · Patient is s/p cardiac transplant.  · Normal left ventricular systolic function. The estimated ejection fraction is 65%  · Indeterminate left ventricular diastolic function.  · No wall motion abnormalities.  · Normal right ventricular systolic function.  · Mild tricuspid regurgitation.  · The estimated PA systolic pressure is 25 mm Hg  · Normal central venous pressure (3 mm Hg).    Assessment & Plan:     Derrick Montanez is a 23 y.o. y.o. male who presents for  Post OHTx [facility] 10 year anniversary. Case discussed with [facility] [place] coordinator Breanna, due to recent discontinuation of prednisone, will also pursue RV EMBx.    - TTE EF 65%, CVP 3mmHg, PASP 25mmHg, no WMA    - Cr 1.1, WBC 13K,    --C (no IVUS needed), RV EMBx  - Anti-platelet Therapy: ASA   - Access: Right radial access, Right CFV   - Hx of R groin hematoma at [facility]   - Difficult PIV access, may need ultrasound guided, if unable to achieve then will give PO Valium and start with R CFV access to IV sedation prior to procedure.   - Catheters: 6 Fr sheath, 9Fr sheath with 7F long curved sheath, Pigtail catheter, Biopsy forceps  - Creatinine/CrCl:  1.1  - Allergies:  Pneuomovax  - Pre-Hydration: D5 1/2NS 125cc/hr   - Pre-Op Med: Benadryl  - All patient's questions were answered.  -The risks, benefits and alternatives of the procedure were explained to the patient.   -The risks of coronary angiography include but are not limited to: bleeding, infection, heart rhythm abnormalities, allergic reactions, kidney injury and potential need for dialysis, stroke and death.   - Should stenting be indicated, the patient has agreed to dual anti-platelet therapy for 1-consecutive year with a drug-eluting stent and a minimum of 1-month with the use of a bare metal stent  - Additionally, pt is aware that non-compliance is likely to result in stent clotting with heart attack, heart failure, and/or death  -The risks of moderate sedation include hypotension, respiratory depression, arrhythmias, bronchospasm, and death.   - Informed consent was obtained and the  patient is agreeable to proceed with the procedure.      Myocarditis at age 6, s/p OHTx at [facility] on 3/17/2009 (CMV +/+),   - Tacrolimus 6mg BID, Myfortic 720mg BID  - Follow up with HTS    Obesity, IDDM type II:  - Continue insulin  - Recommend diet and weight loss    HTN.  The patient's diastolic blood pressures elevated in clinic today.  We will reassess post catheterization.    Signed:  Paulino Roland M.D.  Page # [phone]  Cardiovascular Fellow PGY-IV  [facility]     I have personally taken the history and examined this patient and agree with the resident's note as stated above.  All of the patient's questions were answered.         No

## 2022-02-22 ENCOUNTER — PATIENT MESSAGE (OUTPATIENT)
Dept: RESEARCH | Facility: HOSPITAL | Age: 27
End: 2022-02-22
Payer: MEDICAID

## 2022-03-04 ENCOUNTER — TELEPHONE (OUTPATIENT)
Dept: TRANSPLANT | Facility: CLINIC | Age: 27
End: 2022-03-04
Payer: MEDICAID

## 2022-03-04 NOTE — TELEPHONE ENCOUNTER
Juana Coronel RN, Took call and completed the PA   And spoke with the pharmacy and the Patient. ----- Message from Breonna Sheth MA sent at 3/4/2022  4:12 PM CST -----  Regarding: FW: PA    ----- Message -----  From: Milli Renee  Sent: 3/4/2022   3:52 PM CST  To: [facility] Heart Transplant Medical Assistants  Subject: JUNE perez is calling about the PA on mycophenolate (MYFORTIC) 360 MG TbEC. Thanks, Milli

## 2022-03-07 ENCOUNTER — DOCUMENTATION ONLY (OUTPATIENT)
Dept: TRANSPLANT | Facility: CLINIC | Age: 27
End: 2022-03-07
Payer: MEDICAID

## 2022-03-07 ENCOUNTER — TELEPHONE (OUTPATIENT)
Dept: TRANSPLANT | Facility: CLINIC | Age: 27
End: 2022-03-07
Payer: MEDICAID

## 2022-03-07 NOTE — TELEPHONE ENCOUNTER
----- Message from Elinor Lynn sent at 3/7/2022 11:21 AM CST -----  Regarding: Prior Auth f/u  Cherise with [facility] f/u on a prior auth for the pt Myfortic 360 mg. She states they received it back, but it does not state that this med has to be brand name, and w/o that the insurance comp will not pay for it.    [facility]  [place] [address].   Phone:  [phone]  Fax:  [phone]      Thanks

## 2022-03-07 NOTE — TELEPHONE ENCOUNTER
Patient requires brand name only for Prograf and Myfortic. Patient has been stable on brand name for Prograf and Myfortic. Pending PA for Myfortic is [phone].

## 2022-03-07 NOTE — TELEPHONE ENCOUNTER
Spoke to Cherise about creating a new PA for Brand name only for myfortic. Will try to put it through as urgent.

## 2022-04-05 ENCOUNTER — TELEPHONE (OUTPATIENT)
Dept: TRANSPLANT | Facility: CLINIC | Age: 27
End: 2022-04-05
Payer: MEDICAID

## 2022-04-05 NOTE — TELEPHONE ENCOUNTER
Returned call regarding PA   Most recent    Myfortic is [id number]    She stated that she sent one for prograf and can see that Breanna is working on it.   Pt picked up Myfortic Yesterday and refill is too soon.     ----- Message from Elinor Lynn sent at 4/5/2022  8:56 AM CDT -----  Regarding: Prior auth  Elisha with [facility] calling in ref to prior auths for the pt Myfortic 360 mg and Prograf 5 mg and would like brand name.    [facility]  - [place] [address].   Phone:  [phone]  Fax:  [phone]      Thanks

## 2022-04-08 ENCOUNTER — TELEPHONE (OUTPATIENT)
Dept: TRANSPLANT | Facility: CLINIC | Age: 27
End: 2022-04-08
Payer: MEDICAID

## 2022-04-08 NOTE — TELEPHONE ENCOUNTER
Spoke with Elisha and also submitted on Cover Mymeds approval pending.   Pt has gotten the prograf 1 mg tabs filled in Feb for 90 days.    ----- Message from Elinor Lynn sent at 4/8/2022  9:05 AM CDT -----  Regarding: Prior Auth-refill  Elisha with [facility] f/u on a prior auth and refill request for the pt Prograf 5 mg and she states he's completely out of this med.     [facility]  - [place]  [address] [place] [address].   Phone:  [phone]  Fax:  [phone]      Thanks

## 2022-05-18 DIAGNOSIS — Z94.1 HEART REPLACED BY TRANSPLANT: ICD-10-CM

## 2022-05-18 RX ORDER — TACROLIMUS 1 MG/1
CAPSULE, GELATIN COATED ORAL
Qty: 270 CAPSULE | Refills: 3 | Status: SHIPPED | OUTPATIENT
Start: 2022-05-18 | End: 2023-04-17 | Stop reason: SDUPTHER

## 2022-06-15 ENCOUNTER — PATIENT MESSAGE (OUTPATIENT)
Dept: TRANSPLANT | Facility: CLINIC | Age: 27
End: 2022-06-15
Payer: MEDICAID

## 2022-07-19 DIAGNOSIS — Z94.1 STATUS POST HEART TRANSPLANT: ICD-10-CM

## 2022-07-19 DIAGNOSIS — Z79.899 ENCOUNTER FOR LONG-TERM (CURRENT) USE OF MEDICATIONS: ICD-10-CM

## 2022-07-19 DIAGNOSIS — T86.20 COMPLICATION OF HEART TRANSPLANT, UNSPECIFIED COMPLICATION: ICD-10-CM

## 2022-07-19 DIAGNOSIS — K21.9 GASTROESOPHAGEAL REFLUX DISEASE: ICD-10-CM

## 2022-07-19 DIAGNOSIS — D84.9 IMMUNOSUPPRESSION: ICD-10-CM

## 2022-07-19 DIAGNOSIS — Z94.1 HEART TRANSPLANTED: ICD-10-CM

## 2022-07-19 RX ORDER — PRAVASTATIN SODIUM 20 MG/1
TABLET ORAL
Qty: 30 TABLET | Refills: 11 | Status: SHIPPED | OUTPATIENT
Start: 2022-07-19 | End: 2023-07-12

## 2022-07-19 RX ORDER — PRAVASTATIN SODIUM 20 MG/1
TABLET ORAL
Qty: 30 TABLET | Refills: 11 | Status: CANCELLED | OUTPATIENT
Start: 2022-07-19

## 2022-08-01 ENCOUNTER — PATIENT MESSAGE (OUTPATIENT)
Dept: TRANSPLANT | Facility: CLINIC | Age: 27
End: 2022-08-01
Payer: MEDICAID

## 2022-08-01 ENCOUNTER — TELEPHONE (OUTPATIENT)
Dept: TRANSPLANT | Facility: CLINIC | Age: 27
End: 2022-08-01
Payer: MEDICAID

## 2022-08-01 NOTE — TELEPHONE ENCOUNTER
Called pt to see why he didn't show for labs last week. Unable to leave a message. Will try again later.

## 2022-10-19 RX ORDER — INSULIN GLARGINE 100 [IU]/ML
INJECTION, SOLUTION SUBCUTANEOUS
Qty: 15 ML | Refills: 6 | OUTPATIENT
Start: 2022-10-19

## 2022-10-31 ENCOUNTER — TELEPHONE (OUTPATIENT)
Dept: TRANSPLANT | Facility: CLINIC | Age: 27
End: 2022-10-31
Payer: MEDICAID

## 2022-10-31 DIAGNOSIS — Z94.1 HEART TRANSPLANTED: ICD-10-CM

## 2022-10-31 DIAGNOSIS — D84.9 IMMUNOSUPPRESSION: ICD-10-CM

## 2022-10-31 DIAGNOSIS — T86.20 COMPLICATION OF HEART TRANSPLANT, UNSPECIFIED COMPLICATION: ICD-10-CM

## 2022-10-31 DIAGNOSIS — Z79.899 ENCOUNTER FOR LONG-TERM (CURRENT) USE OF MEDICATIONS: ICD-10-CM

## 2022-10-31 DIAGNOSIS — Z94.1 STATUS POST HEART TRANSPLANT: ICD-10-CM

## 2022-10-31 DIAGNOSIS — K21.9 GASTROESOPHAGEAL REFLUX DISEASE: ICD-10-CM

## 2022-10-31 RX ORDER — DICYCLOMINE HYDROCHLORIDE 10 MG/1
CAPSULE ORAL
Qty: 15 CAPSULE | Refills: 0 | Status: SHIPPED | OUTPATIENT
Start: 2022-10-31 | End: 2023-05-01

## 2022-10-31 NOTE — TELEPHONE ENCOUNTER
Spoke to pt's mother since I was unable to leave a message on pt's VM. Advised pt's mother that he will have to follow with GI for his abdominal pain and refills of bentyl. Dr. Baron gave 15 tablets but wants pt to follow with GI. She stated her understanding and will tell pt.

## 2022-11-02 ENCOUNTER — TELEPHONE (OUTPATIENT)
Dept: GASTROENTEROLOGY | Facility: CLINIC | Age: 27
End: 2022-11-02
Payer: MEDICAID

## 2022-11-02 NOTE — TELEPHONE ENCOUNTER
----- Message from Breanna Lucio RN sent at 11/2/2022 11:21 AM CDT -----  Regarding: needs GI appt  Good morning,  Please schedule pt with GI for abdominal issues. He has been on bentyl for a year, but did not follow up with GI like we told him. Dr. Baron only gave him 15 tablets and advised he needed to follow up with GI.    Thanks,  Breanna

## 2022-11-02 NOTE — TELEPHONE ENCOUNTER
Message left for patient to return my call regarding an appointment.   Can offer him an appointment with  on 11/30 for 1:40.   Confirmation mailed.   Ceci

## 2022-12-08 DIAGNOSIS — Z94.1 STATUS POST HEART TRANSPLANT: Primary | ICD-10-CM

## 2022-12-09 DIAGNOSIS — I15.8 HYPERTENSION ASSOCIATED WITH TRANSPLANTATION: ICD-10-CM

## 2022-12-09 DIAGNOSIS — Z94.1 HEART REPLACED BY TRANSPLANT: ICD-10-CM

## 2022-12-09 DIAGNOSIS — Z94.9 HYPERTENSION ASSOCIATED WITH TRANSPLANTATION: ICD-10-CM

## 2022-12-09 RX ORDER — LISINOPRIL 5 MG/1
5 TABLET ORAL DAILY
Qty: 30 TABLET | Refills: 2 | Status: SHIPPED | OUTPATIENT
Start: 2022-12-09 | End: 2023-02-22

## 2022-12-09 NOTE — TELEPHONE ENCOUNTER
Have sent multiple messages for pt to get labs, has not returned my calls. Will refill until March when his annual is due, then will refill medications with appropriate doses. Pt has not followed up with GI as recommended for GI issues.

## 2023-01-04 ENCOUNTER — TELEPHONE (OUTPATIENT)
Dept: TRANSPLANT | Facility: CLINIC | Age: 28
End: 2023-01-04
Payer: MEDICAID

## 2023-01-04 DIAGNOSIS — Z94.1 STATUS POST HEART TRANSPLANT: Primary | ICD-10-CM

## 2023-01-04 NOTE — TELEPHONE ENCOUNTER
Spoke to pt at length about compliance with transplant. Pt stated he wants to do the best that he can do for himself. He stated he had been dealing with mental strain, but is in a much better place. 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 Will draw labs Friday at [facility]. HE would like to talk to dietitian, as his diabetes has not been great. He would also like to lose weight. Will have Bi call him next week. Pt already keeps a food journal so he can show Bi next week. Will see pt the end of February.

## 2023-01-06 ENCOUNTER — TELEPHONE (OUTPATIENT)
Dept: INTERNAL MEDICINE | Facility: CLINIC | Age: 28
End: 2023-01-06
Payer: MEDICAID

## 2023-01-06 ENCOUNTER — LAB VISIT (OUTPATIENT)
Dept: LAB | Facility: HOSPITAL | Age: 28
End: 2023-01-06
Payer: MEDICAID

## 2023-01-06 ENCOUNTER — TELEPHONE (OUTPATIENT)
Dept: TRANSPLANT | Facility: CLINIC | Age: 28
End: 2023-01-06
Payer: MEDICAID

## 2023-01-06 DIAGNOSIS — Z94.1 STATUS POST HEART TRANSPLANT: ICD-10-CM

## 2023-01-06 LAB
ALBUMIN SERPL BCP-MCNC: 3.7 G/DL (ref 3.5–5.2)
ALP SERPL-CCNC: 71 U/L (ref 55–135)
ALT SERPL W/O P-5'-P-CCNC: 20 U/L (ref 10–44)
ANION GAP SERPL CALC-SCNC: 8 MMOL/L (ref 8–16)
AST SERPL-CCNC: 19 U/L (ref 10–40)
BASOPHILS # BLD AUTO: 0.02 K/UL (ref 0–0.2)
BASOPHILS NFR BLD: 0.2 % (ref 0–1.9)
BILIRUB SERPL-MCNC: 0.4 MG/DL (ref 0.1–1)
BNP SERPL-MCNC: 40 PG/ML (ref 0–99)
BUN SERPL-MCNC: 37 MG/DL (ref 6–20)
CALCIUM SERPL-MCNC: 9.1 MG/DL (ref 8.7–10.5)
CHLORIDE SERPL-SCNC: 107 MMOL/L (ref 95–110)
CHOLEST SERPL-MCNC: 124 MG/DL (ref 120–199)
CHOLEST/HDLC SERPL: 3.8 {RATIO} (ref 2–5)
CO2 SERPL-SCNC: 20 MMOL/L (ref 23–29)
CREAT SERPL-MCNC: 1.6 MG/DL (ref 0.5–1.4)
DIFFERENTIAL METHOD: ABNORMAL
EOSINOPHIL # BLD AUTO: 0.1 K/UL (ref 0–0.5)
EOSINOPHIL NFR BLD: 0.7 % (ref 0–8)
ERYTHROCYTE [DISTWIDTH] IN BLOOD BY AUTOMATED COUNT: 12.7 % (ref 11.5–14.5)
EST. GFR  (NO RACE VARIABLE): >60 ML/MIN/1.73 M^2
ESTIMATED AVG GLUCOSE: ABNORMAL MG/DL (ref 68–131)
GLUCOSE SERPL-MCNC: 135 MG/DL (ref 70–110)
HBA1C MFR BLD: >14 % (ref 4–5.6)
HCT VFR BLD AUTO: 39.7 % (ref 40–54)
HDLC SERPL-MCNC: 33 MG/DL (ref 40–75)
HDLC SERPL: 26.6 % (ref 20–50)
HGB BLD-MCNC: 13 G/DL (ref 14–18)
IMM GRANULOCYTES # BLD AUTO: 0.03 K/UL (ref 0–0.04)
IMM GRANULOCYTES NFR BLD AUTO: 0.3 % (ref 0–0.5)
LDLC SERPL CALC-MCNC: 57.4 MG/DL (ref 63–159)
LYMPHOCYTES # BLD AUTO: 4.3 K/UL (ref 1–4.8)
LYMPHOCYTES NFR BLD: 36 % (ref 18–48)
MAGNESIUM SERPL-MCNC: 1.5 MG/DL (ref 1.6–2.6)
MCH RBC QN AUTO: 27.8 PG (ref 27–31)
MCHC RBC AUTO-ENTMCNC: 32.7 G/DL (ref 32–36)
MCV RBC AUTO: 85 FL (ref 82–98)
MONOCYTES # BLD AUTO: 0.7 K/UL (ref 0.3–1)
MONOCYTES NFR BLD: 6.2 % (ref 4–15)
NEUTROPHILS # BLD AUTO: 6.8 K/UL (ref 1.8–7.7)
NEUTROPHILS NFR BLD: 56.6 % (ref 38–73)
NONHDLC SERPL-MCNC: 91 MG/DL
NRBC BLD-RTO: 0 /100 WBC
PLATELET # BLD AUTO: 347 K/UL (ref 150–450)
PMV BLD AUTO: 10 FL (ref 9.2–12.9)
POTASSIUM SERPL-SCNC: 4.4 MMOL/L (ref 3.5–5.1)
PROT SERPL-MCNC: 6.9 G/DL (ref 6–8.4)
RBC # BLD AUTO: 4.68 M/UL (ref 4.6–6.2)
SODIUM SERPL-SCNC: 135 MMOL/L (ref 136–145)
TACROLIMUS BLD-MCNC: 9.5 NG/ML (ref 5–15)
TRIGL SERPL-MCNC: 168 MG/DL (ref 30–150)
WBC # BLD AUTO: 11.96 K/UL (ref 3.9–12.7)

## 2023-01-06 PROCEDURE — 80053 COMPREHEN METABOLIC PANEL: CPT | Performed by: INTERNAL MEDICINE

## 2023-01-06 PROCEDURE — 83036 HEMOGLOBIN GLYCOSYLATED A1C: CPT | Performed by: INTERNAL MEDICINE

## 2023-01-06 PROCEDURE — 80061 LIPID PANEL: CPT | Performed by: INTERNAL MEDICINE

## 2023-01-06 PROCEDURE — 36415 COLL VENOUS BLD VENIPUNCTURE: CPT | Performed by: INTERNAL MEDICINE

## 2023-01-06 PROCEDURE — 83880 ASSAY OF NATRIURETIC PEPTIDE: CPT | Performed by: INTERNAL MEDICINE

## 2023-01-06 PROCEDURE — 80197 ASSAY OF TACROLIMUS: CPT | Performed by: INTERNAL MEDICINE

## 2023-01-06 PROCEDURE — 83735 ASSAY OF MAGNESIUM: CPT | Performed by: INTERNAL MEDICINE

## 2023-01-06 PROCEDURE — 85025 COMPLETE CBC W/AUTO DIFF WBC: CPT | Performed by: INTERNAL MEDICINE

## 2023-01-06 RX ORDER — LIRAGLUTIDE 6 MG/ML
INJECTION SUBCUTANEOUS
Qty: 9 ML | Refills: 6 | Status: SHIPPED | OUTPATIENT
Start: 2023-01-06 | End: 2023-01-13

## 2023-01-06 NOTE — TELEPHONE ENCOUNTER
----- Message from TARYN Hermosillo, FNP sent at 1/6/2023 10:31 AM CST -----  Regarding: appt in 1-4 weeks  Hi   Pt needs asap  A1c > 14%  Virtual or inperson  Thanks,   IB

## 2023-01-06 NOTE — TELEPHONE ENCOUNTER
----- Message from Shaina Deleon sent at 1/6/2023  1:47 PM CST -----  Contact: edwin/cher/nuno/[phone]  Rep called in regards to checking the status of a faxed that was sent over on  12-27-22 on [employer]. Call back    Please advise

## 2023-01-06 NOTE — TELEPHONE ENCOUNTER
Discussed labs with pt. A1C >14. Message sent to endocrine to follow up with pt. Other labs were ok. Triglycerides slightly elevated. He attributes that to holidays. Will see pt the end of February for annual testing.

## 2023-01-11 ENCOUNTER — PATIENT MESSAGE (OUTPATIENT)
Dept: TRANSPLANT | Facility: CLINIC | Age: 28
End: 2023-01-11
Payer: MEDICAID

## 2023-01-12 PROBLEM — I15.8 HYPERTENSION ASSOCIATED WITH TRANSPLANTATION: Status: ACTIVE | Noted: 2023-01-12

## 2023-01-12 PROBLEM — Z94.9 HYPERTENSION ASSOCIATED WITH TRANSPLANTATION: Status: ACTIVE | Noted: 2023-01-12

## 2023-01-13 ENCOUNTER — TELEPHONE (OUTPATIENT)
Dept: TRANSPLANT | Facility: CLINIC | Age: 28
End: 2023-01-13
Payer: MEDICAID

## 2023-01-13 ENCOUNTER — TELEPHONE (OUTPATIENT)
Dept: INTERNAL MEDICINE | Facility: CLINIC | Age: 28
End: 2023-01-13

## 2023-01-13 ENCOUNTER — OFFICE VISIT (OUTPATIENT)
Dept: INTERNAL MEDICINE | Facility: CLINIC | Age: 28
End: 2023-01-13
Payer: MEDICAID

## 2023-01-13 DIAGNOSIS — E11.65 TYPE 2 DIABETES MELLITUS WITH HYPERGLYCEMIA, WITH LONG-TERM CURRENT USE OF INSULIN: Primary | ICD-10-CM

## 2023-01-13 DIAGNOSIS — E66.01 OBESITY, CLASS III, BMI 40-49.9 (MORBID OBESITY): ICD-10-CM

## 2023-01-13 DIAGNOSIS — Z94.1 HEART REPLACED BY TRANSPLANT: ICD-10-CM

## 2023-01-13 DIAGNOSIS — Z71.89 COUNSELING AND COORDINATION OF CARE: ICD-10-CM

## 2023-01-13 DIAGNOSIS — Z94.9 HYPERTENSION ASSOCIATED WITH TRANSPLANTATION: ICD-10-CM

## 2023-01-13 DIAGNOSIS — I15.8 HYPERTENSION ASSOCIATED WITH TRANSPLANTATION: ICD-10-CM

## 2023-01-13 DIAGNOSIS — Z79.4 TYPE 2 DIABETES MELLITUS WITH HYPERGLYCEMIA, WITH LONG-TERM CURRENT USE OF INSULIN: Primary | ICD-10-CM

## 2023-01-13 PROCEDURE — 99214 OFFICE O/P EST MOD 30 MIN: CPT | Mod: 95,,, | Performed by: NURSE PRACTITIONER

## 2023-01-13 PROCEDURE — 3046F PR MOST RECENT HEMOGLOBIN A1C LEVEL > 9.0%: ICD-10-PCS | Mod: CPTII,95,, | Performed by: NURSE PRACTITIONER

## 2023-01-13 PROCEDURE — 1160F RVW MEDS BY RX/DR IN RCRD: CPT | Mod: CPTII,95,, | Performed by: NURSE PRACTITIONER

## 2023-01-13 PROCEDURE — 99214 PR OFFICE/OUTPT VISIT, EST, LEVL IV, 30-39 MIN: ICD-10-PCS | Mod: 95,,, | Performed by: NURSE PRACTITIONER

## 2023-01-13 PROCEDURE — 3046F HEMOGLOBIN A1C LEVEL >9.0%: CPT | Mod: CPTII,95,, | Performed by: NURSE PRACTITIONER

## 2023-01-13 PROCEDURE — 4010F ACE/ARB THERAPY RXD/TAKEN: CPT | Mod: CPTII,95,, | Performed by: NURSE PRACTITIONER

## 2023-01-13 PROCEDURE — 1160F PR REVIEW ALL MEDS BY PRESCRIBER/CLIN PHARMACIST DOCUMENTED: ICD-10-PCS | Mod: CPTII,95,, | Performed by: NURSE PRACTITIONER

## 2023-01-13 PROCEDURE — 1159F PR MEDICATION LIST DOCUMENTED IN MEDICAL RECORD: ICD-10-PCS | Mod: CPTII,95,, | Performed by: NURSE PRACTITIONER

## 2023-01-13 PROCEDURE — 4010F PR ACE/ARB THEARPY RXD/TAKEN: ICD-10-PCS | Mod: CPTII,95,, | Performed by: NURSE PRACTITIONER

## 2023-01-13 PROCEDURE — 1159F MED LIST DOCD IN RCRD: CPT | Mod: CPTII,95,, | Performed by: NURSE PRACTITIONER

## 2023-01-13 RX ORDER — METFORMIN HYDROCHLORIDE 500 MG/1
500 TABLET ORAL 2 TIMES DAILY WITH MEALS
Qty: 180 TABLET | Refills: 3 | Status: SHIPPED | OUTPATIENT
Start: 2023-01-13 | End: 2023-01-13 | Stop reason: SDUPTHER

## 2023-01-13 RX ORDER — BLOOD-GLUCOSE,RECEIVER,CONT
EACH MISCELLANEOUS
Qty: 1 EACH | Refills: 1 | Status: SHIPPED | OUTPATIENT
Start: 2023-01-13 | End: 2023-07-13 | Stop reason: SDUPTHER

## 2023-01-13 RX ORDER — METFORMIN HYDROCHLORIDE 500 MG/1
500 TABLET ORAL 2 TIMES DAILY WITH MEALS
Qty: 180 TABLET | Refills: 3 | Status: SHIPPED | OUTPATIENT
Start: 2023-01-13 | End: 2023-12-12 | Stop reason: SDUPTHER

## 2023-01-13 RX ORDER — INSULIN LISPRO 100 [IU]/ML
INJECTION, SOLUTION INTRAVENOUS; SUBCUTANEOUS
Qty: 20 ML | Refills: 6 | Status: SHIPPED | OUTPATIENT
Start: 2023-01-13 | End: 2023-02-07

## 2023-01-13 RX ORDER — INSULIN LISPRO 100 [IU]/ML
INJECTION, SOLUTION INTRAVENOUS; SUBCUTANEOUS
Qty: 30 ML | Refills: 8 | Status: SHIPPED | OUTPATIENT
Start: 2023-01-13 | End: 2023-02-07

## 2023-01-13 RX ORDER — BLOOD-GLUCOSE SENSOR
EACH MISCELLANEOUS
Qty: 3 EACH | Refills: 11 | Status: SHIPPED | OUTPATIENT
Start: 2023-01-13 | End: 2023-07-13 | Stop reason: SDUPTHER

## 2023-01-13 RX ORDER — LANCETS
EACH MISCELLANEOUS
Qty: 400 EACH | Refills: 3 | Status: SHIPPED | OUTPATIENT
Start: 2023-01-13

## 2023-01-13 RX ORDER — SEMAGLUTIDE 1.34 MG/ML
1 INJECTION, SOLUTION SUBCUTANEOUS
Qty: 3 PEN | Refills: 3 | Status: SHIPPED | OUTPATIENT
Start: 2023-01-13 | End: 2023-05-17

## 2023-01-13 RX ORDER — PEN NEEDLE, DIABETIC 30 GX3/16"
NEEDLE, DISPOSABLE MISCELLANEOUS
Qty: 400 EACH | Refills: 3 | Status: SHIPPED | OUTPATIENT
Start: 2023-01-13

## 2023-01-13 RX ORDER — INSULIN PUMP SYRINGE, 3 ML
EACH MISCELLANEOUS
Qty: 1 EACH | Refills: 0 | Status: SHIPPED | OUTPATIENT
Start: 2023-01-13 | End: 2024-01-13

## 2023-01-13 RX ORDER — BLOOD-GLUCOSE TRANSMITTER
EACH MISCELLANEOUS
Qty: 1 EACH | Refills: 3 | Status: SHIPPED | OUTPATIENT
Start: 2023-01-13 | End: 2023-07-13 | Stop reason: SDUPTHER

## 2023-01-13 RX ORDER — INSULIN GLARGINE 100 [IU]/ML
INJECTION, SOLUTION SUBCUTANEOUS
Qty: 15 ML | Refills: 8 | Status: SHIPPED | OUTPATIENT
Start: 2023-01-13 | End: 2023-05-17

## 2023-01-13 RX ORDER — SEMAGLUTIDE 1.34 MG/ML
INJECTION, SOLUTION SUBCUTANEOUS
Qty: 3 PEN | Refills: 3 | Status: SHIPPED | OUTPATIENT
Start: 2023-01-13 | End: 2023-01-13

## 2023-01-13 NOTE — TELEPHONE ENCOUNTER
"Called pt to ask about diet due to raised concerns from post heart txp coordinator.     Pt states that he is intermittent fasting and tracking calories. Pt states he fast for 12 hours and eats for 12 hours. Pt has fiber one breakfast bar. Lunch is a salad (he mentioned salad shop and [employer]). Dinner was 1/2 breast of chicken his grandmother cooked. Snacks were 3 tbsp of peanut butter. Pt states he is exercising 3x a week with a .     Recc pt eat more balanced meals with dinner since pt states he has to "force himself to eat" some meals which is why breakfast and dinner were lower in calories. Recc pt to get more protein due to weight bearing exercises to maintain muscle mass.    Will see pt in clinic on 2/28/23 to follow up       "

## 2023-01-13 NOTE — PATIENT INSTRUCTIONS
Admelog pen 16 units before meals  Scale use 180-230+2 ,etc  Lantus 48 units at night   Ozempic 1 mg weekly  Metformin 500 mg twice  a day   Lab Results   Component Value Date    HGBA1C >14.0 (H) 01/06/2023     A1c goal less than 7%    [email or web address]  Eat fit kylie  [employer] kylie  [email or web address]    Goal  no higher than 220     Follow up in 3 months w/ Irielle  A1c urine mac in 6 weeks  A1c in 3 months

## 2023-01-13 NOTE — TELEPHONE ENCOUNTER
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

## 2023-01-13 NOTE — PROGRESS NOTES
CHIEF COMPLAINT: Type 2 Diabetes     HPI: Mr. Derrick Montanez is a 27 y.o. male who was diagnosed with Type 2 DM in 3/2019   Pt has h/o heart transplantation 2009.  H/o DKA, hyperglycemia, hypoglycemia.   Chronically elevated a1cs in the past few years.  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  Managed per HTS.   Lives in [place], [place].   Recent ER visit for BG >600, has been off of insulin x > 4 mos.   Last seen by me in 3/2019 and is now being seen by me again today.   Recent a1c is elevated, aug 2019 6.4%.   FBG on yesterday 275 mg/dl per RN/coordinator     +covid19 1/2022    Admelog (lispro) 10-30 units before meals   Lantus rx needed  45-48 units at night   Victoza 1.8 mg daily   Fbg < 200 mg/dl , 110s   Highest > 350 mg/dl   Lowest 80 mg/dl     Past week much improved-mostly under 250 mg/dl   Binge eating r/t stress     Lab Results   Component Value Date    HGBA1C >14.0 (H) 01/06/2023     The patient location is: home  The chief complaint leading to consultation is: type 2 dm     Visit type: audiovisual    Face to Face time with patient: 15,22    minutes of total time spent on the encounter, which includes face to face time and non-face to face time preparing to see the patient (eg, review of tests), Obtaining and/or reviewing separately obtained history, Documenting clinical information in the electronic or other health record, Independently interpreting results (not separately reported) and communicating results to the patient/family/caregiver, or Care coordination (not separately reported).         Each patient to whom he or she provides medical services by telemedicine is:  (1) informed of the relationship between the physician and patient and the respective role of any other health care provider with respect to management of the patient; and (2) notified that he or she may decline to receive medical services by telemedicine and may withdraw from such care at any  time.    Notes:       PREVIOUS DIABETES MEDICATIONS TRIED  lantus   humalog   novolog  Metformin   victoza     CURRENT DIABETIC MEDS: victoza 1.8 mg daily , metformin 500 mg bid , admelog 10-30 units ac w/ scale 150-200+2, etc , lantus 48 units at night     Pt is monitoring blood glucose readings 0-2 times a day.  Needs >100 strips per month related to fluctuations with blood glucose reading, a1c trends, and activity level.  Diabetes Management Status    Statin: Taking  ACE/ARB: Taking    Screening or Prevention Patient's value Goal Complete/Controlled?   HgA1C Testing and Control   Lab Results   Component Value Date    HGBA1C >14.0 (H) 01/06/2023      Annually/Less than 8% Yes   Lipid profile : 01/06/2023 Annually Yes   LDL control Lab Results   Component Value Date    LDLCALC 57.4 (L) 01/06/2023    Annually/Less than 100 mg/dl  Yes   Nephropathy screening No results found for: LABMICR  Lab Results   Component Value Date    PROTEINUA 2+ (A) 02/20/2021    Annually No   Blood pressure BP Readings from Last 1 Encounters:   01/31/22 (!) 144/106    Less than 140/90 Yes   Dilated retinal exam Most Recent Eye Exam Date: Not Found Annually Yes   Foot exam   Most Recent Foot Exam Date: Not Found Annually Yes       REVIEW OF SYSTEMS  General: no weakness, fatigue, +weight  (gain)  Eyes: no double or blurred vision, eye pain, or redness.   Cardiovascular: no chest pain, palpitations, edema, or murmurs.   Respiratory: no cough or dyspnea.   GI: no heartburn, nausea, or changes in bowel patterns; good appetite.   Skin: no rashes, dryness, itching, or reactions at insulin injection sites.  Neuro: no numbness, tingling, tremors, or vertigo.   Endocrine: + polyuria, polydipsia, polyphagia, heat or cold intolerance.     Vital Signs  There were no vitals taken for this visit.    EXT Hemoglobin A1C   Date Value Ref Range Status   04/12/2019 10.5 % Final     Hemoglobin A1C   Date Value Ref Range Status   01/06/2023 >14.0 (H) 4.0 - 5.6  % Final     Comment:     ADA Screening Guidelines:  5.7-6.4%  Consistent with prediabetes  >or=6.5%  Consistent with diabetes    High levels of fetal hemoglobin interfere with the HbA1C  assay. Heterozygous hemoglobin variants (HbS, HgC, etc)do  not significantly interfere with this assay.   However, presence of multiple variants may affect accuracy.     01/31/2022 >14.0 (H) 4.0 - 5.6 % Final     Comment:     ADA Screening Guidelines:  5.7-6.4%  Consistent with prediabetes  >or=6.5%  Consistent with diabetes    High levels of fetal hemoglobin interfere with the HbA1C  assay. Heterozygous hemoglobin variants (HbS, HgC, etc)do  not significantly interfere with this assay.   However, presence of multiple variants may affect accuracy.     04/29/2021 >14.0 (H) 4.0 - 5.6 % Final     Comment:     ADA Screening Guidelines:  5.7-6.4%  Consistent with prediabetes  >or=6.5%  Consistent with diabetes    High levels of fetal hemoglobin interfere with the HbA1C  assay. Heterozygous hemoglobin variants (HbS, HgC, etc)do  not significantly interfere with this assay.   However, presence of multiple variants may affect accuracy.          Chemistry        Component Value Date/Time     (L) 01/06/2023 0711    K 4.4 01/06/2023 0711     01/06/2023 0711    CO2 20 (L) 01/06/2023 0711    BUN 37 (H) 01/06/2023 0711    CREATININE 1.6 (H) 01/06/2023 0711     (H) 01/06/2023 0711        Component Value Date/Time    CALCIUM 9.1 01/06/2023 0711    ALKPHOS 71 01/06/2023 0711    AST 19 01/06/2023 0711    ALT 20 01/06/2023 0711    BILITOT 0.4 01/06/2023 0711    ESTGFRAFRICA >60.0 01/31/2022 0938    EGFRNONAA >60.0 01/31/2022 0938           Lab Results   Component Value Date    TSH 1.322 01/31/2022      Lab Results   Component Value Date    CHOL 124 01/06/2023    CHOL 105 (L) 01/31/2022    CHOL 110 (L) 04/29/2021     Lab Results   Component Value Date    HDL 33 (L) 01/06/2023    HDL 35 (L) 01/31/2022    HDL 30 (L) 04/29/2021     Lab  Results   Component Value Date    LDLCALC 57.4 (L) 01/06/2023    LDLCALC 31.6 (L) 01/31/2022    LDLCALC 44.2 (L) 04/29/2021     Lab Results   Component Value Date    TRIG 168 (H) 01/06/2023    TRIG 192 (H) 01/31/2022    TRIG 179 (H) 04/29/2021     Lab Results   Component Value Date    CHOLHDL 26.6 01/06/2023    CHOLHDL 33.3 01/31/2022    CHOLHDL 27.3 04/29/2021       PHYSICAL EXAMINATION  Constitutional: Appears well, no distress    Diabetes Foot Exam:   Deferred   Has appropriate footwear.    Assessment/Plan    1. Type 2 diabetes mellitus with hyperglycemia, with long-term current use of insulin  semaglutide (OZEMPIC) 1 mg/dose (4 mg/3 mL)    Ambulatory referral/consult to Diabetes Education    Hemoglobin A1C    Hemoglobin A1C    Microalbumin/Creatinine Ratio, Urine    Hemoglobin A1C      2. Obesity, Class III, BMI 40-49.9 (morbid obesity)        3. Hypertension associated with transplantation  Microalbumin/Creatinine Ratio, Urine      4. Counseling and coordination of care        5. Heart replaced by transplant          1-5. F/u with HTS  A1c urine mac in 6 weeks   A1c in 4 months   A1c  goal less than 7%  Pa needed for ozempic 1 mg weekly ,dexcom bundle   Notify DE for training of cgm   Dexcom g6 will be very beneficial -prepping skin, changing every 10 days will be safe to use  Lispro -change to 16 units before meals with scale 180-230+2, etc, continue lantus 48 units at night  Compliancy is key   Continue metformin  D/c victoza  Consider sglt2i in the future   Discussed dietary habits, stressors   Concerns w/ complications-elevated a1cs chronically   Stress will increase insulin resistance  Inactivity will be a barrier as well  Meal prepping is key as well

## 2023-01-14 ENCOUNTER — TELEPHONE (OUTPATIENT)
Dept: INTERNAL MEDICINE | Facility: CLINIC | Age: 28
End: 2023-01-14
Payer: MEDICAID

## 2023-01-14 NOTE — TELEPHONE ENCOUNTER
PA [id number] Dexcom G6 Sensors was submitted 01/14/2023    PA [id number] for Lantus was on 01/13/2023    PA [id number] for Dexcom G6 transmitter was sent 01/14/2023

## 2023-01-17 ENCOUNTER — TELEPHONE (OUTPATIENT)
Dept: INTERNAL MEDICINE | Facility: CLINIC | Age: 28
End: 2023-01-17
Payer: MEDICAID

## 2023-01-17 NOTE — TELEPHONE ENCOUNTER
Spoke to Yady Laurent who explained their was something that needed clarification - once it was done Lantus Solostar was covered w/o the need of a PA.  Called for [facility] but can't get them on the line. Notified pt of Lantus status and prepared the cmn form for the Dexcom Bundle that Yady faxed today.

## 2023-01-18 ENCOUNTER — TELEPHONE (OUTPATIENT)
Dept: INTERNAL MEDICINE | Facility: CLINIC | Age: 28
End: 2023-01-18
Payer: MEDICAID

## 2023-01-18 NOTE — TELEPHONE ENCOUNTER
Your PA has been resolved, no additional PA is required for Lantus For further inquiries please contact the number on the back of the member prescription card. (Message [phone])

## 2023-01-18 NOTE — TELEPHONE ENCOUNTER
01/17/23 Additional Information Required for [id number] -- sensors.    Your PA for Lantus has been resolved, no additional PA is required. For further inquiries please contact the number on the back of the member prescription card. (Message [phone])

## 2023-01-19 ENCOUNTER — TELEPHONE (OUTPATIENT)
Dept: INTERNAL MEDICINE | Facility: CLINIC | Age: 28
End: 2023-01-19
Payer: MEDICAID

## 2023-01-19 NOTE — TELEPHONE ENCOUNTER
Spoke to Viki wilder w/ [facility] about the PA the Dexcom G6 Bundle for and she explained his insurance is not clearing.  I gave her the Pt ID from him Pharmacy benefit and she said she will re-send it for review.      Notified pt of that information and asked if he would check for a new card that maybe available since Jan 2023.

## 2023-01-30 ENCOUNTER — TELEPHONE (OUTPATIENT)
Dept: INTERNAL MEDICINE | Facility: CLINIC | Age: 28
End: 2023-01-30
Payer: MEDICAID

## 2023-01-30 NOTE — TELEPHONE ENCOUNTER
1001-- Returned the call to notify him the insurer denied the Dexcom G6 bundle/ , transmitter and sensors. Resubmitted PA via medical for DME w/ J-codes A [id number], , and .    PA [id number]  is the ref # explained the pt does use at least two insulins which is included in his ov notes from 01/13/2023.  Pt also verified he has to use ss coverage prior to every meal.  ----- Message from Jose Maria Jo MA sent at 1/30/2023  9:05 AM CST -----  Contact: [phone]  Pt is requesting Decradron which be his initial prescription and would like to speak with nurse in regards to this. Please reach out to pt at [phone].

## 2023-01-30 NOTE — TELEPHONE ENCOUNTER
----- Message from Keeley Christina sent at 1/30/2023  4:06 PM CST -----  Contact: [phone] Hayden Blake is calling to check if fax was received    Please call and advise

## 2023-01-30 NOTE — TELEPHONE ENCOUNTER
----- Message from Melly Jeffrey sent at 1/30/2023  3:14 PM CST -----  Good afternoon! I spoke with mrn [phone] states he is still waiting for dexcom. He asking if anyone can give him a call. Thank you in advance!

## 2023-02-01 ENCOUNTER — TELEPHONE (OUTPATIENT)
Dept: INTERNAL MEDICINE | Facility: CLINIC | Age: 28
End: 2023-02-01
Payer: MEDICAID

## 2023-02-01 DIAGNOSIS — Z94.1 HEART REPLACED BY TRANSPLANT: ICD-10-CM

## 2023-02-01 DIAGNOSIS — I15.8 HYPERTENSION ASSOCIATED WITH TRANSPLANTATION: ICD-10-CM

## 2023-02-01 DIAGNOSIS — Z94.9 HYPERTENSION ASSOCIATED WITH TRANSPLANTATION: ICD-10-CM

## 2023-02-01 RX ORDER — DILTIAZEM HYDROCHLORIDE 180 MG/1
180 CAPSULE, COATED, EXTENDED RELEASE ORAL DAILY
Qty: 30 CAPSULE | Refills: 11 | Status: SHIPPED | OUTPATIENT
Start: 2023-02-01 | End: 2023-12-14

## 2023-02-01 NOTE — TELEPHONE ENCOUNTER
----- Message from Sulma Miranda sent at 2/1/2023 12:52 PM CST -----  Regarding: Medication  Pt is Requesting a Call back Regarding Prior Authorization for Medication   Please call to discuss further.        [phone]

## 2023-02-06 ENCOUNTER — TELEPHONE (OUTPATIENT)
Dept: INTERNAL MEDICINE | Facility: CLINIC | Age: 28
End: 2023-02-06
Payer: MEDICAID

## 2023-02-07 DIAGNOSIS — Z79.4 TYPE 2 DIABETES MELLITUS WITH HYPERGLYCEMIA, WITH LONG-TERM CURRENT USE OF INSULIN: ICD-10-CM

## 2023-02-07 DIAGNOSIS — E11.65 TYPE 2 DIABETES MELLITUS WITH HYPERGLYCEMIA, WITH LONG-TERM CURRENT USE OF INSULIN: ICD-10-CM

## 2023-02-07 RX ORDER — INSULIN LISPRO 100 [IU]/ML
INJECTION, SOLUTION INTRAVENOUS; SUBCUTANEOUS
Qty: 30 ML | Refills: 6 | Status: SHIPPED | OUTPATIENT
Start: 2023-02-07 | End: 2023-05-17

## 2023-02-07 NOTE — TELEPHONE ENCOUNTER
PA denied stating      Your doctor asked [facility] to  cover the drug, Admelog. The health plan will not approve the request. Your doctor must send  information showing the following: reason why you cannot use brand Humalog.    I did include notes for visit on 1/13 about him trying humalog in the past. and lab results

## 2023-02-17 ENCOUNTER — TELEPHONE (OUTPATIENT)
Dept: INTERNAL MEDICINE | Facility: CLINIC | Age: 28
End: 2023-02-17
Payer: MEDICAID

## 2023-02-17 NOTE — TELEPHONE ENCOUNTER
----- Message from Gena Velez sent at 2/17/2023  2:27 PM CST -----  Contact: Mr. Blake @ [facility] # [phone]  Ms. Hayden from [facility] said that he faxed over an authorization form to you on January tenth for the patient and he would like to know what is the status on the form please?

## 2023-02-20 ENCOUNTER — LAB VISIT (OUTPATIENT)
Dept: LAB | Facility: HOSPITAL | Age: 28
End: 2023-02-20
Payer: MEDICAID

## 2023-02-20 DIAGNOSIS — Z79.4 TYPE 2 DIABETES MELLITUS WITH HYPERGLYCEMIA, WITH LONG-TERM CURRENT USE OF INSULIN: ICD-10-CM

## 2023-02-20 DIAGNOSIS — I15.8 HYPERTENSION ASSOCIATED WITH TRANSPLANTATION: ICD-10-CM

## 2023-02-20 DIAGNOSIS — E11.65 TYPE 2 DIABETES MELLITUS WITH HYPERGLYCEMIA, WITH LONG-TERM CURRENT USE OF INSULIN: ICD-10-CM

## 2023-02-20 DIAGNOSIS — Z94.9 HYPERTENSION ASSOCIATED WITH TRANSPLANTATION: ICD-10-CM

## 2023-02-20 LAB
ALBUMIN/CREAT UR: 120.9 UG/MG (ref 0–30)
CREAT UR-MCNC: 115 MG/DL (ref 23–375)
MICROALBUMIN UR DL<=1MG/L-MCNC: 139 UG/ML

## 2023-02-20 PROCEDURE — 82570 ASSAY OF URINE CREATININE: CPT | Performed by: NURSE PRACTITIONER

## 2023-02-27 ENCOUNTER — TELEPHONE (OUTPATIENT)
Dept: TRANSPLANT | Facility: CLINIC | Age: 28
End: 2023-02-27
Payer: MEDICAID

## 2023-02-27 NOTE — TELEPHONE ENCOUNTER
Pt called asking if he could reschedule his appts for tomorrow. He has a conflict appt tomorrow. Message sent to schedulers.

## 2023-02-28 PROBLEM — Z86.16 HISTORY OF COVID-19: Status: ACTIVE | Noted: 2021-02-20

## 2023-03-05 ENCOUNTER — PATIENT MESSAGE (OUTPATIENT)
Dept: INTERNAL MEDICINE | Facility: CLINIC | Age: 28
End: 2023-03-05
Payer: MEDICAID

## 2023-03-07 ENCOUNTER — CLINICAL SUPPORT (OUTPATIENT)
Dept: DIABETES | Facility: CLINIC | Age: 28
End: 2023-03-07
Payer: MEDICAID

## 2023-03-07 DIAGNOSIS — Z79.4 TYPE 2 DIABETES MELLITUS WITH HYPERGLYCEMIA, WITH LONG-TERM CURRENT USE OF INSULIN: ICD-10-CM

## 2023-03-07 DIAGNOSIS — E11.65 TYPE 2 DIABETES MELLITUS WITH HYPERGLYCEMIA, WITH LONG-TERM CURRENT USE OF INSULIN: ICD-10-CM

## 2023-03-07 PROCEDURE — 99211 OFF/OP EST MAY X REQ PHY/QHP: CPT | Mod: PBBFAC

## 2023-03-07 PROCEDURE — 99999 PR PBB SHADOW E&M-EST. PATIENT-LVL I: ICD-10-PCS | Mod: PBBFAC,,,

## 2023-03-07 PROCEDURE — 99999 PR PBB SHADOW E&M-EST. PATIENT-LVL I: CPT | Mod: PBBFAC,,,

## 2023-03-07 PROCEDURE — G0108 DIAB MANAGE TRN  PER INDIV: HCPCS | Mod: PBBFAC

## 2023-03-08 ENCOUNTER — PATIENT MESSAGE (OUTPATIENT)
Dept: INTERNAL MEDICINE | Facility: CLINIC | Age: 28
End: 2023-03-08
Payer: MEDICAID

## 2023-03-09 ENCOUNTER — PATIENT MESSAGE (OUTPATIENT)
Dept: RESEARCH | Facility: HOSPITAL | Age: 28
End: 2023-03-09
Payer: MEDICAID

## 2023-03-09 NOTE — PROGRESS NOTES
Diabetes Care Specialist Progress Note  Author: Maryann Messina RN, CDE  Date: 3/7/2023    Program Intake  Reason for Diabetes Program Visit:: Intervention  Type of Intervention:: Individual  Individual: Device Training  Device Training: Personal CGM  Current diabetes risk level:: high  In the last 12 months, have you:: used emergency room services  Was the ER or hospital admission related to diabetes?: No  Permission to speak with others about care:: no    Lab Results   Component Value Date    HGBA1C 10.6 (H) 02/20/2023       Clinical  Clinical Assessment  Current Diabetes Treatment: Injectable, Insulin (Lantus 48 units; Humalog 16 units plus s/s 19=80 +2; Ozempic 1 mg weekly)  Have you ever experienced hypoglycemia (low blood sugar)?: no  Have you ever experienced hyperglycemia (high blood sugar)?: no    Medication Information  How do you obtain your medications?: Patient drives  How many days a week do you miss your medications?: Never    Additional Social History    Support  Does anyone support you with your diabetes care?: yes  Who supports you?: family member  Who takes you to your medical appointments?: self  Does the current support meet the patient's needs?: Yes  Is Support an area impacting ability to self-manage diabetes?: No    Access to Mass Media & Technology  Does the patient have access to any of the following devices or technologies?: Smart phone, Internet Access  Media or technology needs impacting ability to self-manage diabetes?: No    Cognitive/Behavioral Health  Alert and Oriented: Yes  Requires assistance for routine expression?: No  Cognitive or behavioral barriers impacting ability to self-manage diabetes?: No    Communication  Language preference: English  Hearing Problems: No  Vision Problems: No  Communication needs impacting ability to self-manage diabetes?: No    Health Literacy  Preferred Learning Method: Face to Face, Reading Materials, Demonstration  How often do you need to have  someone help you read instructions, pamphlets, or written material from your doctor or pharmacy?: Never  Health literacy needs impacting ability to self-manage diabetes?: No      Diabetes Self-Management Skills Assessment    Diabetes Disease Process/Treatment Options  Patient/caregiver able to state what happens when someone has diabetes.: yes  Patient/caregiver knows what type of diabetes they have.: yes  Diabetes Type : Type II  Diabetes Disease Process/Treatment Options: Skills Assessment Completed: Yes  Assessment indicates:: Knowledge deficit  Area of need?: Yes    Nutrition/Healthy Eating  Nutrition/Healthy Eating Skills Assessment Completed:: No  Deffered due to:: Time    Physical Activity/Exercise  Physical Activity/Exercise Skills Assessment Completed: : No  Deffered due to:: Time    Medications  Patient is able to describe current diabetes management routine.: yes  Diabetes management routine:: insulin pump, injectable medications  Patient is able to identify current diabetes medications, dosages, and appropriate timing of medications.: yes  Patient understands the purpose of the medications taken for diabetes.: yes  Medication Skills Assessment Completed:: Yes  Assessment indicates:: Adequate understanding  Area of need?: No    Home Blood Glucose Monitoring  Patient states that blood sugar is checked at home daily.: no  Reasons for not monitoring:: needs training  Home Blood Glucose Monitoring Skills Assessment Completed: : Yes  Assessment indicates:: Instruction Needed (Dexcom training required)  Area of need?: Yes    Acute Complications  Patient is able to identify types of acute complications: Yes  Patient Identified:: Hypoglycemia  Patient is able to state the basic meaning of hypoglycemia?: Yes  Able to state the blood sugar range for hypoglycemia?: yes  Patient stated range:: less than 70  Patient can identify general symptoms of hypoglycemia: yes  Patient identified:: sweating, shakiness  Able to  state proper treatment of hypoglycemia?: yes  Patient identified:: 5-6 pieces of hard candy  Acute Complications Skills Assessment Completed: : Yes  Assessment indicates:: Instruction Needed  Area of need?: Yes    Chronic Complications  Chronic Complications Skills Assessment Completed: : No  Deferred due to:: Time    Psychosocial/Coping  Psychosocial/Coping Skills Assessment Completed: : No  Deffered due to:: Time    Assessment Summary and Plan    Based on today's diabetes care assessment, the following areas of need were identified:      Social 3/7/2023   Support No   Access to Mass Media/Tech No   Cognitive/Behavioral Health No   Communication No   Health Literacy No              Diabetes Self-Management Skills 3/7/2023   Diabetes Disease Process/Treatment Options Yes, Discussed significance of current A1c and blood glucose goals.   Medication No   Home Blood Glucose Monitoring Yes, see care planning   Acute Complications Yes - Reviewed blood glucose goals, prevention, detection, signs and symptoms, and treatment of hypoglycemia following rule of 15 and hyperglycemia, and when to contact the clinic. Advised to carry a source of fast acting carbs.  Glucose was checked in clinic due to hypoglycemic symptoms.  Blood glucose was 71, patient was provided with glucose.  Recheck in 20 mins bg was up to 143.  States had not eaten and just got back from the gym.  Advised to carry glucose tabs at all times.           Today's interventions were provided through individual discussion, instruction, and written materials were provided.      Patient verbalized understanding of instruction and written materials.  Pt was able to return back demonstration of instructions today. Patient understood key points, needs reinforcement and further instruction.     Diabetes Self-Management Care Plan:    Today's Diabetes Self-Management Care Plan was developed with Derrick CARDOZA's input. Derrick CARDOZA has agreed to work toward the following  "goal(s) to improve his/her overall diabetes control.      Care Plan: Diabetes Management   Updates made since 2/7/2023 12:00 AM        Problem: Blood Glucose Self-Monitoring         Goal: Patient agrees to check and record blood sugars using the Dexcom G6 system    Start Date: 3/7/2023   Expected End Date: 5/8/2023   Priority: High   Barriers: Knowledge deficit   Note:    DEXCOM G6 CGM TRAINING     Patient referred to clinic today for Dexcom G6 continuous glucose sensor system training.  Patient arrived with  fully charged and Dexcom G6 mobile kylie downloaded to phone. Education was provided using "Quick Start Guide" per Dexcom protocol.     Pt will be using his phone  as the primary .  Overview:  5min glucose reading updates, trending arrows, BG graph screens, battery life indicator, Blue Tooth Symbol.  Menus: trend Graph, start sensor, enter BG, events, Alerts, Settings, Shutdown, Stop Sensor   Settings:                          * Urgent Low: 55 mg/dL                          * Urgent Low Soon: on                          * Low alert: 80 mg/dl repeat 15 min                          * High alert: 250 mg/dl repeat 30 min                          * Rise rate: off                          * Fall Rate: off                          * Signal Loss: on repeat 20 min                          * No Reading: on repeat 20 min                          * Always sound: on                          * Alert Schedule:  (instructed on how to set up alert schedule if desired)     Reviewed additional setting options with patient, including Graph Height and Transmitter ID. Transmitter was paired with .    Reviewed where to find sensor insertion time and sensor expiration date.   Discussed no need to calibrate sensor during the entirety of the 10 day wear. Discussed that pt can calibrate sensor if desired, but at that time she will need to continue calibrating every 12 hours for sensor to remain accurate. " Reviewed appropriate calibration techniques.  Reviewed sensor site selection. Site selected and prepped using aseptic technique Inserted to abdomin. Transmitter placed in pod and secured.  Practiced sensor pod/transmitter removal from site, and removal of transmitter from sensor pod.  Patient able to demonstrate without difficulty.  Encouraged to review manual prior to starting another sensor.   Reviewed problem solving aspects of sensor transmission/ variables that can disrupt RF transmission.  range 20 feet, but the first 3 hrs keep within 3 feet of transmitter.  Pt instructed on lag time of interstitial fluid from CBG and was advised to tx hypoglycemia and dose insulin based on SMBG values.  Dexcom technical support contact number given and examples of when to contact them discussed. Pt will download software at home for Dexcom download.   Patient advised to always check glucose with glucometer should readings do not match symptoms felt (ex. Hypo or hyperglycemia).  Clarity code obtained [id number]                Follow Up Plan     F/u scheduled for 5/8/2023.  Plan review CGM results and complete assessment    Today's care plan and follow up schedule was discussed with patient.  Derrick CARDOZA verbalized understanding of the care plan, goals, and agrees to follow up plan.        The patient was encouraged to communicate with his/her health care provider/physician and care team regarding his/her condition(s) and treatment.  I provided the patient with my contact information today and encouraged to contact me via phone or [facility] Patient Portal as needed.     Length of Visit   Total Time: 60 Minutes

## 2023-03-10 DIAGNOSIS — Z94.1 HEART REPLACED BY TRANSPLANT: Primary | ICD-10-CM

## 2023-03-13 ENCOUNTER — LAB VISIT (OUTPATIENT)
Dept: LAB | Facility: HOSPITAL | Age: 28
End: 2023-03-13
Payer: MEDICAID

## 2023-03-13 DIAGNOSIS — Z94.1 HEART REPLACED BY TRANSPLANT: ICD-10-CM

## 2023-03-13 LAB — TACROLIMUS BLD-MCNC: 9.1 NG/ML (ref 5–15)

## 2023-03-13 PROCEDURE — 80197 ASSAY OF TACROLIMUS: CPT | Performed by: INTERNAL MEDICINE

## 2023-03-13 PROCEDURE — 36415 COLL VENOUS BLD VENIPUNCTURE: CPT | Performed by: INTERNAL MEDICINE

## 2023-03-21 DIAGNOSIS — Z94.1 HEART REPLACED BY TRANSPLANT: ICD-10-CM

## 2023-03-29 ENCOUNTER — PATIENT MESSAGE (OUTPATIENT)
Dept: TRANSPLANT | Facility: CLINIC | Age: 28
End: 2023-03-29
Payer: MEDICAID

## 2023-03-29 DIAGNOSIS — Z94.1 HEART REPLACED BY TRANSPLANT: Primary | ICD-10-CM

## 2023-03-31 ENCOUNTER — TELEPHONE (OUTPATIENT)
Dept: TRANSPLANT | Facility: CLINIC | Age: 28
End: 2023-03-31
Payer: MEDICAID

## 2023-03-31 ENCOUNTER — DOCUMENTATION ONLY (OUTPATIENT)
Dept: TRANSPLANT | Facility: CLINIC | Age: 28
End: 2023-03-31
Payer: MEDICAID

## 2023-03-31 NOTE — TELEPHONE ENCOUNTER
Faxed Rx refill for Myfortic 360 mg, take 2 tabs twice daily #120 w/ 11 refills per Dr. Donahue to [facility].

## 2023-04-10 ENCOUNTER — PATIENT OUTREACH (OUTPATIENT)
Dept: ADMINISTRATIVE | Facility: HOSPITAL | Age: 28
End: 2023-04-10
Payer: MEDICAID

## 2023-04-10 NOTE — PROGRESS NOTES
Health Maintenance Due   Topic Date Due    Pneumococcal Vaccines (Age 0-64) (1 - PCV) Never done    Eye Exam  Never done    HPV Vaccines (1 - Risk male 3-dose series) Never done    Foot Exam  03/25/2020    COVID-19 Vaccine (4 - Booster for Pfizer series) 10/18/2021      Chart reviewed. Triggered LINKS. Updated Care Everywhere.     Sandro Warner CMA  Population Health Care Coordinator  Primary Care Team

## 2023-04-24 ENCOUNTER — TELEPHONE (OUTPATIENT)
Dept: INTERNAL MEDICINE | Facility: CLINIC | Age: 28
End: 2023-04-24
Payer: MEDICAID

## 2023-04-24 NOTE — TELEPHONE ENCOUNTER
----- Message from TARYN Hermosillo, PRINCE sent at 4/21/2023  6:26 PM CDT -----  Regarding: wed virtuals  Offer reschedule on a we  He has done virtual  Thanks  IB

## 2023-04-25 DIAGNOSIS — Z94.9 HYPERTENSION ASSOCIATED WITH TRANSPLANTATION: ICD-10-CM

## 2023-04-25 DIAGNOSIS — I15.8 HYPERTENSION ASSOCIATED WITH TRANSPLANTATION: ICD-10-CM

## 2023-04-25 DIAGNOSIS — Z94.1 HEART REPLACED BY TRANSPLANT: ICD-10-CM

## 2023-04-25 RX ORDER — LISINOPRIL 5 MG/1
5 TABLET ORAL DAILY
Qty: 30 TABLET | Refills: 2 | Status: SHIPPED | OUTPATIENT
Start: 2023-04-25 | End: 2023-04-26

## 2023-04-26 DIAGNOSIS — Z94.9 HYPERTENSION ASSOCIATED WITH TRANSPLANTATION: ICD-10-CM

## 2023-04-26 DIAGNOSIS — I15.8 HYPERTENSION ASSOCIATED WITH TRANSPLANTATION: ICD-10-CM

## 2023-04-26 DIAGNOSIS — Z94.1 HEART REPLACED BY TRANSPLANT: ICD-10-CM

## 2023-04-26 RX ORDER — LISINOPRIL 5 MG/1
5 TABLET ORAL DAILY
Qty: 30 TABLET | Refills: 11 | Status: SHIPPED | OUTPATIENT
Start: 2023-04-26 | End: 2023-05-01

## 2023-05-01 ENCOUNTER — NUTRITION (OUTPATIENT)
Dept: TRANSPLANT | Facility: CLINIC | Age: 28
End: 2023-05-01
Payer: MEDICAID

## 2023-05-01 ENCOUNTER — OFFICE VISIT (OUTPATIENT)
Dept: TRANSPLANT | Facility: CLINIC | Age: 28
End: 2023-05-01
Payer: MEDICAID

## 2023-05-01 ENCOUNTER — PATIENT MESSAGE (OUTPATIENT)
Dept: INTERNAL MEDICINE | Facility: CLINIC | Age: 28
End: 2023-05-01
Payer: MEDICAID

## 2023-05-01 ENCOUNTER — HOSPITAL ENCOUNTER (OUTPATIENT)
Dept: CARDIOLOGY | Facility: HOSPITAL | Age: 28
Discharge: HOME | End: 2023-05-01
Attending: INTERNAL MEDICINE
Payer: MEDICAID

## 2023-05-01 VITALS
WEIGHT: 225.5 LBS | BODY MASS INDEX: 36.82 KG/M2 | HEART RATE: 99 BPM | HEIGHT: 65 IN | WEIGHT: 221 LBS | HEIGHT: 65 IN | BODY MASS INDEX: 37.57 KG/M2 | DIASTOLIC BLOOD PRESSURE: 105 MMHG | SYSTOLIC BLOOD PRESSURE: 160 MMHG

## 2023-05-01 DIAGNOSIS — D84.9 IMMUNOSUPPRESSION: ICD-10-CM

## 2023-05-01 DIAGNOSIS — Z94.1 HEART TRANSPLANT RECIPIENT: ICD-10-CM

## 2023-05-01 DIAGNOSIS — Z94.1 STATUS POST HEART TRANSPLANT: ICD-10-CM

## 2023-05-01 DIAGNOSIS — R63.5 WEIGHT GAIN: Primary | ICD-10-CM

## 2023-05-01 DIAGNOSIS — E11.65 TYPE 2 DIABETES MELLITUS WITH HYPERGLYCEMIA, WITHOUT LONG-TERM CURRENT USE OF INSULIN: ICD-10-CM

## 2023-05-01 DIAGNOSIS — E66.01 OBESITY, CLASS III, BMI 40-49.9 (MORBID OBESITY): ICD-10-CM

## 2023-05-01 DIAGNOSIS — Z94.1 HEART REPLACED BY TRANSPLANT: Primary | ICD-10-CM

## 2023-05-01 PROBLEM — T86.22: Status: RESOLVED | Noted: 2019-08-08 | Resolved: 2023-05-01

## 2023-05-01 LAB
ASCENDING AORTA: 3.02 CM
BSA FOR ECHO PROCEDURE: 2.14 M2
CV ECHO LV RWT: 0.46 CM
CV STRESS BASE HR: 83 BPM
DIASTOLIC BLOOD PRESSURE: 96 MMHG
DOP CALC LVOT AREA: 4.3 CM2
DOP CALC LVOT DIAMETER: 2.33 CM
DOP CALC LVOT PEAK VEL: 0.8 M/S
DOP CALC LVOT STROKE VOLUME: 62.14 CM3
DOP CALCLVOT PEAK VEL VTI: 14.58 CM
E WAVE DECELERATION TIME: 156.72 MSEC
E/A RATIO: 1.68
E/E' RATIO: 9.88 M/S
ECHO LV POSTERIOR WALL: 1.09 CM (ref 0.6–1.1)
EJECTION FRACTION: 55 %
FRACTIONAL SHORTENING: 31 % (ref 28–44)
INTERVENTRICULAR SEPTUM: 1.17 CM (ref 0.6–1.1)
IVRT: 65.65 MSEC
LEFT INTERNAL DIMENSION IN SYSTOLE: 3.25 CM (ref 2.1–4)
LEFT VENTRICLE DIASTOLIC VOLUME INDEX: 49.45 ML/M2
LEFT VENTRICLE DIASTOLIC VOLUME: 101.87 ML
LEFT VENTRICLE MASS INDEX: 94 G/M2
LEFT VENTRICLE SYSTOLIC VOLUME INDEX: 20.6 ML/M2
LEFT VENTRICLE SYSTOLIC VOLUME: 42.53 ML
LEFT VENTRICULAR INTERNAL DIMENSION IN DIASTOLE: 4.69 CM (ref 3.5–6)
LEFT VENTRICULAR MASS: 194.07 G
LV LATERAL E/E' RATIO: 8.4 M/S
LV SEPTAL E/E' RATIO: 12 M/S
MV PEAK A VEL: 0.5 M/S
MV PEAK E VEL: 0.84 M/S
MV STENOSIS PRESSURE HALF TIME: 45.45 MS
MV VALVE AREA P 1/2 METHOD: 4.84 CM2
OHS CV CPX 1 MINUTE RECOVERY HEART RATE: 146 BPM
OHS CV CPX 85 PERCENT MAX PREDICTED HEART RATE MALE: 165
OHS CV CPX ESTIMATED METS: 11
OHS CV CPX MAX PREDICTED HEART RATE: 194
OHS CV CPX PATIENT IS FEMALE: 0
OHS CV CPX PATIENT IS MALE: 1
OHS CV CPX PEAK DIASTOLIC BLOOD PRESSURE: 65 MMHG
OHS CV CPX PEAK HEAR RATE: 187 BPM
OHS CV CPX PEAK RATE PRESSURE PRODUCT: ABNORMAL
OHS CV CPX PEAK SYSTOLIC BLOOD PRESSURE: 184 MMHG
OHS CV CPX PERCENT MAX PREDICTED HEART RATE ACHIEVED: 96
OHS CV CPX RATE PRESSURE PRODUCT PRESENTING: ABNORMAL
PISA TR MAX VEL: 2.46 M/S
RA PRESSURE: 3 MMHG
RIGHT VENTRICULAR END-DIASTOLIC DIMENSION: 3.89 CM
RV TISSUE DOPPLER FREE WALL SYSTOLIC VELOCITY 1 (APICAL 4 CHAMBER VIEW): 8.98 CM/S
SINUS: 3.24 CM
STJ: 2.77 CM
STRESS ECHO POST EXERCISE DUR MIN: 6 MINUTES
STRESS ECHO POST EXERCISE DUR SEC: 41 SECONDS
SYSTOLIC BLOOD PRESSURE: 141 MMHG
TDI LATERAL: 0.1 M/S
TDI SEPTAL: 0.07 M/S
TDI: 0.09 M/S
TR MAX PG: 24 MMHG
TRICUSPID ANNULAR PLANE SYSTOLIC EXCURSION: 1.91 CM
TV REST PULMONARY ARTERY PRESSURE: 27 MMHG

## 2023-05-01 PROCEDURE — 3060F PR POS MICROALBUMINURIA RESULT DOCUMENTED/REVIEW: ICD-10-PCS | Mod: CPTII,,, | Performed by: INTERNAL MEDICINE

## 2023-05-01 PROCEDURE — 93351 STRESS TTE COMPLETE: CPT | Mod: 26,,, | Performed by: INTERNAL MEDICINE

## 2023-05-01 PROCEDURE — 3008F BODY MASS INDEX DOCD: CPT | Mod: CPTII,,, | Performed by: INTERNAL MEDICINE

## 2023-05-01 PROCEDURE — 93351 STRESS TTE COMPLETE: CPT

## 2023-05-01 PROCEDURE — 1159F MED LIST DOCD IN RCRD: CPT | Mod: CPTII,,, | Performed by: INTERNAL MEDICINE

## 2023-05-01 PROCEDURE — 3080F DIAST BP >= 90 MM HG: CPT | Mod: CPTII,,, | Performed by: INTERNAL MEDICINE

## 2023-05-01 PROCEDURE — 3077F SYST BP >= 140 MM HG: CPT | Mod: CPTII,,, | Performed by: INTERNAL MEDICINE

## 2023-05-01 PROCEDURE — 4010F PR ACE/ARB THEARPY RXD/TAKEN: ICD-10-PCS | Mod: CPTII,,, | Performed by: INTERNAL MEDICINE

## 2023-05-01 PROCEDURE — 3066F PR DOCUMENTATION OF TREATMENT FOR NEPHROPATHY: ICD-10-PCS | Mod: CPTII,,, | Performed by: INTERNAL MEDICINE

## 2023-05-01 PROCEDURE — 99214 OFFICE O/P EST MOD 30 MIN: CPT | Mod: S$PBB,,, | Performed by: INTERNAL MEDICINE

## 2023-05-01 PROCEDURE — 3060F POS MICROALBUMINURIA REV: CPT | Mod: CPTII,,, | Performed by: INTERNAL MEDICINE

## 2023-05-01 PROCEDURE — 99999 PR PBB SHADOW E&M-EST. PATIENT-LVL IV: CPT | Mod: PBBFAC,,, | Performed by: INTERNAL MEDICINE

## 2023-05-01 PROCEDURE — 3077F PR MOST RECENT SYSTOLIC BLOOD PRESSURE >= 140 MM HG: ICD-10-PCS | Mod: CPTII,,, | Performed by: INTERNAL MEDICINE

## 2023-05-01 PROCEDURE — 99214 PR OFFICE/OUTPT VISIT, EST, LEVL IV, 30-39 MIN: ICD-10-PCS | Mod: S$PBB,,, | Performed by: INTERNAL MEDICINE

## 2023-05-01 PROCEDURE — 3044F HG A1C LEVEL LT 7.0%: CPT | Mod: CPTII,,, | Performed by: INTERNAL MEDICINE

## 2023-05-01 PROCEDURE — 4010F ACE/ARB THERAPY RXD/TAKEN: CPT | Mod: CPTII,,, | Performed by: INTERNAL MEDICINE

## 2023-05-01 PROCEDURE — 93351 STRESS ECHO (CUPID ONLY): ICD-10-PCS | Mod: 26,,, | Performed by: INTERNAL MEDICINE

## 2023-05-01 PROCEDURE — 3044F PR MOST RECENT HEMOGLOBIN A1C LEVEL <7.0%: ICD-10-PCS | Mod: CPTII,,, | Performed by: INTERNAL MEDICINE

## 2023-05-01 PROCEDURE — 99214 OFFICE O/P EST MOD 30 MIN: CPT | Mod: PBBFAC,25 | Performed by: INTERNAL MEDICINE

## 2023-05-01 PROCEDURE — 1159F PR MEDICATION LIST DOCUMENTED IN MEDICAL RECORD: ICD-10-PCS | Mod: CPTII,,, | Performed by: INTERNAL MEDICINE

## 2023-05-01 PROCEDURE — 99999 PR PBB SHADOW E&M-EST. PATIENT-LVL IV: ICD-10-PCS | Mod: PBBFAC,,, | Performed by: INTERNAL MEDICINE

## 2023-05-01 PROCEDURE — 3008F PR BODY MASS INDEX (BMI) DOCUMENTED: ICD-10-PCS | Mod: CPTII,,, | Performed by: INTERNAL MEDICINE

## 2023-05-01 PROCEDURE — 3066F NEPHROPATHY DOC TX: CPT | Mod: CPTII,,, | Performed by: INTERNAL MEDICINE

## 2023-05-01 PROCEDURE — 3080F PR MOST RECENT DIASTOLIC BLOOD PRESSURE >= 90 MM HG: ICD-10-PCS | Mod: CPTII,,, | Performed by: INTERNAL MEDICINE

## 2023-05-01 RX ORDER — LISINOPRIL 10 MG/1
10 TABLET ORAL DAILY
Qty: 90 TABLET | Refills: 3 | Status: SHIPPED | OUTPATIENT
Start: 2023-05-01 | End: 2024-04-30

## 2023-05-01 NOTE — PROGRESS NOTES
Pt here for dse. Pt stated he prefers to walk on treadmill. Walked two years ago and reached target hr. Last year had dse and did not tolerate well. Pt stated he is active and exercises. 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 Per pt request he will walk on treadmill. Dse and ex2d explained and importance of target hr. Verbalizes understanding.

## 2023-05-01 NOTE — PROGRESS NOTES
"                                                                                       Advanced Heart Failure and Transplantation Clinic Follow up.        Attending Physician: Jonathon Patricia MD.  The patient's last visit with me was on 1/31/2022.         HPI.  Mr. Montanez is a 24 y.o. year old Black or  male who received a  heart transplant on 3/17/09.       CMV status:   Donor: +  Recipient: +     HPI   24 y.o. AAM who developed myocarditis at age 6, ultimately underwent transplant at [facility] on 3/17/2009, who shared care between [facility] and [facility] but now is following with us, here for routine follow-up visit 9-years post-transplant. Patient remains on prednisone 2.5 mg despite being this far out, which per the [facility] snapshot is "due to clinic noncompliance and infrequent FU in [place]."   Patient still doing well. Patient denies N/V/F/C, lightheadedness, dizziness, PND, orthopnea, LE edema, abdominal pain, abdominal pressure, chest pain, chest pressure, syncope, pre-syncope.     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. Denies CV symptoms.    Review of Systems   Constitutional:  Negative for chills, diaphoresis and fever.   HENT:  Negative for nasal congestion, rhinorrhea and sore throat.    Eyes:  Negative for visual disturbance.   Respiratory:  Negative for cough, choking, chest tightness and shortness of breath.    Cardiovascular:  Negative for chest pain, palpitations and leg swelling.   Gastrointestinal:  Negative for abdominal pain, diarrhea, nausea and vomiting.   Genitourinary:  Negative for difficulty urinating, dysuria and hematuria.   Integumentary:  Negative for rash.   Neurological:  Negative for seizures, syncope and light-headedness.   Psychiatric/Behavioral:  Negative for agitation and hallucinations.       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 CATH LAB;  Service: Cardiology;;    CARDIAC SURGERY  2009    OHT at [facility]    CATHETERIZATION OF BOTH LEFT AND RIGHT HEART Right 8/16/2019    Procedure: CATHETERIZATION, HEART, BOTH LEFT AND RIGHT;  Surgeon: Thien Hayes MD;  Location: [facility] CATH LAB;  Service: Cardiology;  Laterality: Right;    CYSTOSCOPY W/ URETERAL STENT PLACEMENT Right 8/21/2020    Procedure: CYSTOSCOPY, WITH URETERAL STENT INSERTION;  Surgeon: Tutu Jolley MD;  Location: [facility] OR [address] [place] [address];  Service: Urology;  Laterality: Right;    heart tx      TONSILLECTOMY      URETEROSCOPY Right 8/21/2020    Procedure: URETEROSCOPY;  Surgeon: Tutu Jolley MD;  Location: [facility] OR [address] [place] [address];  Service: Urology;  Laterality: Right;        Family History   Problem Relation Age of Onset    No Known Problems Mother     No Known Problems Father     Colon cancer Neg Hx     Cirrhosis Neg Hx     Celiac disease Neg Hx     Crohn's disease Neg Hx     Ulcerative colitis Neg Hx     Rectal cancer Neg Hx     Liver cancer Neg Hx     Irritable bowel syndrome Neg Hx     Esophageal cancer Neg Hx     Stomach cancer Neg Hx     Melanoma Neg Hx         Review of patient's allergies indicates:   Allergen Reactions    Pneumovax 23 [pneumococcal 23-yo ps vaccine] Swelling and Other (See Comments)     Swelling at site and Pneumonia like Sx. Pt spent x1 week in hospital    Nsaids (non-steroidal anti-inflammatory drug) Other (See Comments)        Current Outpatient Medications   Medication Instructions    amoxicillin (AMOXIL) 2,000 mg, Oral, On Call Procedure    aspirin (ECOTRIN) 81 mg, Oral, Daily    BLOOD PRESSURE CUFF Misc Please provide pt with one blood pressure cuff to check  "his BP twice a day.    blood sugar diagnostic Strp To check BG 4 times daily, to use with insurance preferred meter, e 11.65    blood-glucose meter kit To check BG 4 times daily, to use with insurance preferred meter, e 11.65    blood-glucose meter,continuous (DEXCOM G6 ) Misc Use as directed.    blood-glucose sensor (DEXCOM G6 SENSOR) Janelle Change every 10 days    blood-glucose transmitter (DEXCOM G6 TRANSMITTER) Janelle Change every 3 months. E 11.65    dicyclomine (BENTYL) 10 MG capsule TAKE 1 CAPSULE BY MOUTH TWICE DAILY    diltiaZEM (CARDIZEM CD) 180 mg, Oral, Daily    ergocalciferol (ERGOCALCIFEROL) 50,000 Units, Oral, Every 7 days    EScitalopram oxalate (LEXAPRO) 10 mg, Oral, Daily    esomeprazole (NEXIUM) 20 mg, Oral, Before breakfast    insulin (LANTUS SOLOSTAR U-100 INSULIN) glargine 100 units/mL SubQ pen ADMINISTER  48 UNITS UNDER THE SKIN AT NIGHT    insulin lispro (HUMALOG KWIKPEN INSULIN) 100 unit/mL pen Humalog brand, inject 16 units before meals plus scale 180-230+2, 231-280+4, 281-330 +6, 331-380+8, >380+10. Max daily 78 units. May substitute humalog, same dosing, e 11.65    lancets Misc To check BG 4 times daily, to use with insurance preferred meter, e 11.65    lisinopriL (PRINIVIL,ZESTRIL) 5 mg, Oral, Daily    metFORMIN (GLUCOPHAGE) 500 mg, Oral, 2 times daily with meals    MYFORTIC 360 mg TbE TAKE 2 TABLETS BY MOUTH TWICE DAILY    OZEMPIC 1 mg, Subcutaneous, Every 7 days    pen needle, diabetic (BD ULTRA-FINE KARIME PEN NEEDLE) 32 gauge x 5/32" Ndle Uses 5 times a day. 90 day    pravastatin (PRAVACHOL) 20 MG tablet TAKE 1 TABLET BY MOUTH EVERY NIGHT    PROGRAF 1 mg Cap TAKE 1 CAPSULE BY MOUTH EVERY MORNING WITH (1) 5mg capsule (6 MG total) & TAKE 2 CAPSULES EVERY EVENING WITH (1) 5mg capsule (7mg total)    PROGRAF 5 mg Cap TAKE 1 CAPSULE BY MOUTH Along with 1 mg capsule in the am and 2 (1 mg) capsules in the pm for total dose of 6 mg in the am and 7 mg in the pm.    topiramate (TOPAMAX) 25 " "mg, Oral, Nightly        There were no vitals filed for this visit.     Wt Readings from Last 3 Encounters:   05/01/23 100.2 kg (221 lb)   01/31/22 104.3 kg (230 lb)   01/31/22 100.3 kg (221 lb 1.9 oz)     Temp Readings from Last 3 Encounters:   01/12/22 99 °F (37.2 °C) (Oral)   01/10/22 97.7 °F (36.5 °C)   02/20/21 99.1 °F (37.3 °C) (Oral)     BP Readings from Last 3 Encounters:   05/01/23 (!) 160/105   01/31/22 (!) 144/106   01/31/22 (!) 163/112     Pulse Readings from Last 3 Encounters:   01/31/22 84   01/31/22 91   01/12/22 85        There is no height or weight on file to calculate BMI. Estimated body surface area is 2.14 meters squared as calculated from the following:    Height as of an earlier encounter on 5/1/23: 5' 5" (1.651 m).    Weight as of an earlier encounter on 5/1/23: 100.2 kg (221 lb).     Physical Exam  Constitutional:       Appearance: He is well-developed.   HENT:      Head: Normocephalic and atraumatic.      Right Ear: External ear normal.      Left Ear: External ear normal.   Eyes:      Conjunctiva/sclera: Conjunctivae normal.      Pupils: Pupils are equal, round, and reactive to light.   Neck:      Vascular: No hepatojugular reflux or JVD.   Cardiovascular:      Rate and Rhythm: Normal rate and regular rhythm.      Pulses: Intact distal pulses.      Heart sounds: S1 normal and S2 normal. No murmur heard.    No friction rub. No gallop.   Pulmonary:      Effort: Pulmonary effort is normal.      Breath sounds: Normal breath sounds.   Abdominal:      General: Bowel sounds are normal. There is no distension.      Palpations: Abdomen is soft.      Tenderness: There is no abdominal tenderness. There is no guarding or rebound.   Musculoskeletal:      Cervical back: Normal range of motion and neck supple.      Right lower leg: No edema.      Left lower leg: No edema.   Neurological:      Mental Status: He is alert and oriented to person, place, and time.        Lab Results   Component Value Date    " BNP 28 05/01/2023     05/01/2023    K 4.3 05/01/2023    MG 1.5 (L) 01/06/2023     05/01/2023    CO2 24 05/01/2023    BUN 24 (H) 05/01/2023    CREATININE 1.3 05/01/2023     05/01/2023    HGBA1C 6.9 (H) 05/01/2023    HGBA1C 10.5 04/12/2019    AST 13 05/01/2023    ALT 16 05/01/2023    ALBUMIN 3.6 05/01/2023    PROT 6.9 05/01/2023    BILITOT 0.4 05/01/2023    WBC 11.55 05/01/2023    HGB 14.5 05/01/2023    HCT 44.2 05/01/2023     05/01/2023    INR 1.0 02/20/2021     (H) 02/20/2021    TSH 1.322 01/31/2022    CHOL 124 01/06/2023    HDL 33 (L) 01/06/2023    LDLCALC 57.4 (L) 01/06/2023    TRIG 168 (H) 01/06/2023    S1BKDVP 6.2 07/27/2020    TACROLIMUS 9.1 03/13/2023    ALLOMAP 26 05/18/2017           Results for orders placed during the hospital encounter of 08/08/19    Transthoracic echo (TTE) complete (Cupid Only)    Interpretation Summary  · Patient is s/p cardiac transplant.  · Normal left ventricular systolic function. The estimated ejection fraction is 65%  · Indeterminate left ventricular diastolic function.  · No wall motion abnormalities.  · Normal right ventricular systolic function.  · Mild tricuspid regurgitation.  · The estimated PA systolic pressure is 25 mm Hg  · Normal central venous pressure (3 mm Hg).        Results for orders placed during the hospital encounter of 08/16/19    Cardiac catheterization    Conclusion  · LVEDP (Pre): 25  · Normal coronary arteries by angiography  · RHC data below  · 5 biospy specimens obtained  · Estimated blood loss: <50 mL         Assessment and Plan:  Heart replaced by transplant    Heart transplant recipient    Type 2 diabetes mellitus with hyperglycemia, without long-term current use of insulin    Obesity, Class III, BMI 40-49.9 (morbid obesity)    Immunosuppression    Other orders  -     lisinopriL 10 MG tablet; Take 1 tablet (10 mg total) by mouth once daily.  Dispense: 90 tablet; Refill: 3             Negative DSE today.  Normal graft  function.  No CV symptoms.    All labs normal. BNP < 100.  Tacro level 5.8.   Myfortic 720 mg BID.    No changes on transplant meds today.  Routine post transplant care, including cancer screening.    Hypertension: uncontrolled. Plan to increase lisinopril from 5 mg to 10 mg daily.     Return instructions as set forth by post transplant schedule or as needed:     Clinic: Return for labs and/or biopsy weekly the first month, every two weeks during month 2 and then monthly for the first year at the provider or coordinator's discretion. During the second year, return to clinic every 3 months. Post transplant year 3-5 return every 6 months. There will be a comprehensive post transplant evaluation every year that may include LHC/RHC/biopsy, stress test, echo, CXR, and other health screening exams.     In addition to the clinical assessment, I have ordered HLA/DSA testing for this patient to assist in identification of moderate/severe acute cellular rejection (ACR) in a pt with stable Allograft function instead of endomyocardial biopsy.      Patient is reminded to call with any health changes as these can be early signs of transplant complications. Patient is advised to make sure any new medications or changes of old medications are discussed with a pharmacist or physician knowledgeable with transplant to avoid rejection/drug toxicity related to significant drug interactions.     UNOS Patient Status  Functional Status: 100% - Normal, no complaints, no evidence of disease  Physical Capacity: No Limitations  Working for Income: No  If no, reason not working: Patient Choice - Student Full Time/Part Time

## 2023-05-01 NOTE — PROGRESS NOTES
TRANSPLANT NUTRITIONAL ASSESSMENT    Referring Provider: Loyda Donahue MD    Reason for Visit: Weight loss education    Age: 27 y.o.  Sex: male    Patient Active Problem List   Diagnosis    Encounter for long-term (current) use of medications    Gastritis    Immunosuppression    Gastroesophageal reflux disease    History of abdominal pain    Heart replaced by transplant    Obesity, Class III, BMI 40-49.9 (morbid obesity)    Anxiety    Counseling and coordination of care    Type 2 diabetes mellitus with hyperglycemia    Heart transplant failure    Right ureteral stone    Tachycardia    Heart transplant recipient    History of COVID-19    Hypomagnesemia    Hypertension associated with transplantation     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 at [facility]    Coronary artery disease     Diabetes mellitus     GERD (gastroesophageal reflux disease)     Hypertension      Lab Results   Component Value Date     05/01/2023    K 4.3 05/01/2023    PHOS 3.8 02/20/2021    MG 1.5 (L) 01/06/2023    CHOL 124 01/06/2023    HDL 33 (L) 01/06/2023    LDL 39 04/12/2019    TRIG 168 (H) 01/06/2023    ALBUMIN 3.6 05/01/2023    HGBA1C 10.6 (H) 02/20/2023    HGBA1C 10.5 04/12/2019    CALCIUM 9.3 05/01/2023     Other Pertinent Labs: HA1C: 6.9 (H),    Current Outpatient Medications   Medication Sig    PROGRAF 1 mg Cap TAKE 1 CAPSULE BY MOUTH EVERY MORNING WITH (1) 5mg capsule (6 MG total) & TAKE 2 CAPSULES EVERY EVENING WITH (1) 5mg capsule (7mg total)    amoxicillin (AMOXIL) 500 MG capsule Take 4 capsules (2,000 mg total) by mouth On call Procedure.    aspirin (ECOTRIN) 81 MG EC tablet Take 81 mg by mouth once daily.    BLOOD PRESSURE CUFF Misc Please provide pt with one blood pressure cuff to check his BP twice a day.    blood sugar diagnostic Strp To check BG 4 times daily, to use with insurance preferred meter, e 11.65    blood-glucose  "meter kit To check BG 4 times daily, to use with insurance preferred meter, e 11.65    blood-glucose meter,continuous (DEXCOM G6 ) Misc Use as directed.    blood-glucose sensor (DEXCOM G6 SENSOR) Janelle Change every 10 days    blood-glucose transmitter (DEXCOM G6 TRANSMITTER) Janelle Change every 3 months. E 11.65    dicyclomine (BENTYL) 10 MG capsule TAKE 1 CAPSULE BY MOUTH TWICE DAILY    diltiaZEM (CARDIZEM CD) 180 MG 24 hr capsule Take 1 capsule (180 mg total) by mouth once daily.    ergocalciferol (ERGOCALCIFEROL) 50,000 unit Cap Take 1 capsule (50,000 Units total) by mouth every 7 days. (Patient taking differently: Take 50,000 Units by mouth every 7 days. Patient takes on Wednesdays)    EScitalopram oxalate (LEXAPRO) 10 MG tablet Take 1 tablet (10 mg total) by mouth once daily.    esomeprazole (NEXIUM) 20 MG capsule Take 1 capsule (20 mg total) by mouth before breakfast. (Patient taking differently: Take by mouth before breakfast.)    insulin (LANTUS SOLOSTAR U-100 INSULIN) glargine 100 units/mL SubQ pen ADMINISTER  48 UNITS UNDER THE SKIN AT NIGHT    insulin lispro (HUMALOG KWIKPEN INSULIN) 100 unit/mL pen Humalog brand, inject 16 units before meals plus scale 180-230+2, 231-280+4, 281-330 +6, 331-380+8, >380+10. Max daily 78 units. May substitute humalog, same dosing, e 11.65    lancets Misc To check BG 4 times daily, to use with insurance preferred meter, e 11.65    lisinopriL (PRINIVIL,ZESTRIL) 5 MG tablet Take 1 tablet (5 mg total) by mouth once daily.    metFORMIN (GLUCOPHAGE) 500 MG tablet Take 1 tablet (500 mg total) by mouth 2 (two) times daily with meals.    MYFORTIC 360 mg TbEC TAKE 2 TABLETS BY MOUTH TWICE DAILY    pen needle, diabetic (BD ULTRA-FINE KARIME PEN NEEDLE) 32 gauge x 5/32" Ndle Uses 5 times a day. 90 day    pravastatin (PRAVACHOL) 20 MG tablet TAKE 1 TABLET BY MOUTH EVERY NIGHT    PROGRAF 5 mg Cap TAKE 1 CAPSULE BY MOUTH Along with 1 mg capsule in the am and 2 (1 mg) capsules in the pm " "for total dose of 6 mg in the am and 7 mg in the pm.    semaglutide (OZEMPIC) 1 mg/dose (4 mg/3 mL) Inject 1 mg into the skin every 7 days.    topiramate (TOPAMAX) 25 MG tablet Take 1 tablet (25 mg total) by mouth every evening.     Current Facility-Administered Medications   Medication    diphenhydrAMINE injection 25 mg     Allergies: Pneumovax 23 [pneumococcal 23-yo ps vaccine] and Nsaids (non-steroidal anti-inflammatory drug)    Ht Readings from Last 1 Encounters:   01/31/22 5' 5" (1.651 m)     Wt Readings from Last 1 Encounters:   01/31/22 104.3 kg (230 lb)      BMI: There is no height or weight on file to calculate BMI.    Usual Weight: pt reports 215 lbs (97.72)  Weight Change/Time: 100.2 kg  Current Diet: DM  Appetite/Current Intake: good   Exercise/Physical Activity: exercising with cardio and HIIT 3x a week  Nutritional/Herbal Supplements: none  Potential Food/Medication Interactions: Amoxicillin - Taking calcium, iron, magnesium decreases the absorption of both the drug and the mineral.   Chewing/Swallowing Problems: none  Symptoms: none  Assessment of Lab Values: DM, heart txp recip  Support System: family    ABW: 157.25 lbs (71.47 kg)  Estimated Kcal Need: 2144 kcal (30 kcal/kg ABW)  Estimated Protein Need: 100.2g (1.0 g/kg CBW)    Nutritional History: pt eats 2 meals per day usually chicken thighs, vegetables, and rice and beans. Pt binged the last couple weeks to celebrate graduating school.      Nutritional Diagnoses  Problem: overweight/obesity  Etiology: Excessive calorie intake over the last couple years   Symptoms: HA1C trends     Educational Need? yes  Barriers: none identified  Discussed with: patient  Interventions: Patient taught nutrition information regarding Weight loss education  .  Spoke too patient that calories were cut too low too quickly. Explained healthier ways to lose weight. Pt going to [employer] 3x a week. Recc continue exercise. Recc getting some sort of nutrition supp to " boost metabolism  Goals/Recommendations: diet adherence, small frequent meals and snacks, and gradual weight loss  Actions Taken: instruct/provide written information  Strategies Used: problem solving, goal setting, motivational interviewing  Patient and/or family comprehend instructions: yes , adherence expected  Outcome: Verbalizes understanding  Monitoring: Contact information provided, will f/u in clinic and communicate with the care team as needed.     Counseling Time: 15 minutes

## 2023-05-08 ENCOUNTER — PATIENT MESSAGE (OUTPATIENT)
Dept: DIABETES | Facility: CLINIC | Age: 28
End: 2023-05-08

## 2023-05-08 ENCOUNTER — TELEPHONE (OUTPATIENT)
Dept: INTERNAL MEDICINE | Facility: CLINIC | Age: 28
End: 2023-05-08
Payer: MEDICAID

## 2023-05-08 NOTE — TELEPHONE ENCOUNTER
----- Message from Kasandra Kuhn sent at 5/8/2023 11:57 AM CDT -----  Contact: [phone]  Hayden from [facility] calling in regards to fax sent over for PA for [employer] --- Please call and advise @ # [phone] ext [phone]

## 2023-05-08 NOTE — TELEPHONE ENCOUNTER
Lm for Hayden Mcfarland formerly [facility].  [phone]

## 2023-05-16 NOTE — PROGRESS NOTES
CHIEF COMPLAINT: Type 2 Diabetes     HPI: Mr. Derrick Montanez is a 27 y.o. male who was diagnosed with Type 2 DM in 3/2019   Pt has h/o heart transplantation 2009.  H/o DKA, hyperglycemia, hypoglycemia.   Chronically elevated a1cs in the past few years.  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  Managed per HTS.     H/o chronically elevated BG  Has concerns with nephrotoxicity -semaglutide, likes results, will like lower dose.   Occ hypoglycemia 70s mg/dl   +covid19 1/2022    Media  Dexcom   Avg 159 mg/dl  Sd 42 mg/dl   TIR 69%        Lab Results   Component Value Date    HGBA1C 6.9 (H) 05/01/2023     The patient location is: home  The chief complaint leading to consultation is: type 2 dm     Visit type: audiovisual    Face to Face time with patient: 15,20   minutes of total time spent on the encounter, which includes face to face time and non-face to face time preparing to see the patient (eg, review of tests), Obtaining and/or reviewing separately obtained history, Documenting clinical information in the electronic or other health record, Independently interpreting results (not separately reported) and communicating results to the patient/family/caregiver, or Care coordination (not separately reported).         Each patient to whom he or she provides medical services by telemedicine is:  (1) informed of the relationship between the physician and patient and the respective role of any other health care provider with respect to management of the patient; and (2) notified that he or she may decline to receive medical services by telemedicine and may withdraw from such care at any time.    Notes:     PREVIOUS DIABETES MEDICATIONS TRIED  lantus   humalog   novolog  Metformin   victoza   Ozempic 1 mg     CURRENT DIABETIC  MEDS: ozempic 1 mg weekly, lantus 48 units, humalog (lispro) 16-16-16 units ac , scale 180-230+2, etc.     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  Diabetes Management Status    Statin: Taking  ACE/ARB: Taking    Screening or Prevention Patient's value Goal Complete/Controlled?   HgA1C Testing and Control   Lab Results   Component Value Date    HGBA1C 6.9 (H) 05/01/2023      Annually/Less than 8% Yes   Lipid profile : 05/01/2023 Annually Yes   LDL control Lab Results   Component Value Date    LDLCALC 36.6 (L) 05/01/2023    Annually/Less than 100 mg/dl  Yes   Nephropathy screening Lab Results   Component Value Date    LABMICR 139.0 02/20/2023     Lab Results   Component Value Date    PROTEINUA 2+ (A) 02/20/2021    Annually No   Blood pressure BP Readings from Last 1 Encounters:   05/01/23 (!) 160/105    Less than 140/90 Yes   Dilated retinal exam Most Recent Eye Exam Date: Not Found Annually Yes   Foot exam   Most Recent Foot Exam Date: Not Found Annually Yes       REVIEW OF SYSTEMS  General: no weakness, fatigue, +weight  (gain)  Eyes: no double or blurred vision, eye pain, or redness.   Cardiovascular: no chest pain, palpitations, edema, or murmurs.   Respiratory: no cough or dyspnea.   GI: no heartburn, nausea, or changes in bowel patterns; good appetite.   Skin: no rashes, dryness, itching, or reactions at insulin injection sites.  Neuro: no numbness, tingling, tremors, or vertigo.   Endocrine: + polyuria, polydipsia, polyphagia, heat or cold intolerance.      Vital Signs  There were no vitals taken for this visit.    EXT Hemoglobin A1C   Date Value Ref Range Status   04/12/2019 10.5 % Final     Hemoglobin A1C   Date Value Ref Range Status   05/01/2023 6.9 (H) 4.0 - 5.6 % Final     Comment:     ADA Screening Guidelines:  5.7-6.4%  Consistent with prediabetes  >or=6.5%  Consistent with diabetes    High levels of fetal hemoglobin interfere with the HbA1C  assay. Heterozygous hemoglobin variants (HbS, HgC, etc)do  not significantly interfere with this assay.   However, presence of multiple variants may affect accuracy.     02/20/2023 10.6 (H) 4.0 - 5.6 % Final     Comment:     ADA Screening Guidelines:  5.7-6.4%  Consistent with prediabetes  >or=6.5%  Consistent with diabetes    High levels of fetal hemoglobin interfere with the HbA1C  assay. Heterozygous hemoglobin variants (HbS, HgC, etc)do  not significantly interfere with this assay.   However, presence of multiple variants may affect accuracy.     01/06/2023 >14.0 (H) 4.0 - 5.6 % Final     Comment:     ADA Screening Guidelines:  5.7-6.4%  Consistent with prediabetes  >or=6.5%  Consistent with diabetes    High levels of fetal hemoglobin interfere with the HbA1C  assay. Heterozygous hemoglobin variants (HbS, HgC, etc)do  not significantly interfere with this assay.   However, presence of multiple variants may affect accuracy.          Chemistry        Component Value Date/Time     05/01/2023 0907    K 4.3 05/01/2023 0907     05/01/2023 0907    CO2 24 05/01/2023 0907    BUN 24 (H) 05/01/2023 0907    CREATININE 1.3 05/01/2023 0907     05/01/2023 0907        Component Value Date/Time    CALCIUM 9.3 05/01/2023 0907    ALKPHOS 70 05/01/2023 0907    AST 13 05/01/2023 0907    ALT 16 05/01/2023 0907    BILITOT 0.4 05/01/2023 0907    ESTGFRAFRICA >60.0 01/31/2022 0938    EGFRNONAA >60.0 01/31/2022 0938           Lab Results   Component Value Date    TSH 1.268 05/01/2023      Lab Results   Component Value  Date    CHOL 105 (L) 05/01/2023    CHOL 124 01/06/2023    CHOL 105 (L) 01/31/2022     Lab Results   Component Value Date    HDL 33 (L) 05/01/2023    HDL 33 (L) 01/06/2023    HDL 35 (L) 01/31/2022     Lab Results   Component Value Date    LDLCALC 36.6 (L) 05/01/2023    LDLCALC 57.4 (L) 01/06/2023    LDLCALC 31.6 (L) 01/31/2022     Lab Results   Component Value Date    TRIG 177 (H) 05/01/2023    TRIG 168 (H) 01/06/2023    TRIG 192 (H) 01/31/2022     Lab Results   Component Value Date    CHOLHDL 31.4 05/01/2023    CHOLHDL 26.6 01/06/2023    CHOLHDL 33.3 01/31/2022       PHYSICAL EXAMINATION  Constitutional: Appears well, no distress    Diabetes Foot Exam:   Deferred       Assessment/Plan  1. Type 2 diabetes mellitus with hyperglycemia, without long-term current use of insulin  Hemoglobin A1C      2. Obesity, Class III, BMI 40-49.9 (morbid obesity)        3. Hypertension associated with transplantation        4. Immunosuppression        5. Heart replaced by transplant        6. Encounter for long-term (current) use of medications        7. Counseling and coordination of care        8. Type 2 diabetes mellitus with hyperglycemia, with long-term current use of insulin  insulin (LANTUS SOLOSTAR U-100 INSULIN) glargine 100 units/mL SubQ pen    insulin lispro (HUMALOG KWIKPEN INSULIN) 100 unit/mL pen    semaglutide (OZEMPIC) 0.25 mg or 0.5 mg (2 mg/3 mL) pen injector      1-8. Follow up in 4 months   Offer virtual   Cut back lantus 45 units at night  Prandial 15-15-15 units ac  Scale use 180-230+2, etc  Cut back ozempic 0.5 mg weekly  Rx sent   Reviewed dexcom =remote access   Doing so well   F/u with HTS  A1c at goal   Goal less than 7%  Answers submitted by the patient for this visit:  Diabetes Questionnaire (Submitted on 5/17/2023)  Chief Complaint: Diabetes problem  Diabetes type: type 2  MedicAlert ID: Yes  Disease duration: 5 Years  blurred vision: No  chest pain: No  fatigue: No  foot paresthesias: No  foot  ulcerations: No  polydipsia: No  polyphagia: No  polyuria: No  visual change: No  weakness: No  weight loss: No  Symptom course: improving  confusion: No  dizziness: No  headaches: No  hunger: Yes  mood changes: No  nervous/anxious: No  pallor: No  seizures: No  sleepiness: No  speech difficulty: No  sweats: No  tremors: No  blackouts: No  hospitalization: No  nocturnal hypoglycemia: No  required assistance: No  required glucagon: No  CVA: No  heart disease: No  impotence: No  nephropathy: No  peripheral neuropathy: No  PVD: No  retinopathy: No  autonomic neuropathy: No  CAD risks: hypertension, obesity, sedentary lifestyle, diabetes mellitus, male sex  Current treatments: diet, insulin injections, oral agent (monotherapy)  Treatment compliance: most of the time  Dose schedule: pre-breakfast, pre-lunch, pre-dinner, at bedtime  Given by: patient  Injection sites: abdominal wall  Home blood tests: 5+ x per day  Home urines: <1 x per month  Monitoring compliance: good  Blood glucose trend: decreasing steadily  Weight trend: stable  Current diet: generally healthy  Meal planning: avoidance of concentrated sweets, calorie counting  Exercise: three times a week  Dietitian visit: Yes  Eye exam current: No  Sees podiatrist: No

## 2023-05-17 ENCOUNTER — OFFICE VISIT (OUTPATIENT)
Dept: INTERNAL MEDICINE | Facility: CLINIC | Age: 28
End: 2023-05-17
Payer: MEDICAID

## 2023-05-17 DIAGNOSIS — D84.9 IMMUNOSUPPRESSION: ICD-10-CM

## 2023-05-17 DIAGNOSIS — E66.01 OBESITY, CLASS III, BMI 40-49.9 (MORBID OBESITY): ICD-10-CM

## 2023-05-17 DIAGNOSIS — E11.65 TYPE 2 DIABETES MELLITUS WITH HYPERGLYCEMIA, WITHOUT LONG-TERM CURRENT USE OF INSULIN: Primary | ICD-10-CM

## 2023-05-17 DIAGNOSIS — Z94.1 HEART REPLACED BY TRANSPLANT: ICD-10-CM

## 2023-05-17 DIAGNOSIS — Z94.9 HYPERTENSION ASSOCIATED WITH TRANSPLANTATION: ICD-10-CM

## 2023-05-17 DIAGNOSIS — E11.65 TYPE 2 DIABETES MELLITUS WITH HYPERGLYCEMIA, WITH LONG-TERM CURRENT USE OF INSULIN: ICD-10-CM

## 2023-05-17 DIAGNOSIS — Z71.89 COUNSELING AND COORDINATION OF CARE: ICD-10-CM

## 2023-05-17 DIAGNOSIS — Z79.899 ENCOUNTER FOR LONG-TERM (CURRENT) USE OF MEDICATIONS: ICD-10-CM

## 2023-05-17 DIAGNOSIS — Z79.4 TYPE 2 DIABETES MELLITUS WITH HYPERGLYCEMIA, WITH LONG-TERM CURRENT USE OF INSULIN: ICD-10-CM

## 2023-05-17 DIAGNOSIS — I15.8 HYPERTENSION ASSOCIATED WITH TRANSPLANTATION: ICD-10-CM

## 2023-05-17 PROCEDURE — 3060F POS MICROALBUMINURIA REV: CPT | Mod: CPTII,95,, | Performed by: NURSE PRACTITIONER

## 2023-05-17 PROCEDURE — 3044F HG A1C LEVEL LT 7.0%: CPT | Mod: CPTII,95,, | Performed by: NURSE PRACTITIONER

## 2023-05-17 PROCEDURE — 99214 OFFICE O/P EST MOD 30 MIN: CPT | Mod: 95,,, | Performed by: NURSE PRACTITIONER

## 2023-05-17 PROCEDURE — 3044F PR MOST RECENT HEMOGLOBIN A1C LEVEL <7.0%: ICD-10-PCS | Mod: CPTII,95,, | Performed by: NURSE PRACTITIONER

## 2023-05-17 PROCEDURE — 4010F ACE/ARB THERAPY RXD/TAKEN: CPT | Mod: CPTII,95,, | Performed by: NURSE PRACTITIONER

## 2023-05-17 PROCEDURE — 3066F NEPHROPATHY DOC TX: CPT | Mod: CPTII,95,, | Performed by: NURSE PRACTITIONER

## 2023-05-17 PROCEDURE — 3066F PR DOCUMENTATION OF TREATMENT FOR NEPHROPATHY: ICD-10-PCS | Mod: CPTII,95,, | Performed by: NURSE PRACTITIONER

## 2023-05-17 PROCEDURE — 95251 CONT GLUC MNTR ANALYSIS I&R: CPT | Mod: ,,, | Performed by: NURSE PRACTITIONER

## 2023-05-17 PROCEDURE — 4010F PR ACE/ARB THEARPY RXD/TAKEN: ICD-10-PCS | Mod: CPTII,95,, | Performed by: NURSE PRACTITIONER

## 2023-05-17 PROCEDURE — 99214 PR OFFICE/OUTPT VISIT, EST, LEVL IV, 30-39 MIN: ICD-10-PCS | Mod: 95,,, | Performed by: NURSE PRACTITIONER

## 2023-05-17 PROCEDURE — 1159F MED LIST DOCD IN RCRD: CPT | Mod: CPTII,95,, | Performed by: NURSE PRACTITIONER

## 2023-05-17 PROCEDURE — 95251 PR GLUCOSE MONITOR, 72 HOUR, PHYS INTERP: ICD-10-PCS | Mod: ,,, | Performed by: NURSE PRACTITIONER

## 2023-05-17 PROCEDURE — 3060F PR POS MICROALBUMINURIA RESULT DOCUMENTED/REVIEW: ICD-10-PCS | Mod: CPTII,95,, | Performed by: NURSE PRACTITIONER

## 2023-05-17 PROCEDURE — 1160F RVW MEDS BY RX/DR IN RCRD: CPT | Mod: CPTII,95,, | Performed by: NURSE PRACTITIONER

## 2023-05-17 PROCEDURE — 1159F PR MEDICATION LIST DOCUMENTED IN MEDICAL RECORD: ICD-10-PCS | Mod: CPTII,95,, | Performed by: NURSE PRACTITIONER

## 2023-05-17 PROCEDURE — 1160F PR REVIEW ALL MEDS BY PRESCRIBER/CLIN PHARMACIST DOCUMENTED: ICD-10-PCS | Mod: CPTII,95,, | Performed by: NURSE PRACTITIONER

## 2023-05-17 RX ORDER — INSULIN GLARGINE 100 [IU]/ML
INJECTION, SOLUTION SUBCUTANEOUS
Qty: 15 ML | Refills: 8
Start: 2023-05-17 | End: 2023-08-08 | Stop reason: SDUPTHER

## 2023-05-17 RX ORDER — SEMAGLUTIDE 0.68 MG/ML
0.5 INJECTION, SOLUTION SUBCUTANEOUS
Qty: 1 EACH | Refills: 6 | Status: SHIPPED | OUTPATIENT
Start: 2023-05-17 | End: 2023-09-22

## 2023-05-17 RX ORDER — INSULIN LISPRO 100 [IU]/ML
INJECTION, SOLUTION INTRAVENOUS; SUBCUTANEOUS
Qty: 30 ML | Refills: 6
Start: 2023-05-17 | End: 2023-08-08 | Stop reason: SDUPTHER

## 2023-05-18 NOTE — TELEPHONE ENCOUNTER
Deadline to reply:  May 17, 2024 5:47 PM (In 1 year) Case ID: [id number]      Payer:  [facility] - Managed Medicaid    [phone]    Attention: Your PA request has been converted to a general form. Please fill out the general PA form to continue. Determinations typically take 48-72 hours. For additional information, please contact the phone number on back of the member prescription card   For fax submission today.

## 2023-05-19 ENCOUNTER — TELEPHONE (OUTPATIENT)
Dept: INTERNAL MEDICINE | Facility: CLINIC | Age: 28
End: 2023-05-19
Payer: MEDICAID

## 2023-05-22 ENCOUNTER — TELEPHONE (OUTPATIENT)
Dept: INTERNAL MEDICINE | Facility: CLINIC | Age: 28
End: 2023-05-22
Payer: MEDICAID

## 2023-05-22 NOTE — TELEPHONE ENCOUNTER
----- Message from TARYN Hermosillo, PRINCE sent at 5/22/2023 12:33 PM CDT -----  A1c in 4 months   F/u in 4 months virtual   Thanks

## 2023-05-24 DIAGNOSIS — F41.9 ANXIETY: ICD-10-CM

## 2023-05-24 RX ORDER — ESCITALOPRAM OXALATE 10 MG/1
TABLET ORAL
Qty: 30 TABLET | Refills: 11 | Status: SHIPPED | OUTPATIENT
Start: 2023-05-24

## 2023-06-11 DIAGNOSIS — E11.65 TYPE 2 DIABETES MELLITUS WITH HYPERGLYCEMIA, WITH LONG-TERM CURRENT USE OF INSULIN: ICD-10-CM

## 2023-06-11 DIAGNOSIS — Z79.4 TYPE 2 DIABETES MELLITUS WITH HYPERGLYCEMIA, WITH LONG-TERM CURRENT USE OF INSULIN: ICD-10-CM

## 2023-06-12 RX ORDER — SEMAGLUTIDE 1.34 MG/ML
1 INJECTION, SOLUTION SUBCUTANEOUS
Qty: 3 ML | Refills: 3 | OUTPATIENT
Start: 2023-06-12 | End: 2024-06-11

## 2023-06-13 ENCOUNTER — PATIENT MESSAGE (OUTPATIENT)
Dept: TRANSPLANT | Facility: CLINIC | Age: 28
End: 2023-06-13
Payer: COMMERCIAL

## 2023-06-13 DIAGNOSIS — Z94.1 HEART REPLACED BY TRANSPLANT: ICD-10-CM

## 2023-06-13 RX ORDER — TOPIRAMATE 25 MG/1
TABLET ORAL
Qty: 30 TABLET | Refills: 11 | OUTPATIENT
Start: 2023-06-13

## 2023-06-21 DIAGNOSIS — E11.65 TYPE 2 DIABETES MELLITUS WITH HYPERGLYCEMIA, WITH LONG-TERM CURRENT USE OF INSULIN: ICD-10-CM

## 2023-06-21 DIAGNOSIS — Z79.4 TYPE 2 DIABETES MELLITUS WITH HYPERGLYCEMIA, WITH LONG-TERM CURRENT USE OF INSULIN: ICD-10-CM

## 2023-06-21 RX ORDER — SEMAGLUTIDE 1.34 MG/ML
1 INJECTION, SOLUTION SUBCUTANEOUS
Qty: 3 ML | Refills: 3 | OUTPATIENT
Start: 2023-06-21 | End: 2024-06-20

## 2023-06-29 ENCOUNTER — TELEPHONE (OUTPATIENT)
Dept: INTERNAL MEDICINE | Facility: CLINIC | Age: 28
End: 2023-06-29
Payer: COMMERCIAL

## 2023-06-29 DIAGNOSIS — Z94.1 HEART REPLACED BY TRANSPLANT: ICD-10-CM

## 2023-06-29 NOTE — TELEPHONE ENCOUNTER
Rec'd call from "[employer] requesting confirmation of current Ozempic Rx- semaglutide (OZEMPIC) 0.25 mg or 0.5 mg (2 mg/3 mL) pen injector was the last dose ordered 05/17/23 by this provider.

## 2023-06-30 RX ORDER — TOPIRAMATE 25 MG/1
TABLET ORAL
Qty: 30 TABLET | Refills: 11 | OUTPATIENT
Start: 2023-06-30

## 2023-07-12 ENCOUNTER — PATIENT MESSAGE (OUTPATIENT)
Dept: TRANSPLANT | Facility: CLINIC | Age: 28
End: 2023-07-12
Payer: COMMERCIAL

## 2023-07-12 DIAGNOSIS — Z79.899 ENCOUNTER FOR LONG-TERM (CURRENT) USE OF MEDICATIONS: ICD-10-CM

## 2023-07-12 DIAGNOSIS — T86.20 COMPLICATION OF HEART TRANSPLANT, UNSPECIFIED COMPLICATION: ICD-10-CM

## 2023-07-12 DIAGNOSIS — K21.9 GASTROESOPHAGEAL REFLUX DISEASE: ICD-10-CM

## 2023-07-12 DIAGNOSIS — Z94.1 HEART REPLACED BY TRANSPLANT: ICD-10-CM

## 2023-07-12 DIAGNOSIS — D84.9 IMMUNOSUPPRESSION: ICD-10-CM

## 2023-07-12 DIAGNOSIS — Z94.1 STATUS POST HEART TRANSPLANT: ICD-10-CM

## 2023-07-12 DIAGNOSIS — Z94.1 HEART TRANSPLANTED: ICD-10-CM

## 2023-07-12 RX ORDER — PRAVASTATIN SODIUM 20 MG/1
TABLET ORAL
Qty: 30 TABLET | Refills: 11 | Status: SHIPPED | OUTPATIENT
Start: 2023-07-12

## 2023-07-12 RX ORDER — TOPIRAMATE 25 MG/1
TABLET ORAL
Qty: 30 TABLET | Refills: 11 | OUTPATIENT
Start: 2023-07-12

## 2023-07-13 RX ORDER — BLOOD-GLUCOSE SENSOR
EACH MISCELLANEOUS
Qty: 3 EACH | Refills: 11 | Status: SHIPPED | OUTPATIENT
Start: 2023-07-13 | End: 2023-07-27 | Stop reason: SDUPTHER

## 2023-07-13 RX ORDER — BLOOD-GLUCOSE TRANSMITTER
EACH MISCELLANEOUS
Qty: 1 EACH | Refills: 3 | Status: SHIPPED | OUTPATIENT
Start: 2023-07-13 | End: 2023-07-27 | Stop reason: SDUPTHER

## 2023-07-13 RX ORDER — BLOOD-GLUCOSE,RECEIVER,CONT
EACH MISCELLANEOUS
Qty: 1 EACH | Refills: 1 | Status: SHIPPED | OUTPATIENT
Start: 2023-07-13 | End: 2023-07-17

## 2023-07-13 NOTE — TELEPHONE ENCOUNTER
----- Message from Tremontana Chevalier sent at 7/13/2023 11:49 AM CDT -----  Regarding: refill  RX Name, Strength, Sig: pt sys he is out of the following meds    blood-glucose meter,continuous (DEXCOM G6 ) Misc   Sig: Use as directed.  Sent to pharmacy as: blood-glucose meter,continuous (DEXCOM G6 ) Misc    blood-glucose sensor (DEXCOM G6 SENSOR) Janelle   Sig: Change every 10 days    blood-glucose transmitter (DEXCOM G6 TRANSMITTER) Janelle 1 Sig: Change every 3 months. E 11.65  Sent to pharmacy as: blood-glucose transmitter     Caller: self       Preferred Pharmacy with phone number:    [facility] [place]  [address]  [place] [address]  Phone: [phone] Fax: [phone]    Ordering Provider: [place]       Contact Preference: [phone]    Addl info:

## 2023-07-17 RX ORDER — BLOOD-GLUCOSE,RECEIVER,CONT
EACH MISCELLANEOUS
Qty: 1 EACH | Refills: 1 | Status: SHIPPED | OUTPATIENT
Start: 2023-07-17

## 2023-07-26 ENCOUNTER — PATIENT MESSAGE (OUTPATIENT)
Dept: INTERNAL MEDICINE | Facility: CLINIC | Age: 28
End: 2023-07-26
Payer: COMMERCIAL

## 2023-07-27 ENCOUNTER — TELEPHONE (OUTPATIENT)
Dept: PHARMACY | Facility: CLINIC | Age: 28
End: 2023-07-27
Payer: COMMERCIAL

## 2023-07-27 RX ORDER — BLOOD-GLUCOSE SENSOR
EACH MISCELLANEOUS
Qty: 3 EACH | Refills: 11 | Status: SHIPPED | OUTPATIENT
Start: 2023-07-27 | End: 2023-08-08 | Stop reason: SDUPTHER

## 2023-07-27 RX ORDER — BLOOD-GLUCOSE TRANSMITTER
EACH MISCELLANEOUS
Qty: 1 EACH | Refills: 3 | Status: SHIPPED | OUTPATIENT
Start: 2023-07-27 | End: 2023-08-08 | Stop reason: SDUPTHER

## 2023-08-08 ENCOUNTER — TELEPHONE (OUTPATIENT)
Dept: INTERNAL MEDICINE | Facility: CLINIC | Age: 28
End: 2023-08-08
Payer: COMMERCIAL

## 2023-08-08 DIAGNOSIS — E11.65 TYPE 2 DIABETES MELLITUS WITH HYPERGLYCEMIA, WITH LONG-TERM CURRENT USE OF INSULIN: Primary | ICD-10-CM

## 2023-08-08 DIAGNOSIS — Z79.4 TYPE 2 DIABETES MELLITUS WITH HYPERGLYCEMIA, WITH LONG-TERM CURRENT USE OF INSULIN: Primary | ICD-10-CM

## 2023-08-08 RX ORDER — INSULIN LISPRO 100 [IU]/ML
INJECTION, SOLUTION INTRAVENOUS; SUBCUTANEOUS
Qty: 30 ML | Refills: 6 | Status: SHIPPED | OUTPATIENT
Start: 2023-08-08

## 2023-08-08 RX ORDER — INSULIN GLARGINE 100 [IU]/ML
INJECTION, SOLUTION SUBCUTANEOUS
Qty: 15 ML | Refills: 8 | Status: SHIPPED | OUTPATIENT
Start: 2023-08-08

## 2023-08-08 RX ORDER — BLOOD-GLUCOSE SENSOR
EACH MISCELLANEOUS
Qty: 3 EACH | Refills: 11 | Status: SHIPPED | OUTPATIENT
Start: 2023-08-08 | End: 2023-09-22 | Stop reason: SDUPTHER

## 2023-08-08 RX ORDER — BLOOD-GLUCOSE TRANSMITTER
EACH MISCELLANEOUS
Qty: 1 EACH | Refills: 3 | Status: SHIPPED | OUTPATIENT
Start: 2023-08-08 | End: 2023-09-22 | Stop reason: SDUPTHER

## 2023-08-08 NOTE — TELEPHONE ENCOUNTER
Spoke to pt. Pt stated pharmacy notified him that Rx is not covered under his insurance and pharmacy was supposed to notify office. Pt unaware if PA was required. He would like to know when he can get his Dexcom

## 2023-08-08 NOTE — TELEPHONE ENCOUNTER
----- Message from Jodi Lafleur sent at 8/8/2023  7:52 AM CDT -----  Alevism A Wilson calling regarding Patient Advice for wanting to know the status of the issues with getting the DEXCOM G6. PT stated he was called by the [facility] on 07/27 and told that it was not covered by the prescription insurance but will be covered by the medical insurance and still have not heard back to confirm if this was taken care of, please call back to inform [phone]

## 2023-08-09 ENCOUNTER — TELEPHONE (OUTPATIENT)
Dept: PHARMACY | Facility: CLINIC | Age: 28
End: 2023-08-09
Payer: COMMERCIAL

## 2023-08-12 ENCOUNTER — HOSPITAL ENCOUNTER (EMERGENCY)
Facility: HOSPITAL | Age: 28
Discharge: HOME | End: 2023-08-13
Attending: EMERGENCY MEDICINE
Payer: MEDICAID

## 2023-08-12 DIAGNOSIS — M25.572 LEFT ANKLE PAIN: Primary | ICD-10-CM

## 2023-08-12 DIAGNOSIS — Z94.1 HEART TRANSPLANT RECIPIENT: ICD-10-CM

## 2023-08-12 DIAGNOSIS — R73.9 HYPERGLYCEMIA: ICD-10-CM

## 2023-08-12 LAB
ALBUMIN SERPL BCP-MCNC: 3.4 G/DL (ref 3.5–5.2)
ALLENS TEST: ABNORMAL
ALP SERPL-CCNC: 111 U/L (ref 55–135)
ALT SERPL W/O P-5'-P-CCNC: 16 U/L (ref 10–44)
ANION GAP SERPL CALC-SCNC: 8 MMOL/L (ref 8–16)
AST SERPL-CCNC: 11 U/L (ref 10–40)
B-OH-BUTYR BLD STRIP-SCNC: 0.1 MMOL/L (ref 0–0.5)
BASOPHILS # BLD AUTO: 0.04 K/UL (ref 0–0.2)
BASOPHILS NFR BLD: 0.3 % (ref 0–1.9)
BILIRUB SERPL-MCNC: 0.5 MG/DL (ref 0.1–1)
BUN SERPL-MCNC: 17 MG/DL (ref 6–20)
CALCIUM SERPL-MCNC: 9.5 MG/DL (ref 8.7–10.5)
CHLORIDE SERPL-SCNC: 103 MMOL/L (ref 95–110)
CO2 SERPL-SCNC: 21 MMOL/L (ref 23–29)
CREAT SERPL-MCNC: 1.3 MG/DL (ref 0.5–1.4)
CRP SERPL-MCNC: 78 MG/L (ref 0–8.2)
DIFFERENTIAL METHOD: ABNORMAL
EOSINOPHIL # BLD AUTO: 0.1 K/UL (ref 0–0.5)
EOSINOPHIL NFR BLD: 0.9 % (ref 0–8)
ERYTHROCYTE [DISTWIDTH] IN BLOOD BY AUTOMATED COUNT: 12.8 % (ref 11.5–14.5)
ERYTHROCYTE [SEDIMENTATION RATE] IN BLOOD BY PHOTOMETRIC METHOD: 65 MM/HR (ref 0–23)
EST. GFR  (NO RACE VARIABLE): >60 ML/MIN/1.73 M^2
GLUCOSE SERPL-MCNC: 371 MG/DL (ref 70–110)
GLUCOSE SERPL-MCNC: 508 MG/DL (ref 70–110)
HCO3 UR-SCNC: 26.3 MMOL/L (ref 24–28)
HCT VFR BLD AUTO: 39.4 % (ref 40–54)
HGB BLD-MCNC: 13.4 G/DL (ref 14–18)
IMM GRANULOCYTES # BLD AUTO: 0.03 K/UL (ref 0–0.04)
IMM GRANULOCYTES NFR BLD AUTO: 0.3 % (ref 0–0.5)
LYMPHOCYTES # BLD AUTO: 3.8 K/UL (ref 1–4.8)
LYMPHOCYTES NFR BLD: 31.9 % (ref 18–48)
MCH RBC QN AUTO: 27.7 PG (ref 27–31)
MCHC RBC AUTO-ENTMCNC: 34 G/DL (ref 32–36)
MCV RBC AUTO: 81 FL (ref 82–98)
MONOCYTES # BLD AUTO: 1 K/UL (ref 0.3–1)
MONOCYTES NFR BLD: 8.7 % (ref 4–15)
NEUTROPHILS # BLD AUTO: 6.8 K/UL (ref 1.8–7.7)
NEUTROPHILS NFR BLD: 57.9 % (ref 38–73)
NRBC BLD-RTO: 0 /100 WBC
PCO2 BLDA: 51.3 MMHG (ref 35–45)
PH SMN: 7.32 [PH] (ref 7.35–7.45)
PLATELET # BLD AUTO: 347 K/UL (ref 150–450)
PMV BLD AUTO: 11 FL (ref 9.2–12.9)
PO2 BLDA: 31 MMHG (ref 40–60)
POC BE: 0 MMOL/L
POC SATURATED O2: 53 % (ref 95–100)
POC TCO2: 28 MMOL/L (ref 24–29)
POTASSIUM SERPL-SCNC: 4.6 MMOL/L (ref 3.5–5.1)
PROT SERPL-MCNC: 7.5 G/DL (ref 6–8.4)
RBC # BLD AUTO: 4.84 M/UL (ref 4.6–6.2)
SAMPLE: ABNORMAL
SITE: ABNORMAL
SODIUM SERPL-SCNC: 132 MMOL/L (ref 136–145)
URATE SERPL-MCNC: 7.5 MG/DL (ref 3.4–7)
WBC # BLD AUTO: 11.8 K/UL (ref 3.9–12.7)

## 2023-08-12 PROCEDURE — 99900035 HC TECH TIME PER 15 MIN (STAT)

## 2023-08-12 PROCEDURE — 96374 THER/PROPH/DIAG INJ IV PUSH: CPT

## 2023-08-12 PROCEDURE — 25000003 PHARM REV CODE 250: Performed by: PHYSICIAN ASSISTANT

## 2023-08-12 PROCEDURE — 63600175 PHARM REV CODE 636 W HCPCS: Performed by: PHYSICIAN ASSISTANT

## 2023-08-12 PROCEDURE — 96361 HYDRATE IV INFUSION ADD-ON: CPT

## 2023-08-12 PROCEDURE — 86140 C-REACTIVE PROTEIN: CPT | Performed by: PHYSICIAN ASSISTANT

## 2023-08-12 PROCEDURE — 80053 COMPREHEN METABOLIC PANEL: CPT | Performed by: PHYSICIAN ASSISTANT

## 2023-08-12 PROCEDURE — 85025 COMPLETE CBC W/AUTO DIFF WBC: CPT | Performed by: PHYSICIAN ASSISTANT

## 2023-08-12 PROCEDURE — 82803 BLOOD GASES ANY COMBINATION: CPT

## 2023-08-12 PROCEDURE — 82010 KETONE BODYS QUAN: CPT | Performed by: PHYSICIAN ASSISTANT

## 2023-08-12 PROCEDURE — 82962 GLUCOSE BLOOD TEST: CPT

## 2023-08-12 PROCEDURE — 99284 EMERGENCY DEPT VISIT MOD MDM: CPT | Mod: 25

## 2023-08-12 PROCEDURE — 84550 ASSAY OF BLOOD/URIC ACID: CPT | Performed by: PHYSICIAN ASSISTANT

## 2023-08-12 PROCEDURE — 85652 RBC SED RATE AUTOMATED: CPT | Performed by: PHYSICIAN ASSISTANT

## 2023-08-12 PROCEDURE — 80048 BASIC METABOLIC PNL TOTAL CA: CPT | Mod: XB

## 2023-08-12 RX ORDER — MORPHINE SULFATE 15 MG/1
15 TABLET ORAL
Status: COMPLETED | OUTPATIENT
Start: 2023-08-12 | End: 2023-08-12

## 2023-08-12 RX ORDER — ONDANSETRON 4 MG/1
4 TABLET, ORALLY DISINTEGRATING ORAL
Status: COMPLETED | OUTPATIENT
Start: 2023-08-12 | End: 2023-08-12

## 2023-08-12 RX ADMIN — INSULIN HUMAN 5 UNITS: 100 INJECTION, SOLUTION PARENTERAL at 11:08

## 2023-08-12 RX ADMIN — MORPHINE SULFATE 15 MG: 15 TABLET ORAL at 11:08

## 2023-08-12 RX ADMIN — SODIUM CHLORIDE 1000 ML: 9 INJECTION, SOLUTION INTRAVENOUS at 11:08

## 2023-08-12 RX ADMIN — ONDANSETRON 4 MG: 4 TABLET, ORALLY DISINTEGRATING ORAL at 11:08

## 2023-08-12 NOTE — Clinical Note
"Derrick CARDOZA"Alex Montanez was seen and treated in our emergency department on 8/12/2023.  He may return to work on 08/16/2023.       If you have any questions or concerns, please don't hesitate to call.      Kun Talavera PA-C"

## 2023-08-13 VITALS
WEIGHT: 220 LBS | SYSTOLIC BLOOD PRESSURE: 161 MMHG | HEART RATE: 87 BPM | RESPIRATION RATE: 18 BRPM | DIASTOLIC BLOOD PRESSURE: 87 MMHG | OXYGEN SATURATION: 100 % | BODY MASS INDEX: 36.61 KG/M2 | TEMPERATURE: 98 F

## 2023-08-13 LAB
POCT GLUCOSE: 346 MG/DL (ref 70–110)
POCT GLUCOSE: 371 MG/DL (ref 70–110)

## 2023-08-13 PROCEDURE — 25000003 PHARM REV CODE 250: Performed by: PHYSICIAN ASSISTANT

## 2023-08-13 PROCEDURE — 82962 GLUCOSE BLOOD TEST: CPT

## 2023-08-13 RX ORDER — LIDOCAINE 50 MG/G
1 PATCH TOPICAL
Status: DISCONTINUED | OUTPATIENT
Start: 2023-08-13 | End: 2023-08-13 | Stop reason: HOSPADM

## 2023-08-13 RX ORDER — CEPHALEXIN 500 MG/1
500 CAPSULE ORAL 4 TIMES DAILY
Qty: 19 CAPSULE | Refills: 0 | Status: SHIPPED | OUTPATIENT
Start: 2023-08-13 | End: 2023-08-18

## 2023-08-13 RX ORDER — CEPHALEXIN 500 MG/1
500 CAPSULE ORAL
Status: COMPLETED | OUTPATIENT
Start: 2023-08-13 | End: 2023-08-13

## 2023-08-13 RX ADMIN — CEPHALEXIN 500 MG: 500 CAPSULE ORAL at 01:08

## 2023-08-13 RX ADMIN — LIDOCAINE 5% 1 PATCH: 700 PATCH TOPICAL at 01:08

## 2023-08-13 NOTE — FIRST PROVIDER EVALUATION
Medical screening examination initiated.  I have conducted a focused provider triage encounter, findings are as follows:    Brief history of present illness:  26yo M with hx heart tx, HTN, DM 2d ago noticed pain in L foot at work here. Hx fracture in foot with similar pain. No new shoes/falls/trauma. +deep aching pain, now with rest, and STS. Taking ibuprofen and norco with relief.     Vitals:    08/12/23 2114   BP: (!) 163/91   Pulse: 93   Resp: 16   Temp: 98 °F (36.7 °C)   TempSrc: Oral   SpO2: 100%   Weight: 99.8 kg (220 lb)       Pertinent physical exam:  +STS dorsal L foot, TTP about proximal dorsal foot. +good pulses and cap refill. No erythema or warmth.     Brief workup plan:  foot XR    Preliminary workup initiated; this workup will be continued and followed by the physician or advanced practice provider that is assigned to the patient when roomed.

## 2023-08-13 NOTE — ED NOTES
Derrick Montanez, an 27 y.o. male presents to the ED    Chief Complaint   Patient presents with    Foot Pain     L foot pain and swelling.      Review of patient's allergies indicates:   Allergen Reactions    Pneumovax 23 [pneumococcal 23-yo ps vaccine] Swelling and Other (See Comments)     Swelling at site and Pneumonia like Sx. Pt spent x1 week in hospital    Nsaids (non-steroidal anti-inflammatory drug) Other (See Comments)     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 at [facility]    Coronary artery disease     Diabetes mellitus     GERD (gastroesophageal reflux disease)     Hypertension        LOC: The patient is awake, alert, aware of environment with an appropriate affect. Oriented x4, speaking appropriately  APPEARANCE: Pt resting comfortably, in no acute distress, pt is clean and well groomed, clothing properly fastened  SKIN:The skin is warm and dry, color consistent with ethnicity, patient has normal skin turgor and moist mucus membranes  RESPIRATORY:Airway is open and patent, respirations are spontaneous, patient has a normal effort and rate, no accessory muscle use noted.  CARDIAC: Normal rate and rhythm, no peripheral edema noted, capillary refill < 3 seconds, bilateral radial pulses 2+.  ABDOMEN: Soft, non tender, non distended. Bowel sounds present x 4 quadrants.   NEUROLOGIC: PERRLA, facial expression is symmetrical, patient moving all extremities spontaneously, normal sensation in all extremities when touched with a finger.  Follows all commands appropriately  MUSCULOSKELETAL: Patient moving all extremities spontaneously, + L foot pain and swelling

## 2023-08-13 NOTE — ED PROVIDER NOTES
Encounter Date: 8/12/2023       History     Chief Complaint   Patient presents with    Foot Pain     L foot pain and swelling.      The history is provided by the patient and medical records. No  was used.       Derrick Montanez is a 27 y.o. 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     Reports L foot pain and swelling beginning 3 days ago while at work. 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 Denies falls or trauma. Pain worsens with bearing weight. Reports using a lidocaine patch with moderate relief. No improvement with IBU. Reports distant history of fracture to his L foot. Denies fever, chest pain, SOB, abdominal pain, vomiting, urinary or bowel movement changes.     Review of patient's allergies indicates:   Allergen Reactions    Pneumovax 23 [pneumococcal 23-yo ps vaccine] Swelling and Other (See Comments)     Swelling at site and Pneumonia like Sx. Pt spent x1 week in hospital    Nsaids (non-steroidal anti-inflammatory drug) Other (See Comments)     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 at [facility]    Coronary artery disease     Diabetes mellitus     GERD (gastroesophageal reflux disease)     Hypertension      Past Surgical History:   Procedure Laterality Date    CARDIAC BIOPSY  8/16/2019    Procedure: Biopsy, Cardiac;  Surgeon: Thien aHyes MD;  Location: [facility] CATH LAB;  Service: Cardiology;;    CARDIAC SURGERY  2009    OHT at [facility]    CATHETERIZATION OF BOTH LEFT AND RIGHT HEART Right 8/16/2019    Procedure: CATHETERIZATION, HEART, BOTH LEFT AND RIGHT;  Surgeon: Thien Hayes MD;  Location: [facility] CATH LAB;  Service: Cardiology;  Laterality: Right;    CYSTOSCOPY W/ URETERAL STENT PLACEMENT Right 8/21/2020    Procedure: CYSTOSCOPY, WITH URETERAL STENT INSERTION;   Surgeon: Tutu Jolley MD;  Location: [address];  Service: Urology;  Laterality: Right;    heart tx      TONSILLECTOMY      URETEROSCOPY Right 8/21/2020    Procedure: URETEROSCOPY;  Surgeon: Tutu Jolley MD;  Location: [address];  Service: Urology;  Laterality: Right;     Family History   Problem Relation Age of Onset    No Known Problems Mother     No Known Problems Father     Colon cancer Neg Hx     Cirrhosis Neg Hx     Celiac disease Neg Hx     Crohn's disease Neg Hx     Ulcerative colitis Neg Hx     Rectal cancer Neg Hx     Liver cancer Neg Hx     Irritable bowel syndrome Neg Hx     Esophageal cancer Neg Hx     Stomach cancer Neg Hx     Melanoma Neg Hx      Social History     Tobacco Use    Smoking status: Never    Smokeless tobacco: Never   Substance Use Topics    Alcohol use: Not Currently     Alcohol/week: 1.0 standard drink of alcohol     Types: 1 Shots of liquor per week     Comment: occ    Drug use: No     Review of Systems   Musculoskeletal:  Positive for arthralgias.       Physical Exam     Initial Vitals [08/12/23 2114]   BP Pulse Resp Temp SpO2   (!) 163/91 93 16 98 °F (36.7 °C) 100 %      MAP       --         Physical Exam    Constitutional: He appears well-developed and well-nourished. He is not diaphoretic. He is easily aroused.   HENT:   Head: Normocephalic and atraumatic.   Mouth/Throat: Oropharynx is clear and moist. No oropharyngeal exudate.   Eyes: EOM and lids are normal. Pupils are equal, round, and reactive to light. No scleral icterus.   Neck: Phonation normal. Neck supple. No stridor present.   Normal range of motion.  Cardiovascular:  Normal rate and regular rhythm.           Pulmonary/Chest: Breath sounds normal. No stridor. No respiratory distress. He has no wheezes. He has no rales.   Abdominal: Abdomen is soft. He exhibits no distension. There is no abdominal tenderness. There is no rebound.   Musculoskeletal:         General: No tenderness or edema. Normal  range of motion.      Cervical back: Normal range of motion and neck supple.      Comments: TTP proximal foot at anterior ankle. Proximal foot edema and slight hyperpigmentation. +Warmth. No erythema or open wound. +2 DP pulses     Neurological: He is alert, oriented to person, place, and time and easily aroused. He has normal strength. No sensory deficit.   Skin: Skin is warm and dry. No rash noted. No erythema.   Psychiatric: He has a normal mood and affect. His speech is normal.         ED Course   Procedures  Labs Reviewed   CBC W/ AUTO DIFFERENTIAL - Abnormal; Notable for the following components:       Result Value    Hemoglobin 13.4 (*)     Hematocrit 39.4 (*)     MCV 81 (*)     All other components within normal limits   COMPREHENSIVE METABOLIC PANEL - Abnormal; Notable for the following components:    Sodium 132 (*)     CO2 21 (*)     Glucose 508 (*)     Albumin 3.4 (*)     All other components within normal limits   SEDIMENTATION RATE - Abnormal; Notable for the following components:    Sed Rate 65 (*)     All other components within normal limits   C-REACTIVE PROTEIN - Abnormal; Notable for the following components:    CRP 78.0 (*)     All other components within normal limits   URIC ACID - Abnormal; Notable for the following components:    Uric Acid 7.5 (*)     All other components within normal limits   POCT GLUCOSE MONITORING CONTINUOUS - Abnormal; Notable for the following components:    POC Glucose 371 (*)     All other components within normal limits   ISTAT PROCEDURE - Abnormal; Notable for the following components:    POC PH 7.318 (*)     POC PCO2 51.3 (*)     POC PO2 31 (*)     POC SATURATED O2 53 (*)     All other components within normal limits   POCT GLUCOSE - Abnormal; Notable for the following components:    POCT Glucose 371 (*)     All other components within normal limits   POCT GLUCOSE - Abnormal; Notable for the following components:    POCT Glucose 346 (*)     All other components  within normal limits   BETA - HYDROXYBUTYRATE, SERUM          Imaging Results              X-Ray Foot Complete Left (Final result)  Result time 08/12/23 23:11:43      Final result by Chucho Braswell MD (08/12/23 23:11:43)                   Impression:      No acute fracture.    Soft tissue swelling.      Electronically signed by: Chucho Braswell MD  Date:    08/12/2023  Time:    23:11               Narrative:    EXAMINATION:  XR ANKLE COMPLETE 3 VIEW LEFT; XR FOOT COMPLETE 3 VIEW LEFT    CLINICAL HISTORY:  foot pain; Pain in left ankle and joints of left foot    TECHNIQUE:  AP, lateral and oblique views of the left ankle and left foot were performed.    COMPARISON:  None    FINDINGS:  No fracture or dislocation.  Ankle mortise is symmetric.  Talar dome is maintained.  Lisfranc joints appear congruent.  Generalized soft tissue swelling about the foot and ankle.                                       X-Ray Ankle Complete Left (Final result)  Result time 08/12/23 23:11:43      Final result by Chucho Braswell MD (08/12/23 23:11:43)                   Impression:      No acute fracture.    Soft tissue swelling.      Electronically signed by: Chucho Braswell MD  Date:    08/12/2023  Time:    23:11               Narrative:    EXAMINATION:  XR ANKLE COMPLETE 3 VIEW LEFT; XR FOOT COMPLETE 3 VIEW LEFT    CLINICAL HISTORY:  foot pain; Pain in left ankle and joints of left foot    TECHNIQUE:  AP, lateral and oblique views of the left ankle and left foot were performed.    COMPARISON:  None    FINDINGS:  No fracture or dislocation.  Ankle mortise is symmetric.  Talar dome is maintained.  Lisfranc joints appear congruent.  Generalized soft tissue swelling about the foot and ankle.                                       Medications   LIDOcaine 5 % patch 1 patch (1 patch Transdermal Patch Applied 8/13/23 0116)   morphine tablet 15 mg (15 mg Oral Given 8/12/23 2310)   ondansetron disintegrating tablet 4 mg (4 mg Oral Given  8/12/23 2310)   sodium chloride 0.9% bolus 1,000 mL 1,000 mL (0 mLs Intravenous Stopped 8/13/23 0050)   insulin regular injection 5 Units 0.05 mL (5 Units Intravenous Given 8/12/23 2342)   cephALEXin capsule 500 mg (500 mg Oral Given 8/13/23 0108)     Medical Decision Making:   History:   Old Medical Records: I decided to obtain old medical records.  Old Records Summarized: records from clinic visits and records from previous admission(s).  Initial Assessment:   27 y.o. 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   Differential Diagnosis:   Gout, stress fracture, dislocation, osteoarthritis, ligament injury. No history of ankle surgeries and lower suspicion for septic joint. No fever.   Clinical Tests:   Lab Tests: Ordered and Reviewed  Radiological Study: Ordered and Reviewed  ED Management:  Emergency room management documented as below or in the ED course.             ED Course as of 08/13/23 0126   Sun Aug 13, 2023   0054 Glucose(!!): 508  Reports he did not take his insulin today and had cake. No AG. B-HB normal. VBG pH slightly decreased. Do not suspect DKA. IVF and insulin ordered.  [BA]   0109 POCT Glucose(!): 346  Glucose improving with IVF and insulin. Discussed compliance with insulin regimen at home. [BA]   0109 X-rays L foot and ankle with soft tissue swelling. No fracture.  [BA]   0109 WBC: 11.80  Upper limits of normal which appears chronic. No leukocytosis. [BA]   0110 CRP(!): 78.0 [BA]   0110 Sed Rate(!): 65 [BA]   0110 Uric Acid(!): 7.5 [BA]   0110 Inflammatory markers elevated. He has good ROM of his ankle and no instrumentation and I have lower suspicion for septic joint. X-rays without joint effusion. He has some warmth overlying his dorsum of his foot and will cover for Cellulitis with RX for Keflex. Advised patient to have close follow-up with his PCP. Patient expresses  understanding and agreeable to the plan. Return to ED precautions given for new, worsening, or concerning symptoms. I have discussed the care of this patient with my supervising physician.  [BA]   0111 Uric acid mildly elevated. Atypical location for gout and feel this is less likely. [BA]      ED Course User Index  [BA] Kun Talavera PA-C                 Clinical Impression:   Final diagnoses:  [M25.572] Left ankle pain (Primary)  [Z94.1] Heart transplant recipient  [R73.9] Hyperglycemia        ED Disposition Condition    Discharge Stable          ED Prescriptions       Medication Sig Dispense Start Date End Date Auth. Provider    cephALEXin (KEFLEX) 500 MG capsule Take 1 capsule (500 mg total) by mouth 4 (four) times daily. for 5 days 19 capsule 8/13/2023 8/18/2023 Kun Talavera PA-C          Follow-up Information       Follow up With Specialties Details Why Contact Info Additional Information    [facility]   [address]  [facility] [phone] [facility] Please park in surface lot and check in at central registration desk    Jude Willard MD Pediatrics   [address] [facility] [address]  [facility] [address]  [phone]                Kun Talavera PA-C  08/13/23 0126

## 2023-08-13 NOTE — DISCHARGE INSTRUCTIONS
Take the prescribed Keflex as directed for underlying skin infection. Continue Tylenol and Ibuprofen for pain at home.     Follow-up with your primary care provider for further evaluation.    Continue your insulin regimen as previously directed.     Return to the emergency room for new, worsening, or concerning symptoms.     Future Appointments   Date Time Provider Department Center   9/15/2023  9:30 AM LAB, APPOINTMENT [facility] P [facility]   9/22/2023 10:30 AM Mickie Lino, APRN, FNP [facility]

## 2023-08-15 ENCOUNTER — TELEPHONE (OUTPATIENT)
Dept: INTERNAL MEDICINE | Facility: CLINIC | Age: 28
End: 2023-08-15
Payer: COMMERCIAL

## 2023-08-15 NOTE — TELEPHONE ENCOUNTER
----- Message from Drew Chapman sent at 8/11/2023  4:47 PM CDT -----  Contact: [phone]  Patient is returning a phone call.  Who left a message for the patient: Rama  Does patient know what this is regarding:  miss call  Would you like a call back, or a response through your [facility] portal?:   call back  Comments:      Pt missed a call and would like a call back.

## 2023-08-15 NOTE — TELEPHONE ENCOUNTER
Spoke to pt. Pt is checking status of Dexcom because her received a message that it is not covered by insurance

## 2023-08-24 DIAGNOSIS — Z94.1 STATUS POST HEART TRANSPLANT: Primary | ICD-10-CM

## 2023-09-11 ENCOUNTER — OFFICE VISIT (OUTPATIENT)
Dept: URGENT CARE | Facility: CLINIC | Age: 28
End: 2023-09-11
Payer: MEDICAID

## 2023-09-11 VITALS
TEMPERATURE: 99 F | SYSTOLIC BLOOD PRESSURE: 145 MMHG | WEIGHT: 220 LBS | RESPIRATION RATE: 20 BRPM | HEART RATE: 94 BPM | BODY MASS INDEX: 36.65 KG/M2 | DIASTOLIC BLOOD PRESSURE: 105 MMHG | HEIGHT: 65 IN | OXYGEN SATURATION: 97 %

## 2023-09-11 DIAGNOSIS — S61.218A LACERATION OF SKIN OF LITTLE FINGER, INITIAL ENCOUNTER: Primary | ICD-10-CM

## 2023-09-11 PROCEDURE — 99213 OFFICE O/P EST LOW 20 MIN: CPT | Mod: 25,S$GLB,, | Performed by: NURSE PRACTITIONER

## 2023-09-11 PROCEDURE — 90471 TDAP VACCINE GREATER THAN OR EQUAL TO 7YO IM: ICD-10-PCS | Mod: S$GLB,,, | Performed by: NURSE PRACTITIONER

## 2023-09-11 PROCEDURE — 90715 TDAP VACCINE 7 YRS/> IM: CPT | Mod: S$GLB,,, | Performed by: NURSE PRACTITIONER

## 2023-09-11 PROCEDURE — 90471 IMMUNIZATION ADMIN: CPT | Mod: S$GLB,,, | Performed by: NURSE PRACTITIONER

## 2023-09-11 PROCEDURE — 90715 TDAP VACCINE GREATER THAN OR EQUAL TO 7YO IM: ICD-10-PCS | Mod: S$GLB,,, | Performed by: NURSE PRACTITIONER

## 2023-09-11 PROCEDURE — 99213 PR OFFICE/OUTPT VISIT, EST, LEVL III, 20-29 MIN: ICD-10-PCS | Mod: 25,S$GLB,, | Performed by: NURSE PRACTITIONER

## 2023-09-11 NOTE — PROGRESS NOTES
"Subjective:      Patient ID: Derrick Montanez is a 27 y.o. male.    Vitals:  height is 5' 5" (1.651 m) and weight is 99.8 kg (220 lb). His temperature is 99 °F (37.2 °C). His blood pressure is 145/105 (abnormal) and his pulse is 94. His respiration is 20 and oxygen saturation is 97%.     Chief Complaint: Hand Injury (Pt States" Cut on Rt pinky finger")    Peterson frame noted. Td not UTD.     Hand Injury   His dominant hand is their right hand. The incident occurred less than 1 hour ago. The incident occurred at home. Injury mechanism: Broken Glass. The pain is present in the right fingers. The quality of the pain is described as aching. The pain is mild. The pain has been Fluctuating since the incident. Pertinent negatives include no numbness. He has tried NSAIDs for the symptoms.       Skin:  Negative for erythema.   Neurological:  Negative for numbness and tingling.      Objective:     Physical Exam   Neurological: He is alert.   Skin: Skin is warm, dry and no rash. No bruising, No erythema and No lesion         Comments: Skin denuded left 5th finger medial and distal   Vitals reviewed.      Assessment:     1. Laceration of skin of little finger, initial encounter        Plan:       Laceration of skin of little finger, initial encounter    Other orders  -     (In Office Administered) Tdap Vaccine                  Laceration skin of finger. Uncomplicated. No infection. Update tetanus.   "

## 2023-09-22 ENCOUNTER — OFFICE VISIT (OUTPATIENT)
Dept: INTERNAL MEDICINE | Facility: CLINIC | Age: 28
End: 2023-09-22
Payer: COMMERCIAL

## 2023-09-22 ENCOUNTER — TELEPHONE (OUTPATIENT)
Dept: INTERNAL MEDICINE | Facility: CLINIC | Age: 28
End: 2023-09-22

## 2023-09-22 ENCOUNTER — TELEPHONE (OUTPATIENT)
Dept: INTERNAL MEDICINE | Facility: CLINIC | Age: 28
End: 2023-09-22
Payer: COMMERCIAL

## 2023-09-22 DIAGNOSIS — Z71.89 COUNSELING AND COORDINATION OF CARE: ICD-10-CM

## 2023-09-22 DIAGNOSIS — Z94.1 HEART TRANSPLANT RECIPIENT: ICD-10-CM

## 2023-09-22 DIAGNOSIS — E11.65 TYPE 2 DIABETES MELLITUS WITH HYPERGLYCEMIA, WITH LONG-TERM CURRENT USE OF INSULIN: ICD-10-CM

## 2023-09-22 DIAGNOSIS — E66.9 OBESITY WITH BODY MASS INDEX OF 30.0-39.9: ICD-10-CM

## 2023-09-22 DIAGNOSIS — Z94.9 HYPERTENSION ASSOCIATED WITH TRANSPLANTATION: ICD-10-CM

## 2023-09-22 DIAGNOSIS — E11.65 TYPE 2 DIABETES MELLITUS WITH HYPERGLYCEMIA, WITHOUT LONG-TERM CURRENT USE OF INSULIN: Primary | ICD-10-CM

## 2023-09-22 DIAGNOSIS — D84.9 IMMUNOSUPPRESSION: ICD-10-CM

## 2023-09-22 DIAGNOSIS — Z79.4 TYPE 2 DIABETES MELLITUS WITH HYPERGLYCEMIA, WITH LONG-TERM CURRENT USE OF INSULIN: ICD-10-CM

## 2023-09-22 DIAGNOSIS — I15.8 HYPERTENSION ASSOCIATED WITH TRANSPLANTATION: ICD-10-CM

## 2023-09-22 PROCEDURE — 3066F NEPHROPATHY DOC TX: CPT | Mod: CPTII,95,, | Performed by: NURSE PRACTITIONER

## 2023-09-22 PROCEDURE — 3060F PR POS MICROALBUMINURIA RESULT DOCUMENTED/REVIEW: ICD-10-PCS | Mod: CPTII,95,, | Performed by: NURSE PRACTITIONER

## 2023-09-22 PROCEDURE — 1159F PR MEDICATION LIST DOCUMENTED IN MEDICAL RECORD: ICD-10-PCS | Mod: CPTII,95,, | Performed by: NURSE PRACTITIONER

## 2023-09-22 PROCEDURE — 3044F PR MOST RECENT HEMOGLOBIN A1C LEVEL <7.0%: ICD-10-PCS | Mod: CPTII,95,, | Performed by: NURSE PRACTITIONER

## 2023-09-22 PROCEDURE — 1159F MED LIST DOCD IN RCRD: CPT | Mod: CPTII,95,, | Performed by: NURSE PRACTITIONER

## 2023-09-22 PROCEDURE — 99214 OFFICE O/P EST MOD 30 MIN: CPT | Mod: 95,,, | Performed by: NURSE PRACTITIONER

## 2023-09-22 PROCEDURE — 3060F POS MICROALBUMINURIA REV: CPT | Mod: CPTII,95,, | Performed by: NURSE PRACTITIONER

## 2023-09-22 PROCEDURE — 4010F ACE/ARB THERAPY RXD/TAKEN: CPT | Mod: CPTII,95,, | Performed by: NURSE PRACTITIONER

## 2023-09-22 PROCEDURE — 1160F RVW MEDS BY RX/DR IN RCRD: CPT | Mod: CPTII,95,, | Performed by: NURSE PRACTITIONER

## 2023-09-22 PROCEDURE — 1160F PR REVIEW ALL MEDS BY PRESCRIBER/CLIN PHARMACIST DOCUMENTED: ICD-10-PCS | Mod: CPTII,95,, | Performed by: NURSE PRACTITIONER

## 2023-09-22 PROCEDURE — 99214 PR OFFICE/OUTPT VISIT, EST, LEVL IV, 30-39 MIN: ICD-10-PCS | Mod: 95,,, | Performed by: NURSE PRACTITIONER

## 2023-09-22 PROCEDURE — 3044F HG A1C LEVEL LT 7.0%: CPT | Mod: CPTII,95,, | Performed by: NURSE PRACTITIONER

## 2023-09-22 PROCEDURE — 3066F PR DOCUMENTATION OF TREATMENT FOR NEPHROPATHY: ICD-10-PCS | Mod: CPTII,95,, | Performed by: NURSE PRACTITIONER

## 2023-09-22 PROCEDURE — 4010F PR ACE/ARB THEARPY RXD/TAKEN: ICD-10-PCS | Mod: CPTII,95,, | Performed by: NURSE PRACTITIONER

## 2023-09-22 RX ORDER — BLOOD-GLUCOSE SENSOR
EACH MISCELLANEOUS
Qty: 3 EACH | Refills: 11 | Status: SHIPPED | OUTPATIENT
Start: 2023-09-22 | End: 2023-10-11 | Stop reason: SDUPTHER

## 2023-09-22 RX ORDER — BLOOD-GLUCOSE TRANSMITTER
EACH MISCELLANEOUS
Qty: 1 EACH | Refills: 3 | Status: SHIPPED | OUTPATIENT
Start: 2023-09-22 | End: 2023-10-11

## 2023-09-22 RX ORDER — BLOOD-GLUCOSE TRANSMITTER
EACH MISCELLANEOUS
Qty: 1 EACH | Refills: 3 | Status: SHIPPED | OUTPATIENT
Start: 2023-09-22 | End: 2023-10-11 | Stop reason: SDUPTHER

## 2023-09-22 RX ORDER — BLOOD-GLUCOSE SENSOR
EACH MISCELLANEOUS
Qty: 3 EACH | Refills: 11 | Status: SHIPPED | OUTPATIENT
Start: 2023-09-22 | End: 2023-10-11

## 2023-09-22 RX ORDER — SEMAGLUTIDE 1.34 MG/ML
1 INJECTION, SOLUTION SUBCUTANEOUS
Qty: 3 ML | Refills: 6 | Status: SHIPPED | OUTPATIENT
Start: 2023-09-22 | End: 2024-09-21

## 2023-09-22 NOTE — TELEPHONE ENCOUNTER
----- Message from Cecilio Moreno sent at 9/22/2023 10:45 AM CDT -----  Regarding: appt  Contact: @[phone]  Pt calling in regards to appt he had today at 10:30 states it was supposed to be a virtual and also with endocrinology for md ware pls call and [phone]

## 2023-09-22 NOTE — PROGRESS NOTES
CHIEF COMPLAINT: Type 2 Diabetes     HPI: Mr. Derrick Montanez is a 28 y.o. male who was diagnosed with Type 2 DM in 3/2019   Pt has h/o heart transplantation 2009, [facility]  H/o DKA, hyperglycemia, hypoglycemia.   Needs new pcp  Chronically elevated a1cs in the past few years.  Much improved now, does very well with cgm.  Last seen in 2023.  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   Lab Results   Component Value Date    HGBA1C 6.9 (H) 05/01/2023     Managed per HTS.   H/o chronically elevated BG  Has concerns with nephrotoxicity -semaglutide, likes results, will like lower dose.   Today: fine with going back up  Occ hypoglycemia 70s mg/dl   +covid19 1/2022    The patient location is: home   The chief complaint leading to consultation is: type 2 dm     Visit type: audiovisual    Face to Face time with patient: 12, 15   minutes of total time spent on the encounter, which includes face to face time and non-face to face time preparing to see the patient (eg, review of tests), Obtaining and/or reviewing separately obtained history, Documenting clinical information in the electronic or other health record, Independently interpreting results (not separately reported) and communicating results to the patient/family/caregiver, or Care coordination (not separately reported).     Each patient to whom he or she provides medical services by telemedicine is:  (1) informed of the relationship between the physician and patient and the respective role of any other health care provider with respect to management of the patient; and (2) notified that he or she may decline to receive medical services by telemedicine and may withdraw from such care at any  time.     PREVIOUS DIABETES MEDICATIONS TRIED  lantus   humalog   novolog  Metformin   victoza   Ozempic 1 mg     CURRENT DIABETIC MEDS: ozempic 0.5 mg weekly, lantus 48 units, humalog (lispro) 16-16-16 units ac , scale 180-230+2, etc.     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  Diabetes Management Status    Statin: Taking  ACE/ARB: Taking    Screening or Prevention Patient's value Goal Complete/Controlled?   HgA1C Testing and Control   Lab Results   Component Value Date    HGBA1C 6.9 (H) 05/01/2023      Annually/Less than 8% Yes   Lipid profile : 05/01/2023 Annually Yes   LDL control Lab Results   Component Value Date    LDLCALC 36.6 (L) 05/01/2023    Annually/Less than 100 mg/dl  Yes   Nephropathy screening Lab Results   Component Value Date    LABMICR 139.0 02/20/2023     Lab Results   Component Value Date    PROTEINUA 2+ (A) 02/20/2021    Annually No   Blood pressure BP Readings from Last 1 Encounters:   09/11/23 (!) 145/105    Less than 140/90 Yes   Dilated retinal exam Most Recent Eye Exam Date: Not Found Annually Yes   Foot exam   Most Recent Foot Exam Date: Not Found Annually Yes       REVIEW OF SYSTEMS  General: no weakness, fatigue, +weight fluctuations   Eyes: no double or blurred vision, eye pain, or redness.   Cardiovascular: no chest pain, palpitations, edema, or murmurs.   Respiratory: no cough or dyspnea.   GI: no heartburn, nausea, or changes in bowel patterns; good appetite.   Skin: no rashes, dryness, itching, or reactions at insulin  injection sites.  Neuro: no numbness, tingling, tremors, or vertigo.   Endocrine: + polyuria, polydipsia, polyphagia, heat or cold intolerance.     Vital Signs  There were no vitals taken for this visit.    EXT Hemoglobin A1C   Date Value Ref Range Status   04/12/2019 10.5 % Final     Hemoglobin A1C   Date Value Ref Range Status   05/01/2023 6.9 (H) 4.0 - 5.6 % Final     Comment:     ADA Screening Guidelines:  5.7-6.4%  Consistent with prediabetes  >or=6.5%  Consistent with diabetes    High levels of fetal hemoglobin interfere with the HbA1C  assay. Heterozygous hemoglobin variants (HbS, HgC, etc)do  not significantly interfere with this assay.   However, presence of multiple variants may affect accuracy.     02/20/2023 10.6 (H) 4.0 - 5.6 % Final     Comment:     ADA Screening Guidelines:  5.7-6.4%  Consistent with prediabetes  >or=6.5%  Consistent with diabetes    High levels of fetal hemoglobin interfere with the HbA1C  assay. Heterozygous hemoglobin variants (HbS, HgC, etc)do  not significantly interfere with this assay.   However, presence of multiple variants may affect accuracy.     01/06/2023 >14.0 (H) 4.0 - 5.6 % Final     Comment:     ADA Screening Guidelines:  5.7-6.4%  Consistent with prediabetes  >or=6.5%  Consistent with diabetes    High levels of fetal hemoglobin interfere with the HbA1C  assay. Heterozygous hemoglobin variants (HbS, HgC, etc)do  not significantly interfere with this assay.   However, presence of multiple variants may affect accuracy.          Chemistry        Component Value Date/Time     (L) 08/12/2023 2240    K 4.6 08/12/2023 2240     08/12/2023 2240    CO2 21 (L) 08/12/2023 2240    BUN 17 08/12/2023 2240    CREATININE 1.3 08/12/2023 2240     (HH) 08/12/2023 2240        Component Value Date/Time    CALCIUM 9.5 08/12/2023 2240    ALKPHOS 111 08/12/2023 2240    AST 11 08/12/2023 2240    ALT 16 08/12/2023 2240    BILITOT 0.5 08/12/2023 2240    ESTGFRAFRICA >60.0  01/31/2022 0938    EGFRNONAA >60.0 01/31/2022 0938           Lab Results   Component Value Date    TSH 1.268 05/01/2023      Lab Results   Component Value Date    CHOL 105 (L) 05/01/2023    CHOL 124 01/06/2023    CHOL 105 (L) 01/31/2022     Lab Results   Component Value Date    HDL 33 (L) 05/01/2023    HDL 33 (L) 01/06/2023    HDL 35 (L) 01/31/2022     Lab Results   Component Value Date    LDLCALC 36.6 (L) 05/01/2023    LDLCALC 57.4 (L) 01/06/2023    LDLCALC 31.6 (L) 01/31/2022     Lab Results   Component Value Date    TRIG 177 (H) 05/01/2023    TRIG 168 (H) 01/06/2023    TRIG 192 (H) 01/31/2022     Lab Results   Component Value Date    CHOLHDL 31.4 05/01/2023    CHOLHDL 26.6 01/06/2023    CHOLHDL 33.3 01/31/2022       PHYSICAL EXAMINATION  Constitutional: Appears well, no distress    Diabetes Foot Exam:   Deferred       Assessment/Plan  1. Type 2 diabetes mellitus with hyperglycemia, without long-term current use of insulin  Hemoglobin A1C    Hemoglobin A1C    semaglutide (OZEMPIC) 1 mg/dose (4 mg/3 mL)    Ambulatory referral/consult to Internal Medicine      2. Obesity with body mass index of 30.0-39.9        3. Immunosuppression        4. Hypertension associated with transplantation        5. Heart transplant recipient        6. Counseling and coordination of care        7. Type 2 diabetes mellitus with hyperglycemia, with long-term current use of insulin        1-7.  Follow up in 4 months w/ me   A1c mid oct  A1c in 4 months  Increase ozempic 1 mg weekly-patio  Continue doses for mdi   Has refills  [facility] rx [facility]com g[facility] bundle [facility]  [facility]   A1c goal less than 7%   F/u with HTS  Discussed stressors and dietary habits briefly    Answers submitted by the patient for this visit:  Review of Systems Questionnaire (Submitted on 9/22/2023)  activity change: No  unexpected weight change: No  neck pain: No  hearing loss: No  rhinorrhea: No  trouble swallowing: No  eye discharge:  No  visual disturbance: No  chest tightness: No  wheezing: No  chest pain: No  palpitations: No  blood in stool: No  constipation: No  vomiting: No  diarrhea: No  polydipsia: No  polyuria: No  difficulty urinating: No  urgency: No  hematuria: No  joint swelling: Yes  arthralgias: Yes  headaches: No  weakness: No  confusion: No  dysphoric mood: No

## 2023-09-22 NOTE — TELEPHONE ENCOUNTER
----- Message from TARYN Hermosillo, PRINCE sent at 9/22/2023  1:47 PM CDT -----  Follow up in 4 months w/Mickie  A1c in mid October 2023- [facility]-any day of week  A1c in 4 months -[facility] is fine for f/u  Internal medicine referral-pt will look at bios

## 2023-09-22 NOTE — Clinical Note
Follow up in 4 months w/Irielle A1c in mid October 2023- [facility]-any day of week A1c in 4 months -[facility] for f/u Internal medicine referral-pt will look at bios

## 2023-09-22 NOTE — PATIENT INSTRUCTIONS
Follow up in 4 months w/Irielle  A1c in October 2023  A1c in 4 months     Lab Results   Component Value Date    HGBA1C 6.9 (H) 05/01/2023      [email or web address]  Eat fit kylie  Myfitnesspal kylie  [email or web address]    [employer] kylie    Increase ozempic 1 mg weekly  Humalog 15-15-15 units w/ scale   Lantus 45 units at night

## 2023-10-10 ENCOUNTER — TELEPHONE (OUTPATIENT)
Dept: INTERNAL MEDICINE | Facility: CLINIC | Age: 28
End: 2023-10-10
Payer: COMMERCIAL

## 2023-10-10 NOTE — TELEPHONE ENCOUNTER
----- Message from Marielle Harman sent at 10/9/2023  3:12 PM CDT -----  Contact: Mom  [phone]  Mom  is calling and stating Pharmacy is telling her that Patient is needing a PA for blood-glucose sensor (DEXCOM G6 SENSOR) Janelle   AND    blood-glucose transmitter (DEXCOM G6 TRANSMITTER) Janelle. Please call to advise

## 2023-10-11 ENCOUNTER — PATIENT MESSAGE (OUTPATIENT)
Dept: INTERNAL MEDICINE | Facility: CLINIC | Age: 28
End: 2023-10-11
Payer: COMMERCIAL

## 2023-10-11 ENCOUNTER — TELEPHONE (OUTPATIENT)
Dept: INTERNAL MEDICINE | Facility: CLINIC | Age: 28
End: 2023-10-11
Payer: COMMERCIAL

## 2023-10-11 RX ORDER — BLOOD-GLUCOSE SENSOR
EACH MISCELLANEOUS
Qty: 3 EACH | Refills: 11 | Status: SHIPPED | OUTPATIENT
Start: 2023-10-11 | End: 2023-12-12 | Stop reason: SDUPTHER

## 2023-10-11 RX ORDER — BLOOD-GLUCOSE TRANSMITTER
EACH MISCELLANEOUS
Qty: 1 EACH | Refills: 3 | Status: SHIPPED | OUTPATIENT
Start: 2023-10-11 | End: 2023-12-12 | Stop reason: SDUPTHER

## 2023-10-11 NOTE — TELEPHONE ENCOUNTER
I called the [facility]. His insurance plan does not allow him to obtain devises from a pharmacy. Please send orders to a DME company.  The pharmacy states that the practice was notified 9/22/2023 so it may have already been done.

## 2023-10-11 NOTE — TELEPHONE ENCOUNTER
----- Message from Jaci Cristy Caldwellsilver sent at 10/11/2023  2:33 PM CDT -----  Regarding: Pt needs follow up on PA for Dexcom G6 sensors and transmitter  Contact: @[phone]  Regarding:Pt needs follow up on PA for Dexcom G6 sensors and transmitter    Contact: [facility] [address] [facility] [place] [facility]    Type: blood-glucose sensor (DEXCOM G6 SENSOR) Janelle and blood-glucose transmitter (DEXCOM G6 TRANSMITTER) Janelle     Who Called: Derrick    Would the patient rather a call back or a response via [facility]? Phone call    Best Call Back Number: @[phone]      Additional Information: May have been sent to wrong location, please send PA to:      [id number] [place] - [address]  [address] [address]  Phone: [phone] Fax: [phone]

## 2023-10-11 NOTE — TELEPHONE ENCOUNTER
Patient and son not happy that he has not been able to  monitor his blood sugar. She states that the PA request has been going on since August and she wants something done today. In my defence, I started working on this today when I was assigned Mrs Landrum's message. She wants to speak to Mrs Lino only.

## 2023-10-12 NOTE — TELEPHONE ENCOUNTER
Spoke to [facility] at 9:05 am on 10/12/23. They stated they ha not received the Rxs; can take 24 hrs to come through to them. Will call back this afternoon.

## 2023-10-12 NOTE — TELEPHONE ENCOUNTER
Spoke to [facility]. Both Rxs were received and are being processed. They were not able to give a delivery date.

## 2023-10-12 NOTE — TELEPHONE ENCOUNTER
Spoke with mother on yesterday afternoon.  Dexcom supplies have been loss in the shuffle since 8/2023- started with [facility], delay, then message to send to dme.  Our office then sent to [facility] and [facility].   Paperwork has been on file.   Sent escribe to [facility] via [facility], mother confirmed that this was mailed out to him last time.   Rama DOMÍNGUEZ called yesterday and this morning, rep stated that it may not cross over with  til after 4p today 10/12.   We just received fax for most recent chart notes, listed with [facility].  Of note [employer]ara, [employer] and [facility] have merged together -which makes things more complicated with multiples #s ,faxes to go back and forth with.   Sending updated chart note from 9/2023 and form in now.   Hopefully it is rectified today.

## 2023-10-15 DIAGNOSIS — Z94.1 HEART REPLACED BY TRANSPLANT: ICD-10-CM

## 2023-10-15 RX ORDER — TACROLIMUS 5 MG/1
CAPSULE ORAL
Qty: 180 CAPSULE | Refills: 3 | Status: CANCELLED | OUTPATIENT
Start: 2023-10-15

## 2023-10-16 ENCOUNTER — PATIENT MESSAGE (OUTPATIENT)
Dept: INTERNAL MEDICINE | Facility: CLINIC | Age: 28
End: 2023-10-16
Payer: MEDICAID

## 2023-10-16 ENCOUNTER — TELEPHONE (OUTPATIENT)
Dept: INTERNAL MEDICINE | Facility: CLINIC | Age: 28
End: 2023-10-16
Payer: MEDICAID

## 2023-10-16 ENCOUNTER — PATIENT MESSAGE (OUTPATIENT)
Dept: TRANSPLANT | Facility: CLINIC | Age: 28
End: 2023-10-16
Payer: MEDICAID

## 2023-10-16 DIAGNOSIS — Z94.1 HEART REPLACED BY TRANSPLANT: ICD-10-CM

## 2023-10-16 RX ORDER — TACROLIMUS 5 MG/1
5 CAPSULE, GELATIN COATED ORAL EVERY 12 HOURS
Qty: 180 CAPSULE | Refills: 3 | Status: SHIPPED | OUTPATIENT
Start: 2023-10-16

## 2023-10-16 NOTE — TELEPHONE ENCOUNTER
----- Message from TARYN Hermosillo, STEFANIP sent at 10/16/2023  9:36 AM CDT -----  Regarding: dexcom  [phone] call [facility] to check on status of dexcom g6 bundle  Thanks  Mickie

## 2023-11-15 ENCOUNTER — TELEPHONE (OUTPATIENT)
Dept: TRANSPLANT | Facility: CLINIC | Age: 28
End: 2023-11-15
Payer: MEDICAID

## 2023-11-15 RX ORDER — MYCOPHENOLIC ACID 360 MG/1
TABLET, DELAYED RELEASE ORAL
Qty: 120 TABLET | Refills: 11 | Status: SHIPPED | OUTPATIENT
Start: 2023-11-15

## 2023-11-15 NOTE — TELEPHONE ENCOUNTER
Pt's mother called stating a PA was needed for prograf 5 mg , prograf 1 mg and myfortic for Brand name. New prescription sent to Dr. Baron to sign for myfortic. PA's submitted for both doses of prograf.

## 2023-11-15 NOTE — TELEPHONE ENCOUNTER
Called for pt to go to lab in the next couple of weeks. He will go next week. Lab appt made for Tuesday.

## 2023-11-17 ENCOUNTER — TELEPHONE (OUTPATIENT)
Dept: TRANSPLANT | Facility: CLINIC | Age: 28
End: 2023-11-17
Payer: MEDICAID

## 2023-11-21 ENCOUNTER — PATIENT MESSAGE (OUTPATIENT)
Dept: TRANSPLANT | Facility: CLINIC | Age: 28
End: 2023-11-21
Payer: MEDICAID

## 2023-12-01 ENCOUNTER — TELEPHONE (OUTPATIENT)
Dept: PODIATRY | Facility: CLINIC | Age: 28
End: 2023-12-01
Payer: MEDICAID

## 2023-12-01 NOTE — TELEPHONE ENCOUNTER
Left message we do not have anything sooner. I scheduled him for 3/18/24 at 10:00 am. If that is not ok, please call. ----- Message from Genoveva Toure sent at 12/1/2023  7:13 AM CST -----  Type:  Sooner Apoointment Request    Caller is requesting a sooner appointment.  Caller declined first available appointment listed below.  Caller will not accept being placed on the waitlist and is requesting a message be sent to doctor.  Name of Caller:pt   When is the first available appointment?  Symptoms:foot/ankle pain  Would the patient rather a call back or a response via [facility]? Call   Best Call Back Number:[phone]  Additional Information: none

## 2023-12-04 ENCOUNTER — PATIENT MESSAGE (OUTPATIENT)
Dept: TRANSPLANT | Facility: CLINIC | Age: 28
End: 2023-12-04
Payer: MEDICAID

## 2023-12-04 ENCOUNTER — LAB VISIT (OUTPATIENT)
Dept: LAB | Facility: HOSPITAL | Age: 28
End: 2023-12-04
Attending: INTERNAL MEDICINE
Payer: MEDICAID

## 2023-12-04 DIAGNOSIS — Z94.1 STATUS POST HEART TRANSPLANT: ICD-10-CM

## 2023-12-04 LAB
ALBUMIN SERPL BCP-MCNC: 3.8 G/DL (ref 3.5–5.2)
ALP SERPL-CCNC: 69 U/L (ref 55–135)
ALT SERPL W/O P-5'-P-CCNC: 21 U/L (ref 10–44)
ANION GAP SERPL CALC-SCNC: 10 MMOL/L (ref 8–16)
AST SERPL-CCNC: 14 U/L (ref 10–40)
BASOPHILS # BLD AUTO: 0.06 K/UL (ref 0–0.2)
BASOPHILS NFR BLD: 0.5 % (ref 0–1.9)
BILIRUB SERPL-MCNC: 0.3 MG/DL (ref 0.1–1)
BNP SERPL-MCNC: 24 PG/ML (ref 0–99)
BUN SERPL-MCNC: 28 MG/DL (ref 6–20)
CALCIUM SERPL-MCNC: 9.2 MG/DL (ref 8.7–10.5)
CHLORIDE SERPL-SCNC: 111 MMOL/L (ref 95–110)
CHOLEST SERPL-MCNC: 121 MG/DL (ref 120–199)
CHOLEST/HDLC SERPL: 3.5 {RATIO} (ref 2–5)
CO2 SERPL-SCNC: 19 MMOL/L (ref 23–29)
CREAT SERPL-MCNC: 1.5 MG/DL (ref 0.5–1.4)
DIFFERENTIAL METHOD: ABNORMAL
EOSINOPHIL # BLD AUTO: 0.1 K/UL (ref 0–0.5)
EOSINOPHIL NFR BLD: 0.8 % (ref 0–8)
ERYTHROCYTE [DISTWIDTH] IN BLOOD BY AUTOMATED COUNT: 12.9 % (ref 11.5–14.5)
EST. GFR  (NO RACE VARIABLE): >60 ML/MIN/1.73 M^2
ESTIMATED AVG GLUCOSE: 197 MG/DL (ref 68–131)
GLUCOSE SERPL-MCNC: 133 MG/DL (ref 70–110)
HBA1C MFR BLD: 8.5 % (ref 4–5.6)
HCT VFR BLD AUTO: 40.4 % (ref 40–54)
HDLC SERPL-MCNC: 35 MG/DL (ref 40–75)
HDLC SERPL: 28.9 % (ref 20–50)
HGB BLD-MCNC: 13.3 G/DL (ref 14–18)
IMM GRANULOCYTES # BLD AUTO: 0.04 K/UL (ref 0–0.04)
IMM GRANULOCYTES NFR BLD AUTO: 0.3 % (ref 0–0.5)
LDLC SERPL CALC-MCNC: 53.6 MG/DL (ref 63–159)
LYMPHOCYTES # BLD AUTO: 4 K/UL (ref 1–4.8)
LYMPHOCYTES NFR BLD: 31.5 % (ref 18–48)
MCH RBC QN AUTO: 27.9 PG (ref 27–31)
MCHC RBC AUTO-ENTMCNC: 32.9 G/DL (ref 32–36)
MCV RBC AUTO: 85 FL (ref 82–98)
MONOCYTES # BLD AUTO: 0.9 K/UL (ref 0.3–1)
MONOCYTES NFR BLD: 6.6 % (ref 4–15)
NEUTROPHILS # BLD AUTO: 7.7 K/UL (ref 1.8–7.7)
NEUTROPHILS NFR BLD: 60.3 % (ref 38–73)
NONHDLC SERPL-MCNC: 86 MG/DL
NRBC BLD-RTO: 0 /100 WBC
PLATELET # BLD AUTO: 329 K/UL (ref 150–450)
PMV BLD AUTO: 10 FL (ref 9.2–12.9)
POTASSIUM SERPL-SCNC: 4.4 MMOL/L (ref 3.5–5.1)
PROT SERPL-MCNC: 7.2 G/DL (ref 6–8.4)
RBC # BLD AUTO: 4.76 M/UL (ref 4.6–6.2)
SODIUM SERPL-SCNC: 140 MMOL/L (ref 136–145)
TACROLIMUS BLD-MCNC: 5.7 NG/ML (ref 5–15)
TRIGL SERPL-MCNC: 162 MG/DL (ref 30–150)
WBC # BLD AUTO: 12.83 K/UL (ref 3.9–12.7)

## 2023-12-04 PROCEDURE — 36415 COLL VENOUS BLD VENIPUNCTURE: CPT | Performed by: INTERNAL MEDICINE

## 2023-12-04 PROCEDURE — 80061 LIPID PANEL: CPT | Performed by: INTERNAL MEDICINE

## 2023-12-04 PROCEDURE — 83036 HEMOGLOBIN GLYCOSYLATED A1C: CPT | Performed by: INTERNAL MEDICINE

## 2023-12-04 PROCEDURE — 83880 ASSAY OF NATRIURETIC PEPTIDE: CPT | Performed by: INTERNAL MEDICINE

## 2023-12-04 PROCEDURE — 85025 COMPLETE CBC W/AUTO DIFF WBC: CPT | Performed by: INTERNAL MEDICINE

## 2023-12-04 PROCEDURE — 80053 COMPREHEN METABOLIC PANEL: CPT | Performed by: INTERNAL MEDICINE

## 2023-12-04 PROCEDURE — 86833 HLA CLASS II HIGH DEFIN QUAL: CPT | Mod: PO | Performed by: INTERNAL MEDICINE

## 2023-12-04 PROCEDURE — 86977 RBC SERUM PRETX INCUBJ/INHIB: CPT | Mod: PO | Performed by: INTERNAL MEDICINE

## 2023-12-04 PROCEDURE — 86832 HLA CLASS I HIGH DEFIN QUAL: CPT | Mod: PO | Performed by: INTERNAL MEDICINE

## 2023-12-04 PROCEDURE — 80197 ASSAY OF TACROLIMUS: CPT | Performed by: INTERNAL MEDICINE

## 2023-12-11 ENCOUNTER — OFFICE VISIT (OUTPATIENT)
Dept: URGENT CARE | Facility: CLINIC | Age: 28
End: 2023-12-11
Payer: MEDICAID

## 2023-12-11 VITALS
OXYGEN SATURATION: 98 % | HEIGHT: 65 IN | HEART RATE: 97 BPM | DIASTOLIC BLOOD PRESSURE: 101 MMHG | TEMPERATURE: 99 F | RESPIRATION RATE: 16 BRPM | BODY MASS INDEX: 36.99 KG/M2 | SYSTOLIC BLOOD PRESSURE: 149 MMHG | WEIGHT: 222 LBS

## 2023-12-11 DIAGNOSIS — J02.0 STREP THROAT: ICD-10-CM

## 2023-12-11 DIAGNOSIS — R05.9 COUGH, UNSPECIFIED TYPE: Primary | ICD-10-CM

## 2023-12-11 LAB
CTP QC/QA: YES
FLUAV AG NPH QL: NEGATIVE
FLUBV AG NPH QL: NEGATIVE
S PYO RRNA THROAT QL PROBE: NEGATIVE
SARS-COV-2 AG RESP QL IA.RAPID: NEGATIVE

## 2023-12-11 PROCEDURE — 87804 INFLUENZA ASSAY W/OPTIC: CPT | Mod: QW,,, | Performed by: NURSE PRACTITIONER

## 2023-12-11 PROCEDURE — 99214 PR OFFICE/OUTPT VISIT, EST, LEVL IV, 30-39 MIN: ICD-10-PCS | Mod: S$GLB,,, | Performed by: NURSE PRACTITIONER

## 2023-12-11 PROCEDURE — 87811 SARS-COV-2 COVID19 W/OPTIC: CPT | Mod: QW,S$GLB,, | Performed by: NURSE PRACTITIONER

## 2023-12-11 PROCEDURE — 87804 POCT INFLUENZA A/B: ICD-10-PCS | Mod: QW,,, | Performed by: NURSE PRACTITIONER

## 2023-12-11 PROCEDURE — 87811 SARS CORONAVIRUS 2 ANTIGEN POCT, MANUAL READ: ICD-10-PCS | Mod: QW,S$GLB,, | Performed by: NURSE PRACTITIONER

## 2023-12-11 PROCEDURE — 87880 POCT RAPID STREP A: ICD-10-PCS | Mod: QW,,, | Performed by: NURSE PRACTITIONER

## 2023-12-11 PROCEDURE — 99214 OFFICE O/P EST MOD 30 MIN: CPT | Mod: S$GLB,,, | Performed by: NURSE PRACTITIONER

## 2023-12-11 PROCEDURE — 87880 STREP A ASSAY W/OPTIC: CPT | Mod: QW,,, | Performed by: NURSE PRACTITIONER

## 2023-12-11 RX ORDER — AMOXICILLIN 500 MG/1
500 TABLET, FILM COATED ORAL 2 TIMES DAILY
Qty: 14 TABLET | Refills: 0 | Status: SHIPPED | OUTPATIENT
Start: 2023-12-11 | End: 2023-12-18

## 2023-12-11 RX ORDER — CEFTRIAXONE 500 MG/1
500 INJECTION, POWDER, FOR SOLUTION INTRAMUSCULAR; INTRAVENOUS
Status: COMPLETED | OUTPATIENT
Start: 2023-12-11 | End: 2023-12-11

## 2023-12-11 RX ADMIN — CEFTRIAXONE 500 MG: 500 INJECTION, POWDER, FOR SOLUTION INTRAMUSCULAR; INTRAVENOUS at 03:12

## 2023-12-11 NOTE — LETTER
December 11, 2023      [facility]  [address] [address]       Patient: Derrick Montanez   YOB: 1995  Date of Visit: 12/11/2023    To Whom It May Concern:    Davy Montanez  was at [facility] on 12/11/2023. The patient may return to work/school on 12/13/2023 with no restrictions. If you have any questions or concerns, or if I can be of further assistance, please do not hesitate to contact me.    Sincerely,    Rich Fischer, NP

## 2023-12-11 NOTE — PROGRESS NOTES
"Subjective:      Patient ID: Derrick Montanez is a 28 y.o. male.    Vitals:  height is 5' 5" (1.651 m) and weight is 100.7 kg (222 lb). His temperature is 98.7 °F (37.1 °C). His blood pressure is 149/101 (abnormal) and his pulse is 97. His respiration is 16 and oxygen saturation is 98%.     Chief Complaint: Cough    Sore throat. Pt request injection abx.     Cough  This is a new problem. Episode onset: x 4 days. The problem has been gradually worsening. The cough is Productive of sputum. Associated symptoms include nasal congestion, postnasal drip and a sore throat. Pertinent negatives include no chills or fever.       Constitution: Negative for activity change, chills, fatigue and fever.   HENT:  Positive for postnasal drip and sore throat.    Respiratory:  Positive for cough.       Objective:     Physical Exam   HENT:   Head: Normocephalic and atraumatic.   Nose: Nose normal.   Mouth/Throat: Mucous membranes are moist. Posterior oropharyngeal erythema (beefy red throat) present. No oropharyngeal exudate.   Abdominal: Normal appearance.   Neurological: He is alert.   Vitals reviewed.      Assessment:     1. Cough, unspecified type    2. Strep throat        Plan:       Cough, unspecified type  -     SARS Coronavirus 2 Antigen, POCT Manual Read  -     POCT Influenza A/B Rapid Antigen  -     POCT rapid strep A    Strep throat    Other orders  -     cefTRIAXone injection 500 mg  -     amoxicillin (AMOXIL) 500 MG Tab; Take 1 tablet (500 mg total) by mouth 2 (two) times daily. for 7 days  Dispense: 14 tablet; Refill: 0                  Covid, flu, strep negative.  Physical exam suspicious for strep throat. Rx as above.     "

## 2023-12-12 ENCOUNTER — PATIENT MESSAGE (OUTPATIENT)
Dept: INTERNAL MEDICINE | Facility: CLINIC | Age: 28
End: 2023-12-12
Payer: MEDICAID

## 2023-12-12 RX ORDER — BLOOD-GLUCOSE SENSOR
EACH MISCELLANEOUS
Qty: 3 EACH | Refills: 11 | Status: SHIPPED | OUTPATIENT
Start: 2023-12-12

## 2023-12-12 RX ORDER — BLOOD-GLUCOSE TRANSMITTER
EACH MISCELLANEOUS
Qty: 1 EACH | Refills: 3 | Status: SHIPPED | OUTPATIENT
Start: 2023-12-12 | End: 2023-12-12 | Stop reason: SDUPTHER

## 2023-12-12 RX ORDER — METFORMIN HYDROCHLORIDE 500 MG/1
500 TABLET ORAL 2 TIMES DAILY WITH MEALS
Qty: 180 TABLET | Refills: 3 | Status: SHIPPED | OUTPATIENT
Start: 2023-12-12 | End: 2023-12-12 | Stop reason: SDUPTHER

## 2023-12-12 RX ORDER — METFORMIN HYDROCHLORIDE 500 MG/1
500 TABLET ORAL 2 TIMES DAILY WITH MEALS
Qty: 180 TABLET | Refills: 3 | Status: SHIPPED | OUTPATIENT
Start: 2023-12-12

## 2023-12-12 RX ORDER — BLOOD-GLUCOSE SENSOR
EACH MISCELLANEOUS
Qty: 3 EACH | Refills: 11 | Status: SHIPPED | OUTPATIENT
Start: 2023-12-12 | End: 2023-12-12 | Stop reason: SDUPTHER

## 2023-12-12 RX ORDER — BLOOD-GLUCOSE TRANSMITTER
EACH MISCELLANEOUS
Qty: 1 EACH | Refills: 3 | Status: SHIPPED | OUTPATIENT
Start: 2023-12-12

## 2023-12-13 ENCOUNTER — TELEPHONE (OUTPATIENT)
Dept: TRANSPLANT | Facility: CLINIC | Age: 28
End: 2023-12-13
Payer: MEDICAID

## 2023-12-14 DIAGNOSIS — Z94.1 HEART REPLACED BY TRANSPLANT: ICD-10-CM

## 2023-12-14 DIAGNOSIS — Z94.9 HYPERTENSION ASSOCIATED WITH TRANSPLANTATION: ICD-10-CM

## 2023-12-14 DIAGNOSIS — I15.8 HYPERTENSION ASSOCIATED WITH TRANSPLANTATION: ICD-10-CM

## 2023-12-14 RX ORDER — DILTIAZEM HYDROCHLORIDE 180 MG/1
180 CAPSULE, COATED, EXTENDED RELEASE ORAL
Qty: 30 CAPSULE | Refills: 11 | Status: SHIPPED | OUTPATIENT
Start: 2023-12-14

## 2023-12-14 NOTE — TELEPHONE ENCOUNTER
Cherise with [facility] called stating she needed another PA for brand name Myfortic. Pt requests brand name only due to pediatric surgeon stating he could only take brand immunos. When I called [facility] Medicaid they stated his policy ended on 10/27/2023. Tried to call pt to see what his policy is, left message for pt to call me with insurance.

## 2023-12-15 ENCOUNTER — TELEPHONE (OUTPATIENT)
Dept: TRANSPLANT | Facility: CLINIC | Age: 28
End: 2023-12-15
Payer: MEDICAID

## 2023-12-15 NOTE — TELEPHONE ENCOUNTER
Pt medicaid switched pharmacy management to [facility]. Called to get PA for Myfortic, brand name. Approved for one year until 12/14/2024. Reference number [id number]

## 2023-12-15 NOTE — TELEPHONE ENCOUNTER
Pt's mother called asking about medicaid and message I received from them that his coverage ended 10/27/2023. She will reach out to [employer] before contacting medicaid. Advised pt's mother that if he is working, once he makes over the maximum amount allowed to keep his benefits, medicaid will drop him.

## 2023-12-20 ENCOUNTER — TELEPHONE (OUTPATIENT)
Dept: INTERNAL MEDICINE | Facility: CLINIC | Age: 28
End: 2023-12-20
Payer: MEDICAID

## 2023-12-20 NOTE — TELEPHONE ENCOUNTER
Called [employer] to get a PA approval for dexacom transmitter ( [id number]) and sensor ([id number]) to  2024.

## 2023-12-20 NOTE — TELEPHONE ENCOUNTER
----- Message from TARYN Hermosillo FNP sent at 12/12/2023 10:33 AM CST -----  Regarding: dexcom pa   [facility] [address]. Thank you in advance for the response have a happy holiday.

## 2023-12-26 DIAGNOSIS — Z79.4 TYPE 2 DIABETES MELLITUS WITH HYPEROSMOLARITY WITHOUT COMA, WITH LONG-TERM CURRENT USE OF INSULIN: Primary | ICD-10-CM

## 2023-12-26 DIAGNOSIS — E11.65 TYPE 2 DIABETES MELLITUS WITH HYPERGLYCEMIA, WITHOUT LONG-TERM CURRENT USE OF INSULIN: Primary | ICD-10-CM

## 2023-12-26 DIAGNOSIS — I10 PRIMARY HYPERTENSION: ICD-10-CM

## 2023-12-26 DIAGNOSIS — Z94.1 STATUS POST HEART TRANSPLANT: ICD-10-CM

## 2023-12-26 DIAGNOSIS — E11.00 TYPE 2 DIABETES MELLITUS WITH HYPEROSMOLARITY WITHOUT COMA, WITH LONG-TERM CURRENT USE OF INSULIN: Primary | ICD-10-CM

## 2024-01-10 ENCOUNTER — PATIENT OUTREACH (OUTPATIENT)
Dept: ADMINISTRATIVE | Facility: HOSPITAL | Age: 29
End: 2024-01-10
Payer: MEDICAID

## 2024-01-10 NOTE — PROGRESS NOTES
Health Maintenance Due   Topic Date Due    Pneumococcal Vaccines (Age 0-64) (1 - PCV) Never done    Eye Exam  Never done    Foot Exam  03/25/2020    COVID-19 Vaccine (4 - 2023-24 season) 09/01/2023     Chart reviewed. Triggered LINKS. Updated Care Everywhere.     Sandro Warner CMA  Population Health Care Coordinator  Primary Care Team

## 2024-01-17 ENCOUNTER — PATIENT MESSAGE (OUTPATIENT)
Dept: INTERNAL MEDICINE | Facility: CLINIC | Age: 29
End: 2024-01-17
Payer: MEDICAID

## 2024-01-17 ENCOUNTER — PATIENT MESSAGE (OUTPATIENT)
Dept: TRANSPLANT | Facility: CLINIC | Age: 29
End: 2024-01-17
Payer: MEDICAID

## 2024-01-17 DIAGNOSIS — E11.65 TYPE 2 DIABETES MELLITUS WITH HYPERGLYCEMIA, WITH LONG-TERM CURRENT USE OF INSULIN: Primary | ICD-10-CM

## 2024-01-17 DIAGNOSIS — Z79.4 TYPE 2 DIABETES MELLITUS WITH HYPERGLYCEMIA, WITH LONG-TERM CURRENT USE OF INSULIN: Primary | ICD-10-CM

## 2024-01-22 ENCOUNTER — LAB VISIT (OUTPATIENT)
Dept: LAB | Facility: HOSPITAL | Age: 29
End: 2024-01-22
Payer: MEDICAID

## 2024-01-22 DIAGNOSIS — E11.65 TYPE 2 DIABETES MELLITUS WITH HYPERGLYCEMIA, WITHOUT LONG-TERM CURRENT USE OF INSULIN: ICD-10-CM

## 2024-01-22 DIAGNOSIS — Z94.1 STATUS POST HEART TRANSPLANT: ICD-10-CM

## 2024-01-22 DIAGNOSIS — Z79.4 TYPE 2 DIABETES MELLITUS WITH HYPEROSMOLARITY WITHOUT COMA, WITH LONG-TERM CURRENT USE OF INSULIN: ICD-10-CM

## 2024-01-22 DIAGNOSIS — E11.00 TYPE 2 DIABETES MELLITUS WITH HYPEROSMOLARITY WITHOUT COMA, WITH LONG-TERM CURRENT USE OF INSULIN: ICD-10-CM

## 2024-01-22 LAB
ALBUMIN SERPL BCP-MCNC: 3.4 G/DL (ref 3.5–5.2)
ALP SERPL-CCNC: 65 U/L (ref 55–135)
ALT SERPL W/O P-5'-P-CCNC: 12 U/L (ref 10–44)
ANION GAP SERPL CALC-SCNC: 12 MMOL/L (ref 8–16)
AST SERPL-CCNC: 14 U/L (ref 10–40)
BASOPHILS # BLD AUTO: 0.03 K/UL (ref 0–0.2)
BASOPHILS NFR BLD: 0.3 % (ref 0–1.9)
BILIRUB SERPL-MCNC: 0.4 MG/DL (ref 0.1–1)
BNP SERPL-MCNC: 12 PG/ML (ref 0–99)
BUN SERPL-MCNC: 29 MG/DL (ref 6–20)
CALCIUM SERPL-MCNC: 9.1 MG/DL (ref 8.7–10.5)
CHLORIDE SERPL-SCNC: 111 MMOL/L (ref 95–110)
CHOLEST SERPL-MCNC: 95 MG/DL (ref 120–199)
CHOLEST/HDLC SERPL: 3.3 {RATIO} (ref 2–5)
CO2 SERPL-SCNC: 19 MMOL/L (ref 23–29)
CREAT SERPL-MCNC: 1.1 MG/DL (ref 0.5–1.4)
DIFFERENTIAL METHOD BLD: ABNORMAL
EOSINOPHIL # BLD AUTO: 0 K/UL (ref 0–0.5)
EOSINOPHIL NFR BLD: 0.3 % (ref 0–8)
ERYTHROCYTE [DISTWIDTH] IN BLOOD BY AUTOMATED COUNT: 13.2 % (ref 11.5–14.5)
EST. GFR  (NO RACE VARIABLE): >60 ML/MIN/1.73 M^2
ESTIMATED AVG GLUCOSE: 163 MG/DL (ref 68–131)
ESTIMATED AVG GLUCOSE: 163 MG/DL (ref 68–131)
GLUCOSE SERPL-MCNC: 82 MG/DL (ref 70–110)
HBA1C MFR BLD: 7.3 % (ref 4–5.6)
HBA1C MFR BLD: 7.3 % (ref 4–5.6)
HCT VFR BLD AUTO: 35.5 % (ref 40–54)
HDLC SERPL-MCNC: 29 MG/DL (ref 40–75)
HDLC SERPL: 30.5 % (ref 20–50)
HGB BLD-MCNC: 11.6 G/DL (ref 14–18)
IMM GRANULOCYTES # BLD AUTO: 0.04 K/UL (ref 0–0.04)
IMM GRANULOCYTES NFR BLD AUTO: 0.3 % (ref 0–0.5)
LDLC SERPL CALC-MCNC: 48 MG/DL (ref 63–159)
LYMPHOCYTES # BLD AUTO: 4 K/UL (ref 1–4.8)
LYMPHOCYTES NFR BLD: 33.8 % (ref 18–48)
MCH RBC QN AUTO: 27.4 PG (ref 27–31)
MCHC RBC AUTO-ENTMCNC: 32.7 G/DL (ref 32–36)
MCV RBC AUTO: 84 FL (ref 82–98)
MONOCYTES # BLD AUTO: 0.7 K/UL (ref 0.3–1)
MONOCYTES NFR BLD: 5.9 % (ref 4–15)
NEUTROPHILS # BLD AUTO: 7.1 K/UL (ref 1.8–7.7)
NEUTROPHILS NFR BLD: 59.4 % (ref 38–73)
NONHDLC SERPL-MCNC: 66 MG/DL
NRBC BLD-RTO: 0 /100 WBC
PLATELET # BLD AUTO: 421 K/UL (ref 150–450)
PMV BLD AUTO: 9 FL (ref 9.2–12.9)
POTASSIUM SERPL-SCNC: 4 MMOL/L (ref 3.5–5.1)
PROT SERPL-MCNC: 7.5 G/DL (ref 6–8.4)
RBC # BLD AUTO: 4.24 M/UL (ref 4.6–6.2)
SODIUM SERPL-SCNC: 142 MMOL/L (ref 136–145)
T4 FREE SERPL-MCNC: 0.96 NG/DL (ref 0.71–1.51)
T4 FREE SERPL-MCNC: 0.96 NG/DL (ref 0.71–1.51)
TACROLIMUS BLD-MCNC: 6.8 NG/ML (ref 5–15)
TRIGL SERPL-MCNC: 90 MG/DL (ref 30–150)
TSH SERPL DL<=0.005 MIU/L-ACNC: 0.78 UIU/ML (ref 0.4–4)
TSH SERPL DL<=0.005 MIU/L-ACNC: 0.78 UIU/ML (ref 0.4–4)
WBC # BLD AUTO: 11.94 K/UL (ref 3.9–12.7)

## 2024-01-22 PROCEDURE — 36415 COLL VENOUS BLD VENIPUNCTURE: CPT | Performed by: NURSE PRACTITIONER

## 2024-01-22 PROCEDURE — 84439 ASSAY OF FREE THYROXINE: CPT | Performed by: INTERNAL MEDICINE

## 2024-01-22 PROCEDURE — 86832 HLA CLASS I HIGH DEFIN QUAL: CPT | Mod: PO | Performed by: INTERNAL MEDICINE

## 2024-01-22 PROCEDURE — 80061 LIPID PANEL: CPT | Performed by: INTERNAL MEDICINE

## 2024-01-22 PROCEDURE — 83880 ASSAY OF NATRIURETIC PEPTIDE: CPT | Performed by: INTERNAL MEDICINE

## 2024-01-22 PROCEDURE — 86833 HLA CLASS II HIGH DEFIN QUAL: CPT | Mod: PO | Performed by: INTERNAL MEDICINE

## 2024-01-22 PROCEDURE — 86977 RBC SERUM PRETX INCUBJ/INHIB: CPT | Mod: 59,PO | Performed by: INTERNAL MEDICINE

## 2024-01-22 PROCEDURE — 85025 COMPLETE CBC W/AUTO DIFF WBC: CPT | Performed by: INTERNAL MEDICINE

## 2024-01-22 PROCEDURE — 83036 HEMOGLOBIN GLYCOSYLATED A1C: CPT | Performed by: NURSE PRACTITIONER

## 2024-01-22 PROCEDURE — 80053 COMPREHEN METABOLIC PANEL: CPT | Performed by: INTERNAL MEDICINE

## 2024-01-22 PROCEDURE — 86832 HLA CLASS I HIGH DEFIN QUAL: CPT | Mod: 59,PO | Performed by: INTERNAL MEDICINE

## 2024-01-22 PROCEDURE — 80197 ASSAY OF TACROLIMUS: CPT | Performed by: INTERNAL MEDICINE

## 2024-01-22 PROCEDURE — 83036 HEMOGLOBIN GLYCOSYLATED A1C: CPT | Mod: 91 | Performed by: INTERNAL MEDICINE

## 2024-01-22 PROCEDURE — 84443 ASSAY THYROID STIM HORMONE: CPT | Performed by: INTERNAL MEDICINE

## 2024-01-22 NOTE — PROGRESS NOTES
CHIEF COMPLAINT: Type 2 Diabetes     HPI: Mr. Derrick Montanez is a 28 y.o. male who was diagnosed with Type 2 DM in 3/2019   Pt has h/o heart transplantation 2009, [facility]  H/o DKA, hyperglycemia, hypoglycemia.   Needs new pcp  Chronically elevated a1cs in the past few years.  Much improved now, does very well with cgm.  Last seen in fall 2023.  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   Dms supplier for dexcom   A1c went down from 8.5% to 7.3%  Less insulin needs   Tolerating ozempic 1 mg weekly -Tuesdays  Humalog correction scale use, 10 units for large meal, not always taking  Lantus 6-8 units only.     TIR 86%  Avg 146 mg/dl  Sd 30 mg/dl  Gmi 6.9%      Lab Results   Component Value Date    HGBA1C 7.3 (H) 01/22/2024    HGBA1C 7.3 (H) 01/22/2024     Managed per HTS.   H/o chronically elevated BG  Has concerns with nephrotoxicity -semaglutide, likes results, will like lower dose.   +hypoglycemia 60s- 70s  +covid19 1/2022    The patient location is: home   The chief complaint leading to consultation is: type 2 dm     Visit type: audiovisual    Face to Face time with patient: 12, 15   minutes of total time spent on the encounter, which includes face to face time and non-face to face time preparing to see the patient (eg, review of tests), Obtaining and/or reviewing separately obtained history, Documenting clinical information in the electronic or other health record, Independently interpreting results (not separately reported) and communicating results to the patient/family/caregiver, or Care coordination (not separately reported).     Each patient to whom he or she provides medical services by telemedicine is:  (1) informed of the relationship between the physician and patient and the respective role of any other health care provider with respect to management of the patient; and (2) notified that he or she may decline to receive  medical services by telemedicine and may withdraw from such care at any time.     PREVIOUS DIABETES MEDICATIONS TRIED  lantus   humalog   novolog  Metformin   victoza   Ozempic 1 mg     CURRENT DIABETIC MEDS: ozempic 0.5 mg weekly, lantus 6-8 units, humalog (lispro) 0-10 units ac , scale 180-230+2, etc.     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  Diabetes Management Status    Statin: Taking  ACE/ARB: Taking    Screening or Prevention Patient's value Goal Complete/Controlled?   HgA1C Testing and Control   Lab Results   Component Value Date    HGBA1C 8.5 (H) 12/04/2023      Annually/Less than 8% Yes   Lipid profile : 12/04/2023 Annually Yes   LDL control Lab Results   Component Value Date    LDLCALC 53.6 (L) 12/04/2023    Annually/Less than 100 mg/dl  Yes   Nephropathy screening Lab Results   Component Value Date    LABMICR 139.0 02/20/2023     Lab Results   Component Value Date    PROTEINUA 2+ (A) 02/20/2021    Annually No   Blood pressure BP Readings from Last 1 Encounters:   12/11/23 (!) 149/101    Less than 140/90 Yes   Dilated retinal exam Most Recent Eye Exam Date: Not Found Annually Yes   Foot exam   Most Recent Foot Exam Date: Not Found Annually Yes       REVIEW OF SYSTEMS  General: no weakness, fatigue, +weight fluctuations   Eyes: no double or blurred vision, eye pain, or redness.   Cardiovascular: no chest pain, palpitations, edema, or murmurs.   Respiratory: no cough or dyspnea.   GI: no heartburn, nausea, or changes in bowel patterns; good  appetite.   Skin: no rashes, dryness, itching, or reactions at insulin injection sites.  Neuro: no numbness, tingling, tremors, or vertigo.   Endocrine: no polyuria, polydipsia, polyphagia, heat or cold intolerance.     Vital Signs  There were no vitals taken for this visit.    EXT Hemoglobin A1C   Date Value Ref Range Status   04/12/2019 10.5 % Final     Hemoglobin A1C   Date Value Ref Range Status   12/04/2023 8.5 (H) 4.0 - 5.6 % Final     Comment:     ADA Screening Guidelines:  5.7-6.4%  Consistent with prediabetes  >or=6.5%  Consistent with diabetes    High levels of fetal hemoglobin interfere with the HbA1C  assay. Heterozygous hemoglobin variants (HbS, HgC, etc)do  not significantly interfere with this assay.   However, presence of multiple variants may affect accuracy.     05/01/2023 6.9 (H) 4.0 - 5.6 % Final     Comment:     ADA Screening Guidelines:  5.7-6.4%  Consistent with prediabetes  >or=6.5%  Consistent with diabetes    High levels of fetal hemoglobin interfere with the HbA1C  assay. Heterozygous hemoglobin variants (HbS, HgC, etc)do  not significantly interfere with this assay.   However, presence of multiple variants may affect accuracy.     02/20/2023 10.6 (H) 4.0 - 5.6 % Final     Comment:     ADA Screening Guidelines:  5.7-6.4%  Consistent with prediabetes  >or=6.5%  Consistent with diabetes    High levels of fetal hemoglobin interfere with the HbA1C  assay. Heterozygous hemoglobin variants (HbS, HgC, etc)do  not significantly interfere with this assay.   However, presence of multiple variants may affect accuracy.          Chemistry        Component Value Date/Time     12/04/2023 0739    K 4.4 12/04/2023 0739     (H) 12/04/2023 0739    CO2 19 (L) 12/04/2023 0739    BUN 28 (H) 12/04/2023 0739    CREATININE 1.5 (H) 12/04/2023 0739     (H) 12/04/2023 0739        Component Value Date/Time    CALCIUM 9.2 12/04/2023 0739    ALKPHOS 69 12/04/2023 0739    AST 14 12/04/2023 0739    ALT  21 12/04/2023 0739    BILITOT 0.3 12/04/2023 0739    ESTGFRAFRICA >60.0 01/31/2022 0938    EGFRNONAA >60.0 01/31/2022 0938           Lab Results   Component Value Date    TSH 1.268 05/01/2023      Lab Results   Component Value Date    CHOL 121 12/04/2023    CHOL 105 (L) 05/01/2023    CHOL 124 01/06/2023     Lab Results   Component Value Date    HDL 35 (L) 12/04/2023    HDL 33 (L) 05/01/2023    HDL 33 (L) 01/06/2023     Lab Results   Component Value Date    LDLCALC 53.6 (L) 12/04/2023    LDLCALC 36.6 (L) 05/01/2023    LDLCALC 57.4 (L) 01/06/2023     Lab Results   Component Value Date    TRIG 162 (H) 12/04/2023    TRIG 177 (H) 05/01/2023    TRIG 168 (H) 01/06/2023     Lab Results   Component Value Date    CHOLHDL 28.9 12/04/2023    CHOLHDL 31.4 05/01/2023    CHOLHDL 26.6 01/06/2023       PHYSICAL EXAMINATION  Constitutional: Appears well, no distress    Diabetes Foot Exam:   Deferred       Assessment/Plan  1. Type 2 diabetes mellitus with hyperglycemia, with long-term current use of insulin  Hemoglobin A1C      2. Obesity with body mass index of 30.0-39.9        3. Hypertension associated with transplantation  Microalbumin/Creatinine Ratio, Urine      4. Heart replaced by transplant        5. Counseling and coordination of care        1-5.   Follow up in 4 months w/ me   A1c urine mac prior -4 months   Doing well with less insulin   Has cgm, needs -has hypoglycemia unawareness  Continue ozempic 1 mg weekly  F/u with HTS  Has refills and supplies  Continue regimen above  Goal A1c < 7%  Nearing goal  Discussed dietary habits, stressors  Answers submitted by the patient for this visit:  Diabetes Questionnaire (Submitted on 1/23/2024)  Chief Complaint: Diabetes problem  Diabetes type: type 2  MedicAlert ID: No  Disease duration: 5 Years  blurred vision: No  chest pain: No  fatigue: No  foot paresthesias: No  foot ulcerations: No  polydipsia: No  polyphagia: No  polyuria: No  visual change: No  weakness: No  weight loss:  No  Symptom course: improving  confusion: No  dizziness: No  headaches: No  hunger: Yes  mood changes: No  nervous/anxious: No  pallor: No  seizures: No  sleepiness: No  speech difficulty: No  sweats: No  tremors: No  blackouts: No  hospitalization: No  nocturnal hypoglycemia: No  required assistance: No  required glucagon: No  CVA: No  heart disease: No  impotence: No  nephropathy: No  peripheral neuropathy: No  PVD: No  retinopathy: No  autonomic neuropathy: No  CAD risks: dyslipidemia, hypertension, obesity, diabetes mellitus, male sex  Current treatments: diet, insulin injections, oral agent (monotherapy)  Treatment compliance: all of the time  Dose schedule: at bedtime  Given by: patient  Injection sites: abdominal wall  Home blood tests: 5+ x per day  Home urines: <1 x per month  Monitoring compliance: excellent  Blood glucose trend: decreasing steadily  Weight trend: decreasing steadily  Current diet: diabetic  Meal planning: avoidance of concentrated sweets  Exercise: intermittently  Dietitian visit: No  Eye exam current: No  Sees podiatrist: Yes

## 2024-01-24 ENCOUNTER — OFFICE VISIT (OUTPATIENT)
Dept: INTERNAL MEDICINE | Facility: CLINIC | Age: 29
End: 2024-01-24
Payer: MEDICAID

## 2024-01-24 ENCOUNTER — TELEPHONE (OUTPATIENT)
Dept: TRANSPLANT | Facility: CLINIC | Age: 29
End: 2024-01-24
Payer: MEDICAID

## 2024-01-24 DIAGNOSIS — E11.65 TYPE 2 DIABETES MELLITUS WITH HYPERGLYCEMIA, WITH LONG-TERM CURRENT USE OF INSULIN: Primary | ICD-10-CM

## 2024-01-24 DIAGNOSIS — Z94.9 HYPERTENSION ASSOCIATED WITH TRANSPLANTATION: ICD-10-CM

## 2024-01-24 DIAGNOSIS — Z79.4 TYPE 2 DIABETES MELLITUS WITH HYPERGLYCEMIA, WITH LONG-TERM CURRENT USE OF INSULIN: Primary | ICD-10-CM

## 2024-01-24 DIAGNOSIS — E66.9 OBESITY WITH BODY MASS INDEX OF 30.0-39.9: ICD-10-CM

## 2024-01-24 DIAGNOSIS — I15.8 HYPERTENSION ASSOCIATED WITH TRANSPLANTATION: ICD-10-CM

## 2024-01-24 DIAGNOSIS — Z94.1 HEART REPLACED BY TRANSPLANT: ICD-10-CM

## 2024-01-24 DIAGNOSIS — Z71.89 COUNSELING AND COORDINATION OF CARE: ICD-10-CM

## 2024-01-24 PROCEDURE — 4010F ACE/ARB THERAPY RXD/TAKEN: CPT | Mod: CPTII,95,, | Performed by: NURSE PRACTITIONER

## 2024-01-24 PROCEDURE — 3051F HG A1C>EQUAL 7.0%<8.0%: CPT | Mod: CPTII,95,, | Performed by: NURSE PRACTITIONER

## 2024-01-24 PROCEDURE — 99214 OFFICE O/P EST MOD 30 MIN: CPT | Mod: 95,,, | Performed by: NURSE PRACTITIONER

## 2024-01-24 NOTE — TELEPHONE ENCOUNTER
Returned patient call. Reviewed lab results. No changes. Patient already scheduled for annual in March

## 2024-01-26 ENCOUNTER — TELEPHONE (OUTPATIENT)
Dept: INTERNAL MEDICINE | Facility: CLINIC | Age: 29
End: 2024-01-26
Payer: MEDICAID

## 2024-01-26 NOTE — TELEPHONE ENCOUNTER
Prior authorization approved for Dexcom Sensors Case ID: [id number]      Payer: [facility] - Medicaid   Approved PA already on file   Electronic appeal: Not supported   Prior auth initiated by: [id number] [place] [place] - [address] [facility]    [phone]

## 2024-02-05 ENCOUNTER — PATIENT MESSAGE (OUTPATIENT)
Dept: TRANSPLANT | Facility: CLINIC | Age: 29
End: 2024-02-05
Payer: MEDICAID

## 2024-03-01 DIAGNOSIS — Z94.1 STATUS POST HEART TRANSPLANT: Primary | ICD-10-CM

## 2024-03-01 RX ORDER — DIPHENHYDRAMINE HCL 50 MG
50 CAPSULE ORAL ONCE
Status: CANCELLED | OUTPATIENT
Start: 2024-03-01 | End: 2024-03-01

## 2024-03-01 RX ORDER — SODIUM CHLORIDE 9 MG/ML
INJECTION, SOLUTION INTRAVENOUS CONTINUOUS
Status: CANCELLED | OUTPATIENT
Start: 2024-03-01 | End: 2024-03-01

## 2024-03-04 ENCOUNTER — TELEPHONE (OUTPATIENT)
Dept: TRANSPLANT | Facility: CLINIC | Age: 29
End: 2024-03-04
Payer: MEDICAID

## 2024-03-04 NOTE — TELEPHONE ENCOUNTER
----- Message from Mamta Elizalde RN sent at 3/4/2024  2:23 PM CST -----  Regarding: FW: no show for Dr Freddy morfin    ----- Message -----  From: Yenny Downing RN  Sent: 3/4/2024   2:11 PM CST  To: Mamta Elizalde RN  Subject: no show for Dr Freddy morfin                        Hi good afternoon,    Just wanted to let you know the patient did not come for her appointment.    We are having bad weather today.    Yenny

## 2024-03-11 ENCOUNTER — TELEPHONE (OUTPATIENT)
Dept: CARDIOLOGY | Facility: CLINIC | Age: 29
End: 2024-03-11
Payer: MEDICAID

## 2024-03-11 DIAGNOSIS — Z94.1 HEART REPLACED BY TRANSPLANT: ICD-10-CM

## 2024-03-11 NOTE — TELEPHONE ENCOUNTER
Pt had appointment with dr Hayes, no show for appointment for s/p transplant consult for procedure, 15 yr LHC/IVUS.  Removed from angio schedule.

## 2024-03-12 RX ORDER — TACROLIMUS 1 MG/1
CAPSULE, GELATIN COATED ORAL
Qty: 270 CAPSULE | Refills: 3 | Status: SHIPPED | OUTPATIENT
Start: 2024-03-12

## 2024-04-02 ENCOUNTER — EDUCATION (OUTPATIENT)
Dept: CARDIOLOGY | Facility: CLINIC | Age: 29
End: 2024-04-02
Payer: MEDICAID

## 2024-04-02 ENCOUNTER — TELEPHONE (OUTPATIENT)
Dept: CARDIOLOGY | Facility: CLINIC | Age: 29
End: 2024-04-02
Payer: MEDICAID

## 2024-04-02 DIAGNOSIS — Z94.1 STATUS POST HEART TRANSPLANT: Primary | ICD-10-CM

## 2024-04-02 RX ORDER — DIPHENHYDRAMINE HCL 50 MG
50 CAPSULE ORAL ONCE
Status: CANCELLED | OUTPATIENT
Start: 2024-04-02 | End: 2024-04-02

## 2024-04-02 RX ORDER — SODIUM CHLORIDE 9 MG/ML
INJECTION, SOLUTION INTRAVENOUS CONTINUOUS
Status: CANCELLED | OUTPATIENT
Start: 2024-04-02 | End: 2024-04-02

## 2024-04-02 NOTE — TELEPHONE ENCOUNTER
Contact call with patient to discuss appointment with TXP 15 yr annual.  Pt booked clinic appointment for consents on his day of testing.  Procedure date agreed for 5/1/24.  Educational information to follow during clinic.

## 2024-04-02 NOTE — PROGRESS NOTES
If you need to change the date of your procedure, please call  Yenny BELLE -[phone]  CALL THE OFFICE IF YOU HAVE ANY MEDICATIONS CHANGES BEFORE A PROCEDURE.    YOUR CLINIC APPOINTMENT FOR EVALUATION AND TO SIGN CONSENTS IS SCHEDULED FOR 5/30/24, 3RD FLOOR, INTERVENTIONAL CARDIOLOGY DEPARTMENT WITH DR PADILLA.     OUTPATIENT CATHETERIZATION INSTRUCTIONS - rescheduled date     You have been scheduled for a procedure in the catheterization lab on FRIDAY, MAY 31, 2024     Please report to the Cardiology Waiting Area on the Third floor of the hospital and check in at ARRIVAL TIME 930AM.   You will then be taken to the SSCU (Short Stay Cardiac Unit) and prepared for your procedure. Please be aware that this is not the time of your procedure but the time you are to arrive. The procedures are scheduled on an hourly basis; however, emergency cases take precedence over all other cases.         1. NOTHING TO EAT AFTER MIDNIGHT 12AM the morning of the procedure.  You may take your medications with small sips of water except those excluded below. SEE BELOW INSTRUCTIONS ON MEDICATIONS.     2.   Do not stop your Aspirin, Plavix, Effient, or Brilinta.  SEE BELOW.    3.  Diabetics: SEE INSTRUCTIONS:  Take half your normal dose of Lantus and Lispro the PM evening before the procedure and Do Not take any insulin the morning of the procedure.    Stop METFORMIN 2 days before the procedure, do not take the morning of the procedure and continue to stop for 2 days after the procedure.    IF TAKING OZEMPIC- THIS MUST BE HELD (do not take) FOR ONE WEEK PRIOR TO PROCEDURE.    4. Heart Failure Patients: N/A        The procedure will take 1-2 hours to perform. After the procedure, you will return to SSCU on the third floor of the hospital. You will need to lie still (or keep your arm still) for the next 4 to 6 hours to minimize bleeding from the puncture site. Your family may remain in the room with you during this time.         You may be  able to be discharged home that same afternoon if there is someone to drive you home and there were no complications. If you have one of the balloon, stent, or device procedures you may spend the night in the hospital. Your doctor will determine, based on your progress, the date and time of your discharge. The results of your procedure will be discussed with you before you are discharged. Any further testing or procedures will be scheduled for you either before you leave or you will be called with these appointments.   YOU WILL NOT BE ABLE TO DRIVE HOME  THE DAY OF THE PROCEDURE.      If you should have any questions, concerns, or please call Yenny [phone]        Special Instructions:  Drink plenty of water the day before the procedure and the day after the procedure to flush your kidneys.     Continue daily medications, including aspirin the morning of the procedure.  See Special instructions for other medications.     IMPORTANT:    DO NOT STOP ASPIRIN.  Take the morning of the procedure.        THE ABOVE INSTRUCTIONS WERE GIVEN TO THE PATIENT VERBALLY AND THEY VERBALIZED UNDERSTANDING.  THEY DO NOT REQUIRE ANY SPECIAL NEEDS AND DO NOT HAVE ANY LEARNING BARRIERS.          Directions for Reporting to Cardiology Waiting Area in the Hospital  If you park in the Parking Garage:  Take elevators to the1st floor of the parking garage.  Continue past the gift shop, coffee shop, and piano.  Take a right and go to the gold elevators. (Elevator B)  Take the elevator to the 3rd floor.  Follow the arrow on the sign on the wall that says Cath Lab Registration/EP Lab Registration.  Follow the long hallway all the way around until you come to a big open area.  This is the registration area.  Check in at Reception Desk.     OR     If family is dropping you off:  Have them drop you off at the front of the Hospital under the green overhang.  Enter through the doors and take a right.  Take the 'E' elevators to the 3rd floor  Cardiology Waiting Area.  Check in at the Reception Desk in the waiting room.

## 2024-04-09 DIAGNOSIS — E11.65 TYPE 2 DIABETES MELLITUS WITH HYPERGLYCEMIA, WITH LONG-TERM CURRENT USE OF INSULIN: ICD-10-CM

## 2024-04-09 DIAGNOSIS — Z79.4 TYPE 2 DIABETES MELLITUS WITH HYPERGLYCEMIA, WITH LONG-TERM CURRENT USE OF INSULIN: ICD-10-CM

## 2024-04-09 RX ORDER — INSULIN LISPRO 100 [IU]/ML
INJECTION, SOLUTION INTRAVENOUS; SUBCUTANEOUS
Qty: 30 ML | Refills: 6 | Status: SHIPPED | OUTPATIENT
Start: 2024-04-09

## 2024-04-15 DIAGNOSIS — F41.9 ANXIETY: ICD-10-CM

## 2024-04-15 DIAGNOSIS — E11.65 TYPE 2 DIABETES MELLITUS WITH HYPERGLYCEMIA, WITHOUT LONG-TERM CURRENT USE OF INSULIN: ICD-10-CM

## 2024-04-15 DIAGNOSIS — I10 HYPERTENSION, UNSPECIFIED TYPE: Primary | ICD-10-CM

## 2024-04-15 DIAGNOSIS — E11.65 TYPE 2 DIABETES MELLITUS WITH HYPERGLYCEMIA, WITH LONG-TERM CURRENT USE OF INSULIN: ICD-10-CM

## 2024-04-15 DIAGNOSIS — Z79.4 TYPE 2 DIABETES MELLITUS WITH HYPERGLYCEMIA, WITH LONG-TERM CURRENT USE OF INSULIN: ICD-10-CM

## 2024-04-15 RX ORDER — ESCITALOPRAM OXALATE 10 MG/1
TABLET ORAL
Qty: 30 TABLET | Refills: 11 | Status: SHIPPED | OUTPATIENT
Start: 2024-04-15

## 2024-04-15 RX ORDER — LISINOPRIL 10 MG/1
10 TABLET ORAL
Qty: 90 TABLET | Refills: 3 | Status: SHIPPED | OUTPATIENT
Start: 2024-04-15

## 2024-04-15 RX ORDER — SEMAGLUTIDE 1.34 MG/ML
1 INJECTION, SOLUTION SUBCUTANEOUS
Qty: 3 ML | Refills: 8 | Status: SHIPPED | OUTPATIENT
Start: 2024-04-15

## 2024-04-15 RX ORDER — INSULIN GLARGINE 100 [IU]/ML
INJECTION, SOLUTION SUBCUTANEOUS
Qty: 15 ML | Refills: 8 | Status: SHIPPED | OUTPATIENT
Start: 2024-04-15

## 2024-04-15 NOTE — TELEPHONE ENCOUNTER
Sent Rx refill to Dr. James for Lexapro. Sent a note to pharmacy to send to his PCP in the future.

## 2024-04-17 ENCOUNTER — TELEPHONE (OUTPATIENT)
Dept: CARDIOLOGY | Facility: CLINIC | Age: 29
End: 2024-04-17
Payer: MEDICAID

## 2024-04-17 NOTE — TELEPHONE ENCOUNTER
Fax received from [facility] stating the following:  Unable to process as patient is not delegated to [facility].    Sent message to auth Robyn Payne to address or respond with correct auth status.  REF ID #[id number]

## 2024-04-18 ENCOUNTER — PATIENT MESSAGE (OUTPATIENT)
Dept: TRANSPLANT | Facility: CLINIC | Age: 29
End: 2024-04-18
Payer: MEDICAID

## 2024-04-18 NOTE — TELEPHONE ENCOUNTER
Sent a note to pt after he cancelled for the second time stating that it is difficult to obtain these appointments. I said we would work on changing them.

## 2024-04-22 ENCOUNTER — PATIENT MESSAGE (OUTPATIENT)
Dept: CARDIOLOGY | Facility: CLINIC | Age: 29
End: 2024-04-22
Payer: MEDICAID

## 2024-04-22 ENCOUNTER — TELEPHONE (OUTPATIENT)
Dept: CARDIOLOGY | Facility: CLINIC | Age: 29
End: 2024-04-22
Payer: MEDICAID

## 2024-04-22 NOTE — TELEPHONE ENCOUNTER
Follow up contact with patient RE: rescheduling angiogram.  Pt was asleep, and mother request to call back after 3 PM.  In basket message sent to patient -  clinic appointment would be 5/30/24 with procedure taking place on 5/31/24.  Mother states she will pass the message to patient.  Provided patient with call back number to discuss.

## 2024-04-24 ENCOUNTER — TELEPHONE (OUTPATIENT)
Dept: CARDIOLOGY | Facility: CLINIC | Age: 29
End: 2024-04-24
Payer: MEDICAID

## 2024-04-24 ENCOUNTER — TELEPHONE (OUTPATIENT)
Dept: TRANSPLANT | Facility: CLINIC | Age: 29
End: 2024-04-24
Payer: MEDICAID

## 2024-04-24 NOTE — TELEPHONE ENCOUNTER
Spoke to pt and confirmed his appts for his annual tests which are on several days of his choice. I told him I am working on obtaining a slog for our heart tx MD appt also.    ----- Message from Yenny Downing RN sent at 4/24/2024  3:16 PM CDT -----  Regarding: rescheduled - pt needs to speak with you  Hi good afternoon,      Pt has been rescheduled with Dr Hayes    Clinic appt 5/30/24 at 11AM  Angiogram [facility] on 5/31/24 arrival time 0930AM        Pt request for you to call him please as he has other appointment and scheduling questions.      Thank you in advance.    Yenny Hayes

## 2024-04-24 NOTE — TELEPHONE ENCOUNTER
Follow up, phone attempt to patient RE: appt and procedure.  Left detailed message, provided phone number for patient to call back.  Attempting to reschedule patients Annual TXP angiogram.

## 2024-04-24 NOTE — TELEPHONE ENCOUNTER
Pt returned office call, agrees to plan for clinic appointment as well as rescheduled cath procedure.  Pre procedure instructions to be sent to patient residence.  All questions answered.

## 2024-05-08 DIAGNOSIS — Z94.1 HEART REPLACED BY TRANSPLANT: ICD-10-CM

## 2024-05-08 RX ORDER — TOPIRAMATE 25 MG/1
25 TABLET ORAL NIGHTLY
Qty: 30 TABLET | Refills: 0 | Status: SHIPPED | OUTPATIENT
Start: 2024-05-08 | End: 2024-06-05

## 2024-05-10 ENCOUNTER — HOSPITAL ENCOUNTER (OUTPATIENT)
Dept: RADIOLOGY | Facility: HOSPITAL | Age: 29
Discharge: HOME | End: 2024-05-10
Attending: INTERNAL MEDICINE
Payer: MEDICAID

## 2024-05-10 DIAGNOSIS — Z94.1 STATUS POST HEART TRANSPLANT: ICD-10-CM

## 2024-05-10 PROCEDURE — 71046 X-RAY EXAM CHEST 2 VIEWS: CPT | Mod: TC,FY

## 2024-05-10 PROCEDURE — 71046 X-RAY EXAM CHEST 2 VIEWS: CPT | Mod: 26,,, | Performed by: RADIOLOGY

## 2024-05-16 ENCOUNTER — PATIENT MESSAGE (OUTPATIENT)
Dept: INTERNAL MEDICINE | Facility: CLINIC | Age: 29
End: 2024-05-16
Payer: MEDICAID

## 2024-05-16 ENCOUNTER — HOSPITAL ENCOUNTER (OUTPATIENT)
Dept: RADIOLOGY | Facility: CLINIC | Age: 29
Discharge: HOME | End: 2024-05-16
Attending: INTERNAL MEDICINE
Payer: MEDICAID

## 2024-05-16 ENCOUNTER — HOSPITAL ENCOUNTER (OUTPATIENT)
Dept: CARDIOLOGY | Facility: HOSPITAL | Age: 29
Discharge: HOME | End: 2024-05-16
Attending: INTERNAL MEDICINE
Payer: MEDICAID

## 2024-05-16 VITALS — HEIGHT: 65 IN | BODY MASS INDEX: 36.99 KG/M2 | WEIGHT: 222 LBS

## 2024-05-16 DIAGNOSIS — Z94.1 STATUS POST HEART TRANSPLANT: ICD-10-CM

## 2024-05-16 LAB
ASCENDING AORTA: 3.2 CM
AV INDEX (PROSTH): 0.91
AV MEAN GRADIENT: 2 MMHG
AV PEAK GRADIENT: 3 MMHG
AV VALVE AREA BY VELOCITY RATIO: 4.21 CM²
AV VALVE AREA: 3.88 CM²
AV VELOCITY RATIO: 0.99
BSA FOR ECHO PROCEDURE: 2.15 M2
CV ECHO LV RWT: 0.4 CM
DOP CALC AO PEAK VEL: 0.88 M/S
DOP CALC AO VTI: 17.67 CM
DOP CALC LVOT AREA: 4.3 CM2
DOP CALC LVOT DIAMETER: 2.33 CM
DOP CALC LVOT PEAK VEL: 0.87 M/S
DOP CALC LVOT STROKE VOLUME: 68.57 CM3
DOP CALCLVOT PEAK VEL VTI: 16.09 CM
E WAVE DECELERATION TIME: 231.47 MSEC
E/A RATIO: 1.71
E/E' RATIO: 7.7 M/S
ECHO LV POSTERIOR WALL: 1 CM (ref 0.6–1.1)
FRACTIONAL SHORTENING: 31 % (ref 28–44)
INTERVENTRICULAR SEPTUM: 0.99 CM (ref 0.6–1.1)
LA MAJOR: 5.5 CM
LA MINOR: 5.5 CM
LA WIDTH: 4.1 CM
LEFT ATRIUM SIZE: 4.59 CM
LEFT ATRIUM VOLUME INDEX: 42.5 ML/M2
LEFT ATRIUM VOLUME: 87.98 CM3
LEFT INTERNAL DIMENSION IN SYSTOLE: 3.47 CM (ref 2.1–4)
LEFT VENTRICLE DIASTOLIC VOLUME INDEX: 48.66 ML/M2
LEFT VENTRICLE DIASTOLIC VOLUME: 100.72 ML
LEFT VENTRICLE MASS INDEX: 87 G/M2
LEFT VENTRICLE SYSTOLIC VOLUME INDEX: 24.1 ML/M2
LEFT VENTRICLE SYSTOLIC VOLUME: 49.94 ML
LEFT VENTRICULAR INTERNAL DIMENSION IN DIASTOLE: 5 CM (ref 3.5–6)
LEFT VENTRICULAR MASS: 180.75 G
LV LATERAL E/E' RATIO: 7 M/S
LV SEPTAL E/E' RATIO: 8.56 M/S
MV PEAK A VEL: 0.45 M/S
MV PEAK E VEL: 0.77 M/S
PISA TR MAX VEL: 1.89 M/S
RA PRESSURE ESTIMATED: 3 MMHG
RV TB RVSP: 5 MMHG
SINUS: 3.48 CM
STJ: 2.86 CM
TDI LATERAL: 0.11 M/S
TDI SEPTAL: 0.09 M/S
TDI: 0.1 M/S
TR MAX PG: 14 MMHG
TV REST PULMONARY ARTERY PRESSURE: 17 MMHG
Z-SCORE OF LEFT VENTRICULAR DIMENSION IN END DIASTOLE: -2.3
Z-SCORE OF LEFT VENTRICULAR DIMENSION IN END SYSTOLE: -0.82

## 2024-05-16 PROCEDURE — 93306 TTE W/DOPPLER COMPLETE: CPT

## 2024-05-16 PROCEDURE — 93306 TTE W/DOPPLER COMPLETE: CPT | Mod: 26,,, | Performed by: INTERNAL MEDICINE

## 2024-05-21 ENCOUNTER — PATIENT MESSAGE (OUTPATIENT)
Dept: INTERNAL MEDICINE | Facility: CLINIC | Age: 29
End: 2024-05-21
Payer: MEDICAID

## 2024-05-21 NOTE — PROGRESS NOTES
CHIEF COMPLAINT: Type 2 Diabetes     HPI: Mr. Derrick Montanez is a 28 y.o. male who was diagnosed with Type 2 DM in 3/2019   Pt has h/o heart transplantation 2009, [facility]  H/o DKA, hyperglycemia, hypoglycemia.   Chronically elevated a1cs in the past few years.  Since then, much improved in the past year with cgm use.  Last seen in winter 2024.  Being seen by me again today.  Less insulin needs.   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   Dms supplier for dexcom   A1c went down from 8.5% to 7.3% to 6.6%  Lab Results   Component Value Date    HGBA1C 6.6 (H) 05/16/2024     Less insulin needs   Tolerating ozempic 1 mg weekly -Tuesdays  Humalog correction scale use, 10 units for large meal, not always taking  Lantus 6-8 units only.       Avg 151 mg/dl  Sd 33 mg/dl  Gmi 7 %    Will start exercise next week   Stress eats at times.   Fell off wagon in past several weeks.    Managed per HTS.   H/o chronically elevated BG  Has concerns with nephrotoxicity -semaglutide, likes results, tried lower dose but had more cravings, etc.  Tolerating 1 mg at this time.   +hypoglycemia 60s- 70s  +covid19 1/2022    The patient location is: home   The chief complaint leading to consultation is: type 2 dm     Visit type: audiovisual    Face to Face time with patient: 12, 15   minutes of total time spent on the encounter, which includes face to face time and non-face to face time preparing to see the patient (eg, review of tests), Obtaining and/or reviewing separately obtained history, Documenting clinical information in the electronic or other health record, Independently interpreting results (not separately reported) and communicating results to the patient/family/caregiver, or Care coordination (not separately reported).     Each patient to whom he or she provides medical services by telemedicine is:  (1) informed of the relationship between the  physician and patient and the respective role of any other health care provider with respect to management of the patient; and (2) notified that he or she may decline to receive medical services by telemedicine and may withdraw from such care at any time.     PREVIOUS DIABETES MEDICATIONS TRIED  lantus   humalog   novolog  Metformin   victoza   Ozempic 1 mg     CURRENT DIABETIC MEDS: ozempic 1 mg weekly, lantus 6-8 units, humalog (lispro) 0-10 units ac , scale 180-230+2, etc.     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  Diabetes Management Status    Statin: Taking  ACE/ARB: Taking    Screening or Prevention Patient's value Goal Complete/Controlled?   HgA1C Testing and Control   Lab Results   Component Value Date    HGBA1C 6.6 (H) 05/16/2024      Annually/Less than 8% Yes   Lipid profile : 05/16/2024 Annually Yes   LDL control Lab Results   Component Value Date    LDLCALC 56.4 (L) 05/16/2024    Annually/Less than 100 mg/dl  Yes   Nephropathy screening Lab Results   Component Value Date    LABMICR 393.0 05/16/2024     Lab Results   Component Value Date    PROTEINUA 2+ (A) 02/20/2021    Annually No   Blood pressure BP Readings from Last 1 Encounters:   12/11/23 (!) 149/101    Less than 140/90 Yes   Dilated retinal exam Most Recent Eye Exam Date: Not Found Annually Yes   Foot exam   Most Recent Foot Exam Date: Not Found Annually Yes       REVIEW OF SYSTEMS  General: no weakness, fatigue, +weight fluctuations   Eyes: no double or blurred vision, eye pain, or  redness.   Cardiovascular: no chest pain, palpitations, edema, or murmurs.   Respiratory: no cough or dyspnea.   GI: no heartburn, nausea, or changes in bowel patterns; good appetite.   Skin: no rashes, dryness, itching, or reactions at insulin injection sites.  Neuro: no numbness, tingling, tremors, or vertigo.   Endocrine: no polyuria, polydipsia, polyphagia, heat or cold intolerance.     Vital Signs  There were no vitals taken for this visit.    EXT Hemoglobin A1C   Date Value Ref Range Status   04/12/2019 10.5 % Final     Hemoglobin A1C   Date Value Ref Range Status   05/16/2024 6.6 (H) 4.0 - 5.6 % Final     Comment:     ADA Screening Guidelines:  5.7-6.4%  Consistent with prediabetes  >or=6.5%  Consistent with diabetes    High levels of fetal hemoglobin interfere with the HbA1C  assay. Heterozygous hemoglobin variants (HbS, HgC, etc)do  not significantly interfere with this assay.   However, presence of multiple variants may affect accuracy.     01/22/2024 7.3 (H) 4.0 - 5.6 % Final     Comment:     ADA Screening Guidelines:  5.7-6.4%  Consistent with prediabetes  >or=6.5%  Consistent with diabetes    High levels of fetal hemoglobin interfere with the HbA1C  assay. Heterozygous hemoglobin variants (HbS, HgC, etc)do  not significantly interfere with this assay.   However, presence of multiple variants may affect accuracy.     01/22/2024 7.3 (H) 4.0 - 5.6 % Final     Comment:     ADA Screening Guidelines:  5.7-6.4%  Consistent with prediabetes  >or=6.5%  Consistent with diabetes    High levels of fetal hemoglobin interfere with the HbA1C  assay. Heterozygous hemoglobin variants (HbS, HgC, etc)do  not significantly interfere with this assay.   However, presence of multiple variants may affect accuracy.          Chemistry        Component Value Date/Time     05/16/2024 0814    K 4.4 05/16/2024 0814     05/16/2024 0814    CO2 20 (L) 05/16/2024 0814    BUN 27 (H) 05/16/2024 0814    CREATININE 1.3  05/16/2024 0814    GLU 87 05/16/2024 0814        Component Value Date/Time    CALCIUM 9.4 05/16/2024 0814    ALKPHOS 69 05/16/2024 0814    AST 15 05/16/2024 0814    ALT 12 05/16/2024 0814    BILITOT 0.3 05/16/2024 0814    ESTGFRAFRICA >60.0 01/31/2022 0938    EGFRNONAA >60.0 01/31/2022 0938           Lab Results   Component Value Date    TSH 1.567 05/16/2024      Lab Results   Component Value Date    CHOL 113 (L) 05/16/2024    CHOL 95 (L) 01/22/2024    CHOL 121 12/04/2023     Lab Results   Component Value Date    HDL 33 (L) 05/16/2024    HDL 29 (L) 01/22/2024    HDL 35 (L) 12/04/2023     Lab Results   Component Value Date    LDLCALC 56.4 (L) 05/16/2024    LDLCALC 48.0 (L) 01/22/2024    LDLCALC 53.6 (L) 12/04/2023     Lab Results   Component Value Date    TRIG 118 05/16/2024    TRIG 90 01/22/2024    TRIG 162 (H) 12/04/2023     Lab Results   Component Value Date    CHOLHDL 29.2 05/16/2024    CHOLHDL 30.5 01/22/2024    CHOLHDL 28.9 12/04/2023       PHYSICAL EXAMINATION  Constitutional: Appears well, no distress    Diabetes Foot Exam:   Deferred       Assessment/Plan  1. Type 2 diabetes mellitus with hyperglycemia, with long-term current use of insulin  Hemoglobin A1C      2. Obesity with body mass index of 30.0-39.9        3. Immunosuppression        4. Hypertension associated with transplantation        5. Heart replaced by transplant        6. Counseling and coordination of care        1-6.   Follow up in 4 months w/ me   A1c below goal:)  Goal < 7%  Continue regimen above  Watch urine mac- monitor q 6-8 months   Managed per HTS   On glp1a -will help with insulin resistance, postprandial excursions, A1c reduction  Use of cgm has helped immensely -doing well overall  Encouraged exercise 3- 5 x a week- getting back on track  Has refills   A1c urine mac prior -4 months   Doing well with less insulin   Has cgm, needs -has hypoglycemia unawareness  Continue ozempic 1 mg weekly  F/u with HTS  Has refills and  supplies  Continue regimen above  Goal A1c < 7%  Nearing goal  Discussed dietary habits, stressors  Answers submitted by the patient for this visit:  Diabetes Questionnaire (Submitted on 5/22/2024)  Chief Complaint: Diabetes problem  Diabetes type: type 2  MedicAlert ID: No  Disease duration: 4 Years  blurred vision: No  chest pain: No  fatigue: No  foot paresthesias: No  foot ulcerations: No  polydipsia: No  polyphagia: No  polyuria: No  visual change: No  weakness: No  weight loss: No  Symptom course: improving  confusion: No  speech difficulty: No  dizziness: No  nervous/anxious: No  headaches: No  hunger: Yes  mood changes: No  pallor: No  seizures: No  tremors: Yes  sleepiness: No  sweats: No  blackouts: No  hospitalization: No  nocturnal hypoglycemia: No  required assistance: No  required glucagon: No  CVA: No  heart disease: No  impotence: No  nephropathy: No  peripheral neuropathy: No  PVD: No  retinopathy: No  autonomic neuropathy: No  CAD risks: hypertension, obesity, stress, diabetes mellitus, male sex  Current treatments: diet, insulin injections, oral agent (monotherapy)  Treatment compliance: all of the time  Dose schedule: at bedtime  Given by: patient  Injection sites: abdominal wall  Home blood tests: 5+ x per day  Home urines: <1 x per month  Monitoring compliance: excellent  Blood glucose trend: decreasing steadily  Weight trend: stable  Current diet: generally unhealthy  Meal planning: avoidance of concentrated sweets  Exercise: three times a week  Dietitian visit: Yes  Eye exam current: No  Sees podiatrist: No

## 2024-05-22 ENCOUNTER — OFFICE VISIT (OUTPATIENT)
Dept: INTERNAL MEDICINE | Facility: CLINIC | Age: 29
End: 2024-05-22
Payer: MEDICAID

## 2024-05-22 DIAGNOSIS — D84.9 IMMUNOSUPPRESSION: ICD-10-CM

## 2024-05-22 DIAGNOSIS — Z94.1 HEART REPLACED BY TRANSPLANT: ICD-10-CM

## 2024-05-22 DIAGNOSIS — Z71.89 COUNSELING AND COORDINATION OF CARE: ICD-10-CM

## 2024-05-22 DIAGNOSIS — E11.65 TYPE 2 DIABETES MELLITUS WITH HYPERGLYCEMIA, WITH LONG-TERM CURRENT USE OF INSULIN: Primary | ICD-10-CM

## 2024-05-22 DIAGNOSIS — Z79.4 TYPE 2 DIABETES MELLITUS WITH HYPERGLYCEMIA, WITH LONG-TERM CURRENT USE OF INSULIN: Primary | ICD-10-CM

## 2024-05-22 DIAGNOSIS — Z94.9 HYPERTENSION ASSOCIATED WITH TRANSPLANTATION: ICD-10-CM

## 2024-05-22 DIAGNOSIS — I15.8 HYPERTENSION ASSOCIATED WITH TRANSPLANTATION: ICD-10-CM

## 2024-05-22 DIAGNOSIS — E66.9 OBESITY WITH BODY MASS INDEX OF 30.0-39.9: ICD-10-CM

## 2024-05-22 PROCEDURE — 4010F ACE/ARB THERAPY RXD/TAKEN: CPT | Mod: CPTII,95,, | Performed by: NURSE PRACTITIONER

## 2024-05-22 PROCEDURE — 99214 OFFICE O/P EST MOD 30 MIN: CPT | Mod: 95,,, | Performed by: NURSE PRACTITIONER

## 2024-05-22 PROCEDURE — 3044F HG A1C LEVEL LT 7.0%: CPT | Mod: CPTII,95,, | Performed by: NURSE PRACTITIONER

## 2024-05-22 PROCEDURE — 1160F RVW MEDS BY RX/DR IN RCRD: CPT | Mod: CPTII,95,, | Performed by: NURSE PRACTITIONER

## 2024-05-22 PROCEDURE — 95251 CONT GLUC MNTR ANALYSIS I&R: CPT | Mod: NDTC,,, | Performed by: NURSE PRACTITIONER

## 2024-05-22 PROCEDURE — 1159F MED LIST DOCD IN RCRD: CPT | Mod: CPTII,95,, | Performed by: NURSE PRACTITIONER

## 2024-05-22 PROCEDURE — 3062F POS MACROALBUMINURIA REV: CPT | Mod: CPTII,95,, | Performed by: NURSE PRACTITIONER

## 2024-05-22 PROCEDURE — 3066F NEPHROPATHY DOC TX: CPT | Mod: CPTII,95,, | Performed by: NURSE PRACTITIONER

## 2024-05-24 ENCOUNTER — HOSPITAL ENCOUNTER (OUTPATIENT)
Dept: RADIOLOGY | Facility: CLINIC | Age: 29
Discharge: HOME | End: 2024-05-24
Attending: INTERNAL MEDICINE
Payer: MEDICAID

## 2024-05-24 PROCEDURE — 77080 DXA BONE DENSITY AXIAL: CPT | Mod: TC

## 2024-05-24 PROCEDURE — 77080 DXA BONE DENSITY AXIAL: CPT | Mod: 26,,, | Performed by: INTERNAL MEDICINE

## 2024-05-30 ENCOUNTER — TELEPHONE (OUTPATIENT)
Dept: TRANSPLANT | Facility: CLINIC | Age: 29
End: 2024-05-30
Payer: MEDICAID

## 2024-05-30 ENCOUNTER — OFFICE VISIT (OUTPATIENT)
Dept: CARDIOLOGY | Facility: CLINIC | Age: 29
End: 2024-05-30
Payer: MEDICAID

## 2024-05-30 VITALS
OXYGEN SATURATION: 100 % | SYSTOLIC BLOOD PRESSURE: 152 MMHG | HEART RATE: 79 BPM | DIASTOLIC BLOOD PRESSURE: 98 MMHG | WEIGHT: 227.75 LBS | BODY MASS INDEX: 37.95 KG/M2 | HEIGHT: 65 IN

## 2024-05-30 DIAGNOSIS — E11.65 TYPE 2 DIABETES MELLITUS WITH HYPERGLYCEMIA, WITH LONG-TERM CURRENT USE OF INSULIN: ICD-10-CM

## 2024-05-30 DIAGNOSIS — Z94.9 HYPERTENSION ASSOCIATED WITH TRANSPLANTATION: ICD-10-CM

## 2024-05-30 DIAGNOSIS — Z79.4 TYPE 2 DIABETES MELLITUS WITH HYPERGLYCEMIA, WITH LONG-TERM CURRENT USE OF INSULIN: ICD-10-CM

## 2024-05-30 DIAGNOSIS — Z94.1 HEART REPLACED BY TRANSPLANT: ICD-10-CM

## 2024-05-30 DIAGNOSIS — F41.9 ANXIETY: Primary | ICD-10-CM

## 2024-05-30 DIAGNOSIS — I15.8 HYPERTENSION ASSOCIATED WITH TRANSPLANTATION: ICD-10-CM

## 2024-05-30 DIAGNOSIS — E66.9 OBESITY WITH BODY MASS INDEX OF 30.0-39.9: ICD-10-CM

## 2024-05-30 PROCEDURE — 3077F SYST BP >= 140 MM HG: CPT | Mod: CPTII,,, | Performed by: INTERNAL MEDICINE

## 2024-05-30 PROCEDURE — 3080F DIAST BP >= 90 MM HG: CPT | Mod: CPTII,,, | Performed by: INTERNAL MEDICINE

## 2024-05-30 PROCEDURE — 99214 OFFICE O/P EST MOD 30 MIN: CPT | Mod: S$PBB,,, | Performed by: INTERNAL MEDICINE

## 2024-05-30 PROCEDURE — 3062F POS MACROALBUMINURIA REV: CPT | Mod: CPTII,,, | Performed by: INTERNAL MEDICINE

## 2024-05-30 PROCEDURE — 1159F MED LIST DOCD IN RCRD: CPT | Mod: CPTII,,, | Performed by: INTERNAL MEDICINE

## 2024-05-30 PROCEDURE — 3044F HG A1C LEVEL LT 7.0%: CPT | Mod: CPTII,,, | Performed by: INTERNAL MEDICINE

## 2024-05-30 PROCEDURE — 3066F NEPHROPATHY DOC TX: CPT | Mod: CPTII,,, | Performed by: INTERNAL MEDICINE

## 2024-05-30 PROCEDURE — 4010F ACE/ARB THERAPY RXD/TAKEN: CPT | Mod: CPTII,,, | Performed by: INTERNAL MEDICINE

## 2024-05-30 PROCEDURE — 3008F BODY MASS INDEX DOCD: CPT | Mod: CPTII,,, | Performed by: INTERNAL MEDICINE

## 2024-05-30 PROCEDURE — 99999 PR PBB SHADOW E&M-EST. PATIENT-LVL V: CPT | Mod: PBBFAC,,, | Performed by: INTERNAL MEDICINE

## 2024-05-30 PROCEDURE — 99215 OFFICE O/P EST HI 40 MIN: CPT | Mod: PBBFAC | Performed by: INTERNAL MEDICINE

## 2024-05-30 RX ORDER — DIAZEPAM 5 MG/1
5 TABLET ORAL EVERY 6 HOURS PRN
Qty: 1 TABLET | Refills: 0 | Status: CANCELLED | OUTPATIENT
Start: 2024-05-30 | End: 2024-06-29

## 2024-05-30 RX ORDER — METFORMIN HYDROCHLORIDE 500 MG/1
500 TABLET ORAL 2 TIMES DAILY WITH MEALS
Qty: 180 TABLET | Refills: 3 | Status: SHIPPED | OUTPATIENT
Start: 2024-05-30

## 2024-05-30 RX ORDER — DIAZEPAM 5 MG/1
5 TABLET ORAL ONCE
Qty: 1 TABLET | Refills: 0 | Status: SHIPPED | OUTPATIENT
Start: 2024-05-30 | End: 2024-05-30

## 2024-05-30 RX ORDER — DIAZEPAM 5 MG/1
5 TABLET ORAL EVERY 6 HOURS PRN
Qty: 1 TABLET | Refills: 0 | Status: SHIPPED | OUTPATIENT
Start: 2024-05-30 | End: 2024-06-29

## 2024-05-30 NOTE — ASSESSMENT & PLAN NOTE
Plan for surveillance [facility] with IVUS via [facility] and load with asa the day of  Last Scr 2 week ago at 1.3

## 2024-05-30 NOTE — H&P (VIEW-ONLY)
Subjective:    Patient ID:  Derrick Montanez is a 28 y.o. male who presents for evaluation of No chief complaint on file.      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 He denies any symptoms and overall has been doing well. Patient denies angina or CHF symptoms.  He denies palpitations, syncope, or near syncope.  He has not been taking an asa lately but is compliant with all other medications. No other acute events.         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 at [facility]    Coronary artery disease     Diabetes mellitus     GERD (gastroesophageal reflux disease)     Hypertension        Past Surgical History:   Procedure Laterality Date    CARDIAC BIOPSY  8/16/2019    Procedure: Biopsy, Cardiac;  Surgeon: Thien Hayes MD;  Location: [facility] CATH LAB;  Service: Cardiology;;    CARDIAC SURGERY  2009    OHT at [facility]    CATHETERIZATION OF BOTH LEFT AND RIGHT HEART Right 8/16/2019    Procedure: CATHETERIZATION, HEART, BOTH LEFT AND RIGHT;  Surgeon: Thien Hayes MD;  Location: [facility] CATH LAB;  Service: Cardiology;  Laterality: Right;    CYSTOSCOPY W/ URETERAL STENT PLACEMENT Right 8/21/2020    Procedure: CYSTOSCOPY, WITH URETERAL STENT INSERTION;  Surgeon: Tutu Jolley MD;  Location: [address];  Service: Urology;  Laterality: Right;    heart tx      TONSILLECTOMY      URETEROSCOPY Right 8/21/2020    Procedure: URETEROSCOPY;  Surgeon: Tutu Jolley MD;  Location: [address];  Service: Urology;  Laterality: Right;       Current Outpatient Medications on File Prior to Visit   Medication Sig Dispense Refill    blood sugar diagnostic Strp To check BG 4 times daily, to use with insurance preferred meter, e 11.65 400 each 3    blood-glucose meter kit To check BG 4 times  "daily, to use with insurance preferred meter, e 11.65 1 each 0    blood-glucose sensor (DEXCOM G6 SENSOR) Janelle Change every 10 days 3 each 11    blood-glucose transmitter (DEXCOM G6 TRANSMITTER) Janelle Change every 3 months e 11.65 1 each 3    DEXCOM G6  Misc USE AS DIRECTED 1 each 1    diltiaZEM (CARDIZEM CD) 180 MG 24 hr capsule TAKE 1 CAPSULE BY MOUTH ONCE DAILY 30 capsule 11    EScitalopram oxalate (LEXAPRO) 10 MG tablet TAKE 1 TABLET BY MOUTH ONCE DAILY 30 tablet 11    esomeprazole (NEXIUM) 20 MG capsule Take 1 capsule (20 mg total) by mouth before breakfast. (Patient taking differently: Take by mouth before breakfast.) 30 capsule 11    insulin (LANTUS SOLOSTAR U-100 INSULIN) glargine 100 units/mL SubQ pen INJECT 45 UNITS UNDER THE SKIN AT NIGHT 15 mL 8    insulin lispro (HUMALOG KWIKPEN INSULIN) 100 unit/mL pen INJECY 15 UNITS BEFORE MEALS PLUS SCALE 180-230+2 UNITS 231-280 + FOUR UNITS 281-330+ 6 UNITS 331-380+ EIGHT UNITS >380+10 MAX DAILY 75 UNITS 30 mL 6    lancets Misc To check BG 4 times daily, to use with insurance preferred meter, e 11.65 400 each 3    lisinopriL 10 MG tablet TAKE 1 TABLET BY MOUTH EVERY DAY 90 tablet 3    MYFORTIC 360 mg TbEC TAKE 2 TABLETS BY MOUTH TWICE DAILY 120 tablet 11    OZEMPIC 1 mg/dose (4 mg/3 mL) inject 1mg INTO THE SKIN EVERY 7 DAYS 3 mL 8    pen needle, diabetic (BD ULTRA-FINE KARIME PEN NEEDLE) 32 gauge x 5/32" Ndle Uses 5 times a day. 90 day 400 each 3    pravastatin (PRAVACHOL) 20 MG tablet TAKE 1 TABLET BY MOUTH EVERY NIGHT 30 tablet 11    PROGRAF 1 mg Cap TAKE 1 CAPSULE BY MOUTH EVERY MORNING WITH (1) 5mg capsule (6 MG total) & TAKE 2 CAPSULES EVERY EVENING WITH (1) 5mg capsule (7mg total) 270 capsule 3    PROGRAF 5 mg Cap Take 1 capsule (5 mg total) by mouth every 12 (twelve) hours. 180 capsule 3    amoxicillin (AMOXIL) 500 MG capsule Take 4 capsules (2,000 mg total) by mouth On call Procedure. (Patient not taking: Reported on 5/30/2024) 12 capsule 3    aspirin " (ECOTRIN) 81 MG EC tablet Take 81 mg by mouth once daily. (Patient not taking: Reported on 5/30/2024)      BLOOD PRESSURE CUFF Misc Please provide pt with one blood pressure cuff to check his BP twice a day. (Patient not taking: Reported on 5/30/2024) 1 each 0    metFORMIN (GLUCOPHAGE) 500 MG tablet Take 1 tablet (500 mg total) by mouth 2 (two) times daily with meals. 180 tablet 3    topiramate (TOPAMAX) 25 MG tablet TAKE 1 TABLET BY MOUTH EVERY EVENING (Patient not taking: Reported on 5/30/2024) 30 tablet 0     Current Facility-Administered Medications on File Prior to Visit   Medication Dose Route Frequency Provider Last Rate Last Admin    diphenhydrAMINE injection 25 mg  25 mg Intravenous Once PRN Garth Glaser MD           Review of patient's allergies indicates:   Allergen Reactions    Pneumovax 23 [pneumococcal 23-yo ps vaccine] Swelling and Other (See Comments)     Swelling at site and Pneumonia like Sx. Pt spent x1 week in hospital    Nsaids (non-steroidal anti-inflammatory drug) Other (See Comments)       Social History     Tobacco Use    Smoking status: Never    Smokeless tobacco: Never   Substance Use Topics    Alcohol use: Not Currently     Alcohol/week: 1.0 standard drink of alcohol     Types: 1 Shots of liquor per week     Comment: occ    Drug use: No       Family History   Problem Relation Name Age of Onset    No Known Problems Mother      No Known Problems Father      Colon cancer Neg Hx      Cirrhosis Neg Hx      Celiac disease Neg Hx      Crohn's disease Neg Hx      Ulcerative colitis Neg Hx      Rectal cancer Neg Hx      Liver cancer Neg Hx      Irritable bowel syndrome Neg Hx      Esophageal cancer Neg Hx      Stomach cancer Neg Hx      Melanoma Neg Hx           Review of Systems   All other systems reviewed and are negative.       Objective:     Vitals:    05/30/24 0920 05/30/24 0922   BP: (!) 164/113 (!) 152/98   BP Location: Right arm Left arm   Patient Position: Sitting Sitting   BP  "Method: Large (Automatic) Large (Automatic)   Pulse: 77 79   SpO2: 100% 100%   Weight: 103.3 kg (227 lb 11.8 oz)    Height: 5' 5" (1.651 m)         Physical Exam  Vitals and nursing note reviewed.   Constitutional:       Appearance: He is obese.   HENT:      Head: Normocephalic and atraumatic.      Right Ear: External ear normal.      Left Ear: External ear normal.      Nose: Nose normal.      Mouth/Throat:      Mouth: Mucous membranes are moist.   Eyes:      Pupils: Pupils are equal, round, and reactive to light.   Cardiovascular:      Rate and Rhythm: Normal rate and regular rhythm.      Pulses: Normal pulses.   Pulmonary:      Effort: Pulmonary effort is normal.   Abdominal:      General: Abdomen is flat.   Musculoskeletal:         General: Normal range of motion.      Cervical back: Normal range of motion.      Right lower leg: No edema.      Left lower leg: No edema.   Skin:     General: Skin is warm and dry.      Capillary Refill: Capillary refill takes less than 2 seconds.   Neurological:      General: No focal deficit present.      Mental Status: He is alert and oriented to person, place, and time.         Assessment:       1. Anxiety    2. Heart replaced by transplant    3. Hypertension associated with transplantation    4. Obesity with body mass index of 30.0-39.9    5. Type 2 diabetes mellitus with hyperglycemia, with long-term current use of insulin         Plan:   Anxiety  -valium prior to angiogram     Heart replaced by transplant  Plan for surveillance [facility] with IVUS via [facility] and load with asa the day of  Last Scr 2 week ago at 1.3    Hypertension associated with transplantation  -continue current anti hypertensives     Obesity with body mass index of 30.0-39.9  -diet and exercise  -hold ozempic 7d prior to procedure     Type 2 diabetes mellitus with hyperglycemia, with long-term current use of insulin  -currently on metformin alone  -hold 48h prior to procedure     Fletcher Siddiqui MD  IC Fellow    I " have personally taken the history and examined this patient and agree with the resident's note as stated above.  I discussed :[facility] with IVUS as part of a surveillance program for CAV s/p OHT. The patient agreed to proceed. Plan as above.  All of the patient's questions were answered.

## 2024-05-30 NOTE — TELEPHONE ENCOUNTER
Met with pt in [facility] regarding his appointment today. He is due to see Dr. Donahue as part of his 15 year annual. However, he is working night shifts and needs to go home and sleep this afternoon. I told him I would talk to Dr. Donahue and let him know what to do. 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     Echo -60-65%. CXR stable; CBC and CMP normal.with Cr. 1.3. Lipids low with LDL 56.  Thyroid studies normal. Tacrolimus within therapeutic range @ 6.2. BNP 21. Pend HLA DSA.   BMD normal; repeat in 2 years. He will see Dr. Hayes this morning regarding 15 yr [facility].   IS: TAC 1/1; Myfortic 360 mg bid and he is on Lisinopril 10 mg and Pravastatin 20 mg daily

## 2024-05-30 NOTE — PROGRESS NOTES
Subjective:    Patient ID:  Derrick Montanez is a 28 y.o. male who presents for evaluation of No chief complaint on file.      Referring physician:  Dr. Godfrey Montanez is a 29 yo M who underwent OHT 3/17/09 and presents today for visit to discuss surveillance angiogram with IVUS. He denies any symptoms and overall has been doing well. Patient denies angina or CHF symptoms.  He denies palpitations, syncope, or near syncope.  He has not been taking an asa lately but is compliant with all other medications. No other acute events.         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 at [facility]    Coronary artery disease     Diabetes mellitus     GERD (gastroesophageal reflux disease)     Hypertension        Past Surgical History:   Procedure Laterality Date    CARDIAC BIOPSY  8/16/2019    Procedure: Biopsy, Cardiac;  Surgeon: Thien Hayes MD;  Location: [facility] CATH LAB;  Service: Cardiology;;    CARDIAC SURGERY  2009    OHT at [facility]    CATHETERIZATION OF BOTH LEFT AND RIGHT HEART Right 8/16/2019    Procedure: CATHETERIZATION, HEART, BOTH LEFT AND RIGHT;  Surgeon: Thien Hayes MD;  Location: [facility] CATH LAB;  Service: Cardiology;  Laterality: Right;    CYSTOSCOPY W/ URETERAL STENT PLACEMENT Right 8/21/2020    Procedure: CYSTOSCOPY, WITH URETERAL STENT INSERTION;  Surgeon: Tutu Jolley MD;  Location: [address];  Service: Urology;  Laterality: Right;    heart tx      TONSILLECTOMY      URETEROSCOPY Right 8/21/2020    Procedure: URETEROSCOPY;  Surgeon: Tutu Jolley MD;  Location: [address];  Service: Urology;  Laterality: Right;       Current Outpatient Medications on File Prior to Visit   Medication Sig Dispense Refill    blood sugar diagnostic Strp To check BG 4 times daily, to use with insurance preferred meter, e 11.65 400 each 3    blood-glucose meter kit To check BG 4 times  "daily, to use with insurance preferred meter, e 11.65 1 each 0    blood-glucose sensor (DEXCOM G6 SENSOR) Janelle Change every 10 days 3 each 11    blood-glucose transmitter (DEXCOM G6 TRANSMITTER) Janelle Change every 3 months e 11.65 1 each 3    DEXCOM G6  Misc USE AS DIRECTED 1 each 1    diltiaZEM (CARDIZEM CD) 180 MG 24 hr capsule TAKE 1 CAPSULE BY MOUTH ONCE DAILY 30 capsule 11    EScitalopram oxalate (LEXAPRO) 10 MG tablet TAKE 1 TABLET BY MOUTH ONCE DAILY 30 tablet 11    esomeprazole (NEXIUM) 20 MG capsule Take 1 capsule (20 mg total) by mouth before breakfast. (Patient taking differently: Take by mouth before breakfast.) 30 capsule 11    insulin (LANTUS SOLOSTAR U-100 INSULIN) glargine 100 units/mL SubQ pen INJECT 45 UNITS UNDER THE SKIN AT NIGHT 15 mL 8    insulin lispro (HUMALOG KWIKPEN INSULIN) 100 unit/mL pen INJECY 15 UNITS BEFORE MEALS PLUS SCALE 180-230+2 UNITS 231-280 + FOUR UNITS 281-330+ 6 UNITS 331-380+ EIGHT UNITS >380+10 MAX DAILY 75 UNITS 30 mL 6    lancets Misc To check BG 4 times daily, to use with insurance preferred meter, e 11.65 400 each 3    lisinopriL 10 MG tablet TAKE 1 TABLET BY MOUTH EVERY DAY 90 tablet 3    MYFORTIC 360 mg TbEC TAKE 2 TABLETS BY MOUTH TWICE DAILY 120 tablet 11    OZEMPIC 1 mg/dose (4 mg/3 mL) inject 1mg INTO THE SKIN EVERY 7 DAYS 3 mL 8    pen needle, diabetic (BD ULTRA-FINE KARIME PEN NEEDLE) 32 gauge x 5/32" Ndle Uses 5 times a day. 90 day 400 each 3    pravastatin (PRAVACHOL) 20 MG tablet TAKE 1 TABLET BY MOUTH EVERY NIGHT 30 tablet 11    PROGRAF 1 mg Cap TAKE 1 CAPSULE BY MOUTH EVERY MORNING WITH (1) 5mg capsule (6 MG total) & TAKE 2 CAPSULES EVERY EVENING WITH (1) 5mg capsule (7mg total) 270 capsule 3    PROGRAF 5 mg Cap Take 1 capsule (5 mg total) by mouth every 12 (twelve) hours. 180 capsule 3    amoxicillin (AMOXIL) 500 MG capsule Take 4 capsules (2,000 mg total) by mouth On call Procedure. (Patient not taking: Reported on 5/30/2024) 12 capsule 3    aspirin " (ECOTRIN) 81 MG EC tablet Take 81 mg by mouth once daily. (Patient not taking: Reported on 5/30/2024)      BLOOD PRESSURE CUFF Misc Please provide pt with one blood pressure cuff to check his BP twice a day. (Patient not taking: Reported on 5/30/2024) 1 each 0    metFORMIN (GLUCOPHAGE) 500 MG tablet Take 1 tablet (500 mg total) by mouth 2 (two) times daily with meals. 180 tablet 3    topiramate (TOPAMAX) 25 MG tablet TAKE 1 TABLET BY MOUTH EVERY EVENING (Patient not taking: Reported on 5/30/2024) 30 tablet 0     Current Facility-Administered Medications on File Prior to Visit   Medication Dose Route Frequency Provider Last Rate Last Admin    diphenhydrAMINE injection 25 mg  25 mg Intravenous Once PRN Garth Glaser MD           Review of patient's allergies indicates:   Allergen Reactions    Pneumovax 23 [pneumococcal 23-yo ps vaccine] Swelling and Other (See Comments)     Swelling at site and Pneumonia like Sx. Pt spent x1 week in hospital    Nsaids (non-steroidal anti-inflammatory drug) Other (See Comments)       Social History     Tobacco Use    Smoking status: Never    Smokeless tobacco: Never   Substance Use Topics    Alcohol use: Not Currently     Alcohol/week: 1.0 standard drink of alcohol     Types: 1 Shots of liquor per week     Comment: occ    Drug use: No       Family History   Problem Relation Name Age of Onset    No Known Problems Mother      No Known Problems Father      Colon cancer Neg Hx      Cirrhosis Neg Hx      Celiac disease Neg Hx      Crohn's disease Neg Hx      Ulcerative colitis Neg Hx      Rectal cancer Neg Hx      Liver cancer Neg Hx      Irritable bowel syndrome Neg Hx      Esophageal cancer Neg Hx      Stomach cancer Neg Hx      Melanoma Neg Hx           Review of Systems   All other systems reviewed and are negative.       Objective:     Vitals:    05/30/24 0920 05/30/24 0922   BP: (!) 164/113 (!) 152/98   BP Location: Right arm Left arm   Patient Position: Sitting Sitting   BP  "Method: Large (Automatic) Large (Automatic)   Pulse: 77 79   SpO2: 100% 100%   Weight: 103.3 kg (227 lb 11.8 oz)    Height: 5' 5" (1.651 m)         Physical Exam  Vitals and nursing note reviewed.   Constitutional:       Appearance: He is obese.   HENT:      Head: Normocephalic and atraumatic.      Right Ear: External ear normal.      Left Ear: External ear normal.      Nose: Nose normal.      Mouth/Throat:      Mouth: Mucous membranes are moist.   Eyes:      Pupils: Pupils are equal, round, and reactive to light.   Cardiovascular:      Rate and Rhythm: Normal rate and regular rhythm.      Pulses: Normal pulses.   Pulmonary:      Effort: Pulmonary effort is normal.   Abdominal:      General: Abdomen is flat.   Musculoskeletal:         General: Normal range of motion.      Cervical back: Normal range of motion.      Right lower leg: No edema.      Left lower leg: No edema.   Skin:     General: Skin is warm and dry.      Capillary Refill: Capillary refill takes less than 2 seconds.   Neurological:      General: No focal deficit present.      Mental Status: He is alert and oriented to person, place, and time.         Assessment:       1. Anxiety    2. Heart replaced by transplant    3. Hypertension associated with transplantation    4. Obesity with body mass index of 30.0-39.9    5. Type 2 diabetes mellitus with hyperglycemia, with long-term current use of insulin         Plan:   Anxiety  -valium prior to angiogram     Heart replaced by transplant  Plan for surveillance [facility] with IVUS via [facility] and load with asa the day of  Last Scr 2 week ago at 1.3    Hypertension associated with transplantation  -continue current anti hypertensives     Obesity with body mass index of 30.0-39.9  -diet and exercise  -hold ozempic 7d prior to procedure     Type 2 diabetes mellitus with hyperglycemia, with long-term current use of insulin  -currently on metformin alone  -hold 48h prior to procedure     Fletcher Siddiqui MD  IC Fellow    I " have personally taken the history and examined this patient and agree with the resident's note as stated above.  I discussed :[facility] with IVUS as part of a surveillance program for CAV s/p OHT. The patient agreed to proceed. Plan as above.  All of the patient's questions were answered.

## 2024-05-31 ENCOUNTER — HOSPITAL ENCOUNTER (OUTPATIENT)
Facility: HOSPITAL | Age: 29
Discharge: HOME | End: 2024-05-31
Attending: INTERNAL MEDICINE | Admitting: INTERNAL MEDICINE
Payer: MEDICAID

## 2024-05-31 VITALS
OXYGEN SATURATION: 98 % | HEART RATE: 92 BPM | RESPIRATION RATE: 16 BRPM | WEIGHT: 227 LBS | DIASTOLIC BLOOD PRESSURE: 89 MMHG | SYSTOLIC BLOOD PRESSURE: 143 MMHG | BODY MASS INDEX: 37.82 KG/M2 | TEMPERATURE: 98 F | HEIGHT: 65 IN

## 2024-05-31 DIAGNOSIS — Z94.1 STATUS POST HEART TRANSPLANT: ICD-10-CM

## 2024-05-31 DIAGNOSIS — Z01.818 PREOPERATIVE TESTING: ICD-10-CM

## 2024-05-31 LAB
ABO + RH BLD: NORMAL
ANION GAP SERPL CALC-SCNC: 9 MMOL/L (ref 8–16)
BLD GP AB SCN CELLS X3 SERPL QL: NORMAL
BUN SERPL-MCNC: 26 MG/DL (ref 6–20)
CALCIUM SERPL-MCNC: 9.5 MG/DL (ref 8.7–10.5)
CHLORIDE SERPL-SCNC: 112 MMOL/L (ref 95–110)
CO2 SERPL-SCNC: 18 MMOL/L (ref 23–29)
CREAT SERPL-MCNC: 1.2 MG/DL (ref 0.5–1.4)
EST. GFR  (NO RACE VARIABLE): >60 ML/MIN/1.73 M^2
GLUCOSE SERPL-MCNC: 88 MG/DL (ref 70–110)
OHS QRS DURATION: 130 MS
OHS QTC CALCULATION: 438 MS
POCT GLUCOSE: 74 MG/DL (ref 70–110)
POCT GLUCOSE: 83 MG/DL (ref 70–110)
POTASSIUM SERPL-SCNC: 5.7 MMOL/L (ref 3.5–5.1)
SODIUM SERPL-SCNC: 139 MMOL/L (ref 136–145)
SPECIMEN OUTDATE: NORMAL

## 2024-05-31 PROCEDURE — 93454 CORONARY ARTERY ANGIO S&I: CPT | Performed by: INTERNAL MEDICINE

## 2024-05-31 PROCEDURE — C1887 CATHETER, GUIDING: HCPCS | Performed by: INTERNAL MEDICINE

## 2024-05-31 PROCEDURE — 63600175 PHARM REV CODE 636 W HCPCS: Performed by: INTERNAL MEDICINE

## 2024-05-31 PROCEDURE — 80048 BASIC METABOLIC PNL TOTAL CA: CPT | Performed by: INTERNAL MEDICINE

## 2024-05-31 PROCEDURE — 85347 COAGULATION TIME ACTIVATED: CPT | Performed by: INTERNAL MEDICINE

## 2024-05-31 PROCEDURE — 25000003 PHARM REV CODE 250: Performed by: STUDENT IN AN ORGANIZED HEALTH CARE EDUCATION/TRAINING PROGRAM

## 2024-05-31 PROCEDURE — 82962 GLUCOSE BLOOD TEST: CPT | Performed by: INTERNAL MEDICINE

## 2024-05-31 PROCEDURE — C1760 CLOSURE DEV, VASC: HCPCS | Performed by: INTERNAL MEDICINE

## 2024-05-31 PROCEDURE — 93454 CORONARY ARTERY ANGIO S&I: CPT | Mod: 26,,, | Performed by: INTERNAL MEDICINE

## 2024-05-31 PROCEDURE — 92979 ENDOLUMINL IVUS OCT C EA: CPT | Mod: 26,LD,, | Performed by: INTERNAL MEDICINE

## 2024-05-31 PROCEDURE — 92978 ENDOLUMINL IVUS OCT C 1ST: CPT | Mod: 26,LM,, | Performed by: INTERNAL MEDICINE

## 2024-05-31 PROCEDURE — 25000003 PHARM REV CODE 250: Performed by: INTERNAL MEDICINE

## 2024-05-31 PROCEDURE — 92979 ENDOLUMINL IVUS OCT C EA: CPT | Mod: LD | Performed by: INTERNAL MEDICINE

## 2024-05-31 PROCEDURE — 93005 ELECTROCARDIOGRAM TRACING: CPT | Mod: 59

## 2024-05-31 PROCEDURE — 92978 ENDOLUMINL IVUS OCT C 1ST: CPT | Mod: LM | Performed by: INTERNAL MEDICINE

## 2024-05-31 PROCEDURE — C1753 CATH, INTRAVAS ULTRASOUND: HCPCS | Performed by: INTERNAL MEDICINE

## 2024-05-31 PROCEDURE — 25500020 PHARM REV CODE 255: Performed by: INTERNAL MEDICINE

## 2024-05-31 PROCEDURE — C1769 GUIDE WIRE: HCPCS | Performed by: INTERNAL MEDICINE

## 2024-05-31 PROCEDURE — 99152 MOD SED SAME PHYS/QHP 5/>YRS: CPT | Performed by: INTERNAL MEDICINE

## 2024-05-31 PROCEDURE — 93010 ELECTROCARDIOGRAM REPORT: CPT | Mod: ,,, | Performed by: INTERNAL MEDICINE

## 2024-05-31 PROCEDURE — 86850 RBC ANTIBODY SCREEN: CPT | Performed by: INTERNAL MEDICINE

## 2024-05-31 PROCEDURE — C1894 INTRO/SHEATH, NON-LASER: HCPCS | Performed by: INTERNAL MEDICINE

## 2024-05-31 PROCEDURE — 99152 MOD SED SAME PHYS/QHP 5/>YRS: CPT | Mod: ,,, | Performed by: INTERNAL MEDICINE

## 2024-05-31 RX ORDER — SODIUM CHLORIDE 9 MG/ML
INJECTION, SOLUTION INTRAVENOUS CONTINUOUS
Status: ACTIVE | OUTPATIENT
Start: 2024-05-31 | End: 2024-05-31

## 2024-05-31 RX ORDER — CEFAZOLIN SODIUM 1 G/3ML
INJECTION, POWDER, FOR SOLUTION INTRAMUSCULAR; INTRAVENOUS
Status: DISCONTINUED | OUTPATIENT
Start: 2024-05-31 | End: 2024-05-31 | Stop reason: HOSPADM

## 2024-05-31 RX ORDER — HEPARIN SODIUM 1000 [USP'U]/ML
INJECTION, SOLUTION INTRAVENOUS; SUBCUTANEOUS
Status: DISCONTINUED | OUTPATIENT
Start: 2024-05-31 | End: 2024-05-31 | Stop reason: HOSPADM

## 2024-05-31 RX ORDER — FENTANYL CITRATE 50 UG/ML
INJECTION, SOLUTION INTRAMUSCULAR; INTRAVENOUS
Status: DISCONTINUED | OUTPATIENT
Start: 2024-05-31 | End: 2024-05-31 | Stop reason: HOSPADM

## 2024-05-31 RX ORDER — ONDANSETRON 8 MG/1
8 TABLET, ORALLY DISINTEGRATING ORAL EVERY 8 HOURS PRN
Status: DISCONTINUED | OUTPATIENT
Start: 2024-05-31 | End: 2024-05-31 | Stop reason: HOSPADM

## 2024-05-31 RX ORDER — DIAZEPAM 5 MG/1
5 TABLET ORAL ONCE
Status: DISCONTINUED | OUTPATIENT
Start: 2024-05-31 | End: 2024-05-31

## 2024-05-31 RX ORDER — NAPROXEN SODIUM 220 MG/1
324 TABLET, FILM COATED ORAL DAILY
Status: DISCONTINUED | OUTPATIENT
Start: 2024-05-31 | End: 2024-05-31 | Stop reason: HOSPADM

## 2024-05-31 RX ORDER — MIDAZOLAM HYDROCHLORIDE 1 MG/ML
INJECTION, SOLUTION INTRAMUSCULAR; INTRAVENOUS
Status: DISCONTINUED | OUTPATIENT
Start: 2024-05-31 | End: 2024-05-31 | Stop reason: HOSPADM

## 2024-05-31 RX ORDER — ACETAMINOPHEN 325 MG/1
650 TABLET ORAL EVERY 4 HOURS PRN
Status: DISCONTINUED | OUTPATIENT
Start: 2024-05-31 | End: 2024-05-31 | Stop reason: HOSPADM

## 2024-05-31 RX ORDER — DIPHENHYDRAMINE HCL 50 MG
50 CAPSULE ORAL ONCE
Status: COMPLETED | OUTPATIENT
Start: 2024-05-31 | End: 2024-05-31

## 2024-05-31 RX ORDER — HEPARIN SOD,PORCINE/0.9 % NACL 1000/500ML
INTRAVENOUS SOLUTION INTRAVENOUS
Status: DISCONTINUED | OUTPATIENT
Start: 2024-05-31 | End: 2024-05-31 | Stop reason: HOSPADM

## 2024-05-31 RX ADMIN — SODIUM CHLORIDE: 9 INJECTION, SOLUTION INTRAVENOUS at 11:05

## 2024-05-31 RX ADMIN — SODIUM CHLORIDE: 9 INJECTION, SOLUTION INTRAVENOUS at 01:05

## 2024-05-31 RX ADMIN — ASPIRIN 81 MG CHEWABLE TABLET 324 MG: 81 TABLET CHEWABLE at 11:05

## 2024-05-31 RX ADMIN — DIPHENHYDRAMINE HYDROCHLORIDE 50 MG: 50 CAPSULE ORAL at 10:05

## 2024-05-31 NOTE — DISCHARGE SUMMARY
[facility] - Cath Lab  Interventional Cardiology  Discharge Summary      Patient Name: Derrick Montanez  MRN: [id number]  Admission Date: 5/31/2024  Hospital Length of Stay: 0 days  Discharge Date and Time:  05/31/2024 1:02 PM  Attending Physician: Thien Hayes MD  Discharging Provider: Fletcher Siddiqui MD  Primary Care Physician: Jude Willard MD      Procedure(s) (LRB):  ANGIOGRAM, CORONARY ARTERY (N/A)  IVUS, Coronary (N/A)     Indwelling Lines/Drains at time of discharge:  Lines/Drains/Airways       None                   Hospital Course:  Patient brought for planned LHC and IVUS that was significant for minimal CAV and  no intervention was performed, please see report for full details. The patient tolerated the procedure well and was instructed to continue home medications. He was then discharged in stable condition.       Goals of Care Treatment Preferences:  Code Status: Full Code          Significant Diagnostic Studies: N/A    Pending Diagnostic Studies:       None          No new Assessment & Plan notes have been filed under this hospital service since the last note was generated.  Service: Interventional Cardiology      Discharged Condition: good    Follow Up:    Patient Instructions:   No discharge procedures on file.  Medications:  Reconciled Home Medications:      Medication List        CONTINUE taking these medications      blood sugar diagnostic Strp  To check BG 4 times daily, to use with insurance preferred meter, e 11.65     blood-glucose meter kit  To check BG 4 times daily, to use with insurance preferred meter, e 11.65     DEXCOM G6  Misc  Generic drug: blood-glucose meter,continuous  USE AS DIRECTED     DEXCOM G6 SENSOR Janelle  Generic drug: blood-glucose sensor  Change every 10 days     DEXCOM G6 TRANSMITTER Janelle  Generic drug: blood-glucose transmitter  Change every 3 months e 11.65     * diazePAM 5 MG tablet  Commonly known as: VALIUM  Take 1 tablet (5 mg total) by  "mouth every 6 (six) hours as needed for Anxiety.     * diazePAM 5 MG tablet  Commonly known as: VALIUM  Take 1 tablet (5 mg total) by mouth once. for 1 dose     diltiaZEM 180 MG 24 hr capsule  Commonly known as: CARDIZEM CD  TAKE 1 CAPSULE BY MOUTH ONCE DAILY     EScitalopram oxalate 10 MG tablet  Commonly known as: LEXAPRO  TAKE 1 TABLET BY MOUTH ONCE DAILY     esomeprazole 20 MG capsule  Commonly known as: NEXIUM  Take 1 capsule (20 mg total) by mouth before breakfast.     insulin lispro 100 unit/mL pen  Commonly known as: HumaLOG KwikPen Insulin  INJECY 15 UNITS BEFORE MEALS PLUS SCALE 180-230+2 UNITS 231-280 + FOUR UNITS 281-330+ 6 UNITS 331-380+ EIGHT UNITS >380+10 MAX DAILY 75 UNITS     lancets Misc  To check BG 4 times daily, to use with insurance preferred meter, e 11.65     LANTUS SOLOSTAR U-100 INSULIN 100 unit/mL (3 mL) Inpn pen  Generic drug: insulin glargine U-100 (Lantus)  INJECT 45 UNITS UNDER THE SKIN AT NIGHT     lisinopriL 10 MG tablet  TAKE 1 TABLET BY MOUTH EVERY DAY     metFORMIN 500 MG tablet  Commonly known as: GLUCOPHAGE  Take 1 tablet (500 mg total) by mouth 2 (two) times daily with meals.     MYFORTIC 360 mg Tbec  Generic drug: mycophenolate sodium  TAKE 2 TABLETS BY MOUTH TWICE DAILY     OZEMPIC 1 mg/dose (4 mg/3 mL)  Generic drug: semaglutide  inject 1mg INTO THE SKIN EVERY 7 DAYS     pen needle, diabetic 32 gauge x 5/32" Ndle  Commonly known as: BD ULTRA-FINE KARIME PEN NEEDLE  Uses 5 times a day. 90 day     pravastatin 20 MG tablet  Commonly known as: PRAVACHOL  TAKE 1 TABLET BY MOUTH EVERY NIGHT     * PROGRAF 5 mg Cap  Generic drug: tacrolimus  Take 1 capsule (5 mg total) by mouth every 12 (twelve) hours.     * PROGRAF 1 mg Cap  Generic drug: tacrolimus  TAKE 1 CAPSULE BY MOUTH EVERY MORNING WITH (1) 5mg capsule (6 MG total) & TAKE 2 CAPSULES EVERY EVENING WITH (1) 5mg capsule (7mg total)           * This list has 4 medication(s) that are the same as other medications prescribed for you. " Read the directions carefully, and ask your doctor or other care provider to review them with you.                ASK your doctor about these medications      amoxicillin 500 MG capsule  Commonly known as: AMOXIL  Take 4 capsules (2,000 mg total) by mouth On call Procedure.     BLOOD PRESSURE CUFF Misc  Generic drug: miscellaneous medical supply  Please provide pt with one blood pressure cuff to check his BP twice a day.     topiramate 25 MG tablet  Commonly known as: TOPAMAX  TAKE 1 TABLET BY MOUTH EVERY EVENING              Time spent on the discharge of patient: 20 minutes    Fletcher Siddiqui MD  Interventional Cardiology  [facility] - Cath Lab

## 2024-05-31 NOTE — Clinical Note
The catheter was inserted into the distal   left anterior descending.  And IVUS was performed of the LAD into the LM

## 2024-05-31 NOTE — BRIEF OP NOTE
Brief Operative Note:    : Thien Hayes MD     Referring Physician: Thien Hayes     All Operators: Surgeon(s):  Thien Hayes MD     Preoperative Diagnosis: Status post heart transplant [Z94.1]     Postop Diagnosis: Status post heart transplant [Z94.1]    Treatments/Procedures: Procedure(s) (LRB):  ANGIOGRAM, CORONARY ARTERY (N/A)  IVUS, Coronary (N/A)    Findings: normal angiographically appearing with 0.4mm CAV to LAD. See catheterization report for full details.    Estimated Blood loss: 20 cc    Specimens removed: No    Recommendations:   - Routine post-cath care as per orders  - IVF at 150 cc/hr for 4 hrs  - Continue medical management, Risk factor reduction, Follow-up with outpatient cardiologist    Fletcher Siddiqui

## 2024-05-31 NOTE — NURSING
Pt's vs wnl and pt free from s/s of distress. Pt's right groin site has dressing intact with site free from s/s of bleeding. Pt able to drink, eat, walk, and use restroom without issues. Discharge paperwork and education reiterated. All questions and concerns addressed. PIV and tele removed. Pt dressed and waiting on wheelchair.

## 2024-05-31 NOTE — NURSING
Pt brought to room 4 post procedure. Procedural RN Jose Alfredo at bedside for handoff report. Pt's vs wnl and pt is free from s.s of distress. Pt's right groin site has gauze/tegaderm intact and site is free from s/s of bleeding. Safety measures in place. Pt understands when to call for assistance. Pt's family called to bedside.     MD Hayes notified by procedural RN that pt's BP is elevated and MD okayed pt take his am missed BP/anti rejection meds. Pt's mother came to bedside and administered meds to pt.

## 2024-05-31 NOTE — PLAN OF CARE
Patient arrived to room. PIV placed, labs sent. Admit assessment completed. Plan of care discussed with patient. Pt came in very alert, now more drowsy. Took valium at home and had benadryl pre procedure here. Monitoring in progress, arouses easy to stimuli.

## 2024-05-31 NOTE — HOSPITAL COURSE
Patient brought for planned LHC and IVUS that was significant for minimal CAV and  no intervention was performed, please see report for full details. The patient tolerated the procedure well and was instructed to continue home medications. He was then discharged in stable condition.

## 2024-05-31 NOTE — INTERVAL H&P NOTE
The patient has been examined and the H&P has been reviewed:    I concur with the findings and no changes have occurred since H&P was written.    Procedure risks, benefits and alternative options discussed and understood by patient/family.    Proceed with [facility] + IVUS. ASA load prior.       There are no hospital problems to display for this patient.

## 2024-06-03 LAB
POC ACTIVATED CLOTTING TIME K: 146 SEC (ref 74–137)
SAMPLE: ABNORMAL

## 2024-06-05 DIAGNOSIS — Z94.1 HEART REPLACED BY TRANSPLANT: ICD-10-CM

## 2024-06-05 RX ORDER — TOPIRAMATE 25 MG/1
25 TABLET ORAL NIGHTLY
Qty: 30 TABLET | Refills: 11 | Status: SHIPPED | OUTPATIENT
Start: 2024-06-05 | End: 2025-06-05

## 2024-06-19 ENCOUNTER — PATIENT MESSAGE (OUTPATIENT)
Dept: TRANSPLANT | Facility: CLINIC | Age: 29
End: 2024-06-19
Payer: MEDICAID

## 2024-06-27 ENCOUNTER — TELEPHONE (OUTPATIENT)
Dept: INTERNAL MEDICINE | Facility: CLINIC | Age: 29
End: 2024-06-27
Payer: MEDICAID

## 2024-06-27 NOTE — TELEPHONE ENCOUNTER
----- Message from America Vazquez MA sent at 6/27/2024  3:31 PM CDT -----  Regarding: FW: Prior Aut - Pharmacy  Contact: Pt [phone]    ----- Message -----  From: Roberto Fabian  Sent: 6/27/2024   3:04 PM CDT  To: [facility] Staff  Subject: Prior Aut - Pharmacy                             Pharmacy is calling to clarify an RX.    RX name:  blood-glucose transmitter (DEXCOM G6 TRANSMITTER) Janelle    What do they need to clarify:  prior authorization for insurance    Comments:

## 2024-07-05 DIAGNOSIS — Z79.899 ENCOUNTER FOR LONG-TERM (CURRENT) USE OF MEDICATIONS: ICD-10-CM

## 2024-07-05 DIAGNOSIS — Z94.1 STATUS POST HEART TRANSPLANT: ICD-10-CM

## 2024-07-05 DIAGNOSIS — T86.20 COMPLICATION OF HEART TRANSPLANT, UNSPECIFIED COMPLICATION: ICD-10-CM

## 2024-07-05 DIAGNOSIS — E78.2 MIXED HYPERLIPIDEMIA: Primary | ICD-10-CM

## 2024-07-05 DIAGNOSIS — K21.9 GASTROESOPHAGEAL REFLUX DISEASE: ICD-10-CM

## 2024-07-05 DIAGNOSIS — Z94.1 HEART TRANSPLANTED: ICD-10-CM

## 2024-07-05 DIAGNOSIS — D84.9 IMMUNOSUPPRESSION: ICD-10-CM

## 2024-07-05 RX ORDER — PRAVASTATIN SODIUM 20 MG/1
TABLET ORAL
Qty: 30 TABLET | Refills: 11 | Status: SHIPPED | OUTPATIENT
Start: 2024-07-05

## 2024-07-12 ENCOUNTER — PATIENT MESSAGE (OUTPATIENT)
Dept: TRANSPLANT | Facility: CLINIC | Age: 29
End: 2024-07-12
Payer: MEDICAID

## 2024-07-12 ENCOUNTER — TELEPHONE (OUTPATIENT)
Dept: INTERNAL MEDICINE | Facility: CLINIC | Age: 29
End: 2024-07-12
Payer: MEDICAID

## 2024-07-12 NOTE — TELEPHONE ENCOUNTER
Rec'd copy of denial for Ozempic. Called [employer] appeals then sent clinicals to support pt's need for the med.   car

## 2024-07-15 NOTE — TELEPHONE ENCOUNTER
Sent a note through the portal after discussing the possibility of using Envarsus vs. Prograf with Dr. Donahue. Asked the pt when he will be in town.

## 2024-08-07 ENCOUNTER — PATIENT MESSAGE (OUTPATIENT)
Dept: TRANSPLANT | Facility: CLINIC | Age: 29
End: 2024-08-07
Payer: MEDICAID

## 2024-08-12 ENCOUNTER — TELEPHONE (OUTPATIENT)
Dept: TRANSPLANT | Facility: CLINIC | Age: 29
End: 2024-08-12
Payer: MEDICAID

## 2024-08-12 NOTE — TELEPHONE ENCOUNTER
Called left message to schedule routine post heart transplant lab work. Call-back number provided.

## 2024-08-20 DIAGNOSIS — E78.2 MIXED HYPERLIPIDEMIA: ICD-10-CM

## 2024-08-20 DIAGNOSIS — Z79.899 ENCOUNTER FOR LONG-TERM (CURRENT) USE OF MEDICATIONS: ICD-10-CM

## 2024-08-20 DIAGNOSIS — Z94.1 STATUS POST HEART TRANSPLANT: Primary | ICD-10-CM

## 2024-08-20 DIAGNOSIS — E13.9 POST-TRANSPLANT DIABETES MELLITUS: ICD-10-CM

## 2024-08-20 RX ORDER — TACROLIMUS 1 MG/1
CAPSULE ORAL
Qty: 90 CAPSULE | Refills: 11 | Status: SHIPPED | OUTPATIENT
Start: 2024-08-20 | End: 2025-08-20

## 2024-08-20 RX ORDER — MYCOPHENOLIC ACID 360 MG/1
720 TABLET, DELAYED RELEASE ORAL 2 TIMES DAILY
Qty: 120 TABLET | Refills: 11 | Status: SHIPPED | OUTPATIENT
Start: 2024-08-20 | End: 2025-08-20

## 2024-08-20 RX ORDER — TACROLIMUS 1 MG/1
5 CAPSULE ORAL EVERY 12 HOURS
Qty: 300 CAPSULE | Refills: 11 | Status: SHIPPED | OUTPATIENT
Start: 2024-08-20 | End: 2025-08-20

## 2024-08-20 NOTE — TELEPHONE ENCOUNTER
Patient called to ask if prograf and myfortic can be switched to generic.  Immunosuppression medications changed to generic per patient request, sent to [facility] [address]. Patient also inquired about switching from prograf to envarsus, states he is able to perform virtual MD visit if needed. Will discuss the above with the transplant team.    Patient states he is able to complete fasting labs on Sept 4 at  [facility]. Lab appt made. Patient states he is able to come to clinic for nurse visit following his lab appt on Sept 4.     Rx refills for tacrolimus and mycophenolate sent to Dr. Baron.

## 2024-08-21 ENCOUNTER — TELEPHONE (OUTPATIENT)
Dept: TRANSPLANT | Facility: CLINIC | Age: 29
End: 2024-08-21
Payer: MEDICAID

## 2024-08-21 NOTE — TELEPHONE ENCOUNTER
Patient sent message via [employer] requesting a letter excusing him from due to covid-19 infection. Patient with recent positive covid test. Letter sent to patient.

## 2024-08-21 NOTE — TELEPHONE ENCOUNTER
Spoke with patient via phone. Patient with mild covid 19 symptoms, requesting letter for work. Letter sent to patient. Instructed patient to notify team if symptoms worsen.     [facility] policy states covid 19 patients should not come to clinic for 21 days. Upcoming labs and nurse visit rescheduled to Sept 12. Patient verbalized understanding.

## 2024-08-21 NOTE — LETTER
August 21, 2024    Derrick Montanez  [address] [phone]      To Whom It May Concern,      Derrick Montanez is a patient of Dr. Loyda Donahue. He is a heart transplant recipient from 3/17/2009. He receives immunosuppressive therapy for his heart transplant which puts him at an increased risk of severe illness from COVID-19. Mr Montanez recently tested positive for COVID-19.      Please excuse Derrick Montanez from work for ten days starting today due to being in a high risk group for COVID-19 infection.         If you have any questions or concerns, please don't hesitate to call.    Sincerely,        Loyda Donahue MD  Section Head, Cardiomyopathy & Heart Transplantation  Medical Director, Mechanical Circulatory Support Program          Gloria Toor, BSN, RN, CCRN  RN Clinical Transplant Coordinator  [facility] [address] [place] [address]  Email: [email or web address]  Phone: [phone]

## 2024-08-27 ENCOUNTER — PATIENT MESSAGE (OUTPATIENT)
Dept: INTERNAL MEDICINE | Facility: CLINIC | Age: 29
End: 2024-08-27
Payer: COMMERCIAL

## 2024-08-27 RX ORDER — TIRZEPATIDE 2.5 MG/.5ML
2.5 INJECTION, SOLUTION SUBCUTANEOUS
Qty: 4 PEN | Refills: 5 | Status: SHIPPED | OUTPATIENT
Start: 2024-08-27 | End: 2024-08-28

## 2024-08-28 ENCOUNTER — TELEPHONE (OUTPATIENT)
Dept: INTERNAL MEDICINE | Facility: CLINIC | Age: 29
End: 2024-08-28
Payer: MEDICAID

## 2024-08-28 RX ORDER — SEMAGLUTIDE 1.34 MG/ML
1 INJECTION, SOLUTION SUBCUTANEOUS
Qty: 3 ML | Refills: 6 | Status: SHIPPED | OUTPATIENT
Start: 2024-08-28 | End: 2025-08-28

## 2024-08-28 NOTE — TELEPHONE ENCOUNTER
Spoke to pt. Pt stated he is having a hard time getting his Ozempic due to med being on backorder. He requested Ozempic Rx be sent to [facility] at [facility].  Pt reschedule virtual appt from 10/6/24 to 9/18/24

## 2024-08-28 NOTE — TELEPHONE ENCOUNTER
----- Message from Yvonne Bertrand sent at 8/28/2024 11:03 AM CDT -----  Pt requesting call back , wouldn't say why     Confirmed patient's contact info below:  Contact Name: Derrick Montanez  Phone Number: [phone]

## 2024-09-12 ENCOUNTER — PATIENT MESSAGE (OUTPATIENT)
Dept: TRANSPLANT | Facility: CLINIC | Age: 29
End: 2024-09-12
Payer: COMMERCIAL

## 2024-09-16 DIAGNOSIS — E78.2 MIXED HYPERLIPIDEMIA: ICD-10-CM

## 2024-09-16 DIAGNOSIS — Z94.1 STATUS POST HEART TRANSPLANT: Primary | ICD-10-CM

## 2024-09-16 DIAGNOSIS — E03.2 HYPOTHYROIDISM DUE TO MEDICATION: ICD-10-CM

## 2024-09-16 DIAGNOSIS — Z79.899 ENCOUNTER FOR LONG-TERM (CURRENT) USE OF MEDICATIONS: ICD-10-CM

## 2024-09-16 DIAGNOSIS — Z13.1 SCREENING FOR DIABETES MELLITUS: ICD-10-CM

## 2024-09-16 NOTE — PROGRESS NOTES
Patient missed annual post transplant follow-up in May. Placed orders needed to re-schedule annual

## 2024-09-17 ENCOUNTER — TELEPHONE (OUTPATIENT)
Dept: TRANSPLANT | Facility: CLINIC | Age: 29
End: 2024-09-17
Payer: COMMERCIAL
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
## 2024-11-07 ENCOUNTER — TELEPHONE (OUTPATIENT)
Dept: TRANSPLANT | Facility: CLINIC | Age: 29
End: 2024-11-07
Payer: COMMERCIAL

## 2024-11-11 ENCOUNTER — TELEPHONE (OUTPATIENT)
Dept: TRANSPLANT | Facility: CLINIC | Age: 29
End: 2024-11-11
Payer: COMMERCIAL
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
## 2024-12-03 ENCOUNTER — TELEPHONE (OUTPATIENT)
Dept: TRANSPLANT | Facility: CLINIC | Age: 29
End: 2024-12-03
Payer: COMMERCIAL

## 2024-12-03 NOTE — TELEPHONE ENCOUNTER
Patient called to notify coordinator that he emailed me FMLA paperwork for the transplant team to complete and return to him. E-mail received. Will complete and return to patient once MD signs.

## 2024-12-26 DIAGNOSIS — I15.8 HYPERTENSION ASSOCIATED WITH TRANSPLANTATION: ICD-10-CM

## 2024-12-26 DIAGNOSIS — Z94.9 HYPERTENSION ASSOCIATED WITH TRANSPLANTATION: ICD-10-CM

## 2024-12-26 DIAGNOSIS — Z94.1 HEART REPLACED BY TRANSPLANT: ICD-10-CM

## 2024-12-27 RX ORDER — DILTIAZEM HYDROCHLORIDE 180 MG/1
180 CAPSULE, COATED, EXTENDED RELEASE ORAL
Qty: 30 CAPSULE | Refills: 11 | Status: SHIPPED | OUTPATIENT
Start: 2024-12-27

## 2025-01-08 ENCOUNTER — PATIENT MESSAGE (OUTPATIENT)
Dept: TRANSPLANT | Facility: CLINIC | Age: 30
End: 2025-01-08
Payer: COMMERCIAL

## 2025-01-16 ENCOUNTER — TELEPHONE (OUTPATIENT)
Dept: INTERNAL MEDICINE | Facility: CLINIC | Age: 30
End: 2025-01-16
Payer: COMMERCIAL

## 2025-01-16 NOTE — TELEPHONE ENCOUNTER
----- Message from Giovanna sent at 1/16/2025  9:35 AM CST -----  Regarding: appt request  Name of Who is Calling: Derrick           What is the request in detail: Patient is requesting a call back to schedule labs and follow up appointment 1/21-1/22.           Can the clinic reply by [facility]: No           What Number to Call Back if not in [facility]:  [phone]

## 2025-01-24 ENCOUNTER — TELEPHONE (OUTPATIENT)
Dept: TRANSPLANT | Facility: CLINIC | Age: 30
End: 2025-01-24
Payer: COMMERCIAL
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
## 2025-02-05 ENCOUNTER — TELEPHONE (OUTPATIENT)
Dept: INTERNAL MEDICINE | Facility: CLINIC | Age: 30
End: 2025-02-05
Payer: COMMERCIAL

## 2025-02-05 DIAGNOSIS — E11.65 TYPE 2 DIABETES MELLITUS WITH HYPERGLYCEMIA, WITH LONG-TERM CURRENT USE OF INSULIN: Primary | ICD-10-CM

## 2025-02-05 DIAGNOSIS — Z79.4 TYPE 2 DIABETES MELLITUS WITH HYPERGLYCEMIA, WITH LONG-TERM CURRENT USE OF INSULIN: Primary | ICD-10-CM

## 2025-02-05 NOTE — TELEPHONE ENCOUNTER
----- Message from Christel sent at 2/5/2025  9:21 AM CST -----  Contact: Self/[phone]  Caller is requesting an earlier appointment then we can schedule.  Caller is requesting a message be sent to the provider.    Reason for the appointment:  Follow up     Patient preference of timeframe to be scheduled:  2/18 @ 10:00 am     Would the patient like a call back, or a response through their [facility] portal?:   Call back     Comments:

## 2025-02-05 NOTE — TELEPHONE ENCOUNTER
Spoke to pt. Scheduled virtual appt on 2/26/25.  Pt has a lab appt scheduled on 2/17/25. Pt has several lab orders attached to appt, except a urine. Pt asked to put order for urine or anything needed.     A1c, lipid, and cmp are already included

## 2025-02-17 ENCOUNTER — LAB VISIT (OUTPATIENT)
Dept: LAB | Facility: HOSPITAL | Age: 30
End: 2025-02-17
Payer: COMMERCIAL

## 2025-02-17 DIAGNOSIS — E13.9 POST-TRANSPLANT DIABETES MELLITUS: ICD-10-CM

## 2025-02-17 DIAGNOSIS — E78.2 MIXED HYPERLIPIDEMIA: ICD-10-CM

## 2025-02-17 DIAGNOSIS — E03.2 HYPOTHYROIDISM DUE TO MEDICATION: ICD-10-CM

## 2025-02-17 DIAGNOSIS — Z94.1 STATUS POST HEART TRANSPLANT: ICD-10-CM

## 2025-02-17 DIAGNOSIS — Z79.899 ENCOUNTER FOR LONG-TERM (CURRENT) USE OF MEDICATIONS: ICD-10-CM

## 2025-02-17 LAB
ALBUMIN SERPL BCP-MCNC: 3.7 G/DL (ref 3.5–5.2)
ALP SERPL-CCNC: 66 U/L (ref 40–150)
ALT SERPL W/O P-5'-P-CCNC: 15 U/L (ref 10–44)
ANION GAP SERPL CALC-SCNC: 13 MMOL/L (ref 8–16)
AST SERPL-CCNC: 17 U/L (ref 10–40)
BASOPHILS # BLD AUTO: 0.04 K/UL (ref 0–0.2)
BASOPHILS NFR BLD: 0.3 % (ref 0–1.9)
BILIRUB SERPL-MCNC: 0.3 MG/DL (ref 0.1–1)
BNP SERPL-MCNC: 29 PG/ML (ref 0–99)
BNP SERPL-MCNC: 29 PG/ML (ref 0–99)
BUN SERPL-MCNC: 38 MG/DL (ref 6–20)
CALCIUM SERPL-MCNC: 8.7 MG/DL (ref 8.7–10.5)
CHLORIDE SERPL-SCNC: 107 MMOL/L (ref 95–110)
CHOLEST SERPL-MCNC: 135 MG/DL (ref 120–199)
CHOLEST/HDLC SERPL: 4.4 {RATIO} (ref 2–5)
CO2 SERPL-SCNC: 16 MMOL/L (ref 23–29)
CREAT SERPL-MCNC: 1.6 MG/DL (ref 0.5–1.4)
DIFFERENTIAL METHOD BLD: ABNORMAL
EOSINOPHIL # BLD AUTO: 0.1 K/UL (ref 0–0.5)
EOSINOPHIL NFR BLD: 0.8 % (ref 0–8)
ERYTHROCYTE [DISTWIDTH] IN BLOOD BY AUTOMATED COUNT: 13.3 % (ref 11.5–14.5)
EST. GFR  (NO RACE VARIABLE): 59.8 ML/MIN/1.73 M^2
ESTIMATED AVG GLUCOSE: 180 MG/DL (ref 68–131)
GLUCOSE SERPL-MCNC: 177 MG/DL (ref 70–110)
HBA1C MFR BLD: 7.9 % (ref 4–5.6)
HCT VFR BLD AUTO: 38.4 % (ref 40–54)
HDLC SERPL-MCNC: 31 MG/DL (ref 40–75)
HDLC SERPL: 23 % (ref 20–50)
HGB BLD-MCNC: 12.4 G/DL (ref 14–18)
IMM GRANULOCYTES # BLD AUTO: 0.05 K/UL (ref 0–0.04)
IMM GRANULOCYTES NFR BLD AUTO: 0.4 % (ref 0–0.5)
LDLC SERPL CALC-MCNC: 76.2 MG/DL (ref 63–159)
LYMPHOCYTES # BLD AUTO: 2.6 K/UL (ref 1–4.8)
LYMPHOCYTES NFR BLD: 21.5 % (ref 18–48)
MAGNESIUM SERPL-MCNC: 1.3 MG/DL (ref 1.6–2.6)
MCH RBC QN AUTO: 27.3 PG (ref 27–31)
MCHC RBC AUTO-ENTMCNC: 32.3 G/DL (ref 32–36)
MCV RBC AUTO: 84 FL (ref 82–98)
MONOCYTES # BLD AUTO: 0.9 K/UL (ref 0.3–1)
MONOCYTES NFR BLD: 7.1 % (ref 4–15)
NEUTROPHILS # BLD AUTO: 8.4 K/UL (ref 1.8–7.7)
NEUTROPHILS NFR BLD: 69.9 % (ref 38–73)
NONHDLC SERPL-MCNC: 104 MG/DL
NRBC BLD-RTO: 0 /100 WBC
PLATELET # BLD AUTO: 300 K/UL (ref 150–450)
PMV BLD AUTO: 9.7 FL (ref 9.2–12.9)
POTASSIUM SERPL-SCNC: 4.3 MMOL/L (ref 3.5–5.1)
PROT SERPL-MCNC: 7.2 G/DL (ref 6–8.4)
RBC # BLD AUTO: 4.55 M/UL (ref 4.6–6.2)
SODIUM SERPL-SCNC: 136 MMOL/L (ref 136–145)
T4 FREE SERPL-MCNC: 1.09 NG/DL (ref 0.71–1.51)
TACROLIMUS BLD-MCNC: 4.4 NG/ML (ref 5–15)
TRIGL SERPL-MCNC: 139 MG/DL (ref 30–150)
TSH SERPL DL<=0.005 MIU/L-ACNC: 1.11 UIU/ML (ref 0.4–4)
WBC # BLD AUTO: 12.05 K/UL (ref 3.9–12.7)

## 2025-02-17 PROCEDURE — 83735 ASSAY OF MAGNESIUM: CPT | Performed by: INTERNAL MEDICINE

## 2025-02-17 PROCEDURE — 86832 HLA CLASS I HIGH DEFIN QUAL: CPT | Performed by: INTERNAL MEDICINE

## 2025-02-17 PROCEDURE — 86977 RBC SERUM PRETX INCUBJ/INHIB: CPT | Performed by: INTERNAL MEDICINE

## 2025-02-17 PROCEDURE — 80197 ASSAY OF TACROLIMUS: CPT | Performed by: INTERNAL MEDICINE

## 2025-02-17 PROCEDURE — 83880 ASSAY OF NATRIURETIC PEPTIDE: CPT | Performed by: INTERNAL MEDICINE

## 2025-02-17 PROCEDURE — 84439 ASSAY OF FREE THYROXINE: CPT | Performed by: INTERNAL MEDICINE

## 2025-02-17 PROCEDURE — 86833 HLA CLASS II HIGH DEFIN QUAL: CPT | Performed by: INTERNAL MEDICINE

## 2025-02-17 PROCEDURE — 85025 COMPLETE CBC W/AUTO DIFF WBC: CPT | Performed by: INTERNAL MEDICINE

## 2025-02-17 PROCEDURE — 84443 ASSAY THYROID STIM HORMONE: CPT | Performed by: INTERNAL MEDICINE

## 2025-02-17 PROCEDURE — 83036 HEMOGLOBIN GLYCOSYLATED A1C: CPT | Performed by: INTERNAL MEDICINE

## 2025-02-17 PROCEDURE — 36415 COLL VENOUS BLD VENIPUNCTURE: CPT | Performed by: INTERNAL MEDICINE

## 2025-02-17 PROCEDURE — 80061 LIPID PANEL: CPT | Performed by: INTERNAL MEDICINE

## 2025-02-17 PROCEDURE — 86833 HLA CLASS II HIGH DEFIN QUAL: CPT | Mod: 59 | Performed by: INTERNAL MEDICINE

## 2025-02-17 PROCEDURE — 80053 COMPREHEN METABOLIC PANEL: CPT | Performed by: INTERNAL MEDICINE

## 2025-02-17 PROCEDURE — 86832 HLA CLASS I HIGH DEFIN QUAL: CPT | Mod: 59 | Performed by: INTERNAL MEDICINE

## 2025-02-18 ENCOUNTER — OFFICE VISIT (OUTPATIENT)
Dept: TRANSPLANT | Facility: CLINIC | Age: 30
End: 2025-02-18
Payer: COMMERCIAL

## 2025-02-18 ENCOUNTER — HOSPITAL ENCOUNTER (OUTPATIENT)
Dept: RADIOLOGY | Facility: HOSPITAL | Age: 30
Discharge: HOME | End: 2025-02-18
Attending: INTERNAL MEDICINE
Payer: COMMERCIAL

## 2025-02-18 ENCOUNTER — HOSPITAL ENCOUNTER (OUTPATIENT)
Dept: CARDIOLOGY | Facility: HOSPITAL | Age: 30
Discharge: HOME | End: 2025-02-18
Attending: INTERNAL MEDICINE
Payer: COMMERCIAL

## 2025-02-18 ENCOUNTER — TELEPHONE (OUTPATIENT)
Dept: TRANSPLANT | Facility: CLINIC | Age: 30
End: 2025-02-18

## 2025-02-18 ENCOUNTER — TELEPHONE (OUTPATIENT)
Dept: PHARMACY | Facility: CLINIC | Age: 30
End: 2025-02-18
Payer: COMMERCIAL

## 2025-02-18 VITALS
SYSTOLIC BLOOD PRESSURE: 179 MMHG | HEIGHT: 63 IN | HEART RATE: 73 BPM | WEIGHT: 233.69 LBS | BODY MASS INDEX: 41.41 KG/M2 | DIASTOLIC BLOOD PRESSURE: 97 MMHG

## 2025-02-18 VITALS — HEIGHT: 63 IN | WEIGHT: 233 LBS | BODY MASS INDEX: 41.29 KG/M2

## 2025-02-18 DIAGNOSIS — Z79.4 TYPE 2 DIABETES MELLITUS WITH HYPERGLYCEMIA, WITH LONG-TERM CURRENT USE OF INSULIN: ICD-10-CM

## 2025-02-18 DIAGNOSIS — Z94.1 HEART REPLACED BY TRANSPLANT: ICD-10-CM

## 2025-02-18 DIAGNOSIS — Z79.899 ENCOUNTER FOR LONG-TERM (CURRENT) USE OF MEDICATIONS: Primary | ICD-10-CM

## 2025-02-18 DIAGNOSIS — I15.8 HYPERTENSION ASSOCIATED WITH TRANSPLANTATION: ICD-10-CM

## 2025-02-18 DIAGNOSIS — Z94.1 STATUS POST HEART TRANSPLANT: ICD-10-CM

## 2025-02-18 DIAGNOSIS — Z94.9 HYPERTENSION ASSOCIATED WITH TRANSPLANTATION: ICD-10-CM

## 2025-02-18 DIAGNOSIS — I10 HYPERTENSION, UNSPECIFIED TYPE: ICD-10-CM

## 2025-02-18 DIAGNOSIS — Z94.1 HEART TRANSPLANT RECIPIENT: ICD-10-CM

## 2025-02-18 DIAGNOSIS — E11.65 TYPE 2 DIABETES MELLITUS WITH HYPERGLYCEMIA, WITH LONG-TERM CURRENT USE OF INSULIN: ICD-10-CM

## 2025-02-18 DIAGNOSIS — E66.01 SEVERE OBESITY (BMI >= 40): ICD-10-CM

## 2025-02-18 DIAGNOSIS — D84.9 IMMUNOSUPPRESSION: ICD-10-CM

## 2025-02-18 LAB
ASCENDING AORTA: 3.05 CM
AV AREA BY CONTINUOUS VTI: 2.3 CM2
AV INDEX (PROSTH): 0.63
AV LVOT MEAN GRADIENT: 1 MMHG
AV LVOT PEAK GRADIENT: 2 MMHG
AV MEAN GRADIENT: 2 MMHG
AV PEAK GRADIENT: 4 MMHG
AV VALVE AREA BY VELOCITY RATIO: 2.7 CM²
AV VALVE AREA: 2.4 CM2
AV VELOCITY RATIO: 0.7
BSA FOR ECHO PROCEDURE: 2.17 M2
CV ECHO LV RWT: 0.47 CM
CV STRESS BASE HR: 61 BPM
DIASTOLIC BLOOD PRESSURE: 85 MMHG
DOP CALC AO PEAK VEL: 1 M/S
DOP CALC AO VTI: 19.9 CM
DOP CALC LVOT AREA: 3.8 CM2
DOP CALC LVOT DIAMETER: 2.2 CM
DOP CALC LVOT PEAK VEL: 0.7 M/S
DOP CALC LVOT STROKE VOLUME: 47.9 CM3
DOP CALCLVOT PEAK VEL VTI: 12.6 CM
E WAVE DECELERATION TIME: 196 MS
E/A RATIO: 2.58
E/E' RATIO: 10 M/S
ECHO EF ESTIMATED: 60 %
ECHO LV POSTERIOR WALL: 1.2 CM (ref 0.6–1.1)
EJECTION FRACTION: 63 %
FRACTIONAL SHORTENING: 31.4 % (ref 28–44)
INTERVENTRICULAR SEPTUM: 0.9 CM (ref 0.6–1.1)
LEFT ATRIUM VOLUME INDEX MOD: 23 ML/M2
LEFT ATRIUM VOLUME MOD: 48 ML
LEFT INTERNAL DIMENSION IN SYSTOLE: 3.5 CM (ref 2.1–4)
LEFT VENTRICLE DIASTOLIC VOLUME INDEX: 60.09 ML/M2
LEFT VENTRICLE DIASTOLIC VOLUME: 123.78 ML
LEFT VENTRICLE MASS INDEX: 97.5 G/M2
LEFT VENTRICLE SYSTOLIC VOLUME INDEX: 24 ML/M2
LEFT VENTRICLE SYSTOLIC VOLUME: 49.36 ML
LEFT VENTRICULAR INTERNAL DIMENSION IN DIASTOLE: 5.1 CM (ref 3.5–6)
LEFT VENTRICULAR MASS: 200.8 G
LV LATERAL E/E' RATIO: 9.8
LV SEPTAL E/E' RATIO: 9.8
MV PEAK A VEL: 0.38 M/S
MV PEAK E VEL: 0.98 M/S
OHS CV CPX 1 MINUTE RECOVERY HEART RATE: 139 BPM
OHS CV CPX 85 PERCENT MAX PREDICTED HEART RATE MALE: 163
OHS CV CPX ESTIMATED METS: 11
OHS CV CPX MAX PREDICTED HEART RATE: 192
OHS CV CPX PATIENT IS FEMALE: 0
OHS CV CPX PATIENT IS MALE: 1
OHS CV CPX PEAK DIASTOLIC BLOOD PRESSURE: 60 MMHG
OHS CV CPX PEAK HEAR RATE: 164 BPM
OHS CV CPX PEAK RATE PRESSURE PRODUCT: ABNORMAL
OHS CV CPX PEAK SYSTOLIC BLOOD PRESSURE: 201 MMHG
OHS CV CPX PERCENT MAX PREDICTED HEART RATE ACHIEVED: 85
OHS CV CPX RATE PRESSURE PRODUCT PRESENTING: 7991
OHS CV RV/LV RATIO: 0.78 CM
PISA TR MAX VEL: 2.2 M/S
POST STRESS EJECTION FRACTION: 75 %
RA PRESSURE ESTIMATED: 3 MMHG
RIGHT ATRIUM VOLUME AREA LENGTH APICAL 4 CHAMBER: 36 ML
RIGHT VENTRICLE DIASTOLIC BASEL DIMENSION: 4 CM
RV TB RVSP: 5 MMHG
RV TISSUE DOPPLER FREE WALL SYSTOLIC VELOCITY 1 (APICAL 4 CHAMBER VIEW): 8.96 CM/S
SINUS: 3.54 CM
STJ: 2.68 CM
STRESS ECHO POST EXERCISE DUR MIN: 6 MINUTES
STRESS ECHO POST EXERCISE DUR SEC: 43 SECONDS
SYSTOLIC BLOOD PRESSURE: 131 MMHG
TASV: 9 CM/S
TDI LATERAL: 0.1 M/S
TDI SEPTAL: 0.1 M/S
TDI: 0.1 M/S
TR MAX PG: 19 MMHG
TRICUSPID ANNULAR PLANE SYSTOLIC EXCURSION: 2.13 CM
TV PEAK GRADIENT: 19 MMHG
TV REST PULMONARY ARTERY PRESSURE: 22 MMHG
Z-SCORE OF LEFT VENTRICULAR DIMENSION IN END DIASTOLE: -1.99
Z-SCORE OF LEFT VENTRICULAR DIMENSION IN END SYSTOLE: -0.67

## 2025-02-18 PROCEDURE — 99214 OFFICE O/P EST MOD 30 MIN: CPT | Mod: S$GLB,,, | Performed by: INTERNAL MEDICINE

## 2025-02-18 PROCEDURE — 3008F BODY MASS INDEX DOCD: CPT | Mod: CPTII,S$GLB,, | Performed by: INTERNAL MEDICINE

## 2025-02-18 PROCEDURE — 3080F DIAST BP >= 90 MM HG: CPT | Mod: CPTII,S$GLB,, | Performed by: INTERNAL MEDICINE

## 2025-02-18 PROCEDURE — 71046 X-RAY EXAM CHEST 2 VIEWS: CPT | Mod: TC,FY

## 2025-02-18 PROCEDURE — 3077F SYST BP >= 140 MM HG: CPT | Mod: CPTII,S$GLB,, | Performed by: INTERNAL MEDICINE

## 2025-02-18 PROCEDURE — 3051F HG A1C>EQUAL 7.0%<8.0%: CPT | Mod: CPTII,S$GLB,, | Performed by: INTERNAL MEDICINE

## 2025-02-18 PROCEDURE — 93351 STRESS TTE COMPLETE: CPT

## 2025-02-18 PROCEDURE — 1159F MED LIST DOCD IN RCRD: CPT | Mod: CPTII,S$GLB,, | Performed by: INTERNAL MEDICINE

## 2025-02-18 RX ORDER — LISINOPRIL 40 MG/1
40 TABLET ORAL DAILY
Qty: 30 TABLET | Refills: 11 | Status: SHIPPED | OUTPATIENT
Start: 2025-02-18 | End: 2026-02-18

## 2025-02-18 RX ORDER — DICYCLOMINE HYDROCHLORIDE 10 MG/1
10 CAPSULE ORAL 2 TIMES DAILY PRN
COMMUNITY
Start: 2024-11-25

## 2025-02-18 RX ORDER — ASPIRIN 81 MG/1
81 TABLET ORAL DAILY
COMMUNITY

## 2025-02-18 RX ORDER — BLOOD-GLUCOSE TRANSMITTER
EACH MISCELLANEOUS
Qty: 1 EACH | Refills: 3 | Status: SHIPPED | OUTPATIENT
Start: 2025-02-18

## 2025-02-18 NOTE — TELEPHONE ENCOUNTER
We have reached out to Mr. Montanez via letter and portal message  to inform him of the [employer] application process for Ozempic 1mg. A follow-up phone call will be made in 5 business days.    Erika Messina  Pharmacy Patient Assistance Team

## 2025-02-18 NOTE — PROGRESS NOTES
"Subjective:   Mr. Montanez is a 29 y.o. year old Black or  male who received a   heart transplant on 3/17/09.      CMV status:   Donor: +  Recipient: +    28 YO M who developed myocarditis at age 6, ultimately underwent transplant at [facility] on 3/17/2009, who shared care between [facility] and [facility] but now is following with us Other medical issues include DM and HTN. 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    Here for routine follow-up visit 16 years post-transplant.  Doing well.  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  No cardiovascular complaints.  Blood pressure is elevated in clinic today.  Says that he does not check his blood pressure regularly at home but we will start doing so.    ROS    Objective:   Blood pressure (!) 179/97, pulse 73, height 5' 3" (1.6 m), weight 106 kg (233 lb 11 oz).body mass index is 41.4 kg/m².    Physical Exam  Vitals reviewed.   Constitutional:       General: He is not in acute distress.     Appearance: He is obese.   HENT:      Head: Atraumatic.   Eyes:      Extraocular Movements: Extraocular movements intact.   Cardiovascular:      Rate and Rhythm: Normal rate and regular rhythm.      Heart sounds: Normal heart sounds.   Pulmonary:      Breath sounds: Normal breath sounds.   Abdominal:      Palpations: Abdomen is soft.      Tenderness: There is no abdominal tenderness.   Musculoskeletal:         General: Normal range of motion.      Right lower leg: No edema.      Left lower leg: No edema.   Neurological:      General: No focal deficit present.      Mental Status: He is alert and oriented to person, place, and time.         Lab Results   Component Value Date    WBC 12.05 02/17/2025    HGB 12.4 (L) 02/17/2025    HCT 38.4 (L) 02/17/2025    MCV 84 02/17/2025     02/17/2025    CO2 16 (L) 02/17/2025    CREATININE 1.6 (H) 02/17/2025    GLUCOSE 105 04/12/2019    CALCIUM 8.7 02/17/2025    ALBUMIN 3.7 02/17/2025    AST 17 02/17/2025    BNP 29 " 02/17/2025    BNP 29 02/17/2025    ALT 15 02/17/2025    ALLOMAP 26 05/18/2017       Lab Results   Component Value Date    INR 1.0 02/20/2021    INR 1.0 10/07/2016       BNP   Date Value Ref Range Status   02/17/2025 29 0 - 99 pg/mL Final     Comment:     Values of less than 100 pg/ml are consistent with non-CHF populations.   02/17/2025 29 0 - 99 pg/mL Final     Comment:     Values of less than 100 pg/ml are consistent with non-CHF populations.   05/16/2024 21 0 - 99 pg/mL Final     Comment:     Values of less than 100 pg/ml are consistent with non-CHF populations.       LD   Date Value Ref Range Status   02/20/2021 370 (H) 110 - 260 U/L Final     Comment:     Results are increased in hemolyzed samples.       Tacrolimus Lvl   Date Value Ref Range Status   02/17/2025 4.4 (L) 5.0 - 15.0 ng/mL Final     Comment:     Testing performed by a chemiluminescent microparticle   immunoassay on the Early[employer] System.    CAUTION: No firm therapeutic range exists for tacrolimus in whole   blood. The   complexity of the clinical state, individual differences in   sensitivity to   immunosuppressive and nephrotoxic effects of tacrolimus,   co-administration   of other immunosuppressants, type of transplant, time post-transplant   and a   number of other factors contribute to different requirements for   optimal   blood levels of tacrolimus. Therefore, individual tacrolimus values   cannot   be used as the sole indicator for making changes in treatment regimen   and   each patient should be thoroughly evaluated clinically before changes   in   treatment regimens are made. Each user must establish his or her own   ranges   based on clinical experience.  Therapeutic ranges vary according to the commercial test used, and   therefore   should be established for each commercial test. Values obtained with   different assay methods cannot be used interchangeably due to   differences in   assay methods and cross-reactivity with  "metabolites, nor should   correction   factors be applied. Therefore, consistent use of one assay for   individual   patients is recommended.       No results found for: "SIROLIMUS"  No results found for: "CYCLOSPORINE"    No results found for this or any previous visit.    No results found for this or any previous visit.      Labs were reviewed with the patient.    Assessment:     1. Encounter for long-term (current) use of medications    2. Hypertension, unspecified type    3. Heart replaced by transplant    4. Heart transplant recipient    5. Hypertension associated with transplantation    6. Immunosuppression    7. Type 2 diabetes mellitus with hyperglycemia, with long-term current use of insulin    8. Severe obesity (BMI >= 40)        Plan:     Presents today for follow-up.  No cardiovascular complaints.  Euvolemic on exam.  Scheduled to get echocardiogram later today.  Working on weight loss and diabetes control.  Blood pressure elevated in clinic today so we will increase his lisinopril from 10 mg daily to 40 mg daily.      Return instructions as set forth by post transplant schedule or as needed:    Clinic: Return for labs and/or biopsy weekly the first month, every two weeks during month 2 and then monthly for the first year at the provider or coordinator's discretion. During the second year, return to clinic every 3 months. Post transplant year 3-5 return every 6 months. There will be a comprehensive post transplant evaluation every year that may include LHC/RHC/biopsy, stress test, echo, CXR, and other health screening exams.    In addition to the clinical assessment, I have ordered Allomap testing for this patient to assist in identification of moderate/severe acute cellular rejection (ACR) in a pt with stable Allograft function instead of endomyocardial biopsy.     Patient is reminded to call with any health changes as these can be early signs of transplant complications. Patient is advised to make sure " any new medications or changes of old medications are discussed with a pharmacist or physician knowledgeable with transplant to avoid rejection/drug toxicity related to significant drug interactions.    Patient advised that it is recommended that all transplanted patients, and their close contacts and household members receive Covid vaccination.    UNOS Patient Status  Functional Status: 90% - Able to carry on normal activity: minor symptoms of disease  Physical Capacity: No Limitations  Working for Income: yes  If yes, working activity level: Working Full Time    Advance Care Planning     Date: 02/18/2025    Power of   I initiated the process of voluntary advance care planning today and explained the importance of this process to the patient.  I introduced the concept of advance directives to the patient, as well. Then the patient received detailed information about the importance of designating a Health Care Power of  (HCPOA). He was also instructed to communicate with this person about their wishes for future healthcare, should he become sick and lose decision-making capacity. The patient has not previously appointed a HCPOA. After our discussion, the patient has decided to complete a HCPOA. I encouraged him to communicate with this person about their wishes for future healthcare, should he become sick and lose decision-making capacity.      A total of 15 min was spent on advance care planning, goals of care discussion, emotional support, formulating and communicating prognosis and exploring burden/benefit of various approaches of treatment. This discussion occurred on a fully voluntary basis with the verbal consent of the patient and/or family.       Trell James MD

## 2025-02-18 NOTE — LETTER
February 18, 2025    Derrick Chucho Montanez  [address] [facility] [address]               Dear Mr. Montanez,      My name is Erika Messina  I am reaching out on behalf of [facility]s Pharmacy Patient Assistance Team in regards to your request for medication assistance. Our goal is to assist qualified [facility] patients with obtaining financial assistance for prescribed medications.     Please note that enrollment into available support may require the following documents:      Proof of household Income (such as social security award letter, pension statement,1040)  Copy of all insurance cards (front and back)  Print out from your insurance or pharmacy showing how much you have spent on prescriptions for the current year  Completed Medication Access Center Authorization Forms        Please reach out to my phone number below if you are still in need of assistance with your medications. Follow-up call will be made in 5 business days. We look forward to hearing from you soon!    Thank you for choosing [facility] for your healthcare needs      Sincerely  Erika WEBER @[phone]  Pharmacy Patient Assistance Team  [address] [place] [address]  [address] [place] [address]  Fax: [phone]  Email: [email or web address]

## 2025-02-18 NOTE — TELEPHONE ENCOUNTER
Spoke with patient via phone. Asked patient to take the prescribed tacrolimus dose of 6 mg in am, 7 mg in pm since his tacrolimus level is 4.4 with a goal of 5-10. Patient verbalized understanding.     Verified patient understands lisinopril dose change from 10 mg to 40 mg per day.     Repeat labs scheduled next Friday Feb 28 at 8 am at [facility] to recheck creatinine and tacrolimus level.     Reviewed stress echo results patient.

## 2025-02-18 NOTE — TELEPHONE ENCOUNTER
Reason for visit:   15 years 11 months  post heart transplant clinic visit.     Immunosuppression:  Tacrolimus 6 mg in am/ 7 mg in pm (Patient not taking as prescribed, taking 6 mg BID)  Myfortic 720 mg BID    Assessment:  Patient arrived after work, ambulated independently to clinic appt. Patient in clinic alone, however, mom on phone during visit. Patient denies chest pain, SOB, palpitations. No N/V/D. No edema noted. Patient obese, stated he is trying to alert his lifestyle to include more activity in order to lose weight. / 97, HR 73.     Psychosocial Evaluation:  -physical activity: Patient admits that he has not been very active lately, but has recently joined a new gym and would like to start swimming regularly.    -work: works full time as night shift nurse at [employer]  -transportation: personal vehicle   -support: patient has friends locally, mom lives out of town. Mom on phone during appt.  -issues/concerns: patient overdue on health maintenance activities. Patient stated his Ozempic medication is expensive    Education:   Reviewed the importance of health maintenance activities. Patient does not currently have PCP, dermatologist or eye doctor. Will place referrals. Informed patient that he should follow with the doctors annually. Patient stated he saw dentist recently and sees dentist annually. Encouraged patient to see dentist Q6 months if possible. Patient with endocrine appt on Feb 26. Will forward labs to endrocinologist and ask if they are aware of assistance programs for ozempic.    Reviewed heart healthy diet with patient, encouraged lean meats and plant proteins. Encouraged fats from plant sources such as avocados to increase HDL and decrease LDL. Encouraged patient to increase physical activity.    Patient asked about the possibility to start envarsus. Informed patient that he would to discuss further with MD. However, if patient switched from tacrolimus BID to envarsus daily he would  need to get weekly labs until two therapeutic ranges obtained. Patient unsure if he would want weekly labs.    Medications, lab and echo results reviewed with patient and Dr. James. See MD note for orders and recommendations.  Lisinopril increased from 10 mg to 40 mg daily.    Next follow up in 1 week for routine labs.

## 2025-02-18 NOTE — TELEPHONE ENCOUNTER
----- Message from TARYN Mcfadden, PRINCE sent at 2/18/2025 12:31 PM CST -----  Regarding: ozempdrew Sotomayor! Good morning, Gloria!Thank you!Pt needs assistance for ozempic.Mickie  ----- Message -----  From: Gloria Toro RN  Sent: 2/18/2025  11:49 AM CST  To: TARYN Hermosillo, PRINCE    Good Morning! We say Derrick today in the transplant clinic. Wanted to share these results with you since he said he sees you next week. He was also asking if there are any assistance programs for his ozempic? Thanks!  Gloria laura coordinator, x [phone]

## 2025-02-21 LAB
CLASS I ANTIBODY COMMENTS - LUMINEX: NORMAL
CLASS II ANTIBODY COMMENTS - LUMINEX: NORMAL
CLASS II ANTIBODY COMMENTS - LUMINEX: NORMAL
DSA1 TESTING DATE: NORMAL
DSA12 TESTING DATE: NORMAL
DSA2 TESTING DATE: NORMAL
DSA2A TESTING DATE: NORMAL
SERUM COLLECTION DT - LUMINEX CLASS I: NORMAL
SERUM COLLECTION DT - LUMINEX CLASS II: NORMAL
SERUM COLLECTION DT - LUMINEX CLASS II: NORMAL

## 2025-02-24 ENCOUNTER — TELEPHONE (OUTPATIENT)
Dept: INTERNAL MEDICINE | Facility: CLINIC | Age: 30
End: 2025-02-24
Payer: COMMERCIAL

## 2025-02-26 ENCOUNTER — OFFICE VISIT (OUTPATIENT)
Dept: INTERNAL MEDICINE | Facility: CLINIC | Age: 30
End: 2025-02-26
Payer: COMMERCIAL

## 2025-02-26 DIAGNOSIS — E66.9 OBESITY WITH BODY MASS INDEX OF 30.0-39.9: ICD-10-CM

## 2025-02-26 DIAGNOSIS — Z94.9 HYPERTENSION ASSOCIATED WITH TRANSPLANTATION: ICD-10-CM

## 2025-02-26 DIAGNOSIS — Z79.4 TYPE 2 DIABETES MELLITUS WITH HYPERGLYCEMIA, WITH LONG-TERM CURRENT USE OF INSULIN: Primary | ICD-10-CM

## 2025-02-26 DIAGNOSIS — Z94.1 HEART REPLACED BY TRANSPLANT: ICD-10-CM

## 2025-02-26 DIAGNOSIS — E11.65 TYPE 2 DIABETES MELLITUS WITH HYPERGLYCEMIA, WITH LONG-TERM CURRENT USE OF INSULIN: Primary | ICD-10-CM

## 2025-02-26 DIAGNOSIS — I15.8 HYPERTENSION ASSOCIATED WITH TRANSPLANTATION: ICD-10-CM

## 2025-02-26 PROBLEM — E66.01 SEVERE OBESITY (BMI >= 40): Status: RESOLVED | Noted: 2025-02-18 | Resolved: 2025-02-26

## 2025-02-26 PROCEDURE — 1160F RVW MEDS BY RX/DR IN RCRD: CPT | Mod: CPTII,95,, | Performed by: NURSE PRACTITIONER

## 2025-02-26 PROCEDURE — 98004 SYNCH AUDIO-VIDEO EST SF 10: CPT | Mod: 95,,, | Performed by: NURSE PRACTITIONER

## 2025-02-26 PROCEDURE — 4010F ACE/ARB THERAPY RXD/TAKEN: CPT | Mod: CPTII,95,, | Performed by: NURSE PRACTITIONER

## 2025-02-26 PROCEDURE — 1159F MED LIST DOCD IN RCRD: CPT | Mod: CPTII,95,, | Performed by: NURSE PRACTITIONER

## 2025-02-26 PROCEDURE — 3051F HG A1C>EQUAL 7.0%<8.0%: CPT | Mod: CPTII,95,, | Performed by: NURSE PRACTITIONER

## 2025-02-26 RX ORDER — BLOOD-GLUCOSE SENSOR
EACH MISCELLANEOUS
Qty: 3 EACH | Refills: 11 | Status: SHIPPED | OUTPATIENT
Start: 2025-02-26

## 2025-02-26 RX ORDER — ORAL SEMAGLUTIDE 14 MG/1
TABLET ORAL
Qty: 30 TABLET | Refills: 11 | Status: SHIPPED | OUTPATIENT
Start: 2025-02-26

## 2025-02-26 NOTE — PROGRESS NOTES
CHIEF COMPLAINT: Type 2 Diabetes     HPI: Mr. Derrick Montanez is a 29 y.o. male who was diagnosed with Type 2 DM in 3/2019   Pt has h/o heart transplantation 2009, [facility]  H/o DKA, hyperglycemia, hypoglycemia.   Chronically elevated a1cs in the past few years.  Since then, much improved in the past year and a half with cgm use.  Cost issues with dexcom-reaching to company.  Gap of not having.   Getting back on dexcom this Friday.   Out of ozempic, Erika is assigned to his case for 1 mg ozempic assistance.  A1c trending up.   Lab Results   Component Value Date    HGBA1C 7.9 (H) 02/17/2025     Avg 172 mg/dl  Sd 60 mg/dl   Co eff 35.1%   Gmi 7.4%  Gaining weight, snacking ,works night shifts.   No issues with feet.  Needs to schedule eye exam.     Last seen in fall 2024.  Being seen by me again today.  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  Managed per HTS.   H/o chronically elevated BG    Has concerns with nephrotoxicity -semaglutide, likes results, tried lower dose but had more cravings, etc.    The patient location is: home   The chief complaint leading to consultation is: type 2 dm     Visit type: audiovisual    Face to Face time with patient: 10   minutes of total time spent on the encounter, which includes face to face time and non-face to face time preparing to see the patient (eg, review of tests), Obtaining and/or reviewing separately obtained history, Documenting clinical information in the electronic or other health record, Independently interpreting results (not separately reported) and communicating results to the patient/family/caregiver, or Care coordination (not separately reported).     Each patient to whom he or she provides medical services by telemedicine is:  (1) informed of the relationship between the physician and  patient and the respective role of any other health care provider with respect to management of the patient; and (2) notified that he or she may decline to receive medical services by telemedicine and may withdraw from such care at any time.     PREVIOUS DIABETES MEDICATIONS TRIED  lantus   humalog   novolog  Metformin   victoza   Ozempic 1 mg     CURRENT DIABETIC MEDS: ozempic 1 mg weekly, lantus 45 units, humalog (lispro) 8-15 units ac , scale 180-230+2, etc.     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  Diabetes Management Status    Statin: Taking  ACE/ARB: Taking    Screening or Prevention Patient's value Goal Complete/Controlled?   HgA1C Testing and Control   Lab Results   Component Value Date    HGBA1C 7.9 (H) 02/17/2025      Annually/Less than 8% Yes   Lipid profile : 02/17/2025 Annually Yes   LDL control Lab Results   Component Value Date    LDLCALC 76.2 02/17/2025    Annually/Less than 100 mg/dl  Yes   Nephropathy screening Lab Results   Component Value Date    LABMICR 393.0 05/16/2024     Lab Results   Component Value Date    PROTEINUA 2+ (A) 02/20/2021    Annually No   Blood pressure BP Readings from Last 1 Encounters:   02/18/25 (!) 179/97    Less than 140/90 Yes   Dilated retinal exam Most Recent Eye Exam Date: Not Found Annually Yes   Foot exam   Most Recent Foot Exam Date: Not Found Annually Yes       REVIEW OF SYSTEMS  General: no weakness, fatigue, +weight fluctuations   Eyes: no double or blurred vision, eye pain, or redness.    Cardiovascular: no chest pain, palpitations, edema, or murmurs.   Respiratory: no cough or dyspnea.   GI: no heartburn, nausea, or changes in bowel patterns; good appetite.   Skin: no rashes, dryness, itching, or reactions at insulin injection sites.  Neuro: no numbness, tingling, tremors, or vertigo.   Endocrine: no polyuria, polydipsia, polyphagia, heat or cold intolerance.     Vital Signs  There were no vitals taken for this visit.    EXT Hemoglobin A1C   Date Value Ref Range Status   04/12/2019 10.5 % Final     Hemoglobin A1C   Date Value Ref Range Status   02/17/2025 7.9 (H) 4.0 - 5.6 % Final     Comment:     ADA Screening Guidelines:  5.7-6.4%  Consistent with prediabetes  >or=6.5%  Consistent with diabetes    High levels of fetal hemoglobin interfere with the HbA1C  assay. Heterozygous hemoglobin variants (HbS, HgC, etc)do  not significantly interfere with this assay.   However, presence of multiple variants may affect accuracy.     05/16/2024 6.6 (H) 4.0 - 5.6 % Final     Comment:     ADA Screening Guidelines:  5.7-6.4%  Consistent with prediabetes  >or=6.5%  Consistent with diabetes    High levels of fetal hemoglobin interfere with the HbA1C  assay. Heterozygous hemoglobin variants (HbS, HgC, etc)do  not significantly interfere with this assay.   However, presence of multiple variants may affect accuracy.     01/22/2024 7.3 (H) 4.0 - 5.6 % Final     Comment:     ADA Screening Guidelines:  5.7-6.4%  Consistent with prediabetes  >or=6.5%  Consistent with diabetes    High levels of fetal hemoglobin interfere with the HbA1C  assay. Heterozygous hemoglobin variants (HbS, HgC, etc)do  not significantly interfere with this assay.   However, presence of multiple variants may affect accuracy.     01/22/2024 7.3 (H) 4.0 - 5.6 % Final     Comment:     ADA Screening Guidelines:  5.7-6.4%  Consistent with prediabetes  >or=6.5%  Consistent with diabetes    High levels of fetal hemoglobin interfere with the  HbA1C  assay. Heterozygous hemoglobin variants (HbS, HgC, etc)do  not significantly interfere with this assay.   However, presence of multiple variants may affect accuracy.          Chemistry        Component Value Date/Time     02/17/2025 0824    K 4.3 02/17/2025 0824     02/17/2025 0824    CO2 16 (L) 02/17/2025 0824    BUN 38 (H) 02/17/2025 0824    CREATININE 1.6 (H) 02/17/2025 0824     (H) 02/17/2025 0824        Component Value Date/Time    CALCIUM 8.7 02/17/2025 0824    ALKPHOS 66 02/17/2025 0824    AST 17 02/17/2025 0824    ALT 15 02/17/2025 0824    BILITOT 0.3 02/17/2025 0824    ESTGFRAFRICA >60.0 01/31/2022 0938    EGFRNONAA >60.0 01/31/2022 0938           Lab Results   Component Value Date    TSH 1.111 02/17/2025      Lab Results   Component Value Date    CHOL 135 02/17/2025    CHOL 113 (L) 05/16/2024    CHOL 95 (L) 01/22/2024     Lab Results   Component Value Date    HDL 31 (L) 02/17/2025    HDL 33 (L) 05/16/2024    HDL 29 (L) 01/22/2024     Lab Results   Component Value Date    LDLCALC 76.2 02/17/2025    LDLCALC 56.4 (L) 05/16/2024    LDLCALC 48.0 (L) 01/22/2024     Lab Results   Component Value Date    TRIG 139 02/17/2025    TRIG 118 05/16/2024    TRIG 90 01/22/2024     Lab Results   Component Value Date    CHOLHDL 23.0 02/17/2025    CHOLHDL 29.2 05/16/2024    CHOLHDL 30.5 01/22/2024       PHYSICAL EXAMINATION  Constitutional: Appears well, no distress    Diabetes Foot Exam:   Deferred       Assessment/Plan  1. Type 2 diabetes mellitus with hyperglycemia, with long-term current use of insulin  Ambulatory referral/consult to Diabetes Education    Hemoglobin A1C  Send rybelsus 14 mg-price check  Needs assistance-reach out to novonordisk   Noted difference without semaglutide   A1c goal less than 7%  DE for nutrition   Has refills  Send rx for upgrade dexcom g7      2. Heart replaced by transplant  F/u with HTS  stable      3. Obesity with body mass index of 30.0-39.9  Work on getting  glp1a  Encourage exercise 3-5 x a week  Cutting down carbs < 150 gm /day  Adding more protein       4. Hypertension associated with transplantation  Microalbumin/Creatinine Ratio, Urine  On arb/acei        Answers submitted by the patient for this visit:  Diabetes Questionnaire (Submitted on 2/26/2025)  Chief Complaint: Diabetes problem  Diabetes type: type 2  MedicAlert ID: No  Disease duration: 5 Years  blurred vision: No  chest pain: No  fatigue: No  foot paresthesias: No  foot ulcerations: No  polydipsia: No  polyphagia: No  polyuria: No  visual change: No  weakness: No  weight loss: No  Symptom course: stable  confusion: No  speech difficulty: No  dizziness: No  nervous/anxious: No  headaches: No  hunger: No  mood changes: No  pallor: No  seizures: No  tremors: No  sleepiness: No  sweats: No  blackouts: No  hospitalization: No  nocturnal hypoglycemia: No  required assistance: No  required glucagon: No  CVA: No  heart disease: No  impotence: No  nephropathy: No  peripheral neuropathy: No  PVD: No  retinopathy: No  autonomic neuropathy: No  CAD risks: hypertension, obesity, stress, diabetes mellitus  Current treatments: insulin injections, oral agent (dual therapy)  Treatment compliance: some of the time  Dose schedule: at bedtime  Given by: patient  Injection sites: abdominal wall  Home blood tests: 5+ x per day  Home urines: <1 x per month  Monitoring compliance: good  Blood glucose trend: increasing steadily  Weight trend: decreasing steadily  Current diet: diabetic  Meal planning: avoidance of concentrated sweets  Exercise: intermittently  Dietitian visit: No  Eye exam current: No  Sees podiatrist: No

## 2025-02-27 ENCOUNTER — PATIENT MESSAGE (OUTPATIENT)
Dept: INTERNAL MEDICINE | Facility: CLINIC | Age: 30
End: 2025-02-27
Payer: COMMERCIAL

## 2025-02-28 ENCOUNTER — RESULTS FOLLOW-UP (OUTPATIENT)
Dept: TRANSPLANT | Facility: CLINIC | Age: 30
End: 2025-02-28

## 2025-02-28 ENCOUNTER — LAB VISIT (OUTPATIENT)
Dept: LAB | Facility: HOSPITAL | Age: 30
End: 2025-02-28
Payer: COMMERCIAL

## 2025-02-28 DIAGNOSIS — Z79.899 ENCOUNTER FOR LONG-TERM (CURRENT) USE OF MEDICATIONS: ICD-10-CM

## 2025-02-28 DIAGNOSIS — Z94.1 STATUS POST HEART TRANSPLANT: ICD-10-CM

## 2025-02-28 LAB
ALBUMIN SERPL BCP-MCNC: 3.5 G/DL (ref 3.5–5.2)
ALP SERPL-CCNC: 63 U/L (ref 40–150)
ALT SERPL W/O P-5'-P-CCNC: 12 U/L (ref 10–44)
ANION GAP SERPL CALC-SCNC: 10 MMOL/L (ref 8–16)
AST SERPL-CCNC: 16 U/L (ref 10–40)
BILIRUB SERPL-MCNC: 0.2 MG/DL (ref 0.1–1)
BUN SERPL-MCNC: 32 MG/DL (ref 6–20)
CALCIUM SERPL-MCNC: 8.9 MG/DL (ref 8.7–10.5)
CHLORIDE SERPL-SCNC: 109 MMOL/L (ref 95–110)
CO2 SERPL-SCNC: 19 MMOL/L (ref 23–29)
CREAT SERPL-MCNC: 1.4 MG/DL (ref 0.5–1.4)
EST. GFR  (NO RACE VARIABLE): >60 ML/MIN/1.73 M^2
GLUCOSE SERPL-MCNC: 73 MG/DL (ref 70–110)
POTASSIUM SERPL-SCNC: 4.5 MMOL/L (ref 3.5–5.1)
PROT SERPL-MCNC: 6.7 G/DL (ref 6–8.4)
SODIUM SERPL-SCNC: 138 MMOL/L (ref 136–145)
TACROLIMUS BLD-MCNC: 9.6 NG/ML (ref 5–15)

## 2025-02-28 PROCEDURE — 36415 COLL VENOUS BLD VENIPUNCTURE: CPT | Performed by: INTERNAL MEDICINE

## 2025-02-28 PROCEDURE — 80197 ASSAY OF TACROLIMUS: CPT | Performed by: INTERNAL MEDICINE

## 2025-02-28 PROCEDURE — 80053 COMPREHEN METABOLIC PANEL: CPT | Performed by: INTERNAL MEDICINE

## 2025-03-05 ENCOUNTER — PATIENT MESSAGE (OUTPATIENT)
Dept: OPHTHALMOLOGY | Facility: CLINIC | Age: 30
End: 2025-03-05
Payer: COMMERCIAL

## 2025-03-24 ENCOUNTER — CLINICAL SUPPORT (OUTPATIENT)
Dept: DIABETES | Facility: CLINIC | Age: 30
End: 2025-03-24
Payer: COMMERCIAL

## 2025-03-24 DIAGNOSIS — Z79.4 TYPE 2 DIABETES MELLITUS WITH HYPERGLYCEMIA, WITH LONG-TERM CURRENT USE OF INSULIN: ICD-10-CM

## 2025-03-24 DIAGNOSIS — E11.65 TYPE 2 DIABETES MELLITUS WITH HYPERGLYCEMIA, WITH LONG-TERM CURRENT USE OF INSULIN: ICD-10-CM

## 2025-03-24 DIAGNOSIS — E11.65 TYPE 2 DIABETES MELLITUS WITH HYPERGLYCEMIA, WITH LONG-TERM CURRENT USE OF INSULIN: Primary | ICD-10-CM

## 2025-03-24 DIAGNOSIS — Z79.4 TYPE 2 DIABETES MELLITUS WITH HYPERGLYCEMIA, WITH LONG-TERM CURRENT USE OF INSULIN: Primary | ICD-10-CM

## 2025-03-24 PROCEDURE — 99999 PR PBB SHADOW E&M-EST. PATIENT-LVL II: CPT | Mod: PBBFAC,,,

## 2025-03-24 RX ORDER — TIRZEPATIDE 10 MG/.5ML
10 INJECTION, SOLUTION SUBCUTANEOUS
Qty: 4 PEN | Refills: 5 | Status: SHIPPED | OUTPATIENT
Start: 2025-03-24 | End: 2025-03-24 | Stop reason: SDUPTHER

## 2025-03-24 RX ORDER — TIRZEPATIDE 10 MG/.5ML
10 INJECTION, SOLUTION SUBCUTANEOUS
Qty: 4 PEN | Refills: 5 | Status: SHIPPED | OUTPATIENT
Start: 2025-03-24

## 2025-03-24 RX ORDER — SEMAGLUTIDE 1.34 MG/ML
1 INJECTION, SOLUTION SUBCUTANEOUS
Qty: 3 ML | Refills: 6 | Status: SHIPPED | OUTPATIENT
Start: 2025-03-24 | End: 2026-03-24

## 2025-03-24 NOTE — TELEPHONE ENCOUNTER
----- Message from Ramandeep sent at 3/24/2025 11:56 AM CDT -----  Contact: [phone]  Requesting an RX refill or new RX.Is this a refill or new RX:  Refill RX name and strength (semaglutide (OZEMPIC) 1 mg/dose (4 mg/3 mL)     Is this a 30 day or 90 day RX:  30Pharmacy name and phone #[facility] [address] [facility] [address]Inlks: [phone] Fax: [phone]

## 2025-03-25 NOTE — PROGRESS NOTES
"Diabetes Care Specialist Progress Note  Author: Keisha Peguero RD  Date: 3/25/2025    Intake    Program Intake  Reason for Diabetes Program Visit:: Initial Diabetes Assessment  Current diabetes risk level:: moderate  In the last month, have you used the ER or been admitted to the hospital: No  Permission to speak with others about care:: no    Current Diabetes Treatment: Insulin, Oral Medications  Oral Medication Type/Dose: Metformin  Method of insulin delivery?: Injections  Injection Type: Pens  Pen Type/Dose: Pt states takes Lantus and Humalog "as needed"    Continuous Glucose Monitoring  Patient has CGM: Yes  Personal CGM type:: Dexcom G7  GMI Date: 03/24/25  GMI Value: 7.4 %    Lab Results   Component Value Date    HGBA1C 7.9 (H) 02/17/2025       There is no height or weight on file to calculate BMI.    Lifestyle Coping Support & Clinical    Lifestyle/Coping/Support  Learning Barriers:: None  Psychosocial/Coping Skills Assessment Completed: : No  Deffered due to:: Time  Area of need?: Deferred    Problem Review  Active Comorbidities: Organ Transplant, Hypertension    Diabetes Self-Management Skills Assessment    Medication Skills Assessment  Patient is able to identify current diabetes medications, dosages, and appropriate timing of medications.: yes  Patient reports problems or concerns with current medication regimen.: yes  Medication regimen problems/concerns:: financial concerns  Pharmacy assistance referral placed?:  (Educator messaged NP)  Patient is  aware that some diabetes medications can cause low blood sugar?: Yes  Medication Skills Assessment Completed:: Yes  Assessment indicates:: Instruction Needed  Area of need?: Yes    Diabetes Disease Process/Treatment Options  Diabetes Type?: Type II  Is patient aware of what causes diabetes?: Yes  Does patient understand the pathophysiology of diabetes?: Yes  Diabetes Disease Process/Treatment Options: Skills Assessment Completed: Yes  Assessment indicates:: " Adequate understanding  Area of need?: No    Nutrition/Healthy Eating  Meal Plan 24 Hour Recall - Breakfast: Egg cups with chorizo, joy de ambriz, and cheese  Meal Plan 24 Hour Recall - Lunch: Skips normally or asparagus and chicken parmesan  Meal Plan 24 Hour Recall - Dinner: Lean meat or beans and little rice or fish/chicken and vegetables  Meal Plan 24 Hour Recall - Snack: Beef jerky, pepperoni, cuties, cheese, carb smart ice cream  Meal Plan 24 Hour Recall - Beverage: Gatorade Zero, Coke Zero, water, Ice Water  Nutrition/Healthy Eating Skills Assessment Completed:: Yes  Assessment indicates:: Adequate understanding  Deffered due to:: Time  Area of need?: No    Physical Activity/Exercise  Patient's daily activity level:: moderately active  Patient formally exercises outside of work.: yes  Frequency: three times a week  Patient can identify forms of physical activity.: yes  Physical Activity/Exercise Skills Assessment Completed: : Yes  Assessment indicates:: Adequate understanding  Deffered due to:: Time  Area of need?: No    Home Blood Glucose Monitoring  Patient states that blood sugar is checked at home daily.: yes  Monitoring Method:: personal continuous glucose monitor  Personal CGM type:: Dexcom G7   What is your current Time in Range?: 75%  Home Blood Glucose Monitoring Skills Assessment Completed: : Yes  Assessment indicates:: Instruction Needed, Adequate understanding (Dexcom training required)  Area of need?: No    Acute Complications  Acute Complications Skills Assessment Completed: : No  Assessment indicates:: Instruction Needed  Deffered due to:: Time  Area of need?: Deferred    Chronic Complications  Chronic Complications Skills Assessment Completed: : No  Deferred due to:: Time  Area of need?: Deferred    Assessment Summary and Plan    Based on today's diabetes care assessment, the following areas of need were identified:      Identified Areas of Need      Medication/Current Diabetes Treatment: Yes,  "see care plan.   Lifestyle Coping/Support: Deferred   Diabetes Disease Process/Treatment Options: No   Nutrition/Healthy Eating: No    Physical Activity/Exercise: No    Home Blood Glucose Monitoring: No    Acute Complications: Deferred    Chronic Complications: Deferred     Today's interventions were provided through individual discussion, instruction, and written materials were provided.      Patient verbalized understanding of instruction and written materials.  Pt was able to return back demonstration of instructions today. Patient understood key points, needs reinforcement and further instruction.     Diabetes Self-Management Care Plan:    Today's Diabetes Self-Management Care Plan was developed with Derrick's input. Derrick has agreed to work toward the following goal(s) to improve his/her overall diabetes control.      Care Plan: Diabetes Management   Updates made since 3/25/2024 12:00 AM        Problem: Medications         Goal: Patient Agrees to take Diabetes Medications as prescribed.    Start Date: 3/24/2025   Expected End Date: 6/25/2025   This Visit's Progress: Deferred   Priority: High   Barriers: Financial   Note:    Pt states taking Metformin at this time. Pt reports unable to take Ozempic 2/2 price. Educator notified NP. NP placed prescription for Mounjaro. Pt states Mounjaro is affordable. Pt states taking Lantus and Humalog "as needed", if >180, will take 20 units of Lantus. Educator notified NP.       Dexcom download uploaded into media manager.      Follow Up Plan     Follow up in about 4 weeks (around 4/21/2025) for 1-month F/U, Virtual Visit.    Today's care plan and follow up schedule was discussed with patient.  Derrick verbalized understanding of the care plan, goals, and agrees to follow up plan.        The patient was encouraged to communicate with his/her health care provider/physician and care team regarding his/her condition(s) and treatment.  I provided the patient with my " contact information today and encouraged to contact me via phone or [facility] Patient Portal as needed.     Length of Visit   Total Time: 40 Minutes

## 2025-03-25 NOTE — PROGRESS NOTES
Prior authorization approved-tirzepatide (MOUNJARO) 10 mg/0.5 mL PnIj   Payer: [employer] - Commercial Case ID: [id number]    [phone]    [phone]  Note from payer: Request Reference Number: [id number]. MOUNJARO INJ 10MG/0.5 is approved through 03/24/2026. Your patient may now fill this prescription and it will be covered.  Approval Details    Authorized from March 24, 2025 to March 24, 2026

## 2025-04-09 ENCOUNTER — PATIENT MESSAGE (OUTPATIENT)
Dept: TRANSPLANT | Facility: CLINIC | Age: 30
End: 2025-04-09
Payer: COMMERCIAL

## 2025-04-15 ENCOUNTER — CLINICAL SUPPORT (OUTPATIENT)
Dept: DIABETES | Facility: CLINIC | Age: 30
End: 2025-04-15
Payer: COMMERCIAL

## 2025-04-15 DIAGNOSIS — E11.65 TYPE 2 DIABETES MELLITUS WITH HYPERGLYCEMIA, WITH LONG-TERM CURRENT USE OF INSULIN: Primary | ICD-10-CM

## 2025-04-15 DIAGNOSIS — Z79.4 TYPE 2 DIABETES MELLITUS WITH HYPERGLYCEMIA, WITH LONG-TERM CURRENT USE OF INSULIN: Primary | ICD-10-CM

## 2025-04-15 PROCEDURE — G0108 DIAB MANAGE TRN  PER INDIV: HCPCS | Mod: 95,,,

## 2025-04-21 NOTE — PROGRESS NOTES
"Diabetes Care Specialist Virtual Visit Note       The patient location is: [place]  The chief complaint leading to consultation is: Diabetes  Visit type: audiovisual  Total time spent with patient: 15 min   Each patient to whom he or she provides medical services by telemedicine is:  (1) informed of the relationship between the physician and patient and the respective role of any other health care provider with respect to management of the patient; and (2) notified that he or she may decline to receive medical services by telemedicine and may withdraw from such care at any time.    Diabetes Care Specialist Progress Note  Author: Keisha Peguero RD  Date: 4/21/2025    Intake    Program Intake  Reason for Diabetes Program Visit:: Intervention  Type of Intervention:: Individual  Individual: Education  Education: Self-Management Skill Review, Pattern Management  Current diabetes risk level:: moderate  In the last month, have you used the ER or been admitted to the hospital: No  Permission to speak with others about care:: no    Current Diabetes Treatment: Oral Medications, DM Injectables  Oral Medication Type/Dose: Metformin  DM Injectables Type/Dose: Mounjaro  Method of insulin delivery?: Injections  Injection Type: Pens  Pen Type/Dose: Pt states takes Lantus and Humalog "as needed"    Continuous Glucose Monitoring  Patient has CGM: Yes  Personal CGM type:: Dexcom G7  GMI Date: 04/15/25  GMI Value: 6.1 %    Lab Results   Component Value Date    HGBA1C 7.9 (H) 02/17/2025       There is no height or weight on file to calculate BMI.    Lifestyle Coping Support & Clinical    Lifestyle/Coping/Support  How do you deal with stress/distress?: Being in safe place  Learning Barriers:: None  Psychosocial/Coping Skills Assessment Completed: : Yes  Assessment indicates:: Adequate understanding  Deffered due to:: Time  Area of need?: No    Problem Review  Active Comorbidities: Organ Transplant, Hypertension    Diabetes " Self-Management Skills Assessment    Medication Skills Assessment  Patient is able to identify current diabetes medications, dosages, and appropriate timing of medications.: yes  Patient reports problems or concerns with current medication regimen.: yes  Medication regimen problems/concerns:: financial concerns  Medication Skills Assessment Completed:: Yes  Assessment indicates:: Adequate understanding  Area of need?: No    Diabetes Disease Process/Treatment Options  Diabetes Type?: Type II  Assessment indicates:: Adequate understanding  Area of need?: No    Nutrition/Healthy Eating  Meal Plan 24 Hour Recall - Breakfast: Skipped  Meal Plan 24 Hour Recall - Lunch: Skips normally or asparagus and chicken parmesan  Meal Plan 24 Hour Recall - Dinner: 2 vegetable tacos on Carb Balance tortillas  Meal Plan 24 Hour Recall - Snack: Beef jerky, pepperoni, cuties, cheese, carb smart ice cream  Assessment indicates:: Adequate understanding  Deffered due to:: Time  Area of need?: No    Physical Activity/Exercise  Patient's daily activity level:: moderately active  Patient formally exercises outside of work.: yes  Frequency: three times a week  Patient can identify forms of physical activity.: yes  Assessment indicates:: Adequate understanding  Deffered due to:: Time  Area of need?: No    Home Blood Glucose Monitoring  Patient states that blood sugar is checked at home daily.: yes  Monitoring Method:: personal continuous glucose monitor  Personal CGM type:: Dexcom G7   What is your current Time in Range?: 95%  Assessment indicates:: Adequate understanding (Dexcom training required)  Area of need?: No    Acute Complications  Acute Complications Skills Assessment Completed: : No  Assessment indicates:: Instruction Needed  Deffered due to:: Time  Area of need?: Deferred    Chronic Complications  Chronic Complications Skills Assessment Completed: : No  Deferred due to:: Time  Area of need?: Deferred    Assessment Summary and  "Plan    Based on today's diabetes care assessment, the following areas of need were identified:      Identified Areas of Need      Medication/Current Diabetes Treatment: No   Lifestyle Coping/Support: No   Diabetes Disease Process/Treatment Options: No   Nutrition/Healthy Eating: No    Physical Activity/Exercise: No    Home Blood Glucose Monitoring: No    Acute Complications: Deferred    Chronic Complications: Deferred     Today's interventions were provided through individual discussion, instruction, and written materials were provided.      Patient verbalized understanding of instruction and written materials.  Pt was able to return back demonstration of instructions today. Patient understood key points, needs reinforcement and further instruction.     Diabetes Self-Management Care Plan:    Today's Diabetes Self-Management Care Plan was developed with Derrick's input. Derrick has agreed to work toward the following goal(s) to improve his/her overall diabetes control.      Care Plan: Diabetes Management   Updates made since 4/21/2024 12:00 AM        Problem: Medications         Goal: Patient Agrees to take Diabetes Medications as prescribed. Completed 4/15/2025   Start Date: 3/24/2025   Expected End Date: 6/25/2025   This Visit's Progress: Met   Recent Progress: Deferred   Priority: High   Barriers: No Barriers Identified   Note:    Pt states taking Metformin at this time. Pt reports unable to take Ozempic 2/2 price. Educator notified NP. NP placed prescription for Mounjaro. Pt states Mounjaro is affordable. Pt states taking Lantus and Humalog "as needed", if >180, will take 20 units of Lantus. Educator notified NP.    4/15/25: Pt states taking Mounjaro and Metformin as recommended. Pt states no longer taking insulin.       Dexcom download uploaded into media manager.      Follow Up Plan     Follow up in about 6 months (around 10/15/2025) for 6-month F/U, Virtual Visit.    Today's care plan and follow up " schedule was discussed with patient.  Spiritism verbalized understanding of the care plan, goals, and agrees to follow up plan.        The patient was encouraged to communicate with his/her health care provider/physician and care team regarding his/her condition(s) and treatment.  I provided the patient with my contact information today and encouraged to contact me via phone or [facility] Patient Portal as needed.     Length of Visit   Total Time: 15 Minutes

## 2025-04-25 ENCOUNTER — TELEPHONE (OUTPATIENT)
Dept: INTERNAL MEDICINE | Facility: CLINIC | Age: 30
End: 2025-04-25
Payer: COMMERCIAL

## 2025-04-28 ENCOUNTER — TELEPHONE (OUTPATIENT)
Dept: INTERNAL MEDICINE | Facility: CLINIC | Age: 30
End: 2025-04-28

## 2025-04-28 ENCOUNTER — RESULTS FOLLOW-UP (OUTPATIENT)
Dept: URGENT CARE | Facility: CLINIC | Age: 30
End: 2025-04-28

## 2025-04-28 ENCOUNTER — OFFICE VISIT (OUTPATIENT)
Dept: INTERNAL MEDICINE | Facility: CLINIC | Age: 30
End: 2025-04-28
Payer: COMMERCIAL

## 2025-04-28 VITALS
HEART RATE: 77 BPM | OXYGEN SATURATION: 97 % | HEIGHT: 65 IN | BODY MASS INDEX: 36.42 KG/M2 | TEMPERATURE: 97 F | SYSTOLIC BLOOD PRESSURE: 124 MMHG | WEIGHT: 218.56 LBS | DIASTOLIC BLOOD PRESSURE: 84 MMHG

## 2025-04-28 DIAGNOSIS — R05.9 COUGH, UNSPECIFIED TYPE: Primary | ICD-10-CM

## 2025-04-28 DIAGNOSIS — U07.1 COVID: Primary | ICD-10-CM

## 2025-04-28 DIAGNOSIS — Z94.1 STATUS POST HEART TRANSPLANT: ICD-10-CM

## 2025-04-28 DIAGNOSIS — F41.9 ANXIETY: ICD-10-CM

## 2025-04-28 DIAGNOSIS — R09.81 SINUS CONGESTION: ICD-10-CM

## 2025-04-28 DIAGNOSIS — E11.65 TYPE 2 DIABETES MELLITUS WITH HYPERGLYCEMIA, WITH LONG-TERM CURRENT USE OF INSULIN: ICD-10-CM

## 2025-04-28 DIAGNOSIS — Z79.4 TYPE 2 DIABETES MELLITUS WITH HYPERGLYCEMIA, WITH LONG-TERM CURRENT USE OF INSULIN: ICD-10-CM

## 2025-04-28 LAB
CTP QC/QA: YES
CTP QC/QA: YES
FLUAV AG NPH QL: NEGATIVE
FLUBV AG NPH QL: NEGATIVE
SARS CORONAVIRUS 2 ANTIGEN: POSITIVE

## 2025-04-28 PROCEDURE — 87804 INFLUENZA ASSAY W/OPTIC: CPT | Mod: QW,S$GLB,, | Performed by: INTERNAL MEDICINE

## 2025-04-28 PROCEDURE — 87811 SARS-COV-2 COVID19 W/OPTIC: CPT | Mod: QW,S$GLB,, | Performed by: INTERNAL MEDICINE

## 2025-04-28 PROCEDURE — G2211 COMPLEX E/M VISIT ADD ON: HCPCS | Mod: S$GLB,,, | Performed by: INTERNAL MEDICINE

## 2025-04-28 PROCEDURE — 99214 OFFICE O/P EST MOD 30 MIN: CPT | Mod: S$GLB,,, | Performed by: INTERNAL MEDICINE

## 2025-04-28 PROCEDURE — 99999 PR PBB SHADOW E&M-EST. PATIENT-LVL V: CPT | Mod: PBBFAC,,, | Performed by: INTERNAL MEDICINE

## 2025-04-28 RX ORDER — FLUTICASONE PROPIONATE 50 MCG
1 SPRAY, SUSPENSION (ML) NASAL DAILY
Qty: 16 G | Refills: 0 | Status: SHIPPED | OUTPATIENT
Start: 2025-04-28

## 2025-04-28 RX ORDER — AZELASTINE 1 MG/ML
1 SPRAY, METERED NASAL 2 TIMES DAILY
Qty: 30 ML | Refills: 0 | Status: SHIPPED | OUTPATIENT
Start: 2025-04-28 | End: 2026-04-28

## 2025-04-28 RX ORDER — ESCITALOPRAM OXALATE 10 MG/1
10 TABLET ORAL
Qty: 30 TABLET | Refills: 11 | Status: SHIPPED | OUTPATIENT
Start: 2025-04-28

## 2025-04-28 NOTE — PROGRESS NOTES
Assessment:       1. Cough, unspecified type  - SARS Coronavirus 2 Antigen, POCT Manual Read  - POCT Influenza A/B Rapid Antigen  - azelastine (ASTELIN) 137 mcg (0.1 %) nasal spray; 1 spray (137 mcg total) by Nasal route 2 (two) times daily.  Dispense: 30 mL; Refill: 0  - fluticasone propionate (FLONASE) 50 mcg/actuation nasal spray; 1 spray (50 mcg total) by Each Nostril route once daily.  Dispense: 16 g; Refill: 0    2. Sinus congestion    3. Status post heart transplant  - Ambulatory referral/consult to Internal Medicine    4. Type 2 diabetes mellitus with hyperglycemia, with long-term current use of insulin        Plan:       1/2.  Check rapid COVID, flu swabs.  Flonase and Astelin prescribed.  3. If above testing is negative, would prescribe an antibiotic given patient's immunocompromised from transplant.    4. Figured into above decision-making process.      Deep Scribe:  IMPRESSION:  1. Evaluated 29-year-old male with 4-day history of dry cough and congestion.  2. History of diabetes and transplant (2009).  3. Avoided steroid injection due to potential prolongation of infection and increased susceptibility to future infections.  4. Will prescribe antibiotics if both tests are negative.    SUMMARY:   Prescribed Flonase and Astelin nasal sprays for congestion and breathing   Ordered flu and COVID swab tests   Plan to prescribe antibiotics if flu and COVID tests are negative   Follow-up pending test results    COUGH:   Mr. Montanez reports a dry cough that started about 4 days ago.   Ordered flu and COVID swab tests.   Plan to prescribe antibiotics if flu and COVID tests are negative.    ACUTE SINUSITIS:   Mr. Montanez reports a little sinus pressure and congestion.   Prescribed Flonase and Astelin nasal sprays to help with breathing and congestion.   Prescribed Flonase and Astelin nasal sprays for congestion.    TYPE 2 DIABETES MELLITUS:   Mr. Montanez confirms having diabetes and uses a Dexcom continuous glucose  monitor.   Mr. Montanez reports current glucose level of 128 mg/dL.   Acknowledged the patient's use of Dexcom continuous glucose monitor.   Documented that the patient uses a Dexcom continuous glucose monitor.    HEART TRANSPLANT STATUS:   Noted that the patient had a heart transplant on March 17, 2009.   Explained rationale for avoiding corticosteroid injections during acute illness, including prolonged recovery time and increased infection risk.                 This note was generated with the assistance of ambient listening technology. Verbal consent was obtained by the patient and accompanying visitor(s) for the recording of patient appointment to facilitate this note. I attest to having reviewed and edited the generated note for accuracy, though some syntax or spelling errors may persist. Please contact the author of this note for any clarification.       Subjective:       Patient ID: Derrick Montanez is a 29 y.o. male.    Chief Complaint: Cough and Nasal Congestion    HPI    29-year-old male here for evaluation of cough and congestion.    History of Present Illness    CHIEF COMPLAINT:  Mr. Montanez presents today for cough and congestion    HISTORY OF PRESENT ILLNESS:  He reports illness onset 4 days ago with dry cough, mild sinus pressure, and breathing difficulties attributed to swelling and congestion. He denies fever but experiences chills, which he manages with Tylenol.    MEDICAL HISTORY:  He has history of diabetes managed with continuous glucose monitor (Dexcom). He underwent transplant on March 17, 2009 at [facility].      ROS:  Constitutional: -fevers, +chills  ENT: +nasal congestion, +sinus pressure  Respiratory: +difficulty breathing, +cough         Review of Systems          Objective:      Physical Exam  Vitals reviewed.   Constitutional:       Appearance: He is well-developed.   HENT:      Head: Normocephalic and atraumatic.      Mouth/Throat:      Pharynx: No oropharyngeal exudate.    Eyes:      General: No scleral icterus.        Right eye: No discharge.         Left eye: No discharge.      Pupils: Pupils are equal, round, and reactive to light.   Neck:      Thyroid: No thyromegaly.      Trachea: No tracheal deviation.   Cardiovascular:      Rate and Rhythm: Normal rate and regular rhythm.      Heart sounds: Normal heart sounds. No murmur heard.     No friction rub. No gallop.   Pulmonary:      Effort: Pulmonary effort is normal. No respiratory distress.      Breath sounds: Normal breath sounds. No wheezing or rales.   Chest:      Chest wall: No tenderness.   Abdominal:      General: Bowel sounds are normal. There is no distension.      Palpations: Abdomen is soft. There is no mass.      Tenderness: There is no abdominal tenderness. There is no guarding or rebound.   Musculoskeletal:         General: No tenderness. Normal range of motion.      Cervical back: Normal range of motion and neck supple.   Skin:     General: Skin is warm and dry.      Coloration: Skin is not pale.      Findings: No erythema or rash.   Neurological:      Mental Status: He is alert and oriented to person, place, and time.   Psychiatric:         Behavior: Behavior normal.

## 2025-04-28 NOTE — TELEPHONE ENCOUNTER
Called pt to inform negative for flu and positive for covid. Informed dr valencia will send doreen to pharmacy

## 2025-05-10 ENCOUNTER — HOSPITAL ENCOUNTER (EMERGENCY)
Facility: HOSPITAL | Age: 30
Discharge: HOME | End: 2025-05-11
Attending: STUDENT IN AN ORGANIZED HEALTH CARE EDUCATION/TRAINING PROGRAM
Payer: COMMERCIAL

## 2025-05-10 VITALS
TEMPERATURE: 99 F | OXYGEN SATURATION: 97 % | HEART RATE: 84 BPM | DIASTOLIC BLOOD PRESSURE: 104 MMHG | WEIGHT: 218 LBS | BODY MASS INDEX: 36.32 KG/M2 | RESPIRATION RATE: 25 BRPM | SYSTOLIC BLOOD PRESSURE: 161 MMHG | HEIGHT: 65 IN

## 2025-05-10 DIAGNOSIS — M79.671 ACUTE FOOT PAIN, RIGHT: ICD-10-CM

## 2025-05-10 DIAGNOSIS — R65.10 SIRS (SYSTEMIC INFLAMMATORY RESPONSE SYNDROME): Primary | ICD-10-CM

## 2025-05-10 DIAGNOSIS — R00.2 PALPITATIONS: ICD-10-CM

## 2025-05-10 DIAGNOSIS — Z94.1 HEART TRANSPLANT RECIPIENT: ICD-10-CM

## 2025-05-10 DIAGNOSIS — D84.9 IMMUNOSUPPRESSION: ICD-10-CM

## 2025-05-10 LAB
ABSOLUTE EOSINOPHIL (OHS): 0.16 K/UL
ABSOLUTE MONOCYTE (OHS): 0.87 K/UL (ref 0.3–1)
ABSOLUTE NEUTROPHIL COUNT (OHS): 13.18 K/UL (ref 1.8–7.7)
ALBUMIN SERPL BCP-MCNC: 3.7 G/DL (ref 3.5–5.2)
ALP SERPL-CCNC: 69 UNIT/L (ref 40–150)
ALT SERPL W/O P-5'-P-CCNC: 10 UNIT/L (ref 10–44)
ANION GAP (OHS): 15 MMOL/L (ref 8–16)
AST SERPL-CCNC: 14 UNIT/L (ref 11–45)
B-OH-BUTYR BLD STRIP-SCNC: 0.1 MMOL/L
BACTERIA #/AREA URNS AUTO: ABNORMAL /HPF
BASOPHILS # BLD AUTO: 0.06 K/UL
BASOPHILS NFR BLD AUTO: 0.4 %
BILIRUB SERPL-MCNC: 0.3 MG/DL (ref 0.1–1)
BILIRUB UR QL STRIP.AUTO: NEGATIVE
BNP SERPL-MCNC: 21 PG/ML (ref 0–99)
BUN SERPL-MCNC: 34 MG/DL (ref 6–20)
CALCIUM SERPL-MCNC: 9.1 MG/DL (ref 8.7–10.5)
CHLORIDE SERPL-SCNC: 110 MMOL/L (ref 95–110)
CK SERPL-CCNC: 72 U/L (ref 20–200)
CLARITY UR: CLEAR
CO2 SERPL-SCNC: 17 MMOL/L (ref 23–29)
COLOR UR AUTO: YELLOW
CREAT SERPL-MCNC: 1.5 MG/DL (ref 0.5–1.4)
ERYTHROCYTE [DISTWIDTH] IN BLOOD BY AUTOMATED COUNT: 13 % (ref 11.5–14.5)
GFR SERPLBLD CREATININE-BSD FMLA CKD-EPI: >60 ML/MIN/1.73/M2
GLUCOSE SERPL-MCNC: 197 MG/DL (ref 70–110)
GLUCOSE UR QL STRIP: NEGATIVE
HCT VFR BLD AUTO: 40.7 % (ref 40–54)
HCV AB SERPL QL IA: NORMAL
HGB BLD-MCNC: 13.2 GM/DL (ref 14–18)
HGB UR QL STRIP: ABNORMAL
HIV 1+2 AB+HIV1 P24 AG SERPL QL IA: NORMAL
HYALINE CASTS UR QL AUTO: 12 /LPF (ref 0–1)
IMM GRANULOCYTES # BLD AUTO: 0.07 K/UL (ref 0–0.04)
IMM GRANULOCYTES NFR BLD AUTO: 0.4 % (ref 0–0.5)
KETONES UR QL STRIP: NEGATIVE
LEUKOCYTE ESTERASE UR QL STRIP: NEGATIVE
LIPASE SERPL-CCNC: 94 U/L (ref 4–60)
LYMPHOCYTES # BLD AUTO: 2.33 K/UL (ref 1–4.8)
MCH RBC QN AUTO: 27.3 PG (ref 27–31)
MCHC RBC AUTO-ENTMCNC: 32.4 G/DL (ref 32–36)
MCV RBC AUTO: 84 FL (ref 82–98)
MICROSCOPIC COMMENT: ABNORMAL
NITRITE UR QL STRIP: NEGATIVE
NUCLEATED RBC (/100WBC) (OHS): 0 /100 WBC
PH UR STRIP: 6 [PH]
PLATELET # BLD AUTO: 355 K/UL (ref 150–450)
PMV BLD AUTO: 9.5 FL (ref 9.2–12.9)
POTASSIUM SERPL-SCNC: 4 MMOL/L (ref 3.5–5.1)
PROT SERPL-MCNC: 7.7 GM/DL (ref 6–8.4)
PROT UR QL STRIP: ABNORMAL
RBC # BLD AUTO: 4.84 M/UL (ref 4.6–6.2)
RBC #/AREA URNS AUTO: 3 /HPF (ref 0–4)
RELATIVE EOSINOPHIL (OHS): 1 %
RELATIVE LYMPHOCYTE (OHS): 14 % (ref 18–48)
RELATIVE MONOCYTE (OHS): 5.2 % (ref 4–15)
RELATIVE NEUTROPHIL (OHS): 79 % (ref 38–73)
SODIUM SERPL-SCNC: 142 MMOL/L (ref 136–145)
SP GR UR STRIP: 1.02
SQUAMOUS #/AREA URNS AUTO: <1 /HPF
TROPONIN I SERPL HS-MCNC: <3 NG/L
TSH SERPL-ACNC: 1.32 UIU/ML (ref 0.4–4)
UROBILINOGEN UR STRIP-ACNC: NEGATIVE EU/DL
WBC # BLD AUTO: 16.67 K/UL (ref 3.9–12.7)
WBC #/AREA URNS AUTO: 2 /HPF (ref 0–5)

## 2025-05-10 PROCEDURE — 80197 ASSAY OF TACROLIMUS: CPT | Performed by: PHYSICIAN ASSISTANT

## 2025-05-10 PROCEDURE — 81003 URINALYSIS AUTO W/O SCOPE: CPT | Performed by: PHYSICIAN ASSISTANT

## 2025-05-10 PROCEDURE — 87389 HIV-1 AG W/HIV-1&-2 AB AG IA: CPT | Performed by: PHYSICIAN ASSISTANT

## 2025-05-10 PROCEDURE — 93005 ELECTROCARDIOGRAM TRACING: CPT

## 2025-05-10 PROCEDURE — 63600175 PHARM REV CODE 636 W HCPCS: Performed by: PHYSICIAN ASSISTANT

## 2025-05-10 PROCEDURE — 85025 COMPLETE CBC W/AUTO DIFF WBC: CPT | Performed by: PHYSICIAN ASSISTANT

## 2025-05-10 PROCEDURE — 82010 KETONE BODYS QUAN: CPT | Performed by: PHYSICIAN ASSISTANT

## 2025-05-10 PROCEDURE — 93010 ELECTROCARDIOGRAM REPORT: CPT | Mod: ,,, | Performed by: INTERNAL MEDICINE

## 2025-05-10 PROCEDURE — 82550 ASSAY OF CK (CPK): CPT | Performed by: PHYSICIAN ASSISTANT

## 2025-05-10 PROCEDURE — 84484 ASSAY OF TROPONIN QUANT: CPT | Performed by: PHYSICIAN ASSISTANT

## 2025-05-10 PROCEDURE — 96374 THER/PROPH/DIAG INJ IV PUSH: CPT

## 2025-05-10 PROCEDURE — 83880 ASSAY OF NATRIURETIC PEPTIDE: CPT | Performed by: PHYSICIAN ASSISTANT

## 2025-05-10 PROCEDURE — 86803 HEPATITIS C AB TEST: CPT | Performed by: PHYSICIAN ASSISTANT

## 2025-05-10 PROCEDURE — 87040 BLOOD CULTURE FOR BACTERIA: CPT | Performed by: STUDENT IN AN ORGANIZED HEALTH CARE EDUCATION/TRAINING PROGRAM

## 2025-05-10 PROCEDURE — 99285 EMERGENCY DEPT VISIT HI MDM: CPT | Mod: 25

## 2025-05-10 PROCEDURE — 83690 ASSAY OF LIPASE: CPT | Performed by: PHYSICIAN ASSISTANT

## 2025-05-10 PROCEDURE — 80053 COMPREHEN METABOLIC PANEL: CPT | Performed by: PHYSICIAN ASSISTANT

## 2025-05-10 PROCEDURE — 25000003 PHARM REV CODE 250: Performed by: STUDENT IN AN ORGANIZED HEALTH CARE EDUCATION/TRAINING PROGRAM

## 2025-05-10 PROCEDURE — 84443 ASSAY THYROID STIM HORMONE: CPT | Performed by: PHYSICIAN ASSISTANT

## 2025-05-10 RX ORDER — ONDANSETRON HYDROCHLORIDE 2 MG/ML
4 INJECTION, SOLUTION INTRAVENOUS
Status: COMPLETED | OUTPATIENT
Start: 2025-05-10 | End: 2025-05-10

## 2025-05-10 RX ORDER — ONDANSETRON 4 MG/1
4 TABLET, ORALLY DISINTEGRATING ORAL EVERY 8 HOURS PRN
Qty: 12 TABLET | Refills: 0 | Status: SHIPPED | OUTPATIENT
Start: 2025-05-10

## 2025-05-10 RX ORDER — ACETAMINOPHEN 500 MG
1000 TABLET ORAL
Status: DISCONTINUED | OUTPATIENT
Start: 2025-05-10 | End: 2025-05-11 | Stop reason: HOSPADM

## 2025-05-10 RX ORDER — OXYCODONE HYDROCHLORIDE 5 MG/1
5 TABLET ORAL EVERY 4 HOURS PRN
Qty: 8 TABLET | Refills: 0 | Status: SHIPPED | OUTPATIENT
Start: 2025-05-10

## 2025-05-10 RX ORDER — OXYCODONE HYDROCHLORIDE 5 MG/1
5 TABLET ORAL
Refills: 0 | Status: COMPLETED | OUTPATIENT
Start: 2025-05-10 | End: 2025-05-10

## 2025-05-10 RX ORDER — CEFEPIME HYDROCHLORIDE 1 G/1
1 INJECTION, POWDER, FOR SOLUTION INTRAMUSCULAR; INTRAVENOUS
Status: DISCONTINUED | OUTPATIENT
Start: 2025-05-10 | End: 2025-05-10

## 2025-05-10 RX ADMIN — OXYCODONE 5 MG: 5 TABLET ORAL at 09:05

## 2025-05-10 RX ADMIN — ONDANSETRON 4 MG: 2 INJECTION INTRAMUSCULAR; INTRAVENOUS at 08:05

## 2025-05-10 NOTE — Clinical Note
"Derrick Romeroendy Montanez was seen and treated in our emergency department on 5/10/2025.  He may return to work on 05/12/2025.       If you have any questions or concerns, please don't hesitate to call.      Jose Alfredo Siddiqui Jr., MD"

## 2025-05-11 LAB
HOLD SPECIMEN: NORMAL
OHS QRS DURATION: 134 MS
OHS QTC CALCULATION: 467 MS
TACROLIMUS BLD-MCNC: 4.6 NG/ML (ref 5–15)

## 2025-05-11 NOTE — ED TRIAGE NOTES
Derrick Montanez, a 29 y.o. male presents to the ED w/ complaint of shortness of breath, weakness, shakiness, feeling of palpitations since 4pm today when he woke up for his shift. Pt also endorsing 8/10 right foot pain and claims he is having difficulty ambulating. Pt has a history of a heart transplant in 2019. In the ED, pt is hypertensive and tachypneic. Pt denies chest pain.     Triage note:  Chief Complaint   Patient presents with    Palpitations     Palpitations with nausea and vomiting. 8/10 right foot pain.      Review of patient's allergies indicates:   Allergen Reactions    Pneumovax 23 [pneumococcal 23-yo ps vaccine] Swelling and Other (See Comments)     Swelling at site and Pneumonia like Sx. 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    Coronary artery disease     Diabetes mellitus     GERD (gastroesophageal reflux disease)     Hypertension

## 2025-05-11 NOTE — FIRST PROVIDER EVALUATION
"Medical screening examination initiated.  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 Presenting with here c/o acute heart palpitations, tachycardia, elevated BP, lightheadedness, dizziness, tremors, nausea and vomiting since 4 pm while getting ready for work. He is slightly diaphoretic. Not having abdominal pain, fevers/chills, CP or SOB. Had Covid 2 weeks ago, but states that this has been resolved for over a week. Also just had 4th Mounjaro injection Monday. Additionally c/o acute right foot pain, denies injury.      Vitals:    05/10/25 1910   BP: (!) 180/119   Pulse: 102   Resp: 16   Temp: 98.8 °F (37.1 °C)   TempSrc: Oral   SpO2: 97%   Weight: 98.9 kg (218 lb)   Height: 5' 5" (1.651 m)       Pertinent physical exam:  Alert and interactive. Slightly diaphoretic and tremulous. Hypertensive. Appears uncomfortable. Benign abdominal exam. Denies CP/SOB.     Brief workup plan:  Cardiac work up including labs, EKG, CXR, TSH, CPK, LFT's/Lipase, B-hydroxybutyrate, Lactate, prograf level, telemetry.   IV Zofran ordered for nausea/vomiting. Notified attending physician - requested main ED bed and pt to be moved from intake.     Preliminary workup initiated; this workup will be continued and followed by the physician or advanced practice provider that is assigned to the patient when roomed.  "

## 2025-05-11 NOTE — PROVIDER PROGRESS NOTES - EMERGENCY DEPT.
Encounter Date: 5/10/2025    ED Physician Progress Notes        Physician Note:   Received patient at sign-out  Urinalysis negative for infection.  There are hyaline casts.  On repeat assessment, patient notes that he does not want to be admitted despite the SIRS criteria/leukocytosis.  He notes his palpitations have resolved.  I was not alerted by any dysrhythmias on telemetry.  With regards to his foot pain, he notes that some of his rejection medications can cause pseudogout symptoms.  He has no rigidity to suggest gout or septic joint.  No erythema to suggest cellulitis.  Will prescribe short course of analgesics for the pain per his request.  Provided with extensive return precautions and that he may return to the ER at any point to continue his evaluation

## 2025-05-11 NOTE — ED PROVIDER NOTES
Encounter Date: 5/10/2025       History     Chief Complaint   Patient presents with    Palpitations     Palpitations with nausea and vomiting. 8/10 right foot pain.      29-year-old male with PMH of heart transplant 2009 and diabetes presents with palpitations.  Patient woke up this afternoon and had some slight lightheadedness with palpitations.  Despite chief complaint mentioning shortness of breath, the patient denies chest pain, shortness of breath, headache, room spinning dizziness, abdominal pain.  He did have an episode of nausea and vomiting earlier.  He took Zofran in his no longer nauseous.  Denies dysuria, hematuria, and increased urinary frequency.  Reports a history of IBS without any changes to his bowel movements.  Was recently diagnosed with COVID and felt fully recovered.  He also reports right pinky toe pain that started when he woke up as well.  It is an aching pain.  Denies any trauma to the area.    The history is provided by the patient and medical records.     Review of patient's allergies indicates:   Allergen Reactions    Pneumovax 23 [pneumococcal 23-yo ps vaccine] Swelling and Other (See Comments)     Swelling at site and Pneumonia like Sx. Pt spent x1 week in hospital    Nsaids (non-steroidal anti-inflammatory drug) Other (See Comments)     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 at [facility]    Coronary artery disease     Diabetes mellitus     GERD (gastroesophageal reflux disease)     Hypertension      Past Surgical History:   Procedure Laterality Date    CARDIAC BIOPSY  8/16/2019    Procedure: Biopsy, Cardiac;  Surgeon: Thien Hayes MD;  Location: [facility] CATH LAB;  Service: Cardiology;;    CARDIAC SURGERY  2009    OHT at [facility]    CATHETERIZATION OF BOTH LEFT AND RIGHT HEART Right 8/16/2019    Procedure: CATHETERIZATION, HEART, BOTH LEFT AND RIGHT;  Surgeon: Thien Hayes MD;   Location: [facility] CATH LAB;  Service: Cardiology;  Laterality: Right;    CORONARY ANGIOGRAPHY N/A 5/31/2024    Procedure: ANGIOGRAM, CORONARY ARTERY;  Surgeon: Thien Hayes MD;  Location: [facility] CATH LAB;  Service: Cardiology;  Laterality: N/A;    CYSTOSCOPY W/ URETERAL STENT PLACEMENT Right 8/21/2020    Procedure: CYSTOSCOPY, WITH URETERAL STENT INSERTION;  Surgeon: Tutu Jolley MD;  Location: [address];  Service: Urology;  Laterality: Right;    heart tx      IVUS, CORONARY N/A 5/31/2024    Procedure: IVUS, Coronary;  Surgeon: Thien Hayes MD;  Location: [facility] CATH LAB;  Service: Cardiology;  Laterality: N/A;    TONSILLECTOMY      URETEROSCOPY Right 8/21/2020    Procedure: URETEROSCOPY;  Surgeon: Tutu Jolley MD;  Location: [address];  Service: Urology;  Laterality: Right;     Family History   Problem Relation Name Age of Onset    No Known Problems Mother      No Known Problems Father      Colon cancer Neg Hx      Cirrhosis Neg Hx      Celiac disease Neg Hx      Crohn's disease Neg Hx      Ulcerative colitis Neg Hx      Rectal cancer Neg Hx      Liver cancer Neg Hx      Irritable bowel syndrome Neg Hx      Esophageal cancer Neg Hx      Stomach cancer Neg Hx      Melanoma Neg Hx       Social History[1]  Review of Systems    Physical Exam     Initial Vitals [05/10/25 1910]   BP Pulse Resp Temp SpO2   (!) 180/119 102 16 98.8 °F (37.1 °C) 97 %      MAP       --         Physical Exam    Nursing note and vitals reviewed.  Constitutional: He appears well-developed and well-nourished. He is not diaphoretic. No distress.   HENT:   Head: Normocephalic and atraumatic.   Eyes: Conjunctivae and EOM are normal. Pupils are equal, round, and reactive to light.   Neck: Neck supple.   Normal range of motion.  Cardiovascular:  Normal rate, regular rhythm, normal heart sounds and intact distal pulses.           No murmur heard.  Pulmonary/Chest: Breath sounds normal. No respiratory distress. He has  no wheezes. He has no rhonchi. He has no rales.   Abdominal: Abdomen is soft. He exhibits no distension. There is no abdominal tenderness. There is no rebound and no guarding.   Musculoskeletal:         General: No tenderness or edema.      Cervical back: Normal range of motion and neck supple.      Comments: Left pinky toe: not warm, red or swollen. No TTP     Neurological: He is alert and oriented to person, place, and time. He has normal strength. No sensory deficit. GCS score is 15. GCS eye subscore is 4. GCS verbal subscore is 5. GCS motor subscore is 6.   Skin: Skin is warm and dry. Capillary refill takes less than 2 seconds.         ED Course   Procedures  Labs Reviewed   COMPREHENSIVE METABOLIC PANEL - Abnormal       Result Value    Sodium 142      Potassium 4.0      Chloride 110      CO2 17 (*)     Glucose 197 (*)     BUN 34 (*)     Creatinine 1.5 (*)     Calcium 9.1      Protein Total 7.7      Albumin 3.7      Bilirubin Total 0.3      ALP 69      AST 14      ALT 10      Anion Gap 15      eGFR >60     LIPASE - Abnormal    Lipase Level 94 (*)    TACROLIMUS LEVEL - Abnormal    Tacrolimus 4.6 (*)    URINALYSIS, REFLEX TO URINE CULTURE - Abnormal    Color, UA Yellow      Appearance, UA Clear      pH, UA 6.0      Spec Grav UA 1.025      Protein, UA 2+ (*)     Glucose, UA Negative      Ketones, UA Negative      Bilirubin, UA Negative      Blood, UA 1+ (*)     Nitrites, UA Negative      Urobilinogen, UA Negative      Leukocyte Esterase, UA Negative     CBC WITH DIFFERENTIAL - Abnormal    WBC 16.67 (*)     RBC 4.84      HGB 13.2 (*)     HCT 40.7      MCV 84      MCH 27.3      MCHC 32.4      RDW 13.0      Platelet Count 355      MPV 9.5      Nucleated RBC 0      Neut % 79.0 (*)     Lymph % 14.0 (*)     Mono % 5.2      Eos % 1.0      Basophil % 0.4      Imm Grans % 0.4      Neut # 13.18 (*)     Lymph # 2.33      Mono # 0.87      Eos # 0.16      Baso # 0.06      Imm Grans # 0.07 (*)    URINALYSIS MICROSCOPIC -  Abnormal    RBC, UA 3      WBC, UA 2      Bacteria, UA None      Squamous Epithelial Cells, UA <1      Hyaline Casts, UA 12 (*)     Microscopic Comment       CULTURE, BLOOD - Normal    Blood Culture No Growth After 6 Hours     CULTURE, BLOOD - Normal    Blood Culture No Growth After 6 Hours     HEPATITIS C ANTIBODY - Normal    Hep C Ab Interp Non-Reactive     HIV 1 / 2 ANTIBODY - Normal    HIV 1/2 Ag/Ab Non-Reactive     BETA - HYDROXYBUTYRATE, SERUM - Normal    Beta-Hydroxybutyrate 0.1     TSH - Normal    TSH 1.321     CK - Normal    CPK 72     TROPONIN I HIGH SENSITIVITY - Normal    Troponin High Sensitive <3     B-TYPE NATRIURETIC PEPTIDE - Normal    BNP 21     CBC W/ AUTO DIFFERENTIAL    Narrative:     The following orders were created for panel order CBC auto differential.  Procedure                               Abnormality         Status                     ---------                               -----------         ------                     CBC with Differential[[id number]]       Abnormal            Final result                 Please view results for these tests on the individual orders.   GREY TOP URINE HOLD    Extra Tube Hold for add-ons.     HEP C VIRUS HOLD SPECIMEN        ECG Results              EKG 12-lead (Final result)        Collection Time Result Time QRS Duration OHS QTC Calculation    05/10/25 19:13:21 05/11/25 08:55:24 134 467                     Final result by Interface, Lab In [facility] (05/11/25 08:55:29)                   Narrative:    Test Reason : R00.2,    Vent. Rate :  98 BPM     Atrial Rate :  98 BPM     P-R Int : 142 ms          QRS Dur : 134 ms      QT Int : 366 ms       P-R-T Axes :  34 116  19 degrees    QTcB Int : 467 ms    Normal sinus rhythm  Right bundle branch block  Abnormal ECG  No significant change was found    Confirmed by Nahid Dinh ([id number]) on 5/11/2025 8:55:21 AM    Referred By: AAAREFERRAL SELF           Confirmed By: Nahid Dinh                                  Imaging  Results              X-Ray Foot Complete Right (Final result)  Result time 05/10/25 20:34:23      Final result by Sukhjinder Bacon MD (05/10/25 20:34:23)                   Impression:      1. No convincing acute displaced fracture or dislocation of the foot.      Electronically signed by: Sukhjinder Bacon MD  Date:    05/10/2025  Time:    20:34               Narrative:    EXAMINATION:  XR FOOT COMPLETE 3 VIEW RIGHT    CLINICAL HISTORY:  . Pain in right foot    TECHNIQUE:  AP, lateral, and oblique views of the right foot were performed.    COMPARISON:  None    FINDINGS:  Three views right foot.    No acute displaced fracture or dislocation of the foot.  No radiopaque foreign body.  No significant edema.                                       X-Ray Chest AP Portable (Final result)  Result time 05/10/25 20:31:00      Final result by Sukhjinder Bacon MD (05/10/25 20:31:00)                   Impression:      1. No acute cardiopulmonary process.      Electronically signed by: Sukhjinder Bacon MD  Date:    05/10/2025  Time:    20:31               Narrative:    EXAMINATION:  XR CHEST AP PORTABLE    CLINICAL HISTORY:  Palpitations    TECHNIQUE:  Single frontal view of the chest was performed.    COMPARISON:  02/18/2025    FINDINGS:  The cardiomediastinal silhouette is not enlarged.  There is no pleural effusion.  The trachea is midline.  The lungs are symmetrically expanded bilaterally without evidence of acute parenchymal process. No large focal consolidation seen.  There is no pneumothorax.  The osseous structures are remarkable for dextroscoliotic curvature of the spine.  Sternotomy noted..                                       Medications   ondansetron injection 4 mg (4 mg Intravenous Given 5/10/25 2020)   oxyCODONE immediate release tablet 5 mg (5 mg Oral Given 5/10/25 2105)     Medical Decision Making  Differential diagnoses considered includes viral syndrome, pneumonia, CHF, ACS, ODALYS, electrolyte abnormality,  sepsis    See ED course for additional attending MDM.      Sepsis documentation: sepsis order panel was used but I do not think the patient has sepsis. Additionally, we did offer admission for IV abx but he declined.  Fluid challenge Fluid Not Needed - Patient is not hypotensive and/or lactate is less than 4.0.     Post- resuscitation assessment Yes - I attest a sepsis perfusion exam was performed within 6 hours of sepsis, severe sepsis, or septic shock presentation, following fluid resuscitation.    Will Not start Pressors- Levophed for MAP of 65  Source control achieved by: n/a        Amount and/or Complexity of Data Reviewed  External Data Reviewed: radiology.     Details: === Results for orders placed during the hospital encounter of 05/16/24 ===    Echo    - Interpretation Summary -  ·  Left Ventricle: The left ventricle is normal in size. Normal wall thickness. Normal wall motion. There is normal systolic function with a visually estimated ejection fraction of 60 - 65%. There is normal diastolic function.  ·  Right Ventricle: Normal right ventricular cavity size. Wall thickness is normal. Systolic function is normal.  ·  The left atrium is moderately dilated.  ·  Pulmonary Artery: The estimated pulmonary artery systolic pressure is 17 mmHg.  ·  IVC/SVC: Normal venous pressure at 3 mmHg.      Labs: ordered. Decision-making details documented in ED Course.  Radiology: ordered and independent interpretation performed. Decision-making details documented in ED Course.  ECG/medicine tests: ordered and independent interpretation performed. Decision-making details documented in ED Course.    Risk  OTC drugs.  Prescription drug management.  Decision regarding hospitalization.               ED Course as of 05/11/25 1519   Sat May 10, 2025   2216 EKG 12-lead  Normal sinus rhythm, rate 98, right bundle-branch block, similar morphology compared to 02/18/2025 [BD]   2216 X-Ray Chest AP Portable  Chest x-ray independently  interpreted by me shows no acute process such as pneumonia, pneumothorax, or pulmonary edema.    [BD]   2216 WBC(!): 16.67  Unclear etiology. Added blood cultures and abx for SIRS criteria. However, patient wanted to be discharged so abx not given.  [BD]   2216 Comprehensive metabolic panel(!)  baseline [BD]   2216 Beta-Hydroxybutyrate: 0.1 [BD]   2216 Troponin I High Sensitivity: <3  ACS unlikely [BD]   2217 Patient's workup remarkable for a mildly elevated lipase and a leukocytosis.  Given his immunocompromised status, suggested admission but patient preferred to be discharged.  He states he just wants us to call him if his cultures are positive.  He does appear well in his hemodynamically stable.  His tachycardia resolved without any intervention.  Shared decision-making with the patient and he will be discharged after his urinalysis results.  Patient care handed off at 10:00 p.m., pending UA followed by discharge. [BD]      ED Course User Index  [BD] Jelani Cox MD               Medical Decision Making:   Clinical Tests:   Sepsis Perfusion Assessment: "I attest a sepsis perfusion exam was performed within 6 hours of sepsis, severe sepsis, or septic shock presentation, following fluid resuscitation."             Clinical Impression:  Final diagnoses:  [R00.2] Palpitations  [M79.671] Acute foot pain, right  [R65.10] SIRS (systemic inflammatory response syndrome) (Primary)  [Z94.1] Heart transplant recipient  [D84.9] Immunosuppression          ED Disposition Condition    Discharge Stable          ED Prescriptions       Medication Sig Dispense Start Date End Date Auth. Provider    ondansetron (ZOFRAN-ODT) 4 MG TbDL Take 1 tablet (4 mg total) by mouth every 8 (eight) hours as needed (nausea). 12 tablet 5/10/2025 -- Jelani Cox MD    oxyCODONE (ROXICODONE) 5 MG immediate release tablet Take 1 tablet (5 mg total) by mouth every 4 (four) hours as needed for Pain. 8 tablet 5/10/2025 -- Gen Watsno MD           Follow-up Information       Follow up With Specialties Details Why Contact Info    Jude Willard MD Pediatrics Schedule an appointment as soon as possible for a visit  To discuss your recent ER visit and any additional concerns that you may have [address] [address]  [phone]      [facility] Emergency Medicine Go to  As needed, If symptoms worsen [id number]  [facility] [address] [phone]                 [1]   Social History  Tobacco Use    Smoking status: Never    Smokeless tobacco: Never   Substance Use Topics    Alcohol use: Yes     Alcohol/week: 1.0 standard drink of alcohol     Types: 1 Shots of liquor per week     Comment: occ    Drug use: No        Jelani Cox MD  05/11/25 0723

## 2025-05-11 NOTE — DISCHARGE INSTRUCTIONS
It was a pleasure taking care of you today!   We offered you admission for IV antibiotics but elected to leave.  You may return to the ER at any point to continue your evaluation or treatment.  No emergent cause you foot pain was found today.  You were prescribed a short course of pain medications. Do not use medication when you are operating heavy machinery, driving, or working because the medication may be sedating. Do not take medication sooner than the frequency as directed.       Home Care:   - Continue home medications as prescribed  - You may take Zofran every 6 hours as needed    Follow-Up Plan:  - Follow-up with primary care doctor within 3 - 5 days to discuss your recent ER visit and any additional concerns that you may have.  - Additional testing and/or evaluation as directed by your primary doctor    Return to the Emergency Department for symptoms including but not limited to: persistence or worsening of symptoms, shortness of breath or chest pain, inability to drink without vomiting, passing out/fainting/ loss of consciousness, or if you have other concerns.

## 2025-05-11 NOTE — ED TRIAGE NOTES
Derrick Montanez, a 29 y.o. male presents to the ED w/ complaint of palpitations upon waking up from a nap x3.5 hours ago. Hx of heart transplant in 2009. Pt also endorsing R foot pain. +lightheadedness and N/V. Denies any fevers or chills.    Triage note:  Chief Complaint   Patient presents with    Palpitations     Palpitations with nausea and vomiting. 8/10 right foot pain.      Review of patient's allergies indicates:   Allergen Reactions    Pneumovax 23 [pneumococcal 23-yo ps vaccine] Swelling and Other (See Comments)     Swelling at site and Pneumonia like Sx. Pt spent x1 week in hospital    Nsaids (non-steroidal anti-inflammatory drug) Other (See Comments)     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 at [facility]    Coronary artery disease     Diabetes mellitus     GERD (gastroesophageal reflux disease)     Hypertension     Patient identifiers for Derrick Montanez checked and correct.    LOC: The patient is awake, alert and aware of environment with an appropriate affect, the patient is oriented x 4 and speaking appropriately.    APPEARANCE: Patient resting comfortably and in no acute distress, patient is clean and well groomed, patient's clothing is properly fastened.    SKIN: The skin is warm and dry, color consistent with ethnicity, patient has normal skin turgor and moist mucus membranes, skin intact, no breakdown or bruising noted.    MUSCULOSKELETAL: Patient moving all extremities well, no obvious swelling or deformities noted.    RESPIRATORY: Airway is open and patent, respirations are spontaneous and even, patient has a normal effort and rate.    CARDIAC: +palipations, no peripheral edema noted, capillary refill < 3 seconds.    ABDOMEN: Soft and non tender to palpation, no distention noted. +nausea and vomiting    NEUROLOGIC: Eyes open spontaneously, PERRL, behavior appropriate to situation,  follows commands, facial expression symmetrical, bilateral hand grasp equal and even, purposeful motor response noted, normal sensation in all extremities.     HEENT: No abnormalities noted. White sclera and pupils equal round and reactive to light. +lightheadedness.     : Pt voids independently, denies dysuria, hematuria, frequency.

## 2025-05-12 ENCOUNTER — TELEPHONE (OUTPATIENT)
Dept: INTERNAL MEDICINE | Facility: CLINIC | Age: 30
End: 2025-05-12
Payer: COMMERCIAL

## 2025-05-12 ENCOUNTER — TELEPHONE (OUTPATIENT)
Dept: TRANSPLANT | Facility: CLINIC | Age: 30
End: 2025-05-12
Payer: COMMERCIAL

## 2025-05-12 DIAGNOSIS — Z94.1 STATUS POST HEART TRANSPLANT: ICD-10-CM

## 2025-05-12 DIAGNOSIS — M79.671 FOOT PAIN, RIGHT: Primary | ICD-10-CM

## 2025-05-12 DIAGNOSIS — Z79.899 ENCOUNTER FOR LONG-TERM (CURRENT) USE OF MEDICATIONS: ICD-10-CM

## 2025-05-12 LAB — BACTERIA BLD CULT: NORMAL

## 2025-05-12 NOTE — TELEPHONE ENCOUNTER
----- Message from Gloria Toro sent at 5/12/2025  3:25 PM CDT -----  Regarding: Gout pain  Hi Dr. Dominguez and Derrick West called the transplant team and asked for medication to help with gout pain; however, Dr. Baron would prefer that his PCP team manage any gout symptoms. We did add a uric acid to his next lab appt. Thanks so much!Gloria [phone]

## 2025-05-12 NOTE — TELEPHONE ENCOUNTER
Patient called to notify coordinator that he went to ED over the weekend for palpitations, nausea. Per patient, EKG was Ok, but heart rate was in the 100's.  Per chart, EKG showed NSR, HR 98.  Patient also stated he has been have gout-like symptoms over the past couple of weeks secondary to tacrolimus, asking for allopurinol. Tacrolimus level drawn in  ED was 4.6. Patient had taken his last dose of tacolimus roughly 12 hours prior to this tacro level.     Patient stated he was diagnosed with Covid a couple of weeks ago, was seen at that time by his PCP. Patient noted to have started Mounjaro recently.    Reviewed chart with Dr. Baron. MD referred patient to PCP for gout pain management.     Spoke with patient regarding the above. Patient agreeable to uric acid level being added to next lab draw. Message sent to patient's PCP and encouraged patient to reach out to his PCP team for further guidance. Patient verbalized understanding.

## 2025-05-13 ENCOUNTER — E-VISIT (OUTPATIENT)
Dept: INTERNAL MEDICINE | Facility: CLINIC | Age: 30
End: 2025-05-13
Payer: COMMERCIAL

## 2025-05-13 DIAGNOSIS — M10.9 GOUT, UNSPECIFIED CAUSE, UNSPECIFIED CHRONICITY, UNSPECIFIED SITE: Primary | ICD-10-CM

## 2025-05-13 RX ORDER — COLCHICINE 0.6 MG/1
0.6 TABLET ORAL DAILY
Qty: 30 TABLET | Refills: 11 | Status: SHIPPED | OUTPATIENT
Start: 2025-05-13 | End: 2026-05-13

## 2025-05-13 NOTE — PROGRESS NOTES
Patient ID: Derrick Montanez is a 29 y.o. male.    Chief Complaint: Joint Pain (Entered automatically based on patient selection in [employer].)    The patient initiated a request through [employer] on 5/13/2025 for evaluation and management with a chief complaint of Joint Pain (Entered automatically based on patient selection in [employer].)     I evaluated the questionnaire submission on 05/13/2025 .    [facility] Peq Evisit Joint Pain    5/13/2025 12:31 PM CDT - Filed by Patient   Do you agree to participate in an E-Visit? Yes   If you have any of the following symptoms, please present to your local emergency room or call 911:  I acknowledge   Choose the state of your primary residence [place]   What is the main issue you would like addressed today? Rihht ankle pain   Do you have any of the following: New or worsening joint pain   Provide any additional information you feel is important.    Please attach any relevant images or files    Are you able to take your vital signs? No   Do you have a history of any of the following: None   What joint(s) are you having a problem with? Ankle;  Foot   Describe the exact location of the pain. Right ankle radiating to right small toe   What activites make your joint pain worse? Standing for long periods of time;  Walking   What have you used to help with your joint pain? Anti-inflammatory (ibuprofen, Aleve, Advil);  Compression or wrap;  Pain Relievers (Tylenol);  Rest;  Stretching the joint   Has there been any change in your joint pain based on the treatment you have tried? Improved   Do you have any history of joint injuries, surgeries, or other medical conditions that may be related to your joint pain? No         Encounter Diagnosis   Name Primary?    Gout, unspecified cause, unspecified chronicity, unspecified site Yes        No orders of the defined types were placed in this encounter.     Medications Ordered This Encounter   Medications    colchicine (COLCRYS) 0.6 mg tablet      Sig: Take 1 tablet (0.6 mg total) by mouth once daily.     Dispense:  30 tablet     Refill:  11        No follow-ups on file.      E-Visit Time Tracking:    Day 1 Time (in minutes): 5    Total Time (in minutes): 5

## 2025-05-16 LAB — BACTERIA BLD CULT: NORMAL

## 2025-05-23 ENCOUNTER — ON-DEMAND VIRTUAL (OUTPATIENT)
Dept: URGENT CARE | Facility: CLINIC | Age: 30
End: 2025-05-23
Payer: COMMERCIAL

## 2025-05-23 DIAGNOSIS — M25.571 ARTHRALGIA OF RIGHT ANKLE: Primary | ICD-10-CM

## 2025-05-23 RX ORDER — NAPROXEN 500 MG/1
500 TABLET ORAL 2 TIMES DAILY PRN
Qty: 20 TABLET | Refills: 0 | Status: SHIPPED | OUTPATIENT
Start: 2025-05-23 | End: 2025-06-02

## 2025-05-23 NOTE — PATIENT INSTRUCTIONS

## 2025-05-23 NOTE — PROGRESS NOTES
Subjective:      Patient ID: Derrick Montanez is a 29 y.o. male.    Vitals:  vitals were not taken for this visit.     Chief Complaint: Joint Pain      Visit Type: TELE AUDIOVISUAL    Patient Location: [place], [place]     Present with the patient at the time of consultation: TELEMED PRESENT WITH PATIENT: None    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 at [facility]    Coronary artery disease     Diabetes mellitus     GERD (gastroesophageal reflux disease)     Hypertension      Past Surgical History:   Procedure Laterality Date    CARDIAC BIOPSY  8/16/2019    Procedure: Biopsy, Cardiac;  Surgeon: Thien Hayes MD;  Location: [facility] CATH LAB;  Service: Cardiology;;    CARDIAC SURGERY  2009    OHT at [facility]    CATHETERIZATION OF BOTH LEFT AND RIGHT HEART Right 8/16/2019    Procedure: CATHETERIZATION, HEART, BOTH LEFT AND RIGHT;  Surgeon: Thien Hayes MD;  Location: [facility] CATH LAB;  Service: Cardiology;  Laterality: Right;    CORONARY ANGIOGRAPHY N/A 5/31/2024    Procedure: ANGIOGRAM, CORONARY ARTERY;  Surgeon: Thien Hayes MD;  Location: [facility] CATH LAB;  Service: Cardiology;  Laterality: N/A;    CYSTOSCOPY W/ URETERAL STENT PLACEMENT Right 8/21/2020    Procedure: CYSTOSCOPY, WITH URETERAL STENT INSERTION;  Surgeon: Tutu Jolley MD;  Location: [address];  Service: Urology;  Laterality: Right;    heart tx      IVUS, CORONARY N/A 5/31/2024    Procedure: IVUS, Coronary;  Surgeon: Thien Hayes MD;  Location: [facility] CATH LAB;  Service: Cardiology;  Laterality: N/A;    TONSILLECTOMY      URETEROSCOPY Right 8/21/2020    Procedure: URETEROSCOPY;  Surgeon: Tutu Jolley MD;  Location: [address];  Service: Urology;  Laterality: Right;     Review of patient's allergies indicates:   Allergen Reactions    Pneumovax 23 [pneumococcal 23-yo ps vaccine] Swelling and Other (See Comments)  "    Swelling at site and Pneumonia like Sx. Pt spent x1 week in hospital    Nsaids (non-steroidal anti-inflammatory drug) Other (See Comments)     Medications Ordered Prior to Encounter[1]  Family History   Problem Relation Name Age of Onset    No Known Problems Mother      No Known Problems Father      Colon cancer Neg Hx      Cirrhosis Neg Hx      Celiac disease Neg Hx      Crohn's disease Neg Hx      Ulcerative colitis Neg Hx      Rectal cancer Neg Hx      Liver cancer Neg Hx      Irritable bowel syndrome Neg Hx      Esophageal cancer Neg Hx      Stomach cancer Neg Hx      Melanoma Neg Hx         Medications Ordered                [facility] - [place], [place] - [address] [address] [facility]   [address] [address], [facility] [address]    Telephone: [phone]   Fax: [phone]   Hours: Not open 24 hours                         E-Prescribed (1 of 1)              naproxen (NAPROSYN) 500 MG tablet    Sig: Take 1 tablet (500 mg total) by mouth 2 (two) times daily as needed (for joint pain). Take with food       Start: 5/23/25     Quantity: 20 tablet Refills: 0                           Ohs Peq Odvv Intake    5/23/2025  6:42 AM CDT - Filed by Patient   What is your current physical address in the event of a medical emergency? [address]   Are you able to take your vital signs? Yes   Systolic Blood Pressure: 138   Diastolic Blood Pressure: 88   Weight: 218   Height: 65   Pulse: 90   Temperature:    Respiration rate:    Pulse Oxygen:    Please attach any relevant images or files    Is your employer contracted with [facility]? No         Pt presents with c/o right ankle pain x 1 week, hx of gout, colchicine not helping,  Noted minimal swelling to the area, Able to bear wt on the right foot/ankle, denies recent injuries or fall.    Additional notes:   Pt noted to have heart transplant in the past "15 years ago"  Reports tolerates NSAIDS okay   Has podiatry appt 5/29/2025  PCP appt " 6/2/2025    Joint Pain  This is a recurrent problem. The current episode started in the past 7 days. The problem has been waxing and waning. Associated symptoms include arthralgias and joint swelling. Pertinent negatives include no chest pain, fatigue, fever, myalgias, numbness or vertigo.       Constitution: Negative for fatigue and fever.   Cardiovascular:  Negative for chest pain.   Respiratory:  Negative for shortness of breath.    Musculoskeletal:  Positive for joint pain, joint swelling and gout. Negative for muscle ache.   Neurological:  Negative for history of vertigo and numbness.        Objective:   The physical exam was conducted virtually.  Physical Exam   Constitutional: He is oriented to person, place, and time. No distress.   HENT:   Head: Normocephalic.   Ears:   Right Ear: External ear normal.   Left Ear: External ear normal.   Nose: No rhinorrhea or congestion.   Eyes: Conjunctivae are normal.   Neck: Neck supple.   Pulmonary/Chest: Effort normal. No respiratory distress.   Musculoskeletal:      Comments: Unable to examine joint on virtual visit   Neurological: He is alert and oriented to person, place, and time.   Skin: Skin is no rash.     CMP  Sodium   Date Value Ref Range Status   05/10/2025 142 136 - 145 mmol/L Final   02/28/2025 138 136 - 145 mmol/L Final     Potassium   Date Value Ref Range Status   05/10/2025 4.0 3.5 - 5.1 mmol/L Final   02/28/2025 4.5 3.5 - 5.1 mmol/L Final     Chloride   Date Value Ref Range Status   05/10/2025 110 95 - 110 mmol/L Final   02/28/2025 109 95 - 110 mmol/L Final     CO2   Date Value Ref Range Status   05/10/2025 17 (L) 23 - 29 mmol/L Final   02/28/2025 19 (L) 23 - 29 mmol/L Final     Glucose   Date Value Ref Range Status   05/10/2025 197 (H) 70 - 110 mg/dL Final   02/28/2025 73 70 - 110 mg/dL Final     BUN   Date Value Ref Range Status   05/10/2025 34 (H) 6 - 20 mg/dL Final     Creatinine   Date Value Ref Range Status   05/10/2025 1.5 (H) 0.5 - 1.4 mg/dL  Final     Calcium   Date Value Ref Range Status   05/10/2025 9.1 8.7 - 10.5 mg/dL Final   02/28/2025 8.9 8.7 - 10.5 mg/dL Final     Protein Total   Date Value Ref Range Status   05/10/2025 7.7 6.0 - 8.4 gm/dL Final     Total Protein   Date Value Ref Range Status   02/28/2025 6.7 6.0 - 8.4 g/dL Final     Albumin   Date Value Ref Range Status   05/10/2025 3.7 3.5 - 5.2 g/dL Final   02/28/2025 3.5 3.5 - 5.2 g/dL Final     Total Bilirubin   Date Value Ref Range Status   02/28/2025 0.2 0.1 - 1.0 mg/dL Final     Comment:     For infants and newborns, interpretation of results should be based  on gestational age, weight and in agreement with clinical  observations.    Premature Infant recommended reference ranges:  Up to 24 hours.............<8.0 mg/dL  Up to 48 hours............<12.0 mg/dL  3-5 days..................<15.0 mg/dL  6-29 days.................<15.0 mg/dL       Bilirubin Total   Date Value Ref Range Status   05/10/2025 0.3 0.1 - 1.0 mg/dL Final     Comment:     For infants and newborns, interpretation of results should be based   on gestational age, weight and in agreement with clinical   observations.    Premature Infant recommended reference ranges:   0-24 hours:  <8.0 mg/dL   24-48 hours: <12.0 mg/dL   3-5 days:    <15.0 mg/dL   6-29 days:   <15.0 mg/dL     Alkaline Phosphatase   Date Value Ref Range Status   02/28/2025 63 40 - 150 U/L Final     ALP   Date Value Ref Range Status   05/10/2025 69 40 - 150 unit/L Final     AST   Date Value Ref Range Status   05/10/2025 14 11 - 45 unit/L Final   02/28/2025 16 10 - 40 U/L Final     ALT   Date Value Ref Range Status   05/10/2025 10 10 - 44 unit/L Final   02/28/2025 12 10 - 44 U/L Final     Anion Gap   Date Value Ref Range Status   05/10/2025 15 8 - 16 mmol/L Final     eGFR   Date Value Ref Range Status   05/10/2025 >60 >60 mL/min/1.73/m2 Final     Comment:     Estimated GFR calculated using the CKD-EPI creatinine (2021) equation.   02/28/2025 >60.0 >60 mL/min/1.73  m^2 Final         Assessment:     1. Arthralgia of right ankle        Plan:   Hx of gout to rt ankle, cochicine helping some, still noted pain  Short course of naproxen as needed  Keep appt with podiatry and PCP    If new or worsening symptoms f/u in person at local Urgent Care or ER    Arthralgia of right ankle  -     naproxen (NAPROSYN) 500 MG tablet; Take 1 tablet (500 mg total) by mouth 2 (two) times daily as needed (for joint pain). Take with food  Dispense: 20 tablet; Refill: 0      We appreciate you trusting us with your medical care. We hope you feel better soon. We will be happy to take care of you for all of your future medical needs.     You must understand that you've received Virtual treatment only and that you may be released before all your medical problems are known or treated. You, the patient, will arrange for follow up care as instructed.     Follow up with your PCP or specialty clinic as directed in the next 1-2 weeks if not improved or as needed. You can call [phone] to schedule an appointment with the appropriate provider.     If your condition worsens we recommend that you receive another evaluation in person, with your primary care provider, urgent care or at the emergency room immediately or contact your primary medical clinics after hours call service to discuss your concerns.                   [1]   Current Outpatient Medications on File Prior to Visit   Medication Sig Dispense Refill    aspirin (ECOTRIN) 81 MG EC tablet Take 81 mg by mouth once daily.      azelastine (ASTELIN) 137 mcg (0.1 %) nasal spray 1 spray (137 mcg total) by Nasal route 2 (two) times daily. 30 mL 0    blood sugar diagnostic Strp To check BG 4 times daily, to use with insurance preferred meter, e 11.65 400 each 3    blood-glucose meter kit To check BG 4 times daily, to use with insurance preferred meter, e 11.65 1 each 0    blood-glucose sensor (DEXCOM G7 SENSOR) Janelle Change every 10 days, e 11.65 type 2  "diabetes 3 each 11    blood-glucose transmitter (DEXCOM G6 TRANSMITTER) Janelle CHANGE EVERY THREE (3) MONTHS 1 each 3    colchicine (COLCRYS) 0.6 mg tablet Take 1 tablet (0.6 mg total) by mouth once daily. 30 tablet 11    DEXCOM G6  Misc USE AS DIRECTED 1 each 1    dicyclomine (BENTYL) 10 MG capsule Take 10 mg by mouth 2 (two) times daily as needed.      diltiaZEM (CARDIZEM CD) 180 MG 24 hr capsule TAKE 1 CAPSULE BY MOUTH ONCE DAILY 30 capsule 11    EScitalopram oxalate (LEXAPRO) 10 MG tablet TAKE 1 TABLET BY MOUTH ONCE DAILY 30 tablet 11    esomeprazole (NEXIUM) 20 MG capsule Take 1 capsule (20 mg total) by mouth before breakfast. 30 capsule 11    fluticasone propionate (FLONASE) 50 mcg/actuation nasal spray 1 spray (50 mcg total) by Each Nostril route once daily. 16 g 0    insulin (LANTUS SOLOSTAR U-100 INSULIN) glargine 100 units/mL SubQ pen INJECT 45 UNITS UNDER THE SKIN AT NIGHT 15 mL 8    insulin lispro (HUMALOG KWIKPEN INSULIN) 100 unit/mL pen INJECY 15 UNITS BEFORE MEALS PLUS SCALE 180-230+2 UNITS 231-280 + FOUR UNITS 281-330+ 6 UNITS 331-380+ EIGHT UNITS >380+10 MAX DAILY 75 UNITS 30 mL 6    lancets Mis To check BG 4 times daily, to use with insurance preferred meter, e 11.65 400 each 3    lisinopriL (PRINIVIL,ZESTRIL) 40 MG tablet Take 1 tablet (40 mg total) by mouth once daily. 30 tablet 11    metFORMIN (GLUCOPHAGE) 500 MG tablet Take 1 tablet (500 mg total) by mouth 2 (two) times daily with meals. 180 tablet 3    mycophenolate sodium 360 MG TbEC Take 2 tablets (720 mg total) by mouth 2 (two) times daily. 120 tablet 11    ondansetron (ZOFRAN-ODT) 4 MG TbDL Take 1 tablet (4 mg total) by mouth every 8 (eight) hours as needed (nausea). 12 tablet 0    oxyCODONE (ROXICODONE) 5 MG immediate release tablet Take 1 tablet (5 mg total) by mouth every 4 (four) hours as needed for Pain. 8 tablet 0    pen needle, diabetic (BD ULTRA-FINE KARIME PEN NEEDLE) 32 gauge x 5/32" Ndle Uses 5 times a day. 90 day 400 each " 3    pravastatin (PRAVACHOL) 20 MG tablet TAKE 1 TABLET BY MOUTH EVERY NIGHT 30 tablet 11    semaglutide (OZEMPIC) 1 mg/dose (4 mg/3 mL) Inject 1 mg into the skin every 7 days. (Patient not taking: Reported on 4/28/2025) 3 mL 6    semaglutide (RYBELSUS) 14 mg tablet Take 1 tablet on empty stomach in the morning daily, wait 30 mins before eating, take only with water. 30 tablet 11    tacrolimus (PROGRAF) 1 MG Cap Take 1 capsule (1 mg total) by mouth every morning AND 2 capsules (2 mg total) every evening. TAKE 1 CAPSULE BY MOUTH EVERY MORNING WITH (1) 5mg capsule (6 MG total) & TAKE 2 CAPSULES EVERY EVENING WITH (1) 5mg capsule (7mg total). 90 capsule 11    tacrolimus (PROGRAF) 1 MG Cap Take 5 capsules (5 mg total) by mouth every 12 (twelve) hours. TAKE 1 CAPSULE BY MOUTH Along with 1 mg capsule in the am and 2 (1 mg) capsules in the pm for total dose of 6 mg in the am and 7 mg in the pm. 300 capsule 11    tirzepatide (MOUNJARO) 10 mg/0.5 mL PnIj Inject 10 mg into the skin every 7 days. 4 Pen 5    topiramate (TOPAMAX) 25 MG tablet Take 1 tablet (25 mg total) by mouth every evening. 30 tablet 11     Current Facility-Administered Medications on File Prior to Visit   Medication Dose Route Frequency Provider Last Rate Last Admin    diphenhydrAMINE injection 25 mg  25 mg Intravenous Once PRN Garth Glaser MD

## 2025-05-30 ENCOUNTER — TELEPHONE (OUTPATIENT)
Dept: INTERNAL MEDICINE | Facility: CLINIC | Age: 30
End: 2025-05-30
Payer: COMMERCIAL

## 2025-06-02 ENCOUNTER — LAB VISIT (OUTPATIENT)
Dept: LAB | Facility: HOSPITAL | Age: 30
End: 2025-06-02
Payer: COMMERCIAL

## 2025-06-02 ENCOUNTER — OFFICE VISIT (OUTPATIENT)
Dept: INTERNAL MEDICINE | Facility: CLINIC | Age: 30
End: 2025-06-02
Payer: COMMERCIAL

## 2025-06-02 ENCOUNTER — PATIENT MESSAGE (OUTPATIENT)
Dept: INTERNAL MEDICINE | Facility: CLINIC | Age: 30
End: 2025-06-02

## 2025-06-02 VITALS
HEART RATE: 75 BPM | OXYGEN SATURATION: 99 % | RESPIRATION RATE: 14 BRPM | TEMPERATURE: 97 F | SYSTOLIC BLOOD PRESSURE: 126 MMHG | DIASTOLIC BLOOD PRESSURE: 84 MMHG | HEIGHT: 65 IN | BODY MASS INDEX: 36.68 KG/M2 | WEIGHT: 220.13 LBS

## 2025-06-02 DIAGNOSIS — Z94.1 HEART TRANSPLANT RECIPIENT: ICD-10-CM

## 2025-06-02 DIAGNOSIS — F41.9 ANXIETY: ICD-10-CM

## 2025-06-02 DIAGNOSIS — Z11.3 ROUTINE SCREENING FOR STI (SEXUALLY TRANSMITTED INFECTION): ICD-10-CM

## 2025-06-02 DIAGNOSIS — Z94.9 HYPERTENSION ASSOCIATED WITH TRANSPLANTATION: ICD-10-CM

## 2025-06-02 DIAGNOSIS — I15.8 HYPERTENSION ASSOCIATED WITH TRANSPLANTATION: ICD-10-CM

## 2025-06-02 DIAGNOSIS — K21.9 GASTROESOPHAGEAL REFLUX DISEASE, UNSPECIFIED WHETHER ESOPHAGITIS PRESENT: ICD-10-CM

## 2025-06-02 DIAGNOSIS — Z94.1 HEART REPLACED BY TRANSPLANT: ICD-10-CM

## 2025-06-02 DIAGNOSIS — E11.65 TYPE 2 DIABETES MELLITUS WITH HYPERGLYCEMIA, WITH LONG-TERM CURRENT USE OF INSULIN: Chronic | ICD-10-CM

## 2025-06-02 DIAGNOSIS — Z00.00 ENCOUNTER FOR ANNUAL HEALTH EXAMINATION: ICD-10-CM

## 2025-06-02 DIAGNOSIS — D84.9 IMMUNOSUPPRESSION: ICD-10-CM

## 2025-06-02 DIAGNOSIS — Z00.00 ENCOUNTER FOR ANNUAL HEALTH EXAMINATION: Primary | ICD-10-CM

## 2025-06-02 DIAGNOSIS — Z79.4 TYPE 2 DIABETES MELLITUS WITH HYPERGLYCEMIA, WITH LONG-TERM CURRENT USE OF INSULIN: Chronic | ICD-10-CM

## 2025-06-02 LAB
ABSOLUTE EOSINOPHIL (OHS): 0.18 K/UL
ABSOLUTE MONOCYTE (OHS): 0.86 K/UL (ref 0.3–1)
ABSOLUTE NEUTROPHIL COUNT (OHS): 6.7 K/UL (ref 1.8–7.7)
ALBUMIN SERPL BCP-MCNC: 3.7 G/DL (ref 3.5–5.2)
ALP SERPL-CCNC: 64 UNIT/L (ref 40–150)
ALT SERPL W/O P-5'-P-CCNC: 14 UNIT/L (ref 10–44)
ANION GAP (OHS): 10 MMOL/L (ref 8–16)
AST SERPL-CCNC: 12 UNIT/L (ref 11–45)
BASOPHILS # BLD AUTO: 0.05 K/UL
BASOPHILS NFR BLD AUTO: 0.4 %
BILIRUB SERPL-MCNC: 0.2 MG/DL (ref 0.1–1)
BUN SERPL-MCNC: 28 MG/DL (ref 6–20)
CALCIUM SERPL-MCNC: 8.5 MG/DL (ref 8.7–10.5)
CHLORIDE SERPL-SCNC: 110 MMOL/L (ref 95–110)
CHOLEST SERPL-MCNC: 118 MG/DL (ref 120–199)
CHOLEST/HDLC SERPL: 2.9 {RATIO} (ref 2–5)
CO2 SERPL-SCNC: 20 MMOL/L (ref 23–29)
CREAT SERPL-MCNC: 1.4 MG/DL (ref 0.5–1.4)
EAG (OHS): 143 MG/DL (ref 68–131)
ERYTHROCYTE [DISTWIDTH] IN BLOOD BY AUTOMATED COUNT: 13.4 % (ref 11.5–14.5)
GFR SERPLBLD CREATININE-BSD FMLA CKD-EPI: >60 ML/MIN/1.73/M2
GLUCOSE SERPL-MCNC: 108 MG/DL (ref 70–110)
HAV IGM SERPL QL IA: NORMAL
HBA1C MFR BLD: 6.6 % (ref 4–5.6)
HBV CORE IGM SERPL QL IA: NORMAL
HBV SURFACE AG SERPL QL IA: NORMAL
HCT VFR BLD AUTO: 36.1 % (ref 40–54)
HCV AB SERPL QL IA: NORMAL
HDLC SERPL-MCNC: 41 MG/DL (ref 40–75)
HDLC SERPL: 34.7 % (ref 20–50)
HGB BLD-MCNC: 11.3 GM/DL (ref 14–18)
HIV 1+2 AB+HIV1 P24 AG SERPL QL IA: NORMAL
IMM GRANULOCYTES # BLD AUTO: 0.03 K/UL (ref 0–0.04)
IMM GRANULOCYTES NFR BLD AUTO: 0.3 % (ref 0–0.5)
LDLC SERPL CALC-MCNC: 60 MG/DL (ref 63–159)
LYMPHOCYTES # BLD AUTO: 3.55 K/UL (ref 1–4.8)
MCH RBC QN AUTO: 26.8 PG (ref 27–31)
MCHC RBC AUTO-ENTMCNC: 31.3 G/DL (ref 32–36)
MCV RBC AUTO: 86 FL (ref 82–98)
NONHDLC SERPL-MCNC: 77 MG/DL
NUCLEATED RBC (/100WBC) (OHS): 0 /100 WBC
PLATELET # BLD AUTO: 315 K/UL (ref 150–450)
PMV BLD AUTO: 9.4 FL (ref 9.2–12.9)
POTASSIUM SERPL-SCNC: 4.4 MMOL/L (ref 3.5–5.1)
PROT SERPL-MCNC: 7 GM/DL (ref 6–8.4)
RBC # BLD AUTO: 4.22 M/UL (ref 4.6–6.2)
RELATIVE EOSINOPHIL (OHS): 1.6 %
RELATIVE LYMPHOCYTE (OHS): 31.2 % (ref 18–48)
RELATIVE MONOCYTE (OHS): 7.6 % (ref 4–15)
RELATIVE NEUTROPHIL (OHS): 58.9 % (ref 38–73)
SODIUM SERPL-SCNC: 140 MMOL/L (ref 136–145)
T PALLIDUM IGG+IGM SER QL: NORMAL
TRIGL SERPL-MCNC: 85 MG/DL (ref 30–150)
TSH SERPL-ACNC: 1.7 UIU/ML (ref 0.4–4)
WBC # BLD AUTO: 11.37 K/UL (ref 3.9–12.7)

## 2025-06-02 PROCEDURE — 83036 HEMOGLOBIN GLYCOSYLATED A1C: CPT

## 2025-06-02 PROCEDURE — 36415 COLL VENOUS BLD VENIPUNCTURE: CPT | Mod: PO

## 2025-06-02 PROCEDURE — 85025 COMPLETE CBC W/AUTO DIFF WBC: CPT

## 2025-06-02 PROCEDURE — 3051F HG A1C>EQUAL 7.0%<8.0%: CPT | Mod: CPTII,S$GLB,,

## 2025-06-02 PROCEDURE — 1159F MED LIST DOCD IN RCRD: CPT | Mod: CPTII,S$GLB,,

## 2025-06-02 PROCEDURE — 87389 HIV-1 AG W/HIV-1&-2 AB AG IA: CPT

## 2025-06-02 PROCEDURE — 99999 PR PBB SHADOW E&M-EST. PATIENT-LVL V: CPT | Mod: PBBFAC,,,

## 2025-06-02 PROCEDURE — 3008F BODY MASS INDEX DOCD: CPT | Mod: CPTII,S$GLB,,

## 2025-06-02 PROCEDURE — 4010F ACE/ARB THERAPY RXD/TAKEN: CPT | Mod: CPTII,S$GLB,,

## 2025-06-02 PROCEDURE — 3079F DIAST BP 80-89 MM HG: CPT | Mod: CPTII,S$GLB,,

## 2025-06-02 PROCEDURE — 99385 PREV VISIT NEW AGE 18-39: CPT | Mod: S$GLB,,,

## 2025-06-02 PROCEDURE — 86593 SYPHILIS TEST NON-TREP QUANT: CPT

## 2025-06-02 PROCEDURE — 80053 COMPREHEN METABOLIC PANEL: CPT

## 2025-06-02 PROCEDURE — 3074F SYST BP LT 130 MM HG: CPT | Mod: CPTII,S$GLB,,

## 2025-06-02 PROCEDURE — 84443 ASSAY THYROID STIM HORMONE: CPT

## 2025-06-02 PROCEDURE — 80074 ACUTE HEPATITIS PANEL: CPT

## 2025-06-02 PROCEDURE — 84478 ASSAY OF TRIGLYCERIDES: CPT

## 2025-06-03 ENCOUNTER — RESULTS FOLLOW-UP (OUTPATIENT)
Dept: INTERNAL MEDICINE | Facility: CLINIC | Age: 30
End: 2025-06-03

## 2025-06-03 ENCOUNTER — TELEPHONE (OUTPATIENT)
Dept: INTERNAL MEDICINE | Facility: CLINIC | Age: 30
End: 2025-06-03
Payer: COMMERCIAL

## 2025-06-03 DIAGNOSIS — R89.9 ABNORMAL LABORATORY TEST: Primary | ICD-10-CM

## 2025-06-04 ENCOUNTER — OFFICE VISIT (OUTPATIENT)
Dept: INTERNAL MEDICINE | Facility: CLINIC | Age: 30
End: 2025-06-04
Payer: COMMERCIAL

## 2025-06-04 DIAGNOSIS — Z79.4 TYPE 2 DIABETES MELLITUS WITH HYPERGLYCEMIA, WITH LONG-TERM CURRENT USE OF INSULIN: Primary | Chronic | ICD-10-CM

## 2025-06-04 DIAGNOSIS — Z94.1 HEART REPLACED BY TRANSPLANT: ICD-10-CM

## 2025-06-04 DIAGNOSIS — Z71.89 COUNSELING AND COORDINATION OF CARE: ICD-10-CM

## 2025-06-04 DIAGNOSIS — E66.9 OBESITY WITH BODY MASS INDEX OF 30.0-39.9: ICD-10-CM

## 2025-06-04 DIAGNOSIS — Z94.9 HYPERTENSION ASSOCIATED WITH TRANSPLANTATION: ICD-10-CM

## 2025-06-04 DIAGNOSIS — E11.65 TYPE 2 DIABETES MELLITUS WITH HYPERGLYCEMIA, WITH LONG-TERM CURRENT USE OF INSULIN: Primary | Chronic | ICD-10-CM

## 2025-06-04 DIAGNOSIS — I15.8 HYPERTENSION ASSOCIATED WITH TRANSPLANTATION: ICD-10-CM

## 2025-06-04 PROCEDURE — 98004 SYNCH AUDIO-VIDEO EST SF 10: CPT | Mod: 95,,, | Performed by: NURSE PRACTITIONER

## 2025-06-04 PROCEDURE — 4010F ACE/ARB THERAPY RXD/TAKEN: CPT | Mod: CPTII,95,, | Performed by: NURSE PRACTITIONER

## 2025-06-04 PROCEDURE — 1160F RVW MEDS BY RX/DR IN RCRD: CPT | Mod: CPTII,95,, | Performed by: NURSE PRACTITIONER

## 2025-06-04 PROCEDURE — 1159F MED LIST DOCD IN RCRD: CPT | Mod: CPTII,95,, | Performed by: NURSE PRACTITIONER

## 2025-06-04 PROCEDURE — 3062F POS MACROALBUMINURIA REV: CPT | Mod: CPTII,95,, | Performed by: NURSE PRACTITIONER

## 2025-06-04 PROCEDURE — 3044F HG A1C LEVEL LT 7.0%: CPT | Mod: CPTII,95,, | Performed by: NURSE PRACTITIONER

## 2025-06-04 PROCEDURE — 3066F NEPHROPATHY DOC TX: CPT | Mod: CPTII,95,, | Performed by: NURSE PRACTITIONER

## 2025-06-04 NOTE — Clinical Note
A1c is stable F/u in 5 months A1c prior  Needs diabetic eye cam this month-order is in Thanks Irielle  A1c Lab Results      Component                Value               Date                      HGBA1C                   6.6 (H)             06/02/2025            Patient not onboarded.  Suitable to On-board:  Yes  Reason(s) for not On-boarded:  Deferred (Will re-attempt)  Reason(s) for Not On-boarding Narrative:  Daughter requested we \"call a little further down the line\" when patient is feeling better.   Deferral Date:  4/13/18        Pre-onboarding Chart Review for RN Care Coordination    The following items were reviewed in Epic (see Snapshot): Care Team, Problem List, Past Medical History, Past Surgical History, Implants, Current Medications, Advanced Directive status, Family Comments, Health Maintenance, Recent Inpatient and/or ED visits.     Labs Reviewed:  Last Vitals:  BP Readings from Last 3 Encounters:   03/21/18 118/64   03/15/18 103/65   02/26/18 130/66      Pulse Readings from Last 3 Encounters:   03/21/18 80   03/15/18 65   02/26/18 108     Temp Readings from Last 3 Encounters:   03/15/18 97.3 °F (36.3 °C) (Oral)   02/26/18 98.8 °F (37.1 °C) (Oral)   02/08/18 98.5 °F (36.9 °C) (Oral)      Wt Readings from Last 3 Encounters:   03/21/18 71.2 kg   03/13/18 68.9 kg   02/08/18 72.3 kg     Ht Readings from Last 3 Encounters:   03/13/18 5' 7\" (1.702 m)   01/19/18 5' 7\" (1.702 m)   01/19/18 5' 7\" (1.702 m)       No results found for: HGBA1C  Lab Results   Component Value Date    BNP 16 08/02/2017     Lab Results   Component Value Date    CREATININE 0.83 03/21/2018     No results found for: CHOLESTEROL, HDL, CHOHDL, TRIGLYCERIDE, CALCLDL    Diagnostics Reviewed:  Ejection Fraction:  No results found for: EF   Last PFT:  No components found for: PFT13   [FreeTextEntry1] :  74 year old female patient seen for evaluation of urinary frequency and urgency. Pt underwent a total knee replacement in 2022. She just had an aspiration and is awaiting results and might need another surgery. Denies hematuria, dysuria, and infection.   [FreeTextEntry1] :  74 year old female patient seen for evaluation of urinary frequency and urgency. Pt underwent a total knee replacement in 2022. She just had an aspiration and is awaiting results and might need another surgery. Denies hematuria, dysuria, and infection.

## 2025-06-04 NOTE — PROGRESS NOTES
The patient location is: [place]  The chief complaint leading to consultation is: type 2 diabetes     Visit type: audiovisual    Face to Face time with patient: 10   minutes of total time spent on the encounter, which includes face to face time and non-face to face time preparing to see the patient (eg, review of tests), Obtaining and/or reviewing separately obtained history, Documenting clinical information in the electronic or other health record, Independently interpreting results (not separately reported) and communicating results to the patient/family/caregiver, or Care coordination (not separately reported).       Each patient to whom he or she provides medical services by telemedicine is:  (1) informed of the relationship between the physician and patient and the respective role of any other health care provider with respect to management of the patient; and (2) notified that he or she may decline to receive medical services by telemedicine and may withdraw from such care at any time.    Notes:   May be moving to [place], [place]  Will come periodically here for his transplant team and diabetes management team.     Dexcom g7  On MDI, injections 3x a day max  On prandial -humalog prn, off lantus  Mounjaro 10 mg weekly    Testing 4 x a day-continuous via cgm  Patient is willing and able to use the device  Patient experiences impaired awareness of hypoglycemia (hypoglycemia unawareness)    Dexcom -via media:    90 day   Gap of not having cgm   Gmi 6.7%   Avg 143 mg/dl  Sd 41 mg/dl  Co eff 28.6 %  TIR 88%    Needs eye appt   Had covid/SIRS 05/10/25    ROS:    Dealing with bilateral ankle pain- f/u with podiatry   L worse than R at this time   Lab Results   Component Value Date    HGBA1C 6.6 (H) 06/02/2025     Assessment/Plan:    1. Type 2 diabetes mellitus with hyperglycemia, with long-term current use of insulin  Ambulatory referral/consult to Optometry    Diabetic Eye Screening Photo    Hemoglobin A1C    A1c goal  less than 7%  At goal   Continue regimen above   F/u in 5 months   Has refills  Has dexcom g7 again-gap for a few weeks-on vacation      2. Heart replaced by transplant  F/u with HTS  Stable       3. Hypertension associated with transplantation  Pt is on arb or acei  Managed per pcp   Stable   Controlled bp helps against microalbuminuria, cardiovascular manifestations   Discussion of jardiance-apprehensive w/ combining it with mounjaro/higher risk for nephrotoxicity       4. Obesity with body mass index of 30.0-39.9  This condition increases insulin resistance  Encourage exercise 3-5 x a week, goal 150 minutes or more/week  If limited, encourage chair exercises -via youtube  Movement is key, walk after meals 15-30 minutes   Watch portions, protein  gm /day, carbs  gm /day, more fiber  Hydrate well, at least 64 oz, half body weight (lbs) in ounces per day  Look at resources per AVS w/ apps/websites    On glp1/gip mounjaro-tolerating well  Loss 13#         5. Counseling and coordination of care  Discussed dietary habits, stressors, supplies                                Answers submitted by the patient for this visit:  Review of Systems Questionnaire (Submitted on 6/4/2025)  activity change: No  unexpected weight change: No  neck pain: No  hearing loss: No  rhinorrhea: No  trouble swallowing: No  eye discharge: No  visual disturbance: No  chest tightness: No  wheezing: No  chest pain: No  palpitations: No  blood in stool: No  constipation: No  vomiting: No  diarrhea: No  polydipsia: No  polyuria: No  difficulty urinating: No  urgency: No  hematuria: No  joint swelling: Yes  arthralgias: Yes  headaches: No  weakness: No  confusion: No  dysphoric mood: No

## 2025-06-06 ENCOUNTER — PATIENT MESSAGE (OUTPATIENT)
Dept: INTERNAL MEDICINE | Facility: CLINIC | Age: 30
End: 2025-06-06
Payer: COMMERCIAL

## 2025-06-06 RX ORDER — BLOOD-GLUCOSE SENSOR
EACH MISCELLANEOUS
Qty: 3 EACH | Refills: 11 | Status: SHIPPED | OUTPATIENT
Start: 2025-06-06

## 2025-06-07 ENCOUNTER — PATIENT MESSAGE (OUTPATIENT)
Dept: TRANSPLANT | Facility: CLINIC | Age: 30
End: 2025-06-07
Payer: COMMERCIAL

## 2025-06-07 ENCOUNTER — PATIENT MESSAGE (OUTPATIENT)
Dept: INTERNAL MEDICINE | Facility: CLINIC | Age: 30
End: 2025-06-07
Payer: COMMERCIAL

## 2025-06-08 NOTE — TELEPHONE ENCOUNTER
LOV with Kalen Jack NP , 6/2/2025 for annual visit.    Pt inquiring about starting Propanolol for tremors and to help in lowering BP. Reviewed recent clinic notes however did not see any notes regarding starting propranolol. Would you like pt to schedule another appt to discuss?

## 2025-06-09 ENCOUNTER — TELEPHONE (OUTPATIENT)
Dept: NEPHROLOGY | Facility: CLINIC | Age: 30
End: 2025-06-09
Payer: COMMERCIAL

## 2025-06-09 ENCOUNTER — TELEPHONE (OUTPATIENT)
Dept: INTERNAL MEDICINE | Facility: CLINIC | Age: 30
End: 2025-06-09
Payer: COMMERCIAL

## 2025-06-09 ENCOUNTER — TELEPHONE (OUTPATIENT)
Dept: TRANSPLANT | Facility: CLINIC | Age: 30
End: 2025-06-09
Payer: COMMERCIAL

## 2025-06-09 DIAGNOSIS — Z94.1 STATUS POST HEART TRANSPLANT: ICD-10-CM

## 2025-06-09 DIAGNOSIS — Z94.1 HEART REPLACED BY TRANSPLANT: ICD-10-CM

## 2025-06-09 DIAGNOSIS — Z79.899 ENCOUNTER FOR LONG-TERM (CURRENT) USE OF MEDICATIONS: ICD-10-CM

## 2025-06-09 DIAGNOSIS — E78.5 HYPERLIPIDEMIA, UNSPECIFIED HYPERLIPIDEMIA TYPE: ICD-10-CM

## 2025-06-09 DIAGNOSIS — Z94.1 HEART REPLACED BY TRANSPLANT: Primary | ICD-10-CM

## 2025-06-09 NOTE — TELEPHONE ENCOUNTER
Patient called to discuss propanolol for tremors related to tacrolimus. Patient stated he has had tremors since transplant and researched that propanolol may help with both tremors and help control his hypertension. Reviewed with patient that propanolol may decrease his HR and that we prefer post transplant patients to have higher HRs. Patient stated most recent VS after current BP meds were BP of 142/90, HR 88. Patient stated he is currently taking lexapro to help with tremors.    Reviewed the above with Dr. Donahue. MD would like patient to be seen in clinic prior to prescribing medication.     Called patient back. Patient available for nurse visit on Monday June 23. Will get labs same day prior to nurse visit.

## 2025-06-09 NOTE — TELEPHONE ENCOUNTER
Good morning,    Yes, patient would require a visit as this was not addressed during his visit. He can schedule with me or Dr. Dominguez (PCP).    RM

## 2025-06-09 NOTE — TELEPHONE ENCOUNTER
----- Message from Gloria Toro sent at 6/9/2025  1:16 PM CDT -----  Regarding: labs tomorrow at [facility] ([address]  Help! Can anyone please help me schedule labs at the [facility] ([address] [place]? I can't find the lab...does it exist?Patient needs tacro level, CMP, CBC, Mg, and BNP. Pretty please with a cherry on top.Thanks!Gloria

## 2025-06-10 ENCOUNTER — PATIENT MESSAGE (OUTPATIENT)
Dept: TRANSPLANT | Facility: CLINIC | Age: 30
End: 2025-06-10
Payer: COMMERCIAL

## 2025-06-10 DIAGNOSIS — E11.65 TYPE 2 DIABETES MELLITUS WITH HYPERGLYCEMIA, WITH LONG-TERM CURRENT USE OF INSULIN: Primary | Chronic | ICD-10-CM

## 2025-06-10 DIAGNOSIS — Z79.4 TYPE 2 DIABETES MELLITUS WITH HYPERGLYCEMIA, WITH LONG-TERM CURRENT USE OF INSULIN: Primary | Chronic | ICD-10-CM

## 2025-06-10 RX ORDER — TOPIRAMATE 25 MG/1
25 TABLET, FILM COATED ORAL NIGHTLY
Qty: 30 TABLET | Refills: 11 | Status: SHIPPED | OUTPATIENT
Start: 2025-06-10 | End: 2026-06-10

## 2025-06-10 RX ORDER — TIRZEPATIDE 12.5 MG/.5ML
12.5 INJECTION, SOLUTION SUBCUTANEOUS
Qty: 12 PEN | Refills: 2 | Status: SHIPPED | OUTPATIENT
Start: 2025-06-10

## 2025-06-10 RX ORDER — MYCOPHENOLIC ACID 360 MG/1
720 TABLET, DELAYED RELEASE ORAL 2 TIMES DAILY
Qty: 120 TABLET | Refills: 11 | Status: SHIPPED | OUTPATIENT
Start: 2025-06-10 | End: 2026-06-10

## 2025-06-11 ENCOUNTER — OFFICE VISIT (OUTPATIENT)
Dept: NEPHROLOGY | Facility: CLINIC | Age: 30
End: 2025-06-11
Payer: COMMERCIAL

## 2025-06-11 ENCOUNTER — TELEPHONE (OUTPATIENT)
Dept: NEPHROLOGY | Facility: CLINIC | Age: 30
End: 2025-06-11
Payer: COMMERCIAL

## 2025-06-11 DIAGNOSIS — R80.9 PROTEINURIA, UNSPECIFIED TYPE: Primary | ICD-10-CM

## 2025-06-11 DIAGNOSIS — N18.2 CHRONIC KIDNEY DISEASE, STAGE II (MILD): ICD-10-CM

## 2025-06-11 NOTE — PROGRESS NOTES
Nephrology Virtual Visit (Audiovisual)    The patient location is: [place]  Face to Face time with patient: 41 minutes in total were spent on this encounter, which includes face-to-face time and non-face-to-face time preparing to see the patient (e.g., review of tests), obtaining and/or reviewing separately obtained history, documenting clinical information in the electronic or other health record, independently interpreting results (not separately reported) and communicating results to the patient/family/caregiver, or care coordination (not separately reported).    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 He had alb/cr of 553 mg/g in 2024, more recently level was done to 448, he used to have poorly controlled hga1c above 14% but more recently he lost weight on mounjaro and hga1c improved to 6.6%, he has been on tacrolimus since heart transplant at  age 14 and levels has been stable, his SBP is generally in the 140s on lisinopril, he denied edema or sob.          Medications:    Outpatient Medications as of 6/11/2025   Medication Sig Dispense Refill    aspirin (ECOTRIN) 81 MG EC tablet Take 81 mg by mouth once daily.      blood sugar diagnostic Strp To check BG 4 times daily, to use with insurance preferred meter, e 11.65 400 each 3    blood-glucose meter kit To check BG 4 times daily, to use with insurance preferred meter, e 11.65 1 each 0    blood-glucose sensor (DEXCOM G7 SENSOR) Janelle Change every 10 days, e 11.65 type 2 diabetes 3 each 11    blood-glucose transmitter (DEXCOM G6 TRANSMITTER) Janelle CHANGE EVERY THREE (3) MONTHS 1 each 3    colchicine (COLCRYS) 0.6 mg tablet Take 1 tablet (0.6 mg total) by mouth once daily. 30 tablet  "11    DEXCOM G6  Misc USE AS DIRECTED 1 each 1    dicyclomine (BENTYL) 10 MG capsule Take 10 mg by mouth 2 (two) times daily as needed.      diltiaZEM (CARDIZEM CD) 180 MG 24 hr capsule TAKE 1 CAPSULE BY MOUTH ONCE DAILY 30 capsule 11    EScitalopram oxalate (LEXAPRO) 10 MG tablet TAKE 1 TABLET BY MOUTH ONCE DAILY 30 tablet 11    esomeprazole (NEXIUM) 20 MG capsule Take 1 capsule (20 mg total) by mouth before breakfast. 30 capsule 11    insulin (LANTUS SOLOSTAR U-100 INSULIN) glargine 100 units/mL SubQ pen INJECT 45 UNITS UNDER THE SKIN AT NIGHT 15 mL 8    insulin lispro (HUMALOG KWIKPEN INSULIN) 100 unit/mL pen INJECY 15 UNITS BEFORE MEALS PLUS SCALE 180-230+2 UNITS 231-280 + FOUR UNITS 281-330+ 6 UNITS 331-380+ EIGHT UNITS >380+10 MAX DAILY 75 UNITS 30 mL 6    lancets Misc To check BG 4 times daily, to use with insurance preferred meter, e 11.65 400 each 3    lisinopriL (PRINIVIL,ZESTRIL) 40 MG tablet Take 1 tablet (40 mg total) by mouth once daily. 30 tablet 11    metFORMIN (GLUCOPHAGE) 500 MG tablet Take 1 tablet (500 mg total) by mouth 2 (two) times daily with meals. 180 tablet 3    mycophenolate sodium 360 MG TbEC Take 2 tablets (720 mg total) by mouth 2 (two) times daily. 120 tablet 11    ondansetron (ZOFRAN-ODT) 4 MG TbDL Take 1 tablet (4 mg total) by mouth every 8 (eight) hours as needed (nausea). 12 tablet 0    pen needle, diabetic (BD ULTRA-FINE KARIME PEN NEEDLE) 32 gauge x 5/32" Ndle Uses 5 times a day. 90 day 400 each 3    pravastatin (PRAVACHOL) 20 MG tablet TAKE 1 TABLET BY MOUTH EVERY NIGHT 30 tablet 11    tacrolimus (PROGRAF) 1 MG Cap Take 1 capsule (1 mg total) by mouth every morning AND 2 capsules (2 mg total) every evening. TAKE 1 CAPSULE BY MOUTH EVERY MORNING WITH (1) 5mg capsule (6 MG total) & TAKE 2 CAPSULES EVERY EVENING WITH (1) 5mg capsule (7mg total). 90 capsule 11    tacrolimus (PROGRAF) 1 MG Cap Take 5 capsules (5 mg total) by mouth every 12 (twelve) hours. TAKE 1 CAPSULE BY " "MOUTH Along with 1 mg capsule in the am and 2 (1 mg) capsules in the pm for total dose of 6 mg in the am and 7 mg in the pm. 300 capsule 11    tirzepatide (MOUNJARO) 12.5 mg/0.5 mL PnIj Inject 12.5 mg into the skin every 7 days. 12 Pen 2    topiramate (TOPAMAX) 25 MG tablet Take 1 tablet (25 mg total) by mouth every evening. 30 tablet 11     Facility-Administered Medications as of 6/11/2025   Medication Dose Route Frequency Provider Last Rate Last Admin    diphenhydrAMINE injection 25 mg  25 mg Intravenous Once PRN Garth Glaser MD                 Physical Exam:    General: Alert and normally oriented, not in acute distress   HEENT: No pallor, no icterus  Extremities: No edema      Chemistry        Component Value Date/Time     06/02/2025 0927     02/28/2025 0812    K 4.4 06/02/2025 0927    K 4.5 02/28/2025 0812     06/02/2025 0927     02/28/2025 0812    CO2 20 (L) 06/02/2025 0927    CO2 19 (L) 02/28/2025 0812    BUN 28 (H) 06/02/2025 0927    CREATININE 1.4 06/02/2025 0927     06/02/2025 0927    GLU 73 02/28/2025 0812        Component Value Date/Time    CALCIUM 8.5 (L) 06/02/2025 0927    CALCIUM 8.9 02/28/2025 0812    ALKPHOS 64 06/02/2025 0927    ALKPHOS 63 02/28/2025 0812    AST 12 06/02/2025 0927    AST 16 02/28/2025 0812    ALT 14 06/02/2025 0927    ALT 12 02/28/2025 0812    BILITOT 0.2 06/02/2025 0927    BILITOT 0.2 02/28/2025 0812    ESTGFRAFRICA >60.0 01/31/2022 0938    EGFRNONAA >60.0 01/31/2022 0938            No results found for: "UTPCR"      Assessment/Plan:    CKD stage 2/3 with albumin to cr of 488 mg/g, the CKD is likely secondary to CNI use and DM, I had lengthy discussion with Derrick today, he generally tries to avoid adding medications, I recommended targeting average SBP of less than 130 and further weight loss, SGLT2 inhibitor use should be considered as well. A reasonable target is to lower the Albumin/cr to less than 300 mg/g. He will discuss these " recommendations with cardiology and endocrinology before any adjustment to the current medications and this is understandable.     In regard to the h/o stones and metabolic acidosis (bicarbonate of 20), this was probably caused by chronic Topiramate which he already stopped recently. The dietary changes to lower the risk of stones were discussed as well.

## 2025-06-17 RX ORDER — METFORMIN HYDROCHLORIDE 500 MG/1
500 TABLET ORAL 2 TIMES DAILY WITH MEALS
Qty: 180 TABLET | Refills: 3 | Status: SHIPPED | OUTPATIENT
Start: 2025-06-17

## 2025-06-17 NOTE — TELEPHONE ENCOUNTER
Refill Routing Note   Medication(s) are not appropriate for processing by [facility] for the following reason(s):        Non-participating provider    [facility] action(s):  Route               Appointments  past 12m or future 3m with PCP    Date Provider   Last Visit   6/4/2025 Mickie Lino APRN, FNP   Next Visit   11/12/2025 Mickie Lino APRN, FNP   ED visits in past 90 days: 1        Note composed:10:32 AM 06/17/2025

## 2025-06-23 ENCOUNTER — CLINICAL SUPPORT (OUTPATIENT)
Dept: TRANSPLANT | Facility: CLINIC | Age: 30
End: 2025-06-23
Payer: COMMERCIAL

## 2025-06-23 ENCOUNTER — LAB VISIT (OUTPATIENT)
Dept: LAB | Facility: HOSPITAL | Age: 30
End: 2025-06-23
Payer: COMMERCIAL

## 2025-06-23 VITALS
OXYGEN SATURATION: 99 % | WEIGHT: 222.69 LBS | RESPIRATION RATE: 18 BRPM | HEART RATE: 89 BPM | SYSTOLIC BLOOD PRESSURE: 149 MMHG | DIASTOLIC BLOOD PRESSURE: 85 MMHG | BODY MASS INDEX: 37.05 KG/M2

## 2025-06-23 DIAGNOSIS — E78.5 HYPERLIPIDEMIA, UNSPECIFIED HYPERLIPIDEMIA TYPE: ICD-10-CM

## 2025-06-23 DIAGNOSIS — Z94.1 HEART REPLACED BY TRANSPLANT: Primary | ICD-10-CM

## 2025-06-23 DIAGNOSIS — Z94.1 STATUS POST HEART TRANSPLANT: ICD-10-CM

## 2025-06-23 DIAGNOSIS — E83.42 HYPOMAGNESEMIA: ICD-10-CM

## 2025-06-23 DIAGNOSIS — Z94.1 HEART REPLACED BY TRANSPLANT: ICD-10-CM

## 2025-06-23 LAB
ABSOLUTE EOSINOPHIL (OHS): 0.18 K/UL
ABSOLUTE MONOCYTE (OHS): 0.72 K/UL (ref 0.3–1)
ABSOLUTE NEUTROPHIL COUNT (OHS): 5.92 K/UL (ref 1.8–7.7)
ALBUMIN SERPL BCP-MCNC: 3.7 G/DL (ref 3.5–5.2)
ALP SERPL-CCNC: 65 UNIT/L (ref 40–150)
ALT SERPL W/O P-5'-P-CCNC: 11 UNIT/L (ref 10–44)
ANION GAP (OHS): 11 MMOL/L (ref 8–16)
AST SERPL-CCNC: 11 UNIT/L (ref 11–45)
BASOPHILS # BLD AUTO: 0.04 K/UL
BASOPHILS NFR BLD AUTO: 0.4 %
BILIRUB SERPL-MCNC: 0.2 MG/DL (ref 0.1–1)
BNP SERPL-MCNC: 92 PG/ML (ref 0–99)
BUN SERPL-MCNC: 26 MG/DL (ref 6–20)
CALCIUM SERPL-MCNC: 8.5 MG/DL (ref 8.7–10.5)
CHLORIDE SERPL-SCNC: 109 MMOL/L (ref 95–110)
CHOLEST SERPL-MCNC: 117 MG/DL (ref 120–199)
CHOLEST/HDLC SERPL: 3.3 {RATIO} (ref 2–5)
CO2 SERPL-SCNC: 20 MMOL/L (ref 23–29)
CREAT SERPL-MCNC: 1.4 MG/DL (ref 0.5–1.4)
ERYTHROCYTE [DISTWIDTH] IN BLOOD BY AUTOMATED COUNT: 13.2 % (ref 11.5–14.5)
GFR SERPLBLD CREATININE-BSD FMLA CKD-EPI: >60 ML/MIN/1.73/M2
GLUCOSE SERPL-MCNC: 94 MG/DL (ref 70–110)
HCT VFR BLD AUTO: 37.2 % (ref 40–54)
HDLC SERPL-MCNC: 35 MG/DL (ref 40–75)
HDLC SERPL: 29.9 % (ref 20–50)
HGB BLD-MCNC: 11.8 GM/DL (ref 14–18)
IMM GRANULOCYTES # BLD AUTO: 0.03 K/UL (ref 0–0.04)
IMM GRANULOCYTES NFR BLD AUTO: 0.3 % (ref 0–0.5)
LDLC SERPL CALC-MCNC: 56.6 MG/DL (ref 63–159)
LYMPHOCYTES # BLD AUTO: 3.69 K/UL (ref 1–4.8)
MAGNESIUM SERPL-MCNC: 1.3 MG/DL (ref 1.6–2.6)
MCH RBC QN AUTO: 27 PG (ref 27–31)
MCHC RBC AUTO-ENTMCNC: 31.7 G/DL (ref 32–36)
MCV RBC AUTO: 85 FL (ref 82–98)
NONHDLC SERPL-MCNC: 82 MG/DL
NUCLEATED RBC (/100WBC) (OHS): 0 /100 WBC
PLATELET # BLD AUTO: 319 K/UL (ref 150–450)
PMV BLD AUTO: 10 FL (ref 9.2–12.9)
POTASSIUM SERPL-SCNC: 4.4 MMOL/L (ref 3.5–5.1)
PROT SERPL-MCNC: 7.3 GM/DL (ref 6–8.4)
RBC # BLD AUTO: 4.37 M/UL (ref 4.6–6.2)
RELATIVE EOSINOPHIL (OHS): 1.7 %
RELATIVE LYMPHOCYTE (OHS): 34.9 % (ref 18–48)
RELATIVE MONOCYTE (OHS): 6.8 % (ref 4–15)
RELATIVE NEUTROPHIL (OHS): 55.9 % (ref 38–73)
SODIUM SERPL-SCNC: 140 MMOL/L (ref 136–145)
TACROLIMUS BLD-MCNC: 4.3 NG/ML (ref 5–15)
TRIGL SERPL-MCNC: 127 MG/DL (ref 30–150)
URATE SERPL-MCNC: 10.8 MG/DL (ref 3.4–7)
WBC # BLD AUTO: 10.58 K/UL (ref 3.9–12.7)

## 2025-06-23 PROCEDURE — 84550 ASSAY OF BLOOD/URIC ACID: CPT

## 2025-06-23 PROCEDURE — 80197 ASSAY OF TACROLIMUS: CPT

## 2025-06-23 PROCEDURE — 36415 COLL VENOUS BLD VENIPUNCTURE: CPT

## 2025-06-23 PROCEDURE — 86977 RBC SERUM PRETX INCUBJ/INHIB: CPT | Mod: 59 | Performed by: INTERNAL MEDICINE

## 2025-06-23 PROCEDURE — 86833 HLA CLASS II HIGH DEFIN QUAL: CPT | Mod: 59 | Performed by: INTERNAL MEDICINE

## 2025-06-23 PROCEDURE — 86833 HLA CLASS II HIGH DEFIN QUAL: CPT | Performed by: INTERNAL MEDICINE

## 2025-06-23 PROCEDURE — 99999 PR PBB SHADOW E&M-EST. PATIENT-LVL III: CPT | Mod: PBBFAC,,,

## 2025-06-23 PROCEDURE — 86832 HLA CLASS I HIGH DEFIN QUAL: CPT | Performed by: INTERNAL MEDICINE

## 2025-06-23 PROCEDURE — 80053 COMPREHEN METABOLIC PANEL: CPT

## 2025-06-23 PROCEDURE — 83880 ASSAY OF NATRIURETIC PEPTIDE: CPT

## 2025-06-23 PROCEDURE — 83735 ASSAY OF MAGNESIUM: CPT

## 2025-06-23 PROCEDURE — 82465 ASSAY BLD/SERUM CHOLESTEROL: CPT

## 2025-06-23 PROCEDURE — 85025 COMPLETE CBC W/AUTO DIFF WBC: CPT

## 2025-06-23 PROCEDURE — 86832 HLA CLASS I HIGH DEFIN QUAL: CPT | Mod: 59 | Performed by: INTERNAL MEDICINE

## 2025-06-23 RX ORDER — LANOLIN ALCOHOL/MO/W.PET/CERES
400 CREAM (GRAM) TOPICAL 2 TIMES DAILY
Qty: 60 TABLET | Refills: 5 | Status: SHIPPED | OUTPATIENT
Start: 2025-06-23

## 2025-06-23 NOTE — PROGRESS NOTES
Labs reviewed as follows:  Lab Results   Component Value Date    BNP 92 06/23/2025     06/23/2025     02/28/2025    K 4.4 06/23/2025    K 4.5 02/28/2025    MG 1.3 (L) 06/23/2025     06/23/2025     02/28/2025    CO2 20 (L) 06/23/2025    CO2 19 (L) 02/28/2025    PHOS 3.8 02/20/2021    BUN 26 (H) 06/23/2025    CREATININE 1.4 06/23/2025    GLU 94 06/23/2025    GLU 73 02/28/2025    HGBA1C 6.6 (H) 06/02/2025    HGBA1C 7.9 (H) 02/17/2025    HGBA1C 10.5 04/12/2019    AST 11 06/23/2025    AST 16 02/28/2025    ALT 11 06/23/2025    ALT 12 02/28/2025    ALBUMIN 3.7 06/23/2025    ALBUMIN 3.5 02/28/2025    PROT 7.3 06/23/2025    PROT 6.7 02/28/2025    BILITOT 0.2 06/23/2025    BILITOT 0.2 02/28/2025    WBC 10.58 06/23/2025    HGB 11.8 (L) 06/23/2025    HGB 12.4 (L) 02/17/2025    HCT 37.2 (L) 06/23/2025    HCT 38.4 (L) 02/17/2025     06/23/2025     02/17/2025    INR 1.0 02/20/2021     (H) 02/20/2021    TSH 1.704 06/02/2025    TSH 1.111 02/17/2025    L1QCCXA 6.2 07/27/2020    FREET4 1.09 02/17/2025    CHOL 117 (L) 06/23/2025    CHOL 135 02/17/2025    HDL 35 (L) 06/23/2025    LDLCALC 56.6 (L) 06/23/2025    TRIG 127 06/23/2025    TRIG 139 02/17/2025    TACROLIMUS 4.3 (L) 06/23/2025    TACROLIMUS 9.6 02/28/2025    ALLOMAP 26 05/18/2017    TROPONINI <0.006 02/19/2021     For low Mg start Mg oxide 400 mg twice a day and repeat Mg level in 1 and 2 weeks

## 2025-06-23 NOTE — TELEPHONE ENCOUNTER
Attempted to call patient to review Rx and recommendations. No answer, left message with call back number.

## 2025-06-23 NOTE — PROGRESS NOTES
Patient requesting to speak with transplant team today following labs appointment. Patient reports that he has significant tremors as a result of his IS medication. Patient requesting propranolol if possible for his tremors. Patient also stated that he would like to stop taking lexapro and would like to consider wellbutrin instead. Patient reports ED side effect from lexapro. Patient has decreased his lexapro dose from 10 mg daily to 10 mg every other day. Dr. Donahue able to speak with patient while he was in clinic.    Reviewed with transplant pharmacy and Dr. Donahue. Transplant pharmacy team, patient may stop taking lexapro if he'd like since he has already begun to wean. Transplant pharmacy recommended propranolol 10 mg QID for tremors. Reviewed with Dr. Donahue who agreed with propranolol dose. Will defer to PCP regarding wellbutrin Rx. Uric acid results routed to PCP who prescribes colchicine.    Vitals:    06/23/25 1002   BP: (!) 149/85   Pulse: 89   Resp: 18        Following clinic visit, attempted to call patient to review recommendations. No answer, left message with my call back number.

## 2025-06-24 ENCOUNTER — TELEPHONE (OUTPATIENT)
Dept: TRANSPLANT | Facility: CLINIC | Age: 30
End: 2025-06-24
Payer: COMMERCIAL

## 2025-06-24 ENCOUNTER — TELEPHONE (OUTPATIENT)
Dept: INTERNAL MEDICINE | Facility: CLINIC | Age: 30
End: 2025-06-24
Payer: COMMERCIAL

## 2025-06-24 ENCOUNTER — PATIENT MESSAGE (OUTPATIENT)
Dept: TRANSPLANT | Facility: CLINIC | Age: 30
End: 2025-06-24
Payer: COMMERCIAL

## 2025-06-24 RX ORDER — PROPRANOLOL HYDROCHLORIDE 10 MG/1
10 TABLET ORAL 4 TIMES DAILY
Qty: 120 TABLET | Refills: 11 | Status: SHIPPED | OUTPATIENT
Start: 2025-06-24 | End: 2026-06-24

## 2025-06-24 NOTE — TELEPHONE ENCOUNTER
Please have patient make an appointment to discuss his uric acid.  Patient was only seen by me acutely for an acute illness.  Patient has not yet see me to establish care.

## 2025-06-24 NOTE — TELEPHONE ENCOUNTER
----- Message from Gloria Toro sent at 6/23/2025  6:57 PM CDT -----  Hi Dr. Dominguez.  Derrick had his post heart transplant labs drawn today and asked if we could add a uric acid to save him a stick. He mentioned you prescribe his allopurinol, so wanted to share the result.    Thanks! Gloria

## 2025-06-25 DIAGNOSIS — Z94.1 STATUS POST HEART TRANSPLANT: ICD-10-CM

## 2025-06-25 RX ORDER — TACROLIMUS 1 MG/1
CAPSULE ORAL
Qty: 90 CAPSULE | Refills: 11 | Status: CANCELLED | OUTPATIENT
Start: 2025-06-25 | End: 2026-06-25

## 2025-06-27 ENCOUNTER — TELEPHONE (OUTPATIENT)
Dept: INTERNAL MEDICINE | Facility: CLINIC | Age: 30
End: 2025-06-27
Payer: COMMERCIAL

## 2025-06-27 DIAGNOSIS — Z94.1 STATUS POST HEART TRANSPLANT: ICD-10-CM

## 2025-06-27 RX ORDER — TACROLIMUS 1 MG/1
CAPSULE ORAL
Qty: 90 CAPSULE | Refills: 11 | Status: SHIPPED | OUTPATIENT
Start: 2025-06-27 | End: 2026-06-27

## 2025-06-27 NOTE — TELEPHONE ENCOUNTER
Spoke with patient via phone. Patient previously stated he has been taking tacrolimus 6 mg BID. Tacro level was 4.3, goal 5-10. Patient currently prescribed tacro 6 mg in am, 7 mg in pm. Advised patient that he should take the prescribed tacro 6 mg in am and 7 mg in pm and we will recheck labs next week to see if tacro level is therapeutic on this dose. Patient available for labs next Tuesday morning.    Reviewed pharmacy's recommendation that he may stop lexapro if he would like, but should take one tab every other day for week and then stop in order to wean off medication. Advised patient that he should schedule a well-visit with Dr. Dominguez in order to officially establish care with him, and to further discuss other medications patient may take in place of lexapro. Patient asked if he may start wellbutrin. Checked with transplant team and Dr. Donahue. Patient may take wellbutrin at PCP's discretion.     Propanolol 10 mg QID prescribed for tremors. Rx sent to patient's preferred pharmacy. Advised patient to check daily vital signs on this medication since it may affect his blood pressure and HR. Side effects of medication reviewed.    Patient with no further questions or concerns at this time.

## 2025-06-30 ENCOUNTER — OFFICE VISIT (OUTPATIENT)
Dept: INTERNAL MEDICINE | Facility: CLINIC | Age: 30
End: 2025-06-30
Payer: COMMERCIAL

## 2025-06-30 VITALS
DIASTOLIC BLOOD PRESSURE: 88 MMHG | WEIGHT: 216.25 LBS | HEIGHT: 65 IN | TEMPERATURE: 96 F | OXYGEN SATURATION: 98 % | BODY MASS INDEX: 36.03 KG/M2 | RESPIRATION RATE: 14 BRPM | HEART RATE: 90 BPM | SYSTOLIC BLOOD PRESSURE: 124 MMHG

## 2025-06-30 DIAGNOSIS — Z94.9 HYPERTENSION ASSOCIATED WITH TRANSPLANTATION: ICD-10-CM

## 2025-06-30 DIAGNOSIS — I15.8 HYPERTENSION ASSOCIATED WITH TRANSPLANTATION: ICD-10-CM

## 2025-06-30 DIAGNOSIS — Z94.1 HEART TRANSPLANT RECIPIENT: ICD-10-CM

## 2025-06-30 DIAGNOSIS — D84.9 IMMUNOSUPPRESSION: ICD-10-CM

## 2025-06-30 DIAGNOSIS — F41.9 ANXIETY: Primary | ICD-10-CM

## 2025-06-30 DIAGNOSIS — E83.42 HYPOMAGNESEMIA: ICD-10-CM

## 2025-06-30 PROCEDURE — 4010F ACE/ARB THERAPY RXD/TAKEN: CPT | Mod: CPTII,S$GLB,,

## 2025-06-30 PROCEDURE — 3008F BODY MASS INDEX DOCD: CPT | Mod: CPTII,S$GLB,,

## 2025-06-30 PROCEDURE — 3044F HG A1C LEVEL LT 7.0%: CPT | Mod: CPTII,S$GLB,,

## 2025-06-30 PROCEDURE — 3066F NEPHROPATHY DOC TX: CPT | Mod: CPTII,S$GLB,,

## 2025-06-30 PROCEDURE — 1159F MED LIST DOCD IN RCRD: CPT | Mod: CPTII,S$GLB,,

## 2025-06-30 PROCEDURE — 3079F DIAST BP 80-89 MM HG: CPT | Mod: CPTII,S$GLB,,

## 2025-06-30 PROCEDURE — 99999 PR PBB SHADOW E&M-EST. PATIENT-LVL V: CPT | Mod: PBBFAC,,,

## 2025-06-30 PROCEDURE — 3062F POS MACROALBUMINURIA REV: CPT | Mod: CPTII,S$GLB,,

## 2025-06-30 PROCEDURE — 3074F SYST BP LT 130 MM HG: CPT | Mod: CPTII,S$GLB,,

## 2025-06-30 PROCEDURE — 1160F RVW MEDS BY RX/DR IN RCRD: CPT | Mod: CPTII,S$GLB,,

## 2025-06-30 PROCEDURE — 99214 OFFICE O/P EST MOD 30 MIN: CPT | Mod: S$GLB,,,

## 2025-06-30 RX ORDER — BUPROPION HYDROCHLORIDE 150 MG/1
TABLET ORAL
Qty: 60 TABLET | Refills: 0 | Status: SHIPPED | OUTPATIENT
Start: 2025-06-30

## 2025-06-30 NOTE — PROGRESS NOTES
Subjective:       Patient ID: Derrick Montanez is a 29 y.o. male.    Chief Complaint: Anxiety    HPI    History of Present Illness      CHIEF COMPLAINT:  Mr. Montanez presents today for medication management for anxiety symptoms.     MENTAL HEALTH:  He recently initiated Lexapro taper per his transplant provider secondary to concerns for sexual dysfunction. He has concerns about ongoing anxiety symptoms if he stops medication altogether. He expresses interest in initiating Wellbutrin, which he has never taken previously. He is seeking a medication with minimal sexual side effects but that will still be efficacious in managing his anxiety.    CURRENT MEDICATIONS:  He takes Pravastatin, Tacrolimus, nifepril, diltiazem, propranolol, Mounjaro (tirzepatide), Colchicine, and mycophenolate. He is not taking Lantus or Humalog due to concerns about blood sugar crashing. 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 He is now on 5 mg for the past few days and reports experiencing no apparent side effects. His tapering plan involves transitioning to 5 mg every other day under transplant team guidance.      ROS:  Psychiatric: +depression, +anxiety     Objective:      Physical Exam  Vitals reviewed.   Constitutional:       General: He is awake. He is not in acute distress.     Appearance: Normal appearance. He is not ill-appearing, toxic-appearing or diaphoretic.   HENT:      Head: Normocephalic and atraumatic.   Eyes:      General: No scleral icterus.     Conjunctiva/sclera: Conjunctivae normal.      Pupils: Pupils are equal, round, and reactive to light.   Cardiovascular:      Rate and Rhythm: Normal rate.      Pulses: Normal pulses.   Pulmonary:      Effort: Pulmonary effort is normal. No respiratory distress.   Skin:     General: Skin is warm and dry.   Neurological:      Mental Status: He is alert and oriented to person, place, and time. Mental status is at baseline.      GCS:  GCS eye subscore is 4. GCS verbal subscore is 5. GCS motor subscore is 6.   Psychiatric:         Behavior: Behavior is cooperative.         Thought Content: Thought content does not include homicidal or suicidal ideation.           Physical Exam            Assessment:       1. Anxiety  - buPROPion (WELLBUTRIN XL) 150 MG TB24 tablet; Take one tablet daily for a week then 2 tabs daily  Dispense: 60 tablet; Refill: 0    2. Hypertension associated with transplantation    3. Heart transplant recipient    4. Hypomagnesemia    5. Immunosuppression      Plan:         Assessment & Plan    PLAN SUMMARY:  - Discontinue Lexapro: Continue taper as initiated by transplant team  - Start Wellbutrin 150 mg daily for 1 week, then increase to 300 mg daily  - Continue Lantus, Humalog, and tirzepatide (Mounjaro) for diabetes management  - Follow-up in 2 weeks to assess Wellbutrin tolerance and efficacy (in-person or virtual option)    HYPERTENSION ASSOCIATED WITH TRANSPLANTATION/ HEART TRANSPLANT STATUS/IMMUNOSUPPRESSION:  - Mr. Montanez is a heart transplant recipient since 2009.  - Continue transplant medication regimen as prescribed including tacrolimus and mycophenolate.  - Continue pravastatin, diltiazem, lisinopril, and propranolol for management of heart transplant status and hypertension.  - Maintain f/u with transplant as indicated.    TYPE 2 DIABETES MELLITUS WITH DIABETIC POLYNEUROPATHY:  - Mr. Montanez's A1C is 6.6, which is better controlled.  - Evaluated that the patient has a history of slightly elevated A1C and experiences neuropathic symptoms when A1C is above 5.  - Mr. Montanez is using Lantus and Humalog along with tirzepatide (Mounjaro) for diabetes management.  - Mr. Montanez is followed per a diabetes specialist, Ms. Maira Lino.    HYPOMAGNESEMIA:  -Continue magnesium oxide as prescribed.    MAJOR DEPRESSIVE DISORDER AND ANXIETY DISORDER:  - Assessed that the patient is coming off Lexapro due to side effects and is  interested in starting Wellbutrin for depression and anxiety management.  - Explained that SSRIs like Lexapro commonly cause sexual side effects, but individual tolerance varies among different medications in this class.  - Advised on continuation of Lexapro taper as initiated by transplant team: 5 mg daily for at least 7 days, then every other day, with option to extend taper if withdrawal symptoms occur.  - Prescribed Wellbutrin 150 mg daily for 1 week, then increase to 300 mg daily (two 150 mg tablets); to be taken during daytime hours to minimize potential sleep disruption.  - Discussed potential for Wellbutrin to cause initial sleep disturbances, which often resolve after a few weeks of use.    FOLLOW-UP:  - Scheduled follow up in 2 weeks to assess Wellbutrin tolerance and efficacy with option for in-person or virtual follow-up visit.              This note was generated with the assistance of ambient listening technology. Verbal consent was obtained by the patient and accompanying visitor(s) for the recording of patient appointment to facilitate this note. I attest to having reviewed and edited the generated note for accuracy, though some syntax or spelling errors may persist. Please contact the author of this note for any clarification.

## 2025-07-02 ENCOUNTER — TELEPHONE (OUTPATIENT)
Dept: TRANSPLANT | Facility: CLINIC | Age: 30
End: 2025-07-02
Payer: COMMERCIAL

## 2025-07-02 NOTE — TELEPHONE ENCOUNTER
Spoke with patient via phone. Patient with missed lab appt yesterday to recheck tacro level after dose increase. Patient available to labs on July 10. Lab appt made July 10 at 9 am (patient takes tacro at 9am/9pm). Patient verbalized understanding.

## 2025-07-10 ENCOUNTER — PATIENT MESSAGE (OUTPATIENT)
Dept: INTERNAL MEDICINE | Facility: CLINIC | Age: 30
End: 2025-07-10
Payer: COMMERCIAL

## 2025-07-10 ENCOUNTER — LAB VISIT (OUTPATIENT)
Dept: LAB | Facility: HOSPITAL | Age: 30
End: 2025-07-10
Payer: COMMERCIAL

## 2025-07-10 DIAGNOSIS — E83.42 HYPOMAGNESEMIA: ICD-10-CM

## 2025-07-10 DIAGNOSIS — Z79.4 TYPE 2 DIABETES MELLITUS WITH HYPERGLYCEMIA, WITH LONG-TERM CURRENT USE OF INSULIN: ICD-10-CM

## 2025-07-10 DIAGNOSIS — Z94.1 STATUS POST HEART TRANSPLANT: ICD-10-CM

## 2025-07-10 DIAGNOSIS — E78.5 HYPERLIPIDEMIA, UNSPECIFIED HYPERLIPIDEMIA TYPE: ICD-10-CM

## 2025-07-10 DIAGNOSIS — M79.671 FOOT PAIN, RIGHT: ICD-10-CM

## 2025-07-10 DIAGNOSIS — Z79.899 ENCOUNTER FOR LONG-TERM (CURRENT) USE OF MEDICATIONS: ICD-10-CM

## 2025-07-10 DIAGNOSIS — Z94.1 HEART REPLACED BY TRANSPLANT: ICD-10-CM

## 2025-07-10 DIAGNOSIS — E11.65 TYPE 2 DIABETES MELLITUS WITH HYPERGLYCEMIA, WITH LONG-TERM CURRENT USE OF INSULIN: ICD-10-CM

## 2025-07-10 LAB
ABSOLUTE EOSINOPHIL (OHS): 0.09 K/UL
ABSOLUTE MONOCYTE (OHS): 0.61 K/UL (ref 0.3–1)
ABSOLUTE NEUTROPHIL COUNT (OHS): 6.09 K/UL (ref 1.8–7.7)
ALBUMIN SERPL BCP-MCNC: 4.1 G/DL (ref 3.5–5.2)
ALP SERPL-CCNC: 60 UNIT/L (ref 40–150)
ALT SERPL W/O P-5'-P-CCNC: 9 UNIT/L (ref 10–44)
ANION GAP (OHS): 11 MMOL/L (ref 8–16)
AST SERPL-CCNC: 11 UNIT/L (ref 11–45)
BASOPHILS # BLD AUTO: 0.07 K/UL
BASOPHILS NFR BLD AUTO: 0.7 %
BILIRUB SERPL-MCNC: 0.6 MG/DL (ref 0.1–1)
BNP SERPL-MCNC: 20 PG/ML (ref 0–99)
BUN SERPL-MCNC: 33 MG/DL (ref 6–20)
CALCIUM SERPL-MCNC: 9.2 MG/DL (ref 8.7–10.5)
CHLORIDE SERPL-SCNC: 108 MMOL/L (ref 95–110)
CHOLEST SERPL-MCNC: 118 MG/DL (ref 120–199)
CHOLEST/HDLC SERPL: 4.7 {RATIO} (ref 2–5)
CO2 SERPL-SCNC: 18 MMOL/L (ref 23–29)
CREAT SERPL-MCNC: 1.6 MG/DL (ref 0.5–1.4)
EAG (OHS): 137 MG/DL (ref 68–131)
ERYTHROCYTE [DISTWIDTH] IN BLOOD BY AUTOMATED COUNT: 13 % (ref 11.5–14.5)
GFR SERPLBLD CREATININE-BSD FMLA CKD-EPI: 60 ML/MIN/1.73/M2
GLUCOSE SERPL-MCNC: 105 MG/DL (ref 70–110)
HBA1C MFR BLD: 6.4 % (ref 4–5.6)
HCT VFR BLD AUTO: 40.6 % (ref 40–54)
HDLC SERPL-MCNC: 25 MG/DL (ref 40–75)
HDLC SERPL: 21.2 % (ref 20–50)
HGB BLD-MCNC: 13.4 GM/DL (ref 14–18)
IMM GRANULOCYTES # BLD AUTO: 0.02 K/UL (ref 0–0.04)
IMM GRANULOCYTES NFR BLD AUTO: 0.2 % (ref 0–0.5)
LDLC SERPL CALC-MCNC: 62.4 MG/DL (ref 63–159)
LYMPHOCYTES # BLD AUTO: 2.87 K/UL (ref 1–4.8)
MAGNESIUM SERPL-MCNC: 1.5 MG/DL (ref 1.6–2.6)
MCH RBC QN AUTO: 27.8 PG (ref 27–31)
MCHC RBC AUTO-ENTMCNC: 33 G/DL (ref 32–36)
MCV RBC AUTO: 84 FL (ref 82–98)
NONHDLC SERPL-MCNC: 93 MG/DL
NUCLEATED RBC (/100WBC) (OHS): 0 /100 WBC
PLATELET # BLD AUTO: 366 K/UL (ref 150–450)
PMV BLD AUTO: 10 FL (ref 9.2–12.9)
POTASSIUM SERPL-SCNC: 4.6 MMOL/L (ref 3.5–5.1)
PROT SERPL-MCNC: 7.4 GM/DL (ref 6–8.4)
RBC # BLD AUTO: 4.82 M/UL (ref 4.6–6.2)
RELATIVE EOSINOPHIL (OHS): 0.9 %
RELATIVE LYMPHOCYTE (OHS): 29.4 % (ref 18–48)
RELATIVE MONOCYTE (OHS): 6.3 % (ref 4–15)
RELATIVE NEUTROPHIL (OHS): 62.5 % (ref 38–73)
SODIUM SERPL-SCNC: 137 MMOL/L (ref 136–145)
TACROLIMUS BLD-MCNC: 7.4 NG/ML (ref 5–15)
TRIGL SERPL-MCNC: 153 MG/DL (ref 30–150)
URATE SERPL-MCNC: 13.7 MG/DL (ref 3.4–7)
WBC # BLD AUTO: 9.75 K/UL (ref 3.9–12.7)

## 2025-07-10 PROCEDURE — 80197 ASSAY OF TACROLIMUS: CPT

## 2025-07-10 PROCEDURE — 85025 COMPLETE CBC W/AUTO DIFF WBC: CPT

## 2025-07-10 PROCEDURE — 86832 HLA CLASS I HIGH DEFIN QUAL: CPT | Performed by: INTERNAL MEDICINE

## 2025-07-10 PROCEDURE — 80061 LIPID PANEL: CPT

## 2025-07-10 PROCEDURE — 83880 ASSAY OF NATRIURETIC PEPTIDE: CPT

## 2025-07-10 PROCEDURE — 84550 ASSAY OF BLOOD/URIC ACID: CPT

## 2025-07-10 PROCEDURE — 86833 HLA CLASS II HIGH DEFIN QUAL: CPT | Performed by: INTERNAL MEDICINE

## 2025-07-10 PROCEDURE — 82310 ASSAY OF CALCIUM: CPT

## 2025-07-10 PROCEDURE — 36415 COLL VENOUS BLD VENIPUNCTURE: CPT

## 2025-07-10 PROCEDURE — 83735 ASSAY OF MAGNESIUM: CPT

## 2025-07-10 PROCEDURE — 86977 RBC SERUM PRETX INCUBJ/INHIB: CPT | Mod: 59 | Performed by: INTERNAL MEDICINE

## 2025-07-10 PROCEDURE — 83036 HEMOGLOBIN GLYCOSYLATED A1C: CPT

## 2025-07-15 ENCOUNTER — OFFICE VISIT (OUTPATIENT)
Dept: INTERNAL MEDICINE | Facility: CLINIC | Age: 30
End: 2025-07-15
Payer: COMMERCIAL

## 2025-07-15 DIAGNOSIS — F41.9 ANXIETY: Primary | ICD-10-CM

## 2025-07-15 PROCEDURE — 3062F POS MACROALBUMINURIA REV: CPT | Mod: CPTII,95,,

## 2025-07-15 PROCEDURE — 1159F MED LIST DOCD IN RCRD: CPT | Mod: CPTII,95,,

## 2025-07-15 PROCEDURE — 3044F HG A1C LEVEL LT 7.0%: CPT | Mod: CPTII,95,,

## 2025-07-15 PROCEDURE — 4010F ACE/ARB THERAPY RXD/TAKEN: CPT | Mod: CPTII,95,,

## 2025-07-15 PROCEDURE — 3066F NEPHROPATHY DOC TX: CPT | Mod: CPTII,95,,

## 2025-07-15 PROCEDURE — 1160F RVW MEDS BY RX/DR IN RCRD: CPT | Mod: CPTII,95,,

## 2025-07-15 PROCEDURE — 98005 SYNCH AUDIO-VIDEO EST LOW 20: CPT | Mod: 95,,,

## 2025-07-15 RX ORDER — BUPROPION HYDROCHLORIDE 150 MG/1
TABLET ORAL
Qty: 90 TABLET | Refills: 3 | Status: SHIPPED | OUTPATIENT
Start: 2025-07-15

## 2025-07-15 NOTE — PROGRESS NOTES
"Subjective:       Patient ID: Derrick Montanez is a 29 y.o. male.    Chief Complaint: Follow-up    The patient location is: [place]   The chief complaint leading to consultation is: Anxiety Follow-Up  Total time spent with patient: 20 minutes  Visit type: Virtual visit with synchronous audio and video  Each patient to whom he or she provides medical services by telemedicine is: (1) informed of the relationship between the physician and patient and the respective role of any other health care provider with respect to management of the patient; and (2) notified that he or she may decline to receive medical services by telemedicine and may withdraw from such care at any time.    History of Present Illness    CHIEF COMPLAINT:  Mr. Montanez presents today for follow up of anxiety.    ANXIETY MANAGEMENT:  He reports anxiety is well controlled and is feeling "really good" on current treatment regimen. He has successfully discontinued Lexapro after completing taper initiated per his transplant team. He initiated Wellbutrin 150 mg and successfully titrated up to Wellbutrin 300 mg with good tolerance and no adverse effects. He reports his anxiety symptoms have significantly improved and reports he is feeling "really good".       ROS:  Psychiatric: -anxiety         Review of Systems          Objective:     LIMITED DUE TO VIRTUAL VISIT    Physical Exam  Constitutional:       General: He is awake. He is not in acute distress.     Appearance: Normal appearance. He is not toxic-appearing.   Eyes:      Pupils: Pupils are equal, round, and reactive to light.   Pulmonary:      Effort: Pulmonary effort is normal. No respiratory distress.   Neurological:      Mental Status: He is alert and oriented to person, place, and time. Mental status is at baseline.   Psychiatric:         Attention and Perception: Attention and perception normal.         Mood and Affect: Mood and affect normal.         Speech: Speech normal.         " Behavior: Behavior normal. Behavior is cooperative.         Thought Content: Thought content normal.           Physical Exam            Assessment:       1. Anxiety  - buPROPion (WELLBUTRIN XL) 150 MG TB24 tablet; Take one tablet daily for a week then 2 tabs daily  Dispense: 90 tablet; Refill: 3      Plan:         Assessment & Plan    PLAN SUMMARY:  - Continue Wellbutrin 300 mg daily for anxiety management  - Titrated dose to 300 mg  - Sent refills to pharmacy    ANXIETY DISORDER:  - Continue Wellbutrin 300 mg daily for anxiety management.  - Mr. Montanez is tolerating medication well with no adverse side effects and reports good symptom control.  - Titrated dose to 300 mg and sent refills to pharmacy.  - Will continue to monitor for any changes in symptom control or desire to discontinue medication.       RTC PRN             This note was generated with the assistance of ambient listening technology. Verbal consent was obtained by the patient and accompanying visitor(s) for the recording of patient appointment to facilitate this note. I attest to having reviewed and edited the generated note for accuracy, though some syntax or spelling errors may persist. Please contact the author of this note for any clarification.

## 2025-08-05 ENCOUNTER — TELEPHONE (OUTPATIENT)
Dept: TRANSPLANT | Facility: CLINIC | Age: 30
End: 2025-08-05
Payer: COMMERCIAL

## 2025-08-05 DIAGNOSIS — Z79.899 ENCOUNTER FOR LONG-TERM (CURRENT) USE OF MEDICATIONS: ICD-10-CM

## 2025-08-05 DIAGNOSIS — Z94.1 STATUS POST HEART TRANSPLANT: ICD-10-CM

## 2025-08-05 DIAGNOSIS — R76.12 POSITIVE QUANTIFERON-TB GOLD TEST: Primary | ICD-10-CM

## 2025-08-05 NOTE — TELEPHONE ENCOUNTER
Patient called to report a positive quantiferon gold TB test performed by his job's employee health dept (patient works for [employer]). Patient denies any symptoms, last chest xray performed in May 2025 negative for any acute processes. Patient states that his job is requiring a chest xray. Asked patient to share the results from that xray with the transplant team. Xray performed out OSH dated 8/5/2025 negative for TB, negative for any acute processes.    Repeat quant gold scheduled this Thursday at [facility].    Reviewed with Dr. Donahue. E-consult to transplant ID placed for further guidance and treatment recommendations.

## 2025-08-06 ENCOUNTER — E-CONSULT (OUTPATIENT)
Dept: INFECTIOUS DISEASES | Facility: HOSPITAL | Age: 30
End: 2025-08-06
Payer: COMMERCIAL

## 2025-08-06 DIAGNOSIS — Z22.7 TB LUNG, LATENT: Primary | ICD-10-CM

## 2025-08-06 DIAGNOSIS — R76.12 POSITIVE QUANTIFERON-TB GOLD TEST: Primary | ICD-10-CM

## 2025-08-06 PROCEDURE — 99451 NTRPROF PH1/NTRNET/EHR 5/>: CPT | Mod: ,,, | Performed by: INTERNAL MEDICINE

## 2025-08-06 NOTE — CONSULTS
[facility] INFECTIOUS DISEASE  Response for E-Consult     Patient Name: Derrick Montanez  MRN: [id number]  Primary Care Provider: Kalen Dominguez MD   Requesting Provider: Loyda Donahue MD  E-Consult to Transplant Infectious Disease  Consult performed by: Joyce Kidd DO  Consult ordered by: Loyda Donahue MD        Recommendation:   If he does not have history of prior treatment, recommend isoniazid 300mg daily + vitamin B6 50mg daily x 9 month course.   Monitor monthly CBC and CMP while on therapy, notify ID if increase in LFT is noted.   No further work up or testing is needed.     If patient has been previously treated for latent TB, do not need to retreat.     Additional future steps to consider: notify ID of any lab abnormalities, or if patient develops any new pulmonary symptoms at any time    Total time of Consultation: 15 minute    I did not speak to the requesting provider verbally about this.     *This eConsult is based on the clinical data available to me and is furnished without benefit of a physical examination. The eConsult will need to be interpreted in light of any clinical issues or changes in patient status not available to me at the time of filing this eConsults. Significant changes in patient condition or level of acuity should result in immediate formal consultation and reevaluation. Please alert me if you have further questions.    Thank you for this eConsult referral.     Joyce Kidd DO  [facility] INFECTIOUS DISEASE

## 2025-08-07 ENCOUNTER — TELEPHONE (OUTPATIENT)
Dept: TRANSPLANT | Facility: CLINIC | Age: 30
End: 2025-08-07
Payer: COMMERCIAL

## 2025-08-07 ENCOUNTER — LAB VISIT (OUTPATIENT)
Dept: LAB | Facility: HOSPITAL | Age: 30
End: 2025-08-07
Payer: COMMERCIAL

## 2025-08-07 ENCOUNTER — PATIENT MESSAGE (OUTPATIENT)
Dept: TRANSPLANT | Facility: CLINIC | Age: 30
End: 2025-08-07
Payer: COMMERCIAL

## 2025-08-07 DIAGNOSIS — Z94.1 HEART REPLACED BY TRANSPLANT: ICD-10-CM

## 2025-08-07 DIAGNOSIS — Z94.1 STATUS POST HEART TRANSPLANT: ICD-10-CM

## 2025-08-07 DIAGNOSIS — Z79.899 ENCOUNTER FOR LONG-TERM (CURRENT) USE OF MEDICATIONS: Primary | ICD-10-CM

## 2025-08-07 LAB
ABSOLUTE EOSINOPHIL (OHS): 0.1 K/UL
ABSOLUTE MONOCYTE (OHS): 0.97 K/UL (ref 0.3–1)
ABSOLUTE NEUTROPHIL COUNT (OHS): 6.66 K/UL (ref 1.8–7.7)
ALBUMIN SERPL BCP-MCNC: 4.3 G/DL (ref 3.5–5.2)
ALP SERPL-CCNC: 57 UNIT/L (ref 40–150)
ALT SERPL W/O P-5'-P-CCNC: 14 UNIT/L (ref 0–55)
ANION GAP (OHS): 11 MMOL/L (ref 8–16)
AST SERPL-CCNC: 24 UNIT/L (ref 0–50)
BASOPHILS # BLD AUTO: 0.04 K/UL
BASOPHILS NFR BLD AUTO: 0.3 %
BILIRUB SERPL-MCNC: 0.4 MG/DL (ref 0.1–1)
BUN SERPL-MCNC: 28 MG/DL (ref 6–20)
CALCIUM SERPL-MCNC: 9.5 MG/DL (ref 8.7–10.5)
CHLORIDE SERPL-SCNC: 113 MMOL/L (ref 95–110)
CO2 SERPL-SCNC: 17 MMOL/L (ref 23–29)
CREAT SERPL-MCNC: 3.3 MG/DL (ref 0.5–1.4)
ERYTHROCYTE [DISTWIDTH] IN BLOOD BY AUTOMATED COUNT: 13.9 % (ref 11.5–14.5)
GFR SERPLBLD CREATININE-BSD FMLA CKD-EPI: 25 ML/MIN/1.73/M2
GLUCOSE SERPL-MCNC: 112 MG/DL (ref 70–110)
HCT VFR BLD AUTO: 41.1 % (ref 40–54)
HGB BLD-MCNC: 12.5 GM/DL (ref 14–18)
IMM GRANULOCYTES # BLD AUTO: 0.03 K/UL (ref 0–0.04)
IMM GRANULOCYTES NFR BLD AUTO: 0.3 % (ref 0–0.5)
LYMPHOCYTES # BLD AUTO: 3.91 K/UL (ref 1–4.8)
MCH RBC QN AUTO: 27.1 PG (ref 27–31)
MCHC RBC AUTO-ENTMCNC: 30.4 G/DL (ref 32–36)
MCV RBC AUTO: 89 FL (ref 82–98)
NT-PROBNP SERPL-MCNC: 119 PG/ML
NUCLEATED RBC (/100WBC) (OHS): 0 /100 WBC
PLATELET # BLD AUTO: 285 K/UL (ref 150–450)
PMV BLD AUTO: 11 FL (ref 9.2–12.9)
POTASSIUM SERPL-SCNC: 4.7 MMOL/L (ref 3.5–5.1)
PROT SERPL-MCNC: 7.7 GM/DL (ref 6–8.4)
RBC # BLD AUTO: 4.61 M/UL (ref 4.6–6.2)
RELATIVE EOSINOPHIL (OHS): 0.9 %
RELATIVE LYMPHOCYTE (OHS): 33.4 % (ref 18–48)
RELATIVE MONOCYTE (OHS): 8.3 % (ref 4–15)
RELATIVE NEUTROPHIL (OHS): 56.8 % (ref 38–73)
SODIUM SERPL-SCNC: 141 MMOL/L (ref 136–145)
TACROLIMUS BLD-MCNC: 9.8 NG/ML (ref 5–15)
WBC # BLD AUTO: 11.71 K/UL (ref 3.9–12.7)

## 2025-08-07 PROCEDURE — 80197 ASSAY OF TACROLIMUS: CPT

## 2025-08-07 PROCEDURE — 36415 COLL VENOUS BLD VENIPUNCTURE: CPT

## 2025-08-07 PROCEDURE — 85025 COMPLETE CBC W/AUTO DIFF WBC: CPT

## 2025-08-07 PROCEDURE — 84075 ASSAY ALKALINE PHOSPHATASE: CPT

## 2025-08-07 PROCEDURE — 86480 TB TEST CELL IMMUN MEASURE: CPT

## 2025-08-07 PROCEDURE — 83880 ASSAY OF NATRIURETIC PEPTIDE: CPT

## 2025-08-07 RX ORDER — ISONIAZID 300 MG/1
300 TABLET ORAL DAILY
Qty: 30 TABLET | Refills: 8 | Status: SHIPPED | OUTPATIENT
Start: 2025-08-07

## 2025-08-07 RX ORDER — LANOLIN ALCOHOL/MO/W.PET/CERES
50 CREAM (GRAM) TOPICAL DAILY
Qty: 30 TABLET | Refills: 8 | Status: SHIPPED | OUTPATIENT
Start: 2025-08-07

## 2025-08-07 NOTE — LETTER
2025    Derrick Montanez  [address]  [place] [address]                                                                                                                                                                                                                                     LAB ORDERS    [facility]  [address] [place] [address]      Phone # [phone]      Fax # [phone]    LAB   [facility]          FAX    [phone]      Phone   [phone]                               PATIENT Derrick Montanez                               1995            DATE      2025                                           Expiration  2026       Diagnosis Codes:  Z94.1 Heart Transplant Status,  Z79.899 Long term current use of Immunoseppressed drug  Z22.7 Latent tuberculosis      _______________________________________________________    Lab Tests as below:                      Standing Order  Frequency: Q1 month and PRN    CBC  CMP  Magnesium  BNP  Tacrolimus        E-Signature          Loyda Donahue MD  Section Head, Cardiomyopathy & Heart Transplantation  Medical Director, Mechanical Circulatory Support Program       PLEASE FAX RESULTS TO: [phone]  If any problems or questions, please call   Gloria Toro [phone]  Jewell Milner [phone]     Juana Coronel [phone]

## 2025-08-07 NOTE — LETTER
Isoniazid (eye fareed ETHEL a zid)   Brand Names: Bobbi Isotamine [DSC]; PDP-Isoniazid   Warning   Severe and sometimes deadly liver problems have happened with this drug. Call your doctor right away if you have signs of liver problems like dark urine, tiredness, decreased appetite, upset stomach or stomach pain, light-colored stools, throwing up, or yellow skin or eyes.  The chance of liver problems is higher the older you are. The chance may also be raised by drinking alcohol every day, long-term liver problems, or injection drug use. The chance of liver problems may also be raised in females, mainly females who are Black or  or who have just had a baby. Most of the time, liver problems caused by this drug happen within the first 3 months of care, but they can happen at any time. Most of the time, liver function has gone back to normal but sometimes it has not. Blood work will need to be done before starting this drug and while taking it. If you have questions, talk with the doctor.  If you have liver disease, talk with your doctor. This drug may not be right for you.  What is this drug used for?   It is used to treat TB (tuberculosis).  It is used to prevent TB (tuberculosis).  What do I need to tell my doctor BEFORE I take this drug?   If you are allergic to this drug; any part of this drug; or any other drugs, foods, or substances. Tell your doctor about the allergy and what signs you had.  If you have had very bad side effects while taking isoniazid in the past, like liver problems, drug fever, chills, or arthritis.  If you had liver problems while taking some other drug in the past.  This is not a list of all drugs or health problems that interact with this drug.  Tell your doctor and pharmacist about all of your drugs (prescription or OTC, natural products, vitamins) and health problems. You must check to make sure that it is safe for you to take  this drug with all of your drugs and health problems. Do not start, stop, or change the dose of any drug without checking with your doctor.  What are some things I need to know or do while I take this drug?   Tell all of your health care providers that you take this drug. This includes your doctors, nurses, pharmacists, and dentists.  Talk with your doctor before you drink alcohol.  Take vitamin B6 (pyridoxine) as you were told by your doctor.  If you have high blood sugar (diabetes), you will need to watch your blood sugar closely.  Have your blood work checked and eye exams as you have been told by your doctor.  Some foods and drinks, like cheese and red wine, may cause sudden, severe high blood pressure when you are taking this drug. This effect can be deadly. Talk with your doctor about your risk for this effect. Get a list of foods and drinks to avoid. Avoid these foods and drinks for as long as your doctor has told you after this drug is stopped.  Tell your doctor if you are pregnant, plan on getting pregnant, or are breast-feeding. You will need to talk about the benefits and risks to you and the baby.  What are some side effects that I need to call my doctor about right away?   WARNING/CAUTION: Even though it may be rare, some people may have very bad and sometimes deadly side effects when taking a drug. Tell your doctor or get medical help right away if you have any of the following signs or symptoms that may be related to a very bad side effect:  Signs of an allergic reaction, like rash; hives; itching; red, swollen, blistered, or peeling skin with or without fever; wheezing; tightness in the chest or throat; trouble breathing, swallowing, or talking; unusual hoarseness; or swelling of the mouth, face, lips, tongue, or throat.  Signs of high blood sugar like confusion, feeling sleepy, unusual thirst or hunger, passing urine more often, flushing, fast breathing, or breath that smells like fruit.  Signs of  lupus like a rash on the cheeks or other body parts, sunburn easy, muscle or joint pain, chest pain or shortness of breath, or swelling in the arms or legs.  Signs of a pancreas problem (pancreatitis) like very bad stomach pain, very bad back pain, or very bad upset stomach or throwing up.  Signs of too much acid in the blood (acidosis) like confusion; fast breathing; fast heartbeat; a heartbeat that does not feel normal; very bad stomach pain, upset stomach, or throwing up; feeling very sleepy; shortness of breath; or feeling very tired or weak.  A burning, numbness, or tingling feeling that is not normal.  Seizures.  Feeling confused.  Memory problems or loss.  Change in eyesight.  Mood changes.  Fever, chills, or sore throat; any unexplained bruising or bleeding; or feeling very tired or weak.  Joint pain or swelling.  Enlarged breasts.  Severe skin reactions may happen with this drug. These include toxic epidermal necrolysis (TEN) and other serious reactions. Sometimes, body organs may also be affected. These reactions can be deadly. Get medical help right away if you have signs like red, swollen, blistered, or peeling skin; red or irritated eyes; sores in your mouth, throat, nose, eyes, genitals, or any areas of skin; fever; chills; body aches; shortness of breath; or swollen glands.  What are some other side effects of this drug?   All drugs may cause side effects. However, many people have no side effects or only have minor side effects. Call your doctor or get medical help if any of these side effects or any other side effects bother you or do not go away:  All products:   Upset stomach or throwing up.  Injection:   Irritation where the shot is given.  These are not all of the side effects that may occur. If you have questions about side effects, call your doctor. Call your doctor for medical advice about side effects.  You may report side effects to your national health agency.  You may report side effects  to the FDA at 1-293.888.2815. You may also report side effects at https://www.fda.gov/medwatch.  How is this drug best taken?   Use this drug as ordered by your doctor. Read all information given to you. Follow all instructions closely.  All oral products:   Take on an empty stomach. Take 1 hour before or 2 hours after meals.  Keep taking this drug as you have been told by your doctor or other health care provider, even if you feel well.  Liquid (solution):   Measure liquid doses carefully. Use the measuring device that comes with this drug. If there is none, ask the pharmacist for a device to measure this drug.  Injection:   It is given as a shot into a muscle.  What do I do if I miss a dose?   All oral products:   Take a missed dose as soon as you think about it.  If it is close to the time for your next dose, skip the missed dose and go back to your normal time.  Do not take 2 doses at the same time or extra doses.  Injection:   Call your doctor to find out what to do.  How do I store and/or throw out this drug?   All oral products:   Store at room temperature protected from light. Store in a dry place. Do not store in a bathroom.  Injection:   If you need to store this drug at home, talk with your doctor, nurse, or pharmacist about how to store it.  All products:   Keep all drugs in a safe place. Keep all drugs out of the reach of children and pets.  Throw away unused or  drugs. Do not flush down a toilet or pour down a drain unless you are told to do so. Check with your pharmacist if you have questions about the best way to throw out drugs. There may be drug take-back programs in your area.  General drug facts   If your symptoms or health problems do not get better or if they become worse, call your doctor.  Do not share your drugs with others and do not take anyone else's drugs.  Some drugs may have another patient information leaflet. If you have any questions about this drug, please talk with your  doctor, nurse, pharmacist, or other health care provider.  Some drugs may have another patient information leaflet. Check with your pharmacist. If you have any questions about this drug, please talk with your doctor, nurse, pharmacist, or other health care provider.  If you think there has been an overdose, call your poison control center or get medical care right away. Be ready to tell or show what was taken, how much, and when it happened.  Consumer Information Use and Disclaimer   This generalized information is a limited summary of diagnosis, treatment, and/or medication information. It is not meant to be comprehensive and should be used as a tool to help the user understand and/or assess potential diagnostic and treatment options. It does NOT include all information about conditions, treatments, medications, side effects, or risks that may apply to a specific patient. It is not intended to be medical advice or a substitute for the medical advice, diagnosis, or treatment of a health care provider based on the health care provider's examination and assessment of a patient's specific and unique circumstances. Patients must speak with a health care provider for complete information about their health, medical questions, and treatment options, including any risks or benefits regarding use of medications. This information does not endorse any treatments or medications as safe, effective, or approved for treating a specific patient. UpTo[employer], Inc[employer] and its affiliates disclaim any warranty or liability relating to this information or the use thereof. The use of this information is governed by the Terms of Use, available at https://www.woltersHeliosuwer.com/en/know/clinical-effectiveness-terms.  Last Reviewed Date   2022-10-25  Copyright   © 2024 UpToDate, Inc. and its affiliates and/or licensors. All rights reserved.                                                                                                                              Isoniazid and Tyramine Containing Foods     Isoniazid can interact with tyramine containing foods, and in rare cases cause a reaction that can be confused with an allergic response, and be frightening to the client. This is because INH is a mild MAO inhibitor.     A tyramine reaction can include: flushing, tachycardia, chills, headache, nausea, vomiting, diarrhea, burning sensations, sweating, or shortness of breath. A client should be made aware of this potential food interaction and be cautioned to eat these foods with caution, in small amounts. If a particular food is found to cause a problem, the INH should be taken 1 hour before or 2 hours after the food item, or avoided completely.     Eat the following tyramine containing foods with caution:   ? cheeses, including american, blue, boursault, brick, brie, camembert, cheddar, emmenthaler, gruyere, mozzarella, parmesan, mccoy, roquefort, stilton, and swiss;   ? sour cream and yogurt;   ? beef or chicken liver, fish, meats prepared with tenderizer, bologna, pepperoni, salami, summer sausage, game meat, meat extracts, caviar, dried fish, herring, shrimp paste, and tuna;   ? avocados, bananas, figs, raisins, and sauerkraut;   ? soy sauce, miso soup, bean curd, and gaby beans;   ? yeast extracts;   ? ginseng;   ? chocolate;   ? caffeine (coffee, tea, cola, etc.); and   ? beer (alcoholic and nonalcoholic), red wine (especially Chianti), maryjane, vermouth, and other distilled spirits

## 2025-08-07 NOTE — Clinical Note
August 7, 2025    Derrick Montanez  [id number]  [facility] [address]          Dear Derrick Montanez,  MRN: [id number]    ***      If you have any questions or concerns, please don't hesitate to call.    Sincerely,        [facility]  [facility] [place] [address]  [phone]

## 2025-08-07 NOTE — TELEPHONE ENCOUNTER
Chart review with Dr. Donahue. Kelvin HERNANDEZ. Patient should go to ED for creatinine of 3.3.   MD also advised patient transfer care to a transplant facility in [place] since he is moving to [place] and have a couple of active issues (elevated creatinine and latent TB) that should be closely followed.    Called and spoke with patient via phone. Explained to patient that team is worried about kidney function and strongly urges him to go to ED today. Also reviewed the importance of treating the latent TB especially since he is immunosuppressed. Patient stated he cannot go to ED because he is still packing and still planning on leaving for [place] tomorrow. Patient does not want to change his travel plans. Also discussed with patient that he has multiple ongoing issues that warrant close follow-up; therefore, we are suggesting he establish transplant follow-up care in [place]. Patient is agreeable to repeat labs tomorrow morning. Patient stated his will re-hydrate today, but still refuses to go to ED at this time. Patient stated his grandpa will be helping him drive [employer] to [place] tomorrow so he will not be alone. Asked patient to consider the above discuss and call coordinator back with more definitive plan.      Patient called coordinator. Patient stated he will proceed with plan to hydrate at home today, and get labs drawn first thing in the morning. Patient agreed not to leave town until repeat labs have resulted. If creatinine still remains elevated after repeat labs, patient agrees to go to ED in [place] at that time.     Patient also agrees to proceed with treatment with isoniazid. Reviewed administrations instructions and reviewed that patient to take a vitamin B6 supplement while being treated with isoniazid. Reviewed side effects of isoniazid including increased liver enzymes. Advised patient that he needs monthly labs to check liver enzymes and that if liver enzymes become elevated, he will need to follow-up with  transplant ID. Reviewed foods that patient should avoid while taking isoniazid including cheese, fish and alcohol.  Education pamphlet on isoniazid, medication purpose, side effects and more comprehensive list of foods to avoid sent to patient via [employer]. Isoniazid and vitamin B6 medication to be sent to patient's local pharmacy [facility]. Refills will be sent to patient's new preferred pharmacy that he will be using in [place] once he provides coordinator with that information.    Patient agreeable to transferring care to [place]/ [facility].

## 2025-08-07 NOTE — TELEPHONE ENCOUNTER
Able to speak with patient via phone today regarding Dr. Donahue and Dr. Kidd's recommendations regarding positive quant gold test. Reviewed that both MDs agree that patient should receive 9 months of treatment with isoniazid and vitamin B6 for latent TB, especially given his immunosuppressed state.     Patient stated he does not want to be treated for TB because of the side effects associated with treatment. Patient stated he had repeat labs drawn today including quant gold. Reiterated to patient that in his immunosuppressed state, if latent TB becomes active TB it could become a very serious infection and become more difficult to treat. Patient adamant that he does not want treatment. Will follow-up with patient again once latest quant gold result is available.     Patient's creatinine is 3.3, eGFR 25 per today's lab. Previous creatinine was 1.6, previous GFR was 60. Patient does not have any home blood pressure readings to report. Asked patient to take blood pressure daily over the next few days and report to transplant team. Patient stated he has not been drinking enough water. Asked patient to attempt to drink an adequate amount of water. Would to repeat labs tomorrow or Monday. Patient stated he is moving to [place] tomorrow and is unable to have labs drawn locally. Patient is not established with any provider in [place] nor does he have a plan for lab location in [place]. Patient will be working at [facility] in [place]. Obtained [facility]'s outpatient lab fax number and spoke with [facility] lab employee. Plan to fax lab orders to [facility]. Advised patient that I am not affliated with [facility] and therefore cannot make a lab appt there. Advised patient to bring lab order slip (provided via [employer]) to lab appt at [facility].     Asked patient which pharmacy he will be utilizing moving forward. Patient stated he will be using [facility] and will forward pharmacy info to transplant team.

## 2025-08-08 ENCOUNTER — LAB VISIT (OUTPATIENT)
Dept: LAB | Facility: HOSPITAL | Age: 30
End: 2025-08-08
Payer: COMMERCIAL

## 2025-08-08 ENCOUNTER — TELEPHONE (OUTPATIENT)
Dept: TRANSPLANT | Facility: CLINIC | Age: 30
End: 2025-08-08
Payer: COMMERCIAL

## 2025-08-08 DIAGNOSIS — Z94.1 STATUS POST HEART TRANSPLANT: ICD-10-CM

## 2025-08-08 LAB
ANION GAP (OHS): 11 MMOL/L (ref 8–16)
BUN SERPL-MCNC: 27 MG/DL (ref 6–20)
CALCIUM SERPL-MCNC: 9.3 MG/DL (ref 8.7–10.5)
CHLORIDE SERPL-SCNC: 107 MMOL/L (ref 95–110)
CO2 SERPL-SCNC: 17 MMOL/L (ref 23–29)
CREAT SERPL-MCNC: 2.6 MG/DL (ref 0.5–1.4)
GFR SERPLBLD CREATININE-BSD FMLA CKD-EPI: 33 ML/MIN/1.73/M2
GLUCOSE SERPL-MCNC: 88 MG/DL (ref 70–110)
MITOGEN MINUS NIL (OHS): 9.96
NIL TB SYNCED (OHS): 0.04
POTASSIUM SERPL-SCNC: 4 MMOL/L (ref 3.5–5.1)
QUANTIFERON GOLD INTERP (OHS): NEGATIVE
SODIUM SERPL-SCNC: 135 MMOL/L (ref 136–145)
TB1 AG MINUS NIL (OHS): 0.1
TB2 AG MINUS NIL (OHS): 0.15

## 2025-08-08 PROCEDURE — 80048 BASIC METABOLIC PNL TOTAL CA: CPT

## 2025-08-08 PROCEDURE — 36415 COLL VENOUS BLD VENIPUNCTURE: CPT | Mod: PN

## 2025-08-08 NOTE — TELEPHONE ENCOUNTER
Called patient to review labs. Creatine is 2.6 today, down from 3.3 yesterday. Patient stated he made an effort to hydrate yesterday. Patient also stated that he accidentally double dosed on Mounjaro this past Wednesday. Advised patient that although his creatinine has improved, he should still got to ED for further evaluation since his creatinine is still elevated.    Notified NP who prescribes Mounjaro that patient believes he double doses his medication this week. NP stated she will lower his dose and ask him to skip a week of mounjaro for safety purposes.       Called back to notify him that his primary care NP may reach out to him to adjust mounjaro dose. Informed patient that our official recommendation is to still go to ED for evaluation and treatment for creatinine of 2.6 Patient stated that he is currently driving to [place] and is about [facility] there. Asked patient to please at least continue to hydrate and have labs drawn at [facility] when he arrives to [place].    Called [facility] post heart transplant coordinator ([phone]) to ask what their process is to transfer care. No answer, left message with my call back number.

## 2025-08-09 ENCOUNTER — PATIENT MESSAGE (OUTPATIENT)
Dept: INTERNAL MEDICINE | Facility: CLINIC | Age: 30
End: 2025-08-09
Payer: COMMERCIAL

## 2025-08-11 ENCOUNTER — PATIENT OUTREACH (OUTPATIENT)
Dept: ADMINISTRATIVE | Facility: HOSPITAL | Age: 30
End: 2025-08-11
Payer: COMMERCIAL

## 2025-08-11 ENCOUNTER — TELEPHONE (OUTPATIENT)
Dept: TRANSPLANT | Facility: CLINIC | Age: 30
End: 2025-08-11
Payer: COMMERCIAL

## 2025-08-11 DIAGNOSIS — Z79.899 ENCOUNTER FOR LONG-TERM (CURRENT) USE OF MEDICATIONS: ICD-10-CM

## 2025-08-11 DIAGNOSIS — Z94.1 STATUS POST HEART TRANSPLANT: Primary | ICD-10-CM

## 2025-08-11 DIAGNOSIS — R76.12 POSITIVE QUANTIFERON-TB GOLD TEST: ICD-10-CM

## 2025-08-11 DIAGNOSIS — R76.12 FALSE POSITIVE QUANTIFERON-TB GOLD TEST: ICD-10-CM

## 2025-08-12 ENCOUNTER — TELEPHONE (OUTPATIENT)
Dept: TRANSPLANT | Facility: CLINIC | Age: 30
End: 2025-08-12
Payer: COMMERCIAL

## 2025-08-13 ENCOUNTER — TELEPHONE (OUTPATIENT)
Dept: TRANSPLANT | Facility: CLINIC | Age: 30
End: 2025-08-13
Payer: COMMERCIAL

## 2025-08-22 ENCOUNTER — TELEPHONE (OUTPATIENT)
Dept: TRANSPLANT | Facility: CLINIC | Age: 30
End: 2025-08-22
Payer: COMMERCIAL

## 2025-08-27 ENCOUNTER — TELEPHONE (OUTPATIENT)
Dept: TRANSPLANT | Facility: CLINIC | Age: 30
End: 2025-08-27
Payer: COMMERCIAL

## 2025-08-29 DIAGNOSIS — Z94.1 HEART REPLACED BY TRANSPLANT: ICD-10-CM

## 2025-08-29 DIAGNOSIS — Z94.1 HEART TRANSPLANTED: ICD-10-CM

## 2025-08-29 DIAGNOSIS — Z94.1 STATUS POST HEART TRANSPLANT: ICD-10-CM

## 2025-08-29 DIAGNOSIS — E78.2 MIXED HYPERLIPIDEMIA: ICD-10-CM

## 2025-08-29 DIAGNOSIS — T86.20 COMPLICATION OF HEART TRANSPLANT, UNSPECIFIED COMPLICATION: ICD-10-CM

## 2025-08-29 DIAGNOSIS — Z79.899 ENCOUNTER FOR LONG-TERM (CURRENT) USE OF MEDICATIONS: ICD-10-CM

## 2025-08-29 RX ORDER — PRAVASTATIN SODIUM 20 MG/1
20 TABLET ORAL NIGHTLY
Qty: 30 TABLET | Refills: 11 | Status: SHIPPED | OUTPATIENT
Start: 2025-08-29

## 2025-08-29 RX ORDER — TACROLIMUS 5 MG/1
5 CAPSULE ORAL EVERY 12 HOURS
Qty: 60 CAPSULE | Refills: 11 | Status: SHIPPED | OUTPATIENT
Start: 2025-08-29 | End: 2026-08-29

## 2025-08-29 RX ORDER — TACROLIMUS 5 MG/1
CAPSULE ORAL
Qty: 60 CAPSULE | Refills: 11 | Status: SHIPPED | OUTPATIENT
Start: 2025-08-29

## 2025-08-29 RX ORDER — TACROLIMUS 1 MG/1
CAPSULE ORAL
Qty: 90 CAPSULE | Refills: 11 | Status: SHIPPED | OUTPATIENT
Start: 2025-08-29 | End: 2026-08-29

## 2025-08-29 RX ORDER — MYCOPHENOLIC ACID 360 MG/1
720 TABLET, DELAYED RELEASE ORAL 2 TIMES DAILY
Qty: 120 TABLET | Refills: 11 | Status: SHIPPED | OUTPATIENT
Start: 2025-08-29 | End: 2026-08-29

## 2025-09-03 ENCOUNTER — TELEPHONE (OUTPATIENT)
Dept: TRANSPLANT | Facility: CLINIC | Age: 30
End: 2025-09-03

## (undated) DEVICE — SOL IRR WATER STRL 3000 ML

## (undated) DEVICE — GUIDEWIRE SUPRA CORE 035 190CM

## (undated) DEVICE — KIT CUSTOM MANIFOLD

## (undated) DEVICE — SHEATH INTRODUCER 6FR 11CM

## (undated) DEVICE — GUIDE WIRE MOTION .035 X 150CM

## (undated) DEVICE — CATH 5FR OPEN END URETERAL

## (undated) DEVICE — SPIKE CONTRAST CONTROLLER

## (undated) DEVICE — GUIDEWIRE EMERALD 150CM PTFE

## (undated) DEVICE — CATH JACKY RADIAL 5FR 100CM

## (undated) DEVICE — KIT GLIDESHEATH SLEND 6FR 10CM

## (undated) DEVICE — COVER BAND BAG 40 X 40

## (undated) DEVICE — GUIDE LAUNCHER 6FR EBU 3.5

## (undated) DEVICE — SEE MEDLINE ITEM 156894

## (undated) DEVICE — PACK CYSTO

## (undated) DEVICE — TRAY CATH LAB OMC

## (undated) DEVICE — KIT MICROINTRO 4F .018X40X7CM

## (undated) DEVICE — TRANSDUCER ADULT DISP

## (undated) DEVICE — GLOVE BIOGEL 7.5

## (undated) DEVICE — CATH INFINITI JUDKINS JR4

## (undated) DEVICE — GUIDEWIRE STF .035X180CM ANG

## (undated) DEVICE — COVER PROBE US 5.5X58L NON LTX

## (undated) DEVICE — SHEATH INTRODUCER 9FR 11CM

## (undated) DEVICE — DEVICE MYNX GRIP 6/7F

## (undated) DEVICE — FORCEP ENDOMYOCARD BIOPSY7F AD

## (undated) DEVICE — CATH DXTERITY JR40 100CM 6FR

## (undated) DEVICE — WIRE GUIDE SAFE-T-J .035 260CM

## (undated) DEVICE — TRAY CYSTO BASIN

## (undated) DEVICE — SOL IRR NACL .9% 3000ML

## (undated) DEVICE — SEAL UROLOGY

## (undated) DEVICE — KIT PROBE COVER WITH GEL

## (undated) DEVICE — HEMOSTAT VASC BAND LONG 27CM

## (undated) DEVICE — OMNIPAQUE 350 200ML

## (undated) DEVICE — CATH ANG PIGTAIL 4FR INFINITY

## (undated) DEVICE — SHEATH 7FR 98CM

## (undated) DEVICE — FIBER MOSES 200 DFL

## (undated) DEVICE — CATH EAGLE EYE PLATINUM

## (undated) DEVICE — CATH DXTERITY JL40 100CM 6FR

## (undated) DEVICE — GUIDE WIRE BMW 014 X190

## (undated) DEVICE — SHEATH INTRODUCER 4FR 11CM

## (undated) DEVICE — SYR ONLY LUER LOCK 20CC

## (undated) DEVICE — CATH SWAN GANZ STND 7FR

## (undated) DEVICE — OMNIPAQUE CONTRAST 350MG/100ML

## (undated) DEVICE — PAD DEFIB CADENCE ADULT R2

## (undated) DEVICE — SEE MEDLINE ITEM 157187

## (undated) DEVICE — GOWN X-LG STERILE BACK

## (undated) DEVICE — EXTRACTOR TIPLESS 2.4FRX1115CM

## (undated) DEVICE — SYR 10CC LUER LOCK

## (undated) DEVICE — SET IRR URLGY 2LINE UNIV SPIKE